# Patient Record
Sex: FEMALE | Race: WHITE | NOT HISPANIC OR LATINO | Employment: OTHER | URBAN - METROPOLITAN AREA
[De-identification: names, ages, dates, MRNs, and addresses within clinical notes are randomized per-mention and may not be internally consistent; named-entity substitution may affect disease eponyms.]

---

## 2017-04-18 ENCOUNTER — HISTORICAL (OUTPATIENT)
Dept: RESPIRATORY THERAPY | Facility: HOSPITAL | Age: 62
End: 2017-04-18

## 2018-07-02 ENCOUNTER — HISTORICAL (OUTPATIENT)
Dept: LAB | Facility: HOSPITAL | Age: 63
End: 2018-07-02

## 2018-07-02 LAB
INR PPP: 2.15 (ref 2–3)
PROTHROMBIN TIME: 21.2 SECOND(S) (ref 9.3–11.4)

## 2018-07-09 ENCOUNTER — HISTORICAL (OUTPATIENT)
Dept: LAB | Facility: HOSPITAL | Age: 63
End: 2018-07-09

## 2018-07-09 LAB
INR PPP: 3.95 (ref 2–3)
PROTHROMBIN TIME: 37.1 SECOND(S) (ref 9.3–11.4)

## 2018-07-18 ENCOUNTER — HISTORICAL (OUTPATIENT)
Dept: LAB | Facility: HOSPITAL | Age: 63
End: 2018-07-18

## 2018-07-18 LAB
INR PPP: 2.71 (ref 2–3)
PROTHROMBIN TIME: 26.2 SECOND(S) (ref 9.3–11.4)

## 2018-09-07 ENCOUNTER — HISTORICAL (OUTPATIENT)
Dept: LAB | Facility: HOSPITAL | Age: 63
End: 2018-09-07

## 2018-09-07 LAB
INR PPP: 2.79 (ref 2–3)
PROTHROMBIN TIME: 26.9 SECOND(S) (ref 9.3–11.4)

## 2018-12-21 ENCOUNTER — HISTORICAL (OUTPATIENT)
Dept: LAB | Facility: HOSPITAL | Age: 63
End: 2018-12-21

## 2018-12-21 LAB
INR PPP: 3.24 (ref 2–3)
PROTHROMBIN TIME: 30.9 SECOND(S) (ref 9.3–11.4)

## 2019-01-25 ENCOUNTER — HISTORICAL (OUTPATIENT)
Dept: LAB | Facility: HOSPITAL | Age: 64
End: 2019-01-25

## 2019-01-25 LAB
INR PPP: 3.61 (ref 2–3)
PROTHROMBIN TIME: 34.1 SECOND(S) (ref 9.3–11.4)

## 2019-02-01 ENCOUNTER — HISTORICAL (OUTPATIENT)
Dept: LAB | Facility: HOSPITAL | Age: 64
End: 2019-02-01

## 2019-02-01 LAB
INR PPP: 2.95 (ref 2–3)
PROTHROMBIN TIME: 28.4 SECOND(S) (ref 9.3–11.4)

## 2019-02-15 ENCOUNTER — HISTORICAL (OUTPATIENT)
Dept: LAB | Facility: HOSPITAL | Age: 64
End: 2019-02-15

## 2019-02-15 LAB
INR PPP: 2.85 (ref 2–3)
PROTHROMBIN TIME: 27.4 SECOND(S) (ref 9.3–11.4)

## 2019-03-26 ENCOUNTER — HISTORICAL (OUTPATIENT)
Dept: LAB | Facility: HOSPITAL | Age: 64
End: 2019-03-26

## 2019-03-26 LAB
INR PPP: 2.11 (ref 2–3)
PROTHROMBIN TIME: 20.8 SECOND(S) (ref 9.3–11.4)

## 2019-05-24 ENCOUNTER — HISTORICAL (OUTPATIENT)
Dept: LAB | Facility: HOSPITAL | Age: 64
End: 2019-05-24

## 2019-05-24 LAB
INR PPP: 2.99 (ref 2–3)
PROTHROMBIN TIME: 28.7 SECOND(S) (ref 9.3–11.4)

## 2019-07-15 ENCOUNTER — HISTORICAL (OUTPATIENT)
Dept: LAB | Facility: HOSPITAL | Age: 64
End: 2019-07-15

## 2019-07-15 LAB
ALBUMIN SERPL-MCNC: 3.9 GM/DL (ref 3.4–5)
ALBUMIN/GLOB SERPL: 1.1 RATIO (ref 1.1–2)
ALP SERPL-CCNC: 125 UNIT/L (ref 46–116)
ALT SERPL-CCNC: 24 UNIT/L (ref 12–78)
AST SERPL-CCNC: 18 UNIT/L (ref 15–37)
BILIRUB SERPL-MCNC: 0.6 MG/DL (ref 0.2–1)
BILIRUBIN DIRECT+TOT PNL SERPL-MCNC: 0.13 MG/DL (ref 0–0.2)
BILIRUBIN DIRECT+TOT PNL SERPL-MCNC: 0.47 MG/DL (ref 0–0.8)
BUN SERPL-MCNC: 15.2 MG/DL (ref 7–18)
CALCIUM SERPL-MCNC: 8.9 MG/DL (ref 8.5–10.1)
CHLORIDE SERPL-SCNC: 105 MMOL/L (ref 98–107)
CHOLEST SERPL-MCNC: 127 MG/DL (ref 0–200)
CHOLEST/HDLC SERPL: 2.6 {RATIO} (ref 0–4)
CO2 SERPL-SCNC: 28.5 MMOL/L (ref 21–32)
CREAT SERPL-MCNC: 0.78 MG/DL (ref 0.6–1.3)
GLOBULIN SER-MCNC: 3.4 GM/DL (ref 2.4–3.5)
GLUCOSE SERPL-MCNC: 138 MG/DL (ref 74–106)
HDLC SERPL-MCNC: 49 MG/DL (ref 40–60)
INR PPP: 3.04 (ref 2–3)
LDLC SERPL CALC-MCNC: 64 MG/DL (ref 0–129)
POTASSIUM SERPL-SCNC: 4.6 MMOL/L (ref 3.5–5.1)
PROT SERPL-MCNC: 7.3 GM/DL (ref 6.4–8.2)
PROTHROMBIN TIME: 29.1 SECOND(S) (ref 9.3–11.4)
SODIUM SERPL-SCNC: 144 MMOL/L (ref 136–145)
TRIGL SERPL-MCNC: 68 MG/DL
VLDLC SERPL CALC-MCNC: 14 MG/DL

## 2019-09-26 ENCOUNTER — HISTORICAL (OUTPATIENT)
Dept: LAB | Facility: HOSPITAL | Age: 64
End: 2019-09-26

## 2019-09-26 LAB
INR PPP: 2.76 (ref 2–3)
PROTHROMBIN TIME: 26.7 SECOND(S) (ref 9.3–11.4)

## 2019-11-21 ENCOUNTER — HISTORICAL (OUTPATIENT)
Dept: LAB | Facility: HOSPITAL | Age: 64
End: 2019-11-21

## 2019-11-21 LAB
INR PPP: 2.89 (ref 2–3)
PROTHROMBIN TIME: 27.8 SECOND(S) (ref 9.3–11.4)

## 2020-01-07 ENCOUNTER — HISTORICAL (OUTPATIENT)
Dept: LAB | Facility: HOSPITAL | Age: 65
End: 2020-01-07

## 2020-01-07 LAB
INR PPP: 2.11 (ref 2–3)
PROTHROMBIN TIME: 20.8 SECOND(S) (ref 9.3–11.4)

## 2020-02-10 ENCOUNTER — HISTORICAL (OUTPATIENT)
Dept: LAB | Facility: HOSPITAL | Age: 65
End: 2020-02-10

## 2020-02-10 LAB
INR PPP: 4.62 (ref 2–3)
PROTHROMBIN TIME: 42.7 SECOND(S) (ref 9.3–11.4)

## 2020-02-12 ENCOUNTER — HISTORICAL (OUTPATIENT)
Dept: LAB | Facility: HOSPITAL | Age: 65
End: 2020-02-12

## 2020-02-12 LAB
INR PPP: 1.66 (ref 2–3)
PROTHROMBIN TIME: 16.7 SECOND(S) (ref 9.3–11.4)

## 2020-02-14 ENCOUNTER — HISTORICAL (OUTPATIENT)
Dept: LAB | Facility: HOSPITAL | Age: 65
End: 2020-02-14

## 2020-02-14 LAB
INR PPP: 1.84 (ref 2–3)
PROTHROMBIN TIME: 18.4 SECOND(S) (ref 9.3–11.4)

## 2020-02-28 ENCOUNTER — HISTORICAL (OUTPATIENT)
Dept: LAB | Facility: HOSPITAL | Age: 65
End: 2020-02-28

## 2020-02-28 LAB
INR PPP: 3.63 (ref 2–3)
PROTHROMBIN TIME: 34.2 SECOND(S) (ref 9.3–11.4)

## 2020-03-10 ENCOUNTER — HISTORICAL (OUTPATIENT)
Dept: LAB | Facility: HOSPITAL | Age: 65
End: 2020-03-10

## 2020-03-10 LAB
INR PPP: 4.41 (ref 2–3)
PROTHROMBIN TIME: 40.9 SECOND(S) (ref 9.3–11.4)

## 2020-03-16 ENCOUNTER — HISTORICAL (OUTPATIENT)
Dept: LAB | Facility: HOSPITAL | Age: 65
End: 2020-03-16

## 2020-03-16 LAB
INR PPP: 2.9 (ref 0–1.3)
PROTHROMBIN TIME: 29.4 SECOND(S) (ref 11.1–13.7)

## 2020-04-02 ENCOUNTER — HISTORICAL (OUTPATIENT)
Dept: LAB | Facility: HOSPITAL | Age: 65
End: 2020-04-02

## 2020-04-02 LAB
INR PPP: 3.6 (ref 0–1.3)
PROTHROMBIN TIME: 35.3 SECOND(S) (ref 11.1–13.7)

## 2020-04-16 ENCOUNTER — HISTORICAL (OUTPATIENT)
Dept: LAB | Facility: HOSPITAL | Age: 65
End: 2020-04-16

## 2020-04-16 LAB
BUN SERPL-MCNC: 19.8 MG/DL (ref 7–18)
CALCIUM SERPL-MCNC: 9 MG/DL (ref 8.5–10.1)
CHLORIDE SERPL-SCNC: 103 MMOL/L (ref 98–107)
CO2 SERPL-SCNC: 29.2 MMOL/L (ref 21–32)
CREAT SERPL-MCNC: 0.7 MG/DL (ref 0.6–1.3)
CREAT/UREA NIT SERPL: 28
GLUCOSE SERPL-MCNC: 107 MG/DL (ref 74–106)
INR PPP: 3.3 (ref 0–1.3)
POTASSIUM SERPL-SCNC: 5 MMOL/L (ref 3.5–5.1)
PROTHROMBIN TIME: 32.9 SECOND(S) (ref 11.1–13.7)
SODIUM SERPL-SCNC: 139 MMOL/L (ref 136–145)

## 2020-05-15 ENCOUNTER — HISTORICAL (OUTPATIENT)
Dept: LAB | Facility: HOSPITAL | Age: 65
End: 2020-05-15

## 2020-05-15 LAB
INR PPP: 2.7 (ref 0–1.3)
PROTHROMBIN TIME: 27.7 SECOND(S) (ref 11.1–13.7)

## 2020-06-18 ENCOUNTER — HISTORICAL (OUTPATIENT)
Dept: LAB | Facility: HOSPITAL | Age: 65
End: 2020-06-18

## 2020-06-18 LAB
INR PPP: 3.3 (ref 0–1.3)
PROTHROMBIN TIME: 33 SECOND(S) (ref 11.1–13.7)

## 2020-07-07 ENCOUNTER — HISTORICAL (OUTPATIENT)
Dept: LAB | Facility: HOSPITAL | Age: 65
End: 2020-07-07

## 2020-07-07 LAB
INR PPP: 2.5 (ref 0–1.3)
PROTHROMBIN TIME: 25.9 SECOND(S) (ref 11.1–13.7)

## 2020-07-30 ENCOUNTER — HISTORICAL (OUTPATIENT)
Dept: LAB | Facility: HOSPITAL | Age: 65
End: 2020-07-30

## 2020-07-30 LAB
INR PPP: 2.3 (ref 0–1.3)
PROTHROMBIN TIME: 24.9 SECOND(S) (ref 11.1–13.7)

## 2020-09-14 ENCOUNTER — HISTORICAL (OUTPATIENT)
Dept: LAB | Facility: HOSPITAL | Age: 65
End: 2020-09-14

## 2020-09-14 LAB
INR PPP: 2.4 (ref 0–1.3)
PROTHROMBIN TIME: 25.7 SECOND(S) (ref 11.1–13.7)

## 2020-10-26 ENCOUNTER — HISTORICAL (OUTPATIENT)
Dept: LAB | Facility: HOSPITAL | Age: 65
End: 2020-10-26

## 2020-10-26 LAB
INR PPP: 2.5 (ref 0–1.3)
PROTHROMBIN TIME: 26.8 SECOND(S) (ref 11.1–13.7)

## 2020-12-03 ENCOUNTER — HISTORICAL (OUTPATIENT)
Dept: LAB | Facility: HOSPITAL | Age: 65
End: 2020-12-03

## 2020-12-03 LAB
INR PPP: 1.5 (ref 0–1.3)
PROTHROMBIN TIME: 17.4 SECOND(S) (ref 11.1–13.7)

## 2020-12-08 ENCOUNTER — HISTORICAL (OUTPATIENT)
Dept: LAB | Facility: HOSPITAL | Age: 65
End: 2020-12-08

## 2020-12-08 LAB
INR PPP: 1.8 (ref 0–1.3)
PROTHROMBIN TIME: 20.6 SECOND(S) (ref 11.1–13.7)

## 2020-12-15 ENCOUNTER — HISTORICAL (OUTPATIENT)
Dept: LAB | Facility: HOSPITAL | Age: 65
End: 2020-12-15

## 2020-12-15 LAB
INR PPP: 2.7 (ref 0–1.27)
PROTHROMBIN TIME: 28.1 SECOND(S) (ref 11.1–13.7)

## 2020-12-22 ENCOUNTER — HISTORICAL (OUTPATIENT)
Dept: LAB | Facility: HOSPITAL | Age: 65
End: 2020-12-22

## 2020-12-22 LAB
INR PPP: 2.7 (ref 0–1.3)
PROTHROMBIN TIME: 28.5 SECOND(S) (ref 11.1–13.7)

## 2021-01-15 ENCOUNTER — HISTORICAL (OUTPATIENT)
Dept: LAB | Facility: HOSPITAL | Age: 66
End: 2021-01-15

## 2021-01-15 LAB
INR PPP: 1.6 (ref 0–1.3)
PROTHROMBIN TIME: 18.7 SECOND(S) (ref 11.1–13.7)

## 2021-01-20 ENCOUNTER — HISTORICAL (OUTPATIENT)
Dept: LAB | Facility: HOSPITAL | Age: 66
End: 2021-01-20

## 2021-01-20 LAB
INR PPP: 1.2 (ref 0–1.3)
PROTHROMBIN TIME: 14.6 SECOND(S) (ref 11.1–13.7)

## 2021-01-22 ENCOUNTER — HISTORICAL (OUTPATIENT)
Dept: LAB | Facility: HOSPITAL | Age: 66
End: 2021-01-22

## 2021-01-22 LAB
INR PPP: 1.9 (ref 0–1.3)
PROTHROMBIN TIME: 21.6 SECOND(S) (ref 11.1–13.7)

## 2021-02-03 ENCOUNTER — HISTORICAL (OUTPATIENT)
Dept: LAB | Facility: HOSPITAL | Age: 66
End: 2021-02-03

## 2021-02-03 LAB
INR PPP: 1.6 (ref 0–1.3)
PROTHROMBIN TIME: 19.2 SECOND(S) (ref 11.1–13.7)

## 2021-02-09 ENCOUNTER — HISTORICAL (OUTPATIENT)
Dept: LAB | Facility: HOSPITAL | Age: 66
End: 2021-02-09

## 2021-02-09 LAB
INR PPP: 1.6 (ref 0–1.3)
PROTHROMBIN TIME: 18.6 SECOND(S) (ref 11.1–13.7)

## 2021-02-18 ENCOUNTER — HISTORICAL (OUTPATIENT)
Dept: LAB | Facility: HOSPITAL | Age: 66
End: 2021-02-18

## 2021-02-18 LAB
INR PPP: 2.1 (ref 0–1.3)
PROTHROMBIN TIME: 23.1 SECOND(S) (ref 11.1–13.7)

## 2021-03-05 ENCOUNTER — HISTORICAL (OUTPATIENT)
Dept: LAB | Facility: HOSPITAL | Age: 66
End: 2021-03-05

## 2021-03-05 LAB
INR PPP: 2.4 (ref 0–1.3)
PROTHROMBIN TIME: 26 SECOND(S) (ref 11.1–13.7)

## 2021-03-15 ENCOUNTER — HISTORICAL (OUTPATIENT)
Dept: LAB | Facility: HOSPITAL | Age: 66
End: 2021-03-15

## 2021-03-15 LAB
INR PPP: 3.9 (ref 0–1.3)
PROTHROMBIN TIME: 37.7 SECOND(S) (ref 11.1–13.7)

## 2021-03-19 ENCOUNTER — HISTORICAL (OUTPATIENT)
Dept: LAB | Facility: HOSPITAL | Age: 66
End: 2021-03-19

## 2021-03-19 LAB
INR PPP: 4.19 (ref 2–3)
PROTHROMBIN TIME: 39.1 SECOND(S) (ref 9.3–11.4)

## 2021-03-23 ENCOUNTER — HISTORICAL (OUTPATIENT)
Dept: LAB | Facility: HOSPITAL | Age: 66
End: 2021-03-23

## 2021-03-23 LAB
INR PPP: 3.8 (ref 2–3)
PROTHROMBIN TIME: 35.7 SECOND(S) (ref 9.3–11.4)

## 2021-03-31 ENCOUNTER — HISTORICAL (OUTPATIENT)
Dept: LAB | Facility: HOSPITAL | Age: 66
End: 2021-03-31

## 2021-03-31 LAB
INR PPP: 1.3 (ref 2–3)
PROTHROMBIN TIME: 13.3 SECOND(S) (ref 9.3–11.4)

## 2021-04-02 ENCOUNTER — HISTORICAL (OUTPATIENT)
Dept: LAB | Facility: HOSPITAL | Age: 66
End: 2021-04-02

## 2021-04-02 LAB
INR PPP: 2.01 (ref 2–3)
PROTHROMBIN TIME: 20 SECOND(S) (ref 9.3–11.4)

## 2021-04-12 ENCOUNTER — HISTORICAL (OUTPATIENT)
Dept: LAB | Facility: HOSPITAL | Age: 66
End: 2021-04-12

## 2021-04-12 LAB
INR PPP: 2.09 (ref 2–3)
PROTHROMBIN TIME: 20.7 SECOND(S) (ref 9.3–11.4)

## 2021-05-06 ENCOUNTER — HISTORICAL (OUTPATIENT)
Dept: ADMINISTRATIVE | Facility: HOSPITAL | Age: 66
End: 2021-05-06

## 2021-05-25 ENCOUNTER — HISTORICAL (OUTPATIENT)
Dept: ADMINISTRATIVE | Facility: HOSPITAL | Age: 66
End: 2021-05-25

## 2021-06-08 ENCOUNTER — HISTORICAL (OUTPATIENT)
Dept: ADMINISTRATIVE | Facility: HOSPITAL | Age: 66
End: 2021-06-08

## 2021-06-24 ENCOUNTER — HISTORICAL (OUTPATIENT)
Dept: ADMINISTRATIVE | Facility: HOSPITAL | Age: 66
End: 2021-06-24

## 2021-07-15 ENCOUNTER — HISTORICAL (OUTPATIENT)
Dept: ADMINISTRATIVE | Facility: HOSPITAL | Age: 66
End: 2021-07-15

## 2021-08-05 ENCOUNTER — HISTORICAL (OUTPATIENT)
Dept: ADMINISTRATIVE | Facility: HOSPITAL | Age: 66
End: 2021-08-05

## 2021-09-07 ENCOUNTER — HISTORICAL (OUTPATIENT)
Dept: ADMINISTRATIVE | Facility: HOSPITAL | Age: 66
End: 2021-09-07

## 2021-10-12 ENCOUNTER — HISTORICAL (OUTPATIENT)
Dept: ADMINISTRATIVE | Facility: HOSPITAL | Age: 66
End: 2021-10-12

## 2021-11-09 ENCOUNTER — HISTORICAL (OUTPATIENT)
Dept: ADMINISTRATIVE | Facility: HOSPITAL | Age: 66
End: 2021-11-09

## 2021-11-30 ENCOUNTER — HISTORICAL (OUTPATIENT)
Dept: ADMINISTRATIVE | Facility: HOSPITAL | Age: 66
End: 2021-11-30

## 2021-12-28 ENCOUNTER — HISTORICAL (OUTPATIENT)
Dept: ADMINISTRATIVE | Facility: HOSPITAL | Age: 66
End: 2021-12-28

## 2022-01-20 ENCOUNTER — HISTORICAL (OUTPATIENT)
Dept: ADMINISTRATIVE | Facility: HOSPITAL | Age: 67
End: 2022-01-20

## 2022-02-24 ENCOUNTER — HISTORICAL (OUTPATIENT)
Dept: ADMINISTRATIVE | Facility: HOSPITAL | Age: 67
End: 2022-02-24

## 2022-04-05 ENCOUNTER — HISTORICAL (OUTPATIENT)
Dept: ADMINISTRATIVE | Facility: HOSPITAL | Age: 67
End: 2022-04-05

## 2022-05-02 DIAGNOSIS — Z95.2 S/P AVR (AORTIC VALVE REPLACEMENT): Primary | ICD-10-CM

## 2022-05-09 DIAGNOSIS — Z95.2 S/P AVR (AORTIC VALVE REPLACEMENT): Primary | ICD-10-CM

## 2022-05-10 ENCOUNTER — ANTI-COAG VISIT (OUTPATIENT)
Dept: CARDIOLOGY | Facility: HOSPITAL | Age: 67
End: 2022-05-10
Payer: MEDICARE

## 2022-05-10 DIAGNOSIS — Z95.2 S/P AVR (AORTIC VALVE REPLACEMENT): ICD-10-CM

## 2022-05-10 LAB — INR PPP: 4 (ref 2–3)

## 2022-05-10 PROCEDURE — 99211 OFF/OP EST MAY X REQ PHY/QHP: CPT

## 2022-05-10 PROCEDURE — 85610 PROTHROMBIN TIME: CPT

## 2022-05-31 ENCOUNTER — ANTI-COAG VISIT (OUTPATIENT)
Dept: CARDIOLOGY | Facility: HOSPITAL | Age: 67
End: 2022-05-31
Payer: MEDICARE

## 2022-05-31 LAB — INR PPP: 3.6 (ref 2–3)

## 2022-05-31 PROCEDURE — 85610 PROTHROMBIN TIME: CPT

## 2022-06-21 ENCOUNTER — ANTI-COAG VISIT (OUTPATIENT)
Dept: CARDIOLOGY | Facility: HOSPITAL | Age: 67
End: 2022-06-21
Payer: MEDICARE

## 2022-06-21 LAB — INR PPP: 2.2 (ref 2–3)

## 2022-06-21 PROCEDURE — 99211 OFF/OP EST MAY X REQ PHY/QHP: CPT

## 2022-06-21 PROCEDURE — 85610 PROTHROMBIN TIME: CPT

## 2022-06-27 DIAGNOSIS — I48.0 PAROXYSMAL ATRIAL FIBRILLATION: Primary | ICD-10-CM

## 2022-07-12 ENCOUNTER — ANTI-COAG VISIT (OUTPATIENT)
Dept: CARDIOLOGY | Facility: HOSPITAL | Age: 67
End: 2022-07-12
Payer: MEDICARE

## 2022-07-12 DIAGNOSIS — I48.0 PAROXYSMAL ATRIAL FIBRILLATION: ICD-10-CM

## 2022-07-12 LAB — INR PPP: 3.3 (ref 2–3)

## 2022-07-12 PROCEDURE — 99211 OFF/OP EST MAY X REQ PHY/QHP: CPT

## 2022-07-12 PROCEDURE — 85610 PROTHROMBIN TIME: CPT

## 2022-08-09 ENCOUNTER — ANTI-COAG VISIT (OUTPATIENT)
Dept: CARDIOLOGY | Facility: HOSPITAL | Age: 67
End: 2022-08-09
Payer: MEDICARE

## 2022-08-09 DIAGNOSIS — I48.0 PAROXYSMAL ATRIAL FIBRILLATION: ICD-10-CM

## 2022-08-09 LAB — INR PPP: 4.2 (ref 2–3)

## 2022-08-09 PROCEDURE — 85610 PROTHROMBIN TIME: CPT

## 2022-08-09 PROCEDURE — 99211 OFF/OP EST MAY X REQ PHY/QHP: CPT

## 2022-09-06 ENCOUNTER — ANTI-COAG VISIT (OUTPATIENT)
Dept: CARDIOLOGY | Facility: HOSPITAL | Age: 67
End: 2022-09-06
Payer: MEDICARE

## 2022-09-06 DIAGNOSIS — I48.0 PAROXYSMAL ATRIAL FIBRILLATION: ICD-10-CM

## 2022-09-06 LAB — INR PPP: 2.3 (ref 2–3)

## 2022-09-06 PROCEDURE — 99211 OFF/OP EST MAY X REQ PHY/QHP: CPT

## 2022-09-06 PROCEDURE — 85610 PROTHROMBIN TIME: CPT

## 2022-10-25 ENCOUNTER — ANTI-COAG VISIT (OUTPATIENT)
Dept: CARDIOLOGY | Facility: HOSPITAL | Age: 67
End: 2022-10-25
Payer: MEDICARE

## 2022-10-25 DIAGNOSIS — I48.0 PAROXYSMAL ATRIAL FIBRILLATION: ICD-10-CM

## 2022-10-25 LAB — INR PPP: 1.6 (ref 2–3)

## 2022-10-25 PROCEDURE — 99211 OFF/OP EST MAY X REQ PHY/QHP: CPT

## 2022-10-25 PROCEDURE — 85610 PROTHROMBIN TIME: CPT

## 2022-11-03 ENCOUNTER — ANTI-COAG VISIT (OUTPATIENT)
Dept: CARDIOLOGY | Facility: HOSPITAL | Age: 67
End: 2022-11-03
Payer: MEDICARE

## 2022-11-03 DIAGNOSIS — I48.0 PAROXYSMAL ATRIAL FIBRILLATION: ICD-10-CM

## 2022-11-03 LAB — INR PPP: 2.7 (ref 2–3)

## 2022-11-03 PROCEDURE — 85610 PROTHROMBIN TIME: CPT

## 2022-11-03 PROCEDURE — 99211 OFF/OP EST MAY X REQ PHY/QHP: CPT

## 2022-12-06 ENCOUNTER — ANTI-COAG VISIT (OUTPATIENT)
Dept: CARDIOLOGY | Facility: HOSPITAL | Age: 67
End: 2022-12-06
Payer: MEDICARE

## 2022-12-06 DIAGNOSIS — I48.0 PAROXYSMAL ATRIAL FIBRILLATION: ICD-10-CM

## 2022-12-06 LAB — INR PPP: 3.1 (ref 2–3)

## 2022-12-06 PROCEDURE — 99211 OFF/OP EST MAY X REQ PHY/QHP: CPT

## 2022-12-06 PROCEDURE — 85610 PROTHROMBIN TIME: CPT

## 2022-12-29 DIAGNOSIS — Z95.2 S/P AVR (AORTIC VALVE REPLACEMENT): ICD-10-CM

## 2022-12-29 DIAGNOSIS — I48.91 A-FIB: Primary | ICD-10-CM

## 2023-01-10 ENCOUNTER — ANTI-COAG VISIT (OUTPATIENT)
Dept: CARDIOLOGY | Facility: HOSPITAL | Age: 68
End: 2023-01-10
Attending: INTERNAL MEDICINE
Payer: MEDICARE

## 2023-01-10 DIAGNOSIS — Z95.2 S/P AVR (AORTIC VALVE REPLACEMENT): ICD-10-CM

## 2023-01-10 DIAGNOSIS — I48.91 A-FIB: ICD-10-CM

## 2023-01-10 LAB — INR PPP: 2.6 (ref 2–3)

## 2023-01-10 PROCEDURE — 99211 OFF/OP EST MAY X REQ PHY/QHP: CPT

## 2023-01-10 PROCEDURE — 85610 PROTHROMBIN TIME: CPT

## 2023-02-17 ENCOUNTER — HOSPITAL ENCOUNTER (OUTPATIENT)
Facility: HOSPITAL | Age: 68
Discharge: HOME | End: 2023-02-20
Attending: EMERGENCY MEDICINE | Admitting: STUDENT IN AN ORGANIZED HEALTH CARE EDUCATION/TRAINING PROGRAM
Payer: MEDICARE

## 2023-02-17 DIAGNOSIS — R06.02 SHORTNESS OF BREATH: ICD-10-CM

## 2023-02-17 DIAGNOSIS — R07.9 CHEST PAIN: ICD-10-CM

## 2023-02-17 DIAGNOSIS — I50.21 ACUTE SYSTOLIC CONGESTIVE HEART FAILURE: ICD-10-CM

## 2023-02-17 DIAGNOSIS — J18.9 COMMUNITY ACQUIRED PNEUMONIA, UNSPECIFIED LATERALITY: ICD-10-CM

## 2023-02-17 DIAGNOSIS — R09.02 HYPOXIA: Primary | ICD-10-CM

## 2023-02-17 DIAGNOSIS — R79.89 TROPONIN LEVEL ELEVATED: ICD-10-CM

## 2023-02-17 LAB
ALBUMIN SERPL-MCNC: 3.8 G/DL (ref 3.4–4.8)
ALBUMIN/GLOB SERPL: 1 RATIO (ref 1.1–2)
ALP SERPL-CCNC: 91 UNIT/L (ref 40–150)
ALT SERPL-CCNC: 29 UNIT/L (ref 0–55)
AST SERPL-CCNC: 33 UNIT/L (ref 5–34)
BASOPHILS # BLD AUTO: 0.03 X10(3)/MCL (ref 0–0.2)
BASOPHILS NFR BLD AUTO: 0.5 %
BILIRUBIN DIRECT+TOT PNL SERPL-MCNC: 0.4 MG/DL
BNP BLD-MCNC: 116.3 PG/ML
BUN SERPL-MCNC: 21.5 MG/DL (ref 9.8–20.1)
CALCIUM SERPL-MCNC: 8.9 MG/DL (ref 8.4–10.2)
CHLORIDE SERPL-SCNC: 97 MMOL/L (ref 98–107)
CO2 SERPL-SCNC: 30 MMOL/L (ref 23–31)
CREAT SERPL-MCNC: 0.85 MG/DL (ref 0.55–1.02)
EOSINOPHIL # BLD AUTO: 0.08 X10(3)/MCL (ref 0–0.9)
EOSINOPHIL NFR BLD AUTO: 1.4 %
ERYTHROCYTE [DISTWIDTH] IN BLOOD BY AUTOMATED COUNT: 13.5 % (ref 11.5–17)
GFR SERPLBLD CREATININE-BSD FMLA CKD-EPI: >60 MLS/MIN/1.73/M2
GLOBULIN SER-MCNC: 3.7 GM/DL (ref 2.4–3.5)
GLUCOSE SERPL-MCNC: 173 MG/DL (ref 82–115)
HCT VFR BLD AUTO: 54.9 % (ref 37–47)
HGB BLD-MCNC: 17.6 G/DL (ref 12–16)
IMM GRANULOCYTES # BLD AUTO: 0.02 X10(3)/MCL (ref 0–0.04)
IMM GRANULOCYTES NFR BLD AUTO: 0.4 %
LYMPHOCYTES # BLD AUTO: 1.2 X10(3)/MCL (ref 0.6–4.6)
LYMPHOCYTES NFR BLD AUTO: 21.5 %
MCH RBC QN AUTO: 30.8 PG
MCHC RBC AUTO-ENTMCNC: 32.1 G/DL (ref 33–36)
MCV RBC AUTO: 96 FL (ref 80–94)
MONOCYTES # BLD AUTO: 0.59 X10(3)/MCL (ref 0.1–1.3)
MONOCYTES NFR BLD AUTO: 10.6 %
NEUTROPHILS # BLD AUTO: 3.67 X10(3)/MCL (ref 2.1–9.2)
NEUTROPHILS NFR BLD AUTO: 65.6 %
NRBC BLD AUTO-RTO: 0 %
PLATELET # BLD AUTO: 182 X10(3)/MCL (ref 130–400)
PMV BLD AUTO: 10 FL (ref 7.4–10.4)
POTASSIUM SERPL-SCNC: 4.1 MMOL/L (ref 3.5–5.1)
PROT SERPL-MCNC: 7.5 GM/DL (ref 5.8–7.6)
RBC # BLD AUTO: 5.72 X10(6)/MCL (ref 4.2–5.4)
SODIUM SERPL-SCNC: 138 MMOL/L (ref 136–145)
TROPONIN I SERPL-MCNC: 0.05 NG/ML (ref 0–0.04)
WBC # SPEC AUTO: 5.6 X10(3)/MCL (ref 4.5–11.5)

## 2023-02-17 PROCEDURE — 25000003 PHARM REV CODE 250: Performed by: EMERGENCY MEDICINE

## 2023-02-17 PROCEDURE — 93005 ELECTROCARDIOGRAM TRACING: CPT

## 2023-02-17 PROCEDURE — 94761 N-INVAS EAR/PLS OXIMETRY MLT: CPT

## 2023-02-17 PROCEDURE — 82962 GLUCOSE BLOOD TEST: CPT

## 2023-02-17 PROCEDURE — 83880 ASSAY OF NATRIURETIC PEPTIDE: CPT | Performed by: EMERGENCY MEDICINE

## 2023-02-17 PROCEDURE — 80053 COMPREHEN METABOLIC PANEL: CPT | Performed by: EMERGENCY MEDICINE

## 2023-02-17 PROCEDURE — 85379 FIBRIN DEGRADATION QUANT: CPT | Performed by: STUDENT IN AN ORGANIZED HEALTH CARE EDUCATION/TRAINING PROGRAM

## 2023-02-17 PROCEDURE — 85025 COMPLETE CBC W/AUTO DIFF WBC: CPT | Performed by: EMERGENCY MEDICINE

## 2023-02-17 PROCEDURE — 85610 PROTHROMBIN TIME: CPT | Performed by: EMERGENCY MEDICINE

## 2023-02-17 PROCEDURE — 84484 ASSAY OF TROPONIN QUANT: CPT | Performed by: EMERGENCY MEDICINE

## 2023-02-17 PROCEDURE — 63600175 PHARM REV CODE 636 W HCPCS: Performed by: EMERGENCY MEDICINE

## 2023-02-17 PROCEDURE — 99285 EMERGENCY DEPT VISIT HI MDM: CPT | Mod: 25

## 2023-02-17 PROCEDURE — 96375 TX/PRO/DX INJ NEW DRUG ADDON: CPT

## 2023-02-17 RX ORDER — FUROSEMIDE 10 MG/ML
40 INJECTION INTRAMUSCULAR; INTRAVENOUS
Status: COMPLETED | OUTPATIENT
Start: 2023-02-17 | End: 2023-02-17

## 2023-02-17 RX ADMIN — NITROGLYCERIN 1 INCH: 20 OINTMENT TOPICAL at 11:02

## 2023-02-17 RX ADMIN — FUROSEMIDE 40 MG: 10 INJECTION, SOLUTION INTRAMUSCULAR; INTRAVENOUS at 11:02

## 2023-02-18 PROBLEM — J18.9 CAP (COMMUNITY ACQUIRED PNEUMONIA): Status: ACTIVE | Noted: 2023-02-18

## 2023-02-18 LAB
ALBUMIN SERPL-MCNC: 3.9 G/DL (ref 3.4–4.8)
ALBUMIN/GLOB SERPL: 1 RATIO (ref 1.1–2)
ALP SERPL-CCNC: 88 UNIT/L (ref 40–150)
ALT SERPL-CCNC: 28 UNIT/L (ref 0–55)
AST SERPL-CCNC: 37 UNIT/L (ref 5–34)
B PERT.PT PRMT NPH QL NAA+NON-PROBE: NOT DETECTED
BASOPHILS # BLD AUTO: 0.02 X10(3)/MCL (ref 0–0.2)
BASOPHILS NFR BLD AUTO: 0.4 %
BILIRUBIN DIRECT+TOT PNL SERPL-MCNC: 0.4 MG/DL
BUN SERPL-MCNC: 22.3 MG/DL (ref 9.8–20.1)
C PNEUM DNA NPH QL NAA+NON-PROBE: NOT DETECTED
CALCIUM SERPL-MCNC: 9.3 MG/DL (ref 8.4–10.2)
CHLORIDE SERPL-SCNC: 97 MMOL/L (ref 98–107)
CO2 SERPL-SCNC: 29 MMOL/L (ref 23–31)
CREAT SERPL-MCNC: 0.86 MG/DL (ref 0.55–1.02)
D DIMER PPP IA.FEU-MCNC: 0.36 UG/ML FEU (ref 0–0.5)
EOSINOPHIL # BLD AUTO: 0.03 X10(3)/MCL (ref 0–0.9)
EOSINOPHIL NFR BLD AUTO: 0.6 %
ERYTHROCYTE [DISTWIDTH] IN BLOOD BY AUTOMATED COUNT: 13.6 % (ref 11.5–17)
FLUAV AG UPPER RESP QL IA.RAPID: NOT DETECTED
FLUBV AG UPPER RESP QL IA.RAPID: NOT DETECTED
GFR SERPLBLD CREATININE-BSD FMLA CKD-EPI: >60 MLS/MIN/1.73/M2
GLOBULIN SER-MCNC: 3.9 GM/DL (ref 2.4–3.5)
GLUCOSE SERPL-MCNC: 158 MG/DL (ref 82–115)
HADV DNA NPH QL NAA+NON-PROBE: NOT DETECTED
HCOV 229E RNA NPH QL NAA+NON-PROBE: NOT DETECTED
HCOV HKU1 RNA NPH QL NAA+NON-PROBE: NOT DETECTED
HCOV NL63 RNA NPH QL NAA+NON-PROBE: NOT DETECTED
HCOV OC43 RNA NPH QL NAA+NON-PROBE: NOT DETECTED
HCT VFR BLD AUTO: 54.4 % (ref 37–47)
HGB BLD-MCNC: 17.7 G/DL (ref 12–16)
HMPV RNA NPH QL NAA+NON-PROBE: DETECTED
HPIV1 RNA NPH QL NAA+NON-PROBE: NOT DETECTED
HPIV2 RNA NPH QL NAA+NON-PROBE: NOT DETECTED
HPIV3 RNA NPH QL NAA+NON-PROBE: NOT DETECTED
HPIV4 RNA NPH QL NAA+NON-PROBE: NOT DETECTED
IMM GRANULOCYTES # BLD AUTO: 0.01 X10(3)/MCL (ref 0–0.04)
IMM GRANULOCYTES NFR BLD AUTO: 0.2 %
LYMPHOCYTES # BLD AUTO: 0.96 X10(3)/MCL (ref 0.6–4.6)
LYMPHOCYTES NFR BLD AUTO: 18.5 %
M PNEUMO DNA NPH QL NAA+NON-PROBE: NOT DETECTED
MAGNESIUM SERPL-MCNC: 2.3 MG/DL (ref 1.6–2.6)
MCH RBC QN AUTO: 31.1 PG
MCHC RBC AUTO-ENTMCNC: 32.5 G/DL (ref 33–36)
MCV RBC AUTO: 95.6 FL (ref 80–94)
MONOCYTES # BLD AUTO: 0.45 X10(3)/MCL (ref 0.1–1.3)
MONOCYTES NFR BLD AUTO: 8.7 %
NEUTROPHILS # BLD AUTO: 3.72 X10(3)/MCL (ref 2.1–9.2)
NEUTROPHILS NFR BLD AUTO: 71.6 %
NRBC BLD AUTO-RTO: 0 %
PHOSPHATE SERPL-MCNC: 3.8 MG/DL (ref 2.3–4.7)
PLATELET # BLD AUTO: 190 X10(3)/MCL (ref 130–400)
PMV BLD AUTO: 10.3 FL (ref 7.4–10.4)
POCT GLUCOSE: 137 MG/DL (ref 70–110)
POCT GLUCOSE: 151 MG/DL (ref 70–110)
POCT GLUCOSE: 159 MG/DL (ref 70–110)
POCT GLUCOSE: 188 MG/DL (ref 70–110)
POCT GLUCOSE: 238 MG/DL (ref 70–110)
POTASSIUM SERPL-SCNC: 4.3 MMOL/L (ref 3.5–5.1)
PROT SERPL-MCNC: 7.8 GM/DL (ref 5.8–7.6)
RBC # BLD AUTO: 5.69 X10(6)/MCL (ref 4.2–5.4)
RSV RNA NPH QL NAA+NON-PROBE: NOT DETECTED
RV+EV RNA NPH QL NAA+NON-PROBE: NOT DETECTED
SARS-COV-2 RDRP RESP QL NAA+PROBE: NEGATIVE
SODIUM SERPL-SCNC: 137 MMOL/L (ref 136–145)
TROPONIN I SERPL-MCNC: 0.67 NG/ML (ref 0–0.04)
TROPONIN I SERPL-MCNC: 0.74 NG/ML (ref 0–0.04)
TROPONIN I SERPL-MCNC: 0.94 NG/ML (ref 0–0.04)
TROPONIN I SERPL-MCNC: 1.07 NG/ML (ref 0–0.04)
WBC # SPEC AUTO: 5.2 X10(3)/MCL (ref 4.5–11.5)

## 2023-02-18 PROCEDURE — 96365 THER/PROPH/DIAG IV INF INIT: CPT

## 2023-02-18 PROCEDURE — 94640 AIRWAY INHALATION TREATMENT: CPT | Mod: XB

## 2023-02-18 PROCEDURE — 93005 ELECTROCARDIOGRAM TRACING: CPT

## 2023-02-18 PROCEDURE — 85610 PROTHROMBIN TIME: CPT | Performed by: STUDENT IN AN ORGANIZED HEALTH CARE EDUCATION/TRAINING PROGRAM

## 2023-02-18 PROCEDURE — 84484 ASSAY OF TROPONIN QUANT: CPT | Mod: 91

## 2023-02-18 PROCEDURE — 96367 TX/PROPH/DG ADDL SEQ IV INF: CPT

## 2023-02-18 PROCEDURE — 84484 ASSAY OF TROPONIN QUANT: CPT | Performed by: STUDENT IN AN ORGANIZED HEALTH CARE EDUCATION/TRAINING PROGRAM

## 2023-02-18 PROCEDURE — 27000221 HC OXYGEN, UP TO 24 HOURS

## 2023-02-18 PROCEDURE — 96376 TX/PRO/DX INJ SAME DRUG ADON: CPT

## 2023-02-18 PROCEDURE — 84100 ASSAY OF PHOSPHORUS: CPT

## 2023-02-18 PROCEDURE — G0378 HOSPITAL OBSERVATION PER HR: HCPCS

## 2023-02-18 PROCEDURE — 87635 SARS-COV-2 COVID-19 AMP PRB: CPT | Performed by: EMERGENCY MEDICINE

## 2023-02-18 PROCEDURE — 81270 JAK2 GENE: CPT | Performed by: STUDENT IN AN ORGANIZED HEALTH CARE EDUCATION/TRAINING PROGRAM

## 2023-02-18 PROCEDURE — 85025 COMPLETE CBC W/AUTO DIFF WBC: CPT | Performed by: STUDENT IN AN ORGANIZED HEALTH CARE EDUCATION/TRAINING PROGRAM

## 2023-02-18 PROCEDURE — 87502 INFLUENZA DNA AMP PROBE: CPT | Mod: 59 | Performed by: STUDENT IN AN ORGANIZED HEALTH CARE EDUCATION/TRAINING PROGRAM

## 2023-02-18 PROCEDURE — 96368 THER/DIAG CONCURRENT INF: CPT

## 2023-02-18 PROCEDURE — 25000242 PHARM REV CODE 250 ALT 637 W/ HCPCS

## 2023-02-18 PROCEDURE — 94761 N-INVAS EAR/PLS OXIMETRY MLT: CPT

## 2023-02-18 PROCEDURE — 87798 DETECT AGENT NOS DNA AMP: CPT | Mod: 59 | Performed by: STUDENT IN AN ORGANIZED HEALTH CARE EDUCATION/TRAINING PROGRAM

## 2023-02-18 PROCEDURE — 96375 TX/PRO/DX INJ NEW DRUG ADDON: CPT

## 2023-02-18 PROCEDURE — 96372 THER/PROPH/DIAG INJ SC/IM: CPT | Mod: 59 | Performed by: STUDENT IN AN ORGANIZED HEALTH CARE EDUCATION/TRAINING PROGRAM

## 2023-02-18 PROCEDURE — 25000003 PHARM REV CODE 250: Performed by: STUDENT IN AN ORGANIZED HEALTH CARE EDUCATION/TRAINING PROGRAM

## 2023-02-18 PROCEDURE — 80053 COMPREHEN METABOLIC PANEL: CPT | Performed by: STUDENT IN AN ORGANIZED HEALTH CARE EDUCATION/TRAINING PROGRAM

## 2023-02-18 PROCEDURE — 87633 RESP VIRUS 12-25 TARGETS: CPT | Performed by: STUDENT IN AN ORGANIZED HEALTH CARE EDUCATION/TRAINING PROGRAM

## 2023-02-18 PROCEDURE — 87040 BLOOD CULTURE FOR BACTERIA: CPT | Mod: 59 | Performed by: STUDENT IN AN ORGANIZED HEALTH CARE EDUCATION/TRAINING PROGRAM

## 2023-02-18 PROCEDURE — 25000242 PHARM REV CODE 250 ALT 637 W/ HCPCS: Performed by: STUDENT IN AN ORGANIZED HEALTH CARE EDUCATION/TRAINING PROGRAM

## 2023-02-18 PROCEDURE — 96361 HYDRATE IV INFUSION ADD-ON: CPT

## 2023-02-18 PROCEDURE — 82668 ASSAY OF ERYTHROPOIETIN: CPT

## 2023-02-18 PROCEDURE — 82103 ALPHA-1-ANTITRYPSIN TOTAL: CPT | Performed by: STUDENT IN AN ORGANIZED HEALTH CARE EDUCATION/TRAINING PROGRAM

## 2023-02-18 PROCEDURE — 63600175 PHARM REV CODE 636 W HCPCS: Performed by: STUDENT IN AN ORGANIZED HEALTH CARE EDUCATION/TRAINING PROGRAM

## 2023-02-18 PROCEDURE — 83735 ASSAY OF MAGNESIUM: CPT

## 2023-02-18 RX ORDER — INSULIN ASPART 100 [IU]/ML
0-5 INJECTION, SOLUTION INTRAVENOUS; SUBCUTANEOUS
Status: DISCONTINUED | OUTPATIENT
Start: 2023-02-18 | End: 2023-02-20 | Stop reason: HOSPADM

## 2023-02-18 RX ORDER — IPRATROPIUM BROMIDE AND ALBUTEROL SULFATE 2.5; .5 MG/3ML; MG/3ML
SOLUTION RESPIRATORY (INHALATION)
Status: COMPLETED
Start: 2023-02-18 | End: 2023-02-18

## 2023-02-18 RX ORDER — SODIUM CHLORIDE 0.9 % (FLUSH) 0.9 %
10 SYRINGE (ML) INJECTION EVERY 12 HOURS PRN
Status: DISCONTINUED | OUTPATIENT
Start: 2023-02-18 | End: 2023-02-20 | Stop reason: HOSPADM

## 2023-02-18 RX ORDER — ATORVASTATIN CALCIUM 40 MG/1
40 TABLET, FILM COATED ORAL NIGHTLY
Status: DISCONTINUED | OUTPATIENT
Start: 2023-02-18 | End: 2023-02-20 | Stop reason: HOSPADM

## 2023-02-18 RX ORDER — FLUOXETINE HYDROCHLORIDE 20 MG/1
40 CAPSULE ORAL DAILY
Status: DISCONTINUED | OUTPATIENT
Start: 2023-02-18 | End: 2023-02-20 | Stop reason: HOSPADM

## 2023-02-18 RX ORDER — WARFARIN SODIUM 5 MG/1
5 TABLET ORAL ONCE
Status: COMPLETED | OUTPATIENT
Start: 2023-02-18 | End: 2023-02-18

## 2023-02-18 RX ORDER — GLUCAGON 1 MG
1 KIT INJECTION
Status: DISCONTINUED | OUTPATIENT
Start: 2023-02-18 | End: 2023-02-20 | Stop reason: HOSPADM

## 2023-02-18 RX ORDER — HYDROCHLOROTHIAZIDE 12.5 MG/1
12.5 TABLET ORAL DAILY
Status: DISCONTINUED | OUTPATIENT
Start: 2023-02-18 | End: 2023-02-20 | Stop reason: HOSPADM

## 2023-02-18 RX ORDER — IPRATROPIUM BROMIDE AND ALBUTEROL SULFATE 2.5; .5 MG/3ML; MG/3ML
3 SOLUTION RESPIRATORY (INHALATION) EVERY 6 HOURS
Status: DISCONTINUED | OUTPATIENT
Start: 2023-02-18 | End: 2023-02-20 | Stop reason: HOSPADM

## 2023-02-18 RX ORDER — ISOSORBIDE MONONITRATE 30 MG/1
60 TABLET, EXTENDED RELEASE ORAL DAILY
Status: DISCONTINUED | OUTPATIENT
Start: 2023-02-18 | End: 2023-02-20 | Stop reason: HOSPADM

## 2023-02-18 RX ORDER — DEXTROSE 40 %
30 GEL (GRAM) ORAL
Status: DISCONTINUED | OUTPATIENT
Start: 2023-02-18 | End: 2023-02-20 | Stop reason: HOSPADM

## 2023-02-18 RX ORDER — AMLODIPINE BESYLATE 5 MG/1
5 TABLET ORAL DAILY
Status: DISCONTINUED | OUTPATIENT
Start: 2023-02-18 | End: 2023-02-20 | Stop reason: HOSPADM

## 2023-02-18 RX ORDER — DEXTROSE 40 %
15 GEL (GRAM) ORAL
Status: DISCONTINUED | OUTPATIENT
Start: 2023-02-18 | End: 2023-02-20 | Stop reason: HOSPADM

## 2023-02-18 RX ORDER — NAPROXEN SODIUM 220 MG/1
81 TABLET, FILM COATED ORAL DAILY
Status: DISCONTINUED | OUTPATIENT
Start: 2023-02-18 | End: 2023-02-20 | Stop reason: HOSPADM

## 2023-02-18 RX ORDER — SOTALOL HYDROCHLORIDE 80 MG/1
80 TABLET ORAL 2 TIMES DAILY
Status: DISCONTINUED | OUTPATIENT
Start: 2023-02-18 | End: 2023-02-20 | Stop reason: HOSPADM

## 2023-02-18 RX ORDER — SODIUM CHLORIDE, SODIUM LACTATE, POTASSIUM CHLORIDE, CALCIUM CHLORIDE 600; 310; 30; 20 MG/100ML; MG/100ML; MG/100ML; MG/100ML
INJECTION, SOLUTION INTRAVENOUS CONTINUOUS
Status: DISCONTINUED | OUTPATIENT
Start: 2023-02-18 | End: 2023-02-18

## 2023-02-18 RX ADMIN — HYDROCHLOROTHIAZIDE 12.5 MG: 12.5 TABLET ORAL at 08:02

## 2023-02-18 RX ADMIN — ASPIRIN 81 MG: 81 TABLET, CHEWABLE ORAL at 08:02

## 2023-02-18 RX ADMIN — SODIUM CHLORIDE, POTASSIUM CHLORIDE, SODIUM LACTATE AND CALCIUM CHLORIDE: 600; 310; 30; 20 INJECTION, SOLUTION INTRAVENOUS at 04:02

## 2023-02-18 RX ADMIN — OLANZAPINE 15 MG: 5 TABLET, FILM COATED ORAL at 11:02

## 2023-02-18 RX ADMIN — SOTALOL HYDROCHLORIDE 80 MG: 80 TABLET ORAL at 10:02

## 2023-02-18 RX ADMIN — SOTALOL HYDROCHLORIDE 80 MG: 80 TABLET ORAL at 08:02

## 2023-02-18 RX ADMIN — INSULIN ASPART 1 UNITS: 100 INJECTION, SOLUTION INTRAVENOUS; SUBCUTANEOUS at 10:02

## 2023-02-18 RX ADMIN — ATORVASTATIN CALCIUM 40 MG: 40 TABLET, FILM COATED ORAL at 10:02

## 2023-02-18 RX ADMIN — AMLODIPINE BESYLATE 5 MG: 5 TABLET ORAL at 08:02

## 2023-02-18 RX ADMIN — IPRATROPIUM BROMIDE AND ALBUTEROL SULFATE 3 ML: 2.5; .5 SOLUTION RESPIRATORY (INHALATION) at 02:02

## 2023-02-18 RX ADMIN — ISOSORBIDE MONONITRATE 60 MG: 30 TABLET, EXTENDED RELEASE ORAL at 08:02

## 2023-02-18 RX ADMIN — METHYLPREDNISOLONE SODIUM SUCCINATE 60 MG: 40 INJECTION, POWDER, FOR SOLUTION INTRAMUSCULAR; INTRAVENOUS at 09:02

## 2023-02-18 RX ADMIN — FLUOXETINE 40 MG: 20 CAPSULE ORAL at 08:02

## 2023-02-18 RX ADMIN — IPRATROPIUM BROMIDE AND ALBUTEROL SULFATE 3 ML: 2.5; .5 SOLUTION RESPIRATORY (INHALATION) at 07:02

## 2023-02-18 RX ADMIN — METHYLPREDNISOLONE SODIUM SUCCINATE 60 MG: 40 INJECTION, POWDER, FOR SOLUTION INTRAMUSCULAR; INTRAVENOUS at 08:02

## 2023-02-18 RX ADMIN — IPRATROPIUM BROMIDE AND ALBUTEROL SULFATE 3 ML: 2.5; .5 SOLUTION RESPIRATORY (INHALATION) at 04:02

## 2023-02-18 RX ADMIN — METHYLPREDNISOLONE SODIUM SUCCINATE 60 MG: 40 INJECTION, POWDER, FOR SOLUTION INTRAMUSCULAR; INTRAVENOUS at 02:02

## 2023-02-18 RX ADMIN — AZITHROMYCIN MONOHYDRATE 500 MG: 500 INJECTION, POWDER, LYOPHILIZED, FOR SOLUTION INTRAVENOUS at 04:02

## 2023-02-18 RX ADMIN — CEFTRIAXONE SODIUM 1 G: 1 INJECTION, POWDER, FOR SOLUTION INTRAMUSCULAR; INTRAVENOUS at 04:02

## 2023-02-18 RX ADMIN — WARFARIN SODIUM 5 MG: 5 TABLET ORAL at 09:02

## 2023-02-18 RX ADMIN — IPRATROPIUM BROMIDE AND ALBUTEROL SULFATE 3 ML: 2.5; .5 SOLUTION RESPIRATORY (INHALATION) at 01:02

## 2023-02-18 NOTE — PLAN OF CARE
Problem: Adult Inpatient Plan of Care  Goal: Plan of Care Review  Outcome: Ongoing, Progressing  Goal: Patient-Specific Goal (Individualized)  Outcome: Ongoing, Progressing  Goal: Absence of Hospital-Acquired Illness or Injury  Outcome: Ongoing, Progressing  Goal: Optimal Comfort and Wellbeing  Outcome: Ongoing, Progressing  Goal: Readiness for Transition of Care  Outcome: Ongoing, Progressing     Problem: Fluid Imbalance (Pneumonia)  Goal: Fluid Balance  Outcome: Ongoing, Progressing     Problem: Infection (Pneumonia)  Goal: Resolution of Infection Signs and Symptoms  Outcome: Ongoing, Progressing     Problem: Respiratory Compromise (Pneumonia)  Goal: Effective Oxygenation and Ventilation  Outcome: Ongoing, Progressing     Problem: Fall Injury Risk  Goal: Absence of Fall and Fall-Related Injury  Outcome: Ongoing, Progressing     Problem: Gas Exchange Impaired  Goal: Optimal Gas Exchange  Outcome: Ongoing, Progressing

## 2023-02-18 NOTE — ED NOTES
"Called to patient room via call bell. Upon entering room, patient sitting at edge of bed states she fell. Removed herself from oxygen, monitor, and external cath suctioning. Unwitnessed. States she was "looking for the controls". Palpated head-no swelling or bruising. No facial injury. Knees without abrasions or bruising. Superficial skin abrasion to right elbow.  "

## 2023-02-18 NOTE — PLAN OF CARE
HO3 Addendum:   I agree with Dr. Coy HPI, ROS, PE and Assessment and plan    PE:  General: appears well, in no acute distress, laying in bed with 4L NC saturating at 92%.  Able to speak in full sentences without getting winded.   Eye: no scleral icterus   HENT: MMM, oropharynx without erythema/exudate   Neck: supple  Respiratory: clear to auscultation in lower lobes,  wheezing noted in bialteral upper lobes, nonlabored respirations   Cardiovascular: regular rate and rhythm without murmurs or gallops, minimal trace edema in bilateral lower extremities   Gastrointestinal: soft, non-tender, non-distended, bowel sounds present   Genitourinary: no suprapubic tenderness   Musculoskeletal: no gross deformities observed   Integumentary: no acute rashes or skin lesions observed    Neuro: No focal lesions observed         CAP   Chronic smoker   Elevated troponin   Afib  Mechanical atrial valve on warfarin  Depression     -curb 65 score is 2  -requiring 4L O2 to maintain 92%  -few days of increasing dyspnea and worsening productive cough with sob and feeling congested in her chest.   -less likely concern for PE at this time as patient is supratheraputic and this was a gradual onset.  Also known sick contact.    -smoked 1 ppd for 40 year, likely undiagnosed COPD  -start Rocephin and Azithromycin  -duonebs, for wheezing in upper lobes   -CT chest without ordered  -will recheck INR in am then reassess in am.  Supratheraputic at this time.  Warfarin goal 2-3.   -will trend troponin and EKG, repeat at 4 am.  Not concerned for cardiac cause at this time. Initial EKG without st elevation or depression, no t-wave changes.  No chest pain, diaphoresis, n/v.  Maybe demand ischemia.  BUN creatine ratio >20 so likely a little dry, will start on maintenance IVF LR at 125 cc/h.    -continue home fluoxetine and olanzapine       Indy Murillo M.D.  [facility]

## 2023-02-18 NOTE — HOSPITAL COURSE
Patient admitted for atypical PNA vs COPD, NSTEMI. She remained without chest pain throughout stay, EKG unremarkable, and troponin peaked at 1 but then downtrended. She follows with [facility] with upcoming appointment in April 2023. She was treated with ASA and nitro. Concern for atypical PNA on CXR, however appeared to be consistent with undiagnosed COPD on chest CT. There was a 6mm nodule noted on chest CT. She required supplemental oxygen throughout the duration of her admission, unable to wean off and she was discharged with home O2. Referral placed to [facility]. Symptoms improved throughout stay on 2L O2. Vitals remained stable. Polycythemia noted likely due to smoking hx with EPO and JAK2 pending at time of discharge. Of note, her INR became supratheraputic and her warfarin was held for 2 days. No active signs of bleeding and she voiced importance of follow up with INR clinic.

## 2023-02-18 NOTE — ED NOTES
"Called lab concerning blood cultures for antibiotic administration. Unable to reach phlebotomy. Reported to floor nurse "Shanel" meds not yet started due to need to have blood cultures collected.  "

## 2023-02-18 NOTE — PROGRESS NOTES
[facility]  Progress Note      Patient Name: Kayla Ruiz  : 1955  MRN: [id number]  Patient Class: OP- Observation   Admission Date: 2023   Length of Stay: 0  Admitting Service: Hospital Medicine  Attending Physician: Jack Patel MD  PCP: Tony Leon MD    CHIEF COMPLAINT   Hospital follow up    HOSPITAL COURSE     Brief HPI:  66y/o F with PMH of afib, aortic valve replacement, DM2, HLD presents to ED with worsening SOB over last 2 days, now requiring oxygen via NC in ED. She reports known sick contact, chills, productive cough, weakness. Denies N/V, dizziness, vision changes, CP, leg edema, abdominal pain. Not on home O2. 50 pack year tobacco history. States baseline ins SOB with exertion when performing house hold activities.     Interval History:  Reported fall overnight. CT head negative. VSS. Needed additional duoneb treatment. Reports improvement from yesterday. Bump on troponin #2. Continues to deny CP, N/V, arm pain.     OBJECTIVE/PHYSICAL EXAM     VITAL SIGNS (MOST RECENT):  Temp: 99 °F (37.2 °C) (23 0444)  Pulse: 73 (23 0444)  Resp: 20 (23 0444)  BP: (!) 142/80 (23 0444)  SpO2: 95 % (23 0444)   VITAL SIGNS (24 HOUR RANGE):  Temp:  [97.9 °F (36.6 °C)-99 °F (37.2 °C)]   Pulse:  [66-88]   Resp:  [15-38]   BP: (111-165)/(69-81)   SpO2:  [77 %-95 %]      Physical Exam  Constitutional:       General: She is not in acute distress.     Comments: Improved from yesterday. On 4L O2 NC   Eyes:      Extraocular Movements: Extraocular movements intact.   Cardiovascular:      Rate and Rhythm: Normal rate and regular rhythm.   Pulmonary:      Effort: Pulmonary effort is normal.      Breath sounds: Wheezing (mild scattered expiratory) present.   Abdominal:      General: Abdomen is flat. There is no distension.      Tenderness: There is no abdominal tenderness.   Skin:     General: Skin is warm and dry.      Capillary Refill: Capillary  refill takes less than 2 seconds.   Neurological:      Mental Status: She is alert and oriented to person, place, and time.   Psychiatric:         Behavior: Behavior normal.       LABS/MICRO/MEDS/DIAGNOSTICS     LABS  CBC  Recent Labs     02/17/23 2217 02/18/23 0358   WBC 5.6 5.2   RBC 5.72* 5.69*   HGB 17.6* 17.7*   HCT 54.9* 54.4*   MCV 96.0* 95.6*   MCH 30.8 31.1   MCHC 32.1* 32.5*   RDW 13.5 13.6    190       Coags  Recent Labs     02/17/23 2217 02/18/23  0406   INR 3.81* 3.96*   D-DIMER 0.36  --      Cardiac  Recent Labs     02/17/23 2217 02/17/23 2218 02/18/23 0358   BNP  --  116.3*  --    TROPONINI 0.054*  --  0.938*        BMP  Recent Labs     02/17/23 2217 02/18/23 0358    137   K 4.1 4.3   CHLORIDE 97* 97*   CO2 30 29   BUN 21.5* 22.3*   CREATININE 0.85 0.86   GLUCOSE 173* 158*   CALCIUM 8.9 9.3     LFTs  Recent Labs     02/17/23 2217 02/18/23 0358   ALBUMIN 3.8 3.9   GLOBULIN 3.7* 3.9*   ALKPHOS 91 88   ALT 29 28   AST 33 37*   BILITOT 0.4 0.4     Diabetes  Recent Labs     02/17/23 2217 02/18/23 0358   GLUCOSE 173* 158*        INTAKE/OUTPUT    Intake/Output Summary (Last 24 hours) at 2/18/2023 0624  Last data filed at 2/18/2023 0611  Gross per 24 hour   Intake 794.17 ml   Output 1150 ml   Net -355.83 ml        MICROBIOLOGY  Microbiology Results (last 7 days)       Procedure Component Value Units Date/Time    Blood Culture [[id number]] Collected: 02/18/23 0358    Order Status: Sent Specimen: Blood from Arm, Right Updated: 02/18/23 0423    Blood Culture [[id number]] Collected: 02/18/23 0358    Order Status: Sent Specimen: Blood from Arm, Left Updated: 02/18/23 0423             MEDICATIONS   albuterol-ipratropium  3 mL Nebulization Q6H    amLODIPine  5 mg Oral Daily    aspirin  81 mg Oral Daily    atorvastatin  40 mg Oral QHS    azithromycin  500 mg Intravenous Q24H    cefTRIAXone (ROCEPHIN) IVPB  1 g Intravenous Q24H    FLUoxetine  40 mg Oral Daily    hydroCHLOROthiazide  12.5 mg Oral  Daily    isosorbide mononitrate  60 mg Oral Daily    OLANZapine  15 mg Oral QHS    sotaloL  80 mg Oral BID         INFUSIONS   lactated ringers 125 mL/hr at 02/18/23 0409        DIAGNOSTIC TESTS  CT Head Without Contrast         CT Chest Without Contrast         X-Ray Chest AP Portable    (Results Pending)        No results found for: EF       ASSESSMENT/PLAN      CAP/Atypical PNA vs COPD         Chronic tobacco user   New requirement for o2, on 4L in ED  Ground glass appearance on CXR, negative covid/flu, repsi culture pending  CURB-65 score 2,   Possible undiagnosed COPD, keep o2 between 88-92%  Supratheraputic INR, unlikely PE, d-dimer negative  Chest CT significant for scarring; head CT negative  Rocephin and azithromycin initiated on 02/18  Duonebs q6, with q4 PRN  Solumedrol 60mg TID  Blood culture pending      Afib        Aortic valve replacement        HTN        NSTEMI likely demand ischemia         HLD   Troponin #2 bump. EKG unremarkable. Will continue to trend troponin and monitor s/s  Symptoms more consistent with CAP than MI or CHF. Received ASA and nitro in ED  Follows up with cardiology with [facility]  Continue home medication    3.    Polycythemia  Likely secondary to tobacco   Ordered JAK2, EPO    4.    DM2  Hold home medication  Initiate SSI     5.    Depression  Continue home medication      Access: pIV  Antibiotics: ceftriaxone and azithromycin  Diet: diabetic heart healthy  DVT Prophylaxis: warfarin  GI Prophylaxis: none  Fluids: LR@125cc/hr     Anticipated discharge and disposition: observation, pending improving in SOB, downtitrating o2 requirement and troponin   __________________________________________________________________________      MD PAMELA SegoviaU [facility], [facility]

## 2023-02-18 NOTE — ED PROVIDER NOTES
Encounter Date: 2/17/2023       History     Chief Complaint   Patient presents with    Shortness of Breath     Pt arrives via [facility] with c/o SOB and was given 324 of aspirin and 2 sprays of nitro enroute; pt states that she gets short of breath with exertion daily but it was worse today; denies any chest pain and currently denies any SOB; O2 sat 77% on room air      67-year-old female with history of AFib, CHF, aortic valve replacement on Coumadin, presents via EMS with shortness of breath and O2 sats 77% on room air documented upon arrival.  Patient reports chronic dyspnea on exertion that has been worse over the past 2 days, currently moderate in intensity.  Denies chest pain.  She does report some cough and congestion recently.  Currently on 4 L nasal cannula, comfortable with O2 sat 95%, no increased work of breathing.    Review of patient's allergies indicates:   Allergen Reactions    Ace inhibitors     Naproxen sodium     Nifedipine     Venlafaxine      History reviewed. No pertinent past medical history.  History reviewed. No pertinent surgical history.  History reviewed. No pertinent family history.  Social History     Tobacco Use    Smoking status: Every Day     Packs/day: 1.00     Types: Cigarettes    Smokeless tobacco: Never     Review of Systems   Constitutional:  Negative for chills and fever.   HENT:  Negative for congestion, rhinorrhea and sore throat.    Respiratory:  Positive for shortness of breath. Negative for chest tightness and wheezing.    Cardiovascular:  Negative for chest pain, palpitations and leg swelling.   Gastrointestinal:  Negative for abdominal pain, blood in stool, diarrhea and vomiting.   Endocrine: Negative for polyuria.   Genitourinary:  Negative for dysuria and flank pain.   Musculoskeletal:  Negative for myalgias.   Skin:  Negative for rash.   Neurological:  Negative for headaches.   All other systems reviewed and are negative.    Physical Exam     Initial Vitals [02/17/23 3975]    BP Pulse Resp Temp SpO2   (!) 165/81 75 (!) 22 97.9 °F (36.6 °C) (!) 77 %      MAP       --         Physical Exam    Nursing note and vitals reviewed.  Constitutional: She appears well-developed and well-nourished.   HENT:   Head: Normocephalic and atraumatic.   Eyes: Conjunctivae are normal. Pupils are equal, round, and reactive to light.   Neck: Neck supple.   Normal range of motion.  Cardiovascular:  Normal rate, regular rhythm, normal heart sounds and intact distal pulses.           Pulmonary/Chest: Breath sounds normal.   Abdominal: Abdomen is soft. Bowel sounds are normal. There is no abdominal tenderness.   Musculoskeletal:         General: No tenderness or edema. Normal range of motion.      Cervical back: Normal range of motion and neck supple.      Comments: Bilateral calves are symmetric and appearance with no significant skin abnormality; normal by palpation with no tenderness or palpable abnormality.     Neurological: She is alert and oriented to person, place, and time.   Skin: Skin is warm and dry.   Psychiatric: She has a normal mood and affect.       ED Course   Procedures  Labs Reviewed   COMPREHENSIVE METABOLIC PANEL - Abnormal; Notable for the following components:       Result Value    Chloride 97 (*)     Glucose Level 173 (*)     Blood Urea Nitrogen 21.5 (*)     Globulin 3.7 (*)     Albumin/Globulin Ratio 1.0 (*)     All other components within normal limits   TROPONIN I - Abnormal; Notable for the following components:    Troponin-I 0.054 (*)     All other components within normal limits   B-TYPE NATRIURETIC PEPTIDE - Abnormal; Notable for the following components:    Natriuretic Peptide 116.3 (*)     All other components within normal limits   PROTIME-INR - Abnormal; Notable for the following components:    INR 3.81 (*)     All other components within normal limits   CBC WITH DIFFERENTIAL - Abnormal; Notable for the following components:    RBC 5.72 (*)     Hgb 17.6 (*)     Hct 54.9 (*)      MCV 96.0 (*)     MCHC 32.1 (*)     All other components within normal limits   SARS-COV-2 RNA AMPLIFICATION, QUAL - Normal    Narrative:     The IDNOW COVID-19 assay is a rapid molecular in vitro diagnostic test utilizing an isothermal nucleic acid amplification technology intended for the qualitative detection of nucleic acid from the SARS-CoV-2 viral RNA in direct nasal, nasopharyngeal or throat swabs from individuals who are suspected of COVID-19 by their healthcare provider.   CBC W/ AUTO DIFFERENTIAL    Narrative:     The following orders were created for panel order CBC auto differential.  Procedure                               Abnormality         Status                     ---------                               -----------         ------                     CBC with Differential[[id number]]        Abnormal            Final result                 Please view results for these tests on the individual orders.          Imaging Results              X-Ray Chest AP Portable (In process)                      Medications   furosemide injection 40 mg (40 mg Intravenous Given 2/17/23 2359)   nitroGLYCERIN 2% TD oint ointment 1 inch (1 inch Topical (Top) Given 2/17/23 2358)     Medical Decision Making:   Initial Assessment:   Differential diagnosis includes but is not limited to bronchitis, pneumonia, pneumothorax, pleural effusion, congestive heart failure, pulmonary embolus      I discussed patient's history, presentation, workup, results, impression, and suggested plan of care with hospitalist Dr Birch. They will see the patient in the ED to determine eligibility for admission to the hospital.      Critical Care:     Aggregate critical care time was 45 minutes which includes only time during which I was engaged in work directly related to patients care, as described above, whether at bedside or elsewhere in the ED. It did not include time spent performing other reported procedures or the services of physician  assistants, residents, students, or nurses.    The high probability of sudden and clinically significant deterioration of patient condition required highest level of my preparedness to intervene urgently.  The services provided to this patient were to treat and/or prevent clinically significant deterioration that could result in permanent disability or death due to hypoxia, interstitial edema, CHF exacerbation .    Services included the following: chart data review, reviewing nurses notes, gathering information form relatives, gathering information for EMS personal, reviewing old charts, documentation time, consultant collaboration regarding findings and treatment options, medication orders/management, direct patient care, re-evaluation of patient response to treatment, vital signs assessments and calculations, interpreting and reviewing diagnostic studies/labs.         Labs are reviewed by me.  No leukocytosis or anemia.  INR mildly supratherapeutic at 3.81.  Mildly hypovolemic suggested by BUN to creatinine ratio of 21.5 to 0.85.  BNP mildly elevated at 116, troponin mildly elevated at 0.054.  COVID test negative.  Chest x-ray shows ground-glass pattern no suggestive of interstitial edema.         Patient is given Lasix 40 mg IV push shortly after arrival and has had approximately 0.5 L diuresis.  She is comfortable on oxygen, 4 L nasal cannula, O2 sat 95%, no increased work of breathing, normal conversation.            Clinical Impression:   Final diagnoses:  [R06.02] Shortness of breath  [R07.9] Chest pain  [R09.02] Hypoxia (Primary)  [I50.21] Acute systolic congestive heart failure  [R77.8] Troponin level elevated               Dennis Landry MD  02/18/23 0114

## 2023-02-18 NOTE — H&P
[facility] Medicine  History & Physical Note      Patient Name: Kayla Ruiz  : 1955  MRN: [id number]  Patient Class: OP- Observation   Admission Date: 2023   Length of Stay: 0  Admitting Service: Hospital Medicine  Attending Physician: Jack Patel MD  PCP: Tony Leon MD  Source of history: Patient and EMR.   Code status: FULL    Chief Complaint   Shortness of Breath (Pt arrives via [facility] with c/o SOB and was given 324 of aspirin and 2 sprays of nitro enroute; pt states that she gets short of breath with exertion daily but it was worse today; denies any chest pain and currently denies any SOB; O2 sat 77% on room air )    History of Present Illness   Patietn with PMH of afib, aortic valve replacement, DM2, HLD presents to ED with worsening SOB over last 2 days, now requiring oxygen via NC in ED. She reports known sick contact, chills, productive cough, weakness. Denies N/V, dizziness, vision changes, CP, leg edema, abdominal pain. Not on home O2. 50 pack year tobacco history. States baseline ins SOB with exertion when performing house hold activities.     ROS   Pertinent positive and negative as mentioned in HPI     Past Medical History     Past Medical History:   Diagnosis Date    A-fib     Anticoagulant long-term use     Depression     Diabetes mellitus      Past Surgical History     Past Surgical History:   Procedure Laterality Date    AORTIC VALVE REPLACEMENT       Social History     Social History     Tobacco Use    Smoking status: Every Day     Packs/day: 1.00     Years: 50.00     Pack years: 50.00     Types: Cigarettes    Smokeless tobacco: Never   Substance Use Topics    Alcohol use: Not on file      Family History   Reviewed and noncontributory    Allergies   Ace inhibitors, Naproxen sodium, Nifedipine, and Venlafaxine    Home Medications     Prior to Admission medications    Not on File      Inpatient Medications   Scheduled Meds   albuterol-ipratropium  3 mL  Nebulization Q6H    azithromycin  500 mg Intravenous Q24H    cefTRIAXone (ROCEPHIN) IVPB  1 g Intravenous Q24H      Continuous Infusions   lactated ringers        PRN Meds  dextrose 10%, dextrose 10%, dextrose, dextrose, glucagon (human recombinant), insulin aspart U-100, sodium chloride 0.9%     Physical Exam   Vital Signs  Temp:  [97.9 °F (36.6 °C)]   Pulse:  [66-80]   Resp:  [17-26]   BP: (111-165)/(69-81)   SpO2:  [77 %-95 %]       General: Appears comfortable on 4L O2 NC. Appropriate and pleasant.  HEENT: NC/AT  Neck:  No JVD  CVS: Regular rhythm. Normal S1/S2. Trace ankle pitting edema b/l  Pulm: No respiratory distress. Scattered mild expiratory wheezing in all lung fields, worse in left upper field  Abdomen: nondistended, normoactive BS, soft and non-tender.  MSK: No obvious deformity or joint swelling  Skin: Warm and dry  Neuro: AAOx3, no focal neurological deficit  Psych: Cooperative    Labs     CBC  Recent Labs     02/17/23 2217   WBC 5.6   RBC 5.72*   HGB 17.6*   HCT 54.9*   MCV 96.0*   MCH 30.8   MCHC 32.1*   RDW 13.5        Coags  Recent Labs     02/17/23 2217   INR 3.81*     Cardiac  Recent Labs     02/17/23 2217 02/17/23 2218   BNP  --  116.3*   TROPONINI 0.054*  --         BMP  Recent Labs     02/17/23 2217      K 4.1   CHLORIDE 97*   CO2 30   BUN 21.5*   CREATININE 0.85   GLUCOSE 173*   CALCIUM 8.9     LFTs  Recent Labs     02/17/23 2217   ALBUMIN 3.8   GLOBULIN 3.7*   ALKPHOS 91   ALT 29   AST 33   BILITOT 0.4     Diabetes  Recent Labs     02/17/23 2217   GLUCOSE 173*        Microbiology Results (last 7 days)       Procedure Component Value Units Date/Time    Blood Culture [[id number]]     Order Status: Sent Specimen: Blood     Blood Culture [[id number]]     Order Status: Sent Specimen: Blood            Imaging     X-Ray Chest AP Portable    (Results Pending)   CT Chest Without Contrast    (Results Pending)   CT Head Without Contrast    (Results Pending)     Assessment & Plan       CAP         Chronic tobacco user   New requirement for o2, on 4L in ED  Ground glass appearance on CXR, negative covid  CURB-65 score 2,   Possible undiagnosed COPD, keep o2 between 88-92%  Supratheraputic INR, unlikely PE, but  will order d-dimer  Will order chest and head CT due to smoking history and fall/weakness   Rocephin and azithromycin initiated on 02/18  Duonebs q6  Blood culture pending      Afib        Aortic valve replacement        Elevated troponin        HLD   Mildly elevated troponin, will repeat  Symptoms more consistent with CAP than MI or CHF. Received ASA and nitro in ED  Continue home medication      DM2  Hold home medication  Initiate SSI     Depression  Continue home medication      Access: pIV  Antibiotics: ceftriaxone and azithromycin  Diet: diabetic heart healthy  DVT Prophylaxis: warfarin  GI Prophylaxis: none  Fluids: LR@125cc/hr    Liliya English MD  [employer] [facility]

## 2023-02-18 NOTE — CARE UPDATE
I was called and informed an increase in troponin to 1.066 from 0.745.      I repeated an EKG which did not show any evidence of an ischemic process.  Ordered phosphorus and magnesium.    I evaluated the patient at bedside and noted that she was not having any chest pain.  She states that her shortness of breath was not worsened from previous.  We will trend troponins for 3 more occurrences or until drop from current plateau.     Zac Mcdowell MD  [facility] PGY-1

## 2023-02-19 LAB
ALBUMIN SERPL-MCNC: 3.5 G/DL (ref 3.4–4.8)
ALBUMIN/GLOB SERPL: 0.9 RATIO (ref 1.1–2)
ALP SERPL-CCNC: 71 UNIT/L (ref 40–150)
ALT SERPL-CCNC: 24 UNIT/L (ref 0–55)
AST SERPL-CCNC: 28 UNIT/L (ref 5–34)
BASOPHILS # BLD AUTO: 0.01 X10(3)/MCL (ref 0–0.2)
BASOPHILS NFR BLD AUTO: 0.2 %
BILIRUBIN DIRECT+TOT PNL SERPL-MCNC: 0.3 MG/DL
BUN SERPL-MCNC: 31.4 MG/DL (ref 9.8–20.1)
CALCIUM SERPL-MCNC: 9.3 MG/DL (ref 8.4–10.2)
CHLORIDE SERPL-SCNC: 100 MMOL/L (ref 98–107)
CO2 SERPL-SCNC: 32 MMOL/L (ref 23–31)
CREAT SERPL-MCNC: 0.82 MG/DL (ref 0.55–1.02)
EOSINOPHIL # BLD AUTO: 0 X10(3)/MCL (ref 0–0.9)
EOSINOPHIL NFR BLD AUTO: 0 %
ERYTHROCYTE [DISTWIDTH] IN BLOOD BY AUTOMATED COUNT: 13.6 % (ref 11.5–17)
GFR SERPLBLD CREATININE-BSD FMLA CKD-EPI: >60 MLS/MIN/1.73/M2
GLOBULIN SER-MCNC: 3.8 GM/DL (ref 2.4–3.5)
GLUCOSE SERPL-MCNC: 180 MG/DL (ref 82–115)
HCT VFR BLD AUTO: 53.2 % (ref 37–47)
HGB BLD-MCNC: 16.9 G/DL (ref 12–16)
IMM GRANULOCYTES # BLD AUTO: 0.01 X10(3)/MCL (ref 0–0.04)
IMM GRANULOCYTES NFR BLD AUTO: 0.2 %
LYMPHOCYTES # BLD AUTO: 0.85 X10(3)/MCL (ref 0.6–4.6)
LYMPHOCYTES NFR BLD AUTO: 20.1 %
MCH RBC QN AUTO: 30 PG
MCHC RBC AUTO-ENTMCNC: 31.8 G/DL (ref 33–36)
MCV RBC AUTO: 94.3 FL (ref 80–94)
MONOCYTES # BLD AUTO: 0.16 X10(3)/MCL (ref 0.1–1.3)
MONOCYTES NFR BLD AUTO: 3.8 %
NEUTROPHILS # BLD AUTO: 3.2 X10(3)/MCL (ref 2.1–9.2)
NEUTROPHILS NFR BLD AUTO: 75.7 %
NRBC BLD AUTO-RTO: 0 %
PLATELET # BLD AUTO: 202 X10(3)/MCL (ref 130–400)
PMV BLD AUTO: 10.3 FL (ref 7.4–10.4)
POCT GLUCOSE: 157 MG/DL (ref 70–110)
POCT GLUCOSE: 179 MG/DL (ref 70–110)
POCT GLUCOSE: 187 MG/DL (ref 70–110)
POCT GLUCOSE: 202 MG/DL (ref 70–110)
POTASSIUM SERPL-SCNC: 4 MMOL/L (ref 3.5–5.1)
PROT SERPL-MCNC: 7.3 GM/DL (ref 5.8–7.6)
RBC # BLD AUTO: 5.64 X10(6)/MCL (ref 4.2–5.4)
SODIUM SERPL-SCNC: 142 MMOL/L (ref 136–145)
WBC # SPEC AUTO: 4.2 X10(3)/MCL (ref 4.5–11.5)

## 2023-02-19 PROCEDURE — 96372 THER/PROPH/DIAG INJ SC/IM: CPT | Performed by: STUDENT IN AN ORGANIZED HEALTH CARE EDUCATION/TRAINING PROGRAM

## 2023-02-19 PROCEDURE — 97162 PT EVAL MOD COMPLEX 30 MIN: CPT

## 2023-02-19 PROCEDURE — 63600175 PHARM REV CODE 636 W HCPCS: Performed by: STUDENT IN AN ORGANIZED HEALTH CARE EDUCATION/TRAINING PROGRAM

## 2023-02-19 PROCEDURE — 96376 TX/PRO/DX INJ SAME DRUG ADON: CPT

## 2023-02-19 PROCEDURE — 25000003 PHARM REV CODE 250: Performed by: STUDENT IN AN ORGANIZED HEALTH CARE EDUCATION/TRAINING PROGRAM

## 2023-02-19 PROCEDURE — 96366 THER/PROPH/DIAG IV INF ADDON: CPT

## 2023-02-19 PROCEDURE — G0378 HOSPITAL OBSERVATION PER HR: HCPCS

## 2023-02-19 PROCEDURE — 94761 N-INVAS EAR/PLS OXIMETRY MLT: CPT

## 2023-02-19 PROCEDURE — 85025 COMPLETE CBC W/AUTO DIFF WBC: CPT | Performed by: STUDENT IN AN ORGANIZED HEALTH CARE EDUCATION/TRAINING PROGRAM

## 2023-02-19 PROCEDURE — 80053 COMPREHEN METABOLIC PANEL: CPT | Performed by: STUDENT IN AN ORGANIZED HEALTH CARE EDUCATION/TRAINING PROGRAM

## 2023-02-19 PROCEDURE — 85610 PROTHROMBIN TIME: CPT | Performed by: STUDENT IN AN ORGANIZED HEALTH CARE EDUCATION/TRAINING PROGRAM

## 2023-02-19 PROCEDURE — 94640 AIRWAY INHALATION TREATMENT: CPT | Mod: XB

## 2023-02-19 PROCEDURE — 25000242 PHARM REV CODE 250 ALT 637 W/ HCPCS: Performed by: STUDENT IN AN ORGANIZED HEALTH CARE EDUCATION/TRAINING PROGRAM

## 2023-02-19 PROCEDURE — 27000221 HC OXYGEN, UP TO 24 HOURS

## 2023-02-19 RX ADMIN — METHYLPREDNISOLONE SODIUM SUCCINATE 60 MG: 40 INJECTION, POWDER, FOR SOLUTION INTRAMUSCULAR; INTRAVENOUS at 06:02

## 2023-02-19 RX ADMIN — METHYLPREDNISOLONE SODIUM SUCCINATE 60 MG: 40 INJECTION, POWDER, FOR SOLUTION INTRAMUSCULAR; INTRAVENOUS at 11:02

## 2023-02-19 RX ADMIN — AZITHROMYCIN MONOHYDRATE 500 MG: 500 INJECTION, POWDER, LYOPHILIZED, FOR SOLUTION INTRAVENOUS at 03:02

## 2023-02-19 RX ADMIN — IPRATROPIUM BROMIDE AND ALBUTEROL SULFATE 3 ML: 2.5; .5 SOLUTION RESPIRATORY (INHALATION) at 08:02

## 2023-02-19 RX ADMIN — ATORVASTATIN CALCIUM 40 MG: 40 TABLET, FILM COATED ORAL at 09:02

## 2023-02-19 RX ADMIN — FLUOXETINE 40 MG: 20 CAPSULE ORAL at 08:02

## 2023-02-19 RX ADMIN — OLANZAPINE 15 MG: 5 TABLET, FILM COATED ORAL at 09:02

## 2023-02-19 RX ADMIN — INSULIN ASPART 2 UNITS: 100 INJECTION, SOLUTION INTRAVENOUS; SUBCUTANEOUS at 05:02

## 2023-02-19 RX ADMIN — CEFTRIAXONE SODIUM 1 G: 1 INJECTION, POWDER, FOR SOLUTION INTRAMUSCULAR; INTRAVENOUS at 03:02

## 2023-02-19 RX ADMIN — METHYLPREDNISOLONE SODIUM SUCCINATE 60 MG: 40 INJECTION, POWDER, FOR SOLUTION INTRAMUSCULAR; INTRAVENOUS at 03:02

## 2023-02-19 RX ADMIN — IPRATROPIUM BROMIDE AND ALBUTEROL SULFATE 3 ML: 2.5; .5 SOLUTION RESPIRATORY (INHALATION) at 01:02

## 2023-02-19 RX ADMIN — SOTALOL HYDROCHLORIDE 80 MG: 80 TABLET ORAL at 09:02

## 2023-02-19 RX ADMIN — ISOSORBIDE MONONITRATE 60 MG: 30 TABLET, EXTENDED RELEASE ORAL at 08:02

## 2023-02-19 RX ADMIN — SOTALOL HYDROCHLORIDE 80 MG: 80 TABLET ORAL at 08:02

## 2023-02-19 RX ADMIN — ASPIRIN 81 MG: 81 TABLET, CHEWABLE ORAL at 08:02

## 2023-02-19 RX ADMIN — AMLODIPINE BESYLATE 5 MG: 5 TABLET ORAL at 08:02

## 2023-02-19 RX ADMIN — HYDROCHLOROTHIAZIDE 12.5 MG: 12.5 TABLET ORAL at 08:02

## 2023-02-19 NOTE — PT/OT/SLP EVAL
Physical Therapy Evaluation    Patient Name:  Kayla Ruiz   MRN:  [id number]    Recommendations:     Discharge Recommendations:  (home w/ responsible caregiver w/ HH)   Discharge Equipment Recommendations: shower chair (pt has access to shower chair, rolling walker, straight cane, wheelchair)   Recommended Equipment needed to be obtained: none  Barriers to discharge:  severity of impairment, 02 requirements    Assessment:     Kayla Ruiz is a 67 y.o. female admitted with a medical diagnosis of CAP (community acquired pneumonia).      CAP/Atypical PNA vs COPD    Chronic tobacco user    Afib   Aortic valve replacement   HTN   NSTEMI likely 2/2 to demand ischemia    HLD    Polycythemia   DM2   Depression  Patient Active Problem List   Diagnosis    CAP (community acquired pneumonia)     She presents with the following impairments/functional limitations: impaired endurance, impaired functional mobility, gait instability, impaired balance, impaired self care skills, impaired cardiopulmonary response to activity.    Rehab Prognosis: Good; patient would benefit from acute skilled PT services to address these deficits and reach maximum level of function.    -continued supervised/assisted out-of-bed and ambulation w/ 02 NC w/o device w/ progression of gait distance/duration/frequency/speed    Recent Surgery: * No surgery found *      Plan:     During this hospitalization, patient to be seen  (3-5 times/week) to address the identified rehab impairments via therapeutic activities, therapeutic exercises, gait training and progress toward the following goals:    Plan of Care Expires:  03/19/23    Subjective     Chief Complaint: SOB w/o 02 NC  Patient/Family Comments/goals: home  Pain/Comfort:  Pain Rating 1: 0/10  Pain Addressed 1: Nurse notified  Pain Rating Post-Intervention 1: 0/10    Patients cultural, spiritual, [employer] conflicts given the current situation: no    Living Environment:  -lives w/  in split level  home w/o outside steps (except threshold) and pt does not go upstairs inside of the home, tub/shower (pt utilized both but intermittent difficulty rising from bottom of tub after bathing.  Prior to admission, patients level of function was independent ADL's and IADL's. Pt drives, goes shopping, performs household chores and some yard work.  Equipment used at home: none (pt has access to shower chair, rolling walker, straight cane, wheelchair).  DME owned (not currently used): rolling walker, single point cane, shower chair, and wheelchair.  Upon discharge, patient will have assistance from .    Pt is Rt hand dominant  Pt denied: pain complaints; lightheadedness/dizziness; new vision impairment; extremity tingling/numbness; swallowing difficulty    Objective:     Per pt's nurse - OK to perform SAT 02 check (ambulatory)    Communicated with pt's nurse Julio prior to session.  Patient found  supine in bed w/ HOB elevated w/ HOB and Rt FOB rails up w/ pt's  in room  with peripheral IV, telemetry, oxygen  upon PT entry to room.    General Precautions: Standard, fall (h/o fall in ED)  Orthopedic Precautions:N/A   Braces: N/A  Respiratory Status: Nasal cannula, flow 2 1/2 L/min    Exams:  Cognitive Exam:  Patient is oriented to Person, Place, and Situation  Fine Motor Coordination:    -       Intact  Left hand thumb/finger opposition skills and Right hand thumb/finger opposition skills  Gross Motor Coordination:  BILAT TOE TAPS - WFL's  Sensation:    -       Intact  light/touch bilat/distal LE's and fingers  RUE ROM: WFL  RUE Strength: WFL  LUE ROM: WFL  LUE Strength: WFL  RLE ROM: WFL  RLE Strength: WFL  LLE ROM: WFL  LLE Strength: WFL    Functional Mobility:  Bed Mobility:     Rolling Left:  modified independence  Rolling Right: modified independence  Scooting: modified independence  Supine to Sit: supervision  Sit to Supine: supervision  Transfers:     Sit to Stand:  supervision with no AD  Gait: contact  guard assistance-supervision w/o assistive device w/ 02 NC  -130ft x1  -HHA  -some inconsistency in step length/base of support and foot placement  -no loss of balance or mis-steps  -decreased bilat UE arm swing  -bilat heel 'initial contact' and 'toe off'    Balance Training  Static Sitting:  Patient performed static sitting on level surface with [place] and  no  verbal cues.     Dynamic Sitting:  Patient performed dynamic sitting on level surface with [place] and  no  verbal cues during maximal excursions.    Static Standing:  Patient performed static standing on level surface without assistive device with Modified [place] and  no  verbal cues.     SAT 02 Check  Room air:  -sitting - 85%  02 NC @ 2 1/2 l/m donned  -sitting - 92% within 2 minutes w/ focused breathing  -sitting after 1st gait session - 88% w/ recovery to 92% within 1 minute w/ focused breathing    Vitals   Vitals at Rest  BP  119/63   HR  55   O2 Sat  93%      Vitals With Activity  BP  122/72   HR  57   O2 Sat  91%     Patient left  cardiac chair positioning in bed (pt and pt's  instructed in cardiac chair button on bed and how to adjust out of that mode), bed rails up x HOB and Rt FOB, telemetry monitoring, UE IV, 02 NC @ 2 1/2 l/m  with all lines intact, call button in reach, pt's nurse Julio (in room at tx end) notified, pt's  present, and tray table at bedside .    GOALS:   Multidisciplinary Problems       Physical Therapy Goals          Problem: Physical Therapy    Goal Priority Disciplines Outcome Goal Variances Interventions   Physical Therapy Goal     PT, PT/OT      Description: Goals to be met by: DISCHARGE     Patient will increase functional independence with mobility by performing:    -. Supine to sit with [place]  -. Sit to supine with [place]  -. Rolling to Left and Right with [place]  -. Sit to stand transfer with Modified [place]  -. Gait  x 130ft x2 w/ sitting rest x3 minutes b/w  sessions with Supervision using No Assistive Device                     History:     Past Medical History:   Diagnosis Date    A-fib     Anticoagulant long-term use     Depression     Diabetes mellitus        Past Surgical History:   Procedure Laterality Date    AORTIC VALVE REPLACEMENT       Time Tracking:     PT Received On: 02/19/23  PT Start Time: 1404     PT Stop Time: 1436  PT Total Time (min): 32 min     Billable Minutes: Evaluation 32      02/19/2023

## 2023-02-19 NOTE — PROGRESS NOTES
[facility] Medicine  Progress Note      Patient Name: Kayla Ruiz  : 1955  MRN: [id number]  Patient Class: OP- Observation   Admission Date: 2023   Length of Stay: 0  Admitting Service: Hospital Medicine  Attending Physician: Jack Patel MD  PCP: Tony Leon MD    CHIEF COMPLAINT   Hospital follow up    HOSPITAL COURSE     Brief HPI:  66y/o F with PMH of afib, aortic valve replacement, DM2, HLD presents to ED with worsening SOB over last 2 days, now requiring oxygen via NC in ED. She reports known sick contact, chills, productive cough, weakness. Denies N/V, dizziness, vision changes, CP, leg edema, abdominal pain. Not on home O2. 50 pack year tobacco history. States baseline ins SOB with exertion when performing house hold activities.     Interval History:  VSS. O2 at goal between 88-92%. Reports improvement from yesterday. Has not ambulated much. Bump in troponin overnight. Unremarkable EKG. Continues to deny CP, N/V, arm pain.     OBJECTIVE/PHYSICAL EXAM     VITAL SIGNS (MOST RECENT):  Temp: 97.8 °F (36.6 °C) (23 0405)  Pulse: 64 (23 0405)  Resp: 18 (23 0129)  BP: 115/70 (23 0405)  SpO2: 96 % (23 0405)   VITAL SIGNS (24 HOUR RANGE):  Temp:  [97.6 °F (36.4 °C)-99.1 °F (37.3 °C)]   Pulse:  [56-64]   Resp:  [18]   BP: (113-137)/(69-78)   SpO2:  [89 %-96 %]      Physical Exam  Constitutional:       General: She is not in acute distress.     Comments: Improved from yesterday. On 3L O2 NC   Eyes:      Extraocular Movements: Extraocular movements intact.   Cardiovascular:      Rate and Rhythm: Normal rate and regular rhythm.   Pulmonary:      Effort: Pulmonary effort is normal.      Breath sounds: Wheezing (mild scattered expiratory) present.   Abdominal:      General: Abdomen is flat. There is no distension.      Tenderness: There is no abdominal tenderness.   Musculoskeletal:      Right lower leg: No edema.      Left lower leg: No edema.    Skin:     General: Skin is warm and dry.      Capillary Refill: Capillary refill takes less than 2 seconds.   Neurological:      Mental Status: She is alert and oriented to person, place, and time.   Psychiatric:         Behavior: Behavior normal.       LABS/MICRO/MEDS/DIAGNOSTICS     LABS  CBC  Recent Labs     02/18/23 0358 02/19/23 0417   WBC 5.2 4.2*   RBC 5.69* 5.64*   HGB 17.7* 16.9*   HCT 54.4* 53.2*   MCV 95.6* 94.3*   MCH 31.1 30.0   MCHC 32.5* 31.8*   RDW 13.6 13.6    202       Coags  Recent Labs     02/17/23 2217 02/18/23 0406 02/19/23 0417   INR 3.81*   < > 5.27*   D-DIMER 0.36  --   --     < > = values in this interval not displayed.     Cardiac  Recent Labs     02/17/23 2218 02/18/23 0358 02/18/23  0605 02/18/23 1155 02/18/23 1807   .3*  --   --   --   --    TROPONINI  --    < > 0.745* 1.066* 0.667*    < > = values in this interval not displayed.        BMP  Recent Labs     02/18/23 0358 02/18/23 1155 02/19/23 0417     --  142   K 4.3  --  4.0   CHLORIDE 97*  --  100   CO2 29  --  32*   BUN 22.3*  --  31.4*   CREATININE 0.86  --  0.82   GLUCOSE 158*  --  180*   CALCIUM 9.3  --  9.3   MG  --  2.30  --    PHOS  --  3.8  --      LFTs  Recent Labs     02/18/23 0358 02/19/23 0417   ALBUMIN 3.9 3.5   GLOBULIN 3.9* 3.8*   ALKPHOS 88 71   ALT 28 24   AST 37* 28   BILITOT 0.4 0.3     Diabetes  Recent Labs     02/17/23 2217 02/18/23 0358 02/19/23 0417   GLUCOSE 173* 158* 180*        INTAKE/OUTPUT    Intake/Output Summary (Last 24 hours) at 2/19/2023 0645  Last data filed at 2/19/2023 0641  Gross per 24 hour   Intake 345 ml   Output 700 ml   Net -355 ml        MICROBIOLOGY  Microbiology Results (last 7 days)       Procedure Component Value Units Date/Time    Blood Culture [[id number]] Collected: 02/18/23 0358    Order Status: Resulted Specimen: Blood from Arm, Right Updated: 02/18/23 0423    Blood Culture [[id number]] Collected: 02/18/23 0358    Order Status: Resulted Specimen:  Blood from Arm, Left Updated: 02/18/23 0423             MEDICATIONS   albuterol-ipratropium  3 mL Nebulization Q6H    amLODIPine  5 mg Oral Daily    aspirin  81 mg Oral Daily    atorvastatin  40 mg Oral QHS    azithromycin  500 mg Intravenous Q24H    cefTRIAXone (ROCEPHIN) IVPB  1 g Intravenous Q24H    FLUoxetine  40 mg Oral Daily    hydroCHLOROthiazide  12.5 mg Oral Daily    isosorbide mononitrate  60 mg Oral Daily    methylPREDNISolone sodium succinate injection  60 mg Intravenous Q8H    OLANZapine  15 mg Oral QHS    sotaloL  80 mg Oral BID         INFUSIONS         DIAGNOSTIC TESTS  CT Head Without Contrast   Final Result   Impression:      1. No acute intracranial process identified. Details and findings as noted above.         Electronically signed by: Philip Amaya   Date:    02/18/2023   Time:    07:57      CT Chest Without Contrast   Final Result   Impression:      1. No acute focal infiltrate or consolidation is seen.      6 mm nodule noted within the left upper lobe.  Recommend CT of the thorax within 3 months to document stability or resolution      2. No acute intrathoracic process identified. Details and findings as discussed above         Electronically signed by: Philip Amaya   Date:    02/18/2023   Time:    07:59      X-Ray Chest AP Portable   Final Result      Bibasilar interstitial infiltrates         Electronically signed by: Blanca Mccabe   Date:    02/18/2023   Time:    09:08           No results found for: EF       ASSESSMENT/PLAN      CAP/Atypical PNA vs COPD         Chronic tobacco user   New requirement for o2, on 4L in ED  Ground glass appearance on CXR, negative covid/flu, repsi culture +metapneumo virus   CURB-65 score 2,   Possible undiagnosed COPD, goal o2 88-92%; alpha antitrypsin pending; outpatient PFTs  Supratheraputic INR, unlikely PE, d-dimer negative, CTM  Chest CT significant for scarring & 6mm nodule. Repeat CT in 3mo  Rocephin and azithromycin initiated on  02/18  Duonebs q6, with q4 PRN  Solumedrol 60mg TID  Blood culture pending      Afib        Aortic valve replacement        HTN        NSTEMI likely 2/2 to demand ischemia         HLD   Troponin peak @1.07. EKG unremarkable. Will continue to trend troponin and monitor s/s  Symptoms more consistent with pulmonary than cardiac etiology. Received ASA and nitro in ED  Follows up with cardiology at [facility], reports having echo and angiogram performed. Consider cards consult Monday (2/19)  INR 5.3. Hold warfarin for today, give low dose vit k  Continue home medication    3.    Polycythemia  Likely secondary to tobacco   JAK2, EPO pending    4.    DM2  Hold home medication  Initiate SSI     5.    Depression  Continue home medication      Access: pIV  Antibiotics: ceftriaxone and azithromycin  Diet: diabetic heart healthy  DVT Prophylaxis: hold 2/19 warfarin  GI Prophylaxis: none  Fluids: none     Anticipated discharge and disposition: observation, pending improving in SOB, downtitrating o2 requirement and troponin   __________________________________________________________________________      Liliya English MD  [employer] [facility], [facility]

## 2023-02-19 NOTE — NURSING
PT ON ROOM AIR AT REST O2 SATURATION 85%    AMBULATED ON ROOM AIR DECREASE TO 78%    O2 APPLIED AT 3LPM VIA NASAL CANNULA AND SAT INCREASE TO 88%

## 2023-02-19 NOTE — PLAN OF CARE
02/19/23 1014   Discharge Assessment   Assessment Type Discharge Planning Assessment   Confirmed/corrected address, phone number and insurance Yes   Confirmed Demographics Correct on Facesheet   Source of Information patient   When was your last doctors appointment?   (PCP: Dr. Nolan in [place], Dr. Tony Leon, cardiologist)   Does patient/caregiver understand observation status Yes   Communicated PETR with patient/caregiver Date not available/Unable to determine   Reason For Admission Chest pain, hypoxia   People in Home spouse   Facility Arrived From: Home   Do you expect to return to your current living situation? Yes   Do you have help at home or someone to help you manage your care at home? Yes   Who are your caregiver(s) and their phone number(s)? Christopher Ruiz, spouse, P: [phone]   Prior to hospitilization cognitive status: Alert/Oriented;No Deficits   Current cognitive status: Alert/Oriented;No Deficits   Home Accessibility wheelchair accessible   Home Layout Able to live on 1st floor   Equipment Currently Used at Home   (BP monitor)   Readmission within 30 days? No   Patient currently being followed by outpatient case management? No   Do you currently have service(s) that help you manage your care at home? No   Do you take prescription medications? Yes  ([facility] [place] [facility] [address] [facility], [employer] [facility])   Do you have prescription coverage? Yes   Coverage [employer]   Do you have any problems affording any of your prescribed medications? No   Is the patient taking medications as prescribed? yes   Who is going to help you get home at discharge? Christopher Ruiz, , P: [phone]   How do you get to doctors appointments? car, drives self;family or friend will provide   Are you on dialysis? No   Do you take coumadin? No   Discharge Plan A Home with family   DME Needed Upon Discharge  none   Discharge Plan discussed with: Patient   Discharge Barriers Identified None   Financial  Resource Strain   How hard is it for you to pay for the very basics like food, housing, medical care, and heating? Not very   Housing Stability   In the last 12 months, was there a time when you did not have a steady place to sleep or slept in a shelter (including now)? N   Transportation Needs   In the past 12 months, has lack of transportation kept you from medical appointments or from getting medications? no   In the past 12 months, has lack of transportation kept you from meetings, work, or from getting things needed for daily living? No   Food Insecurity   Within the past 12 months, you worried that your food would run out before you got the money to buy more. Never true   Within the past 12 months, the food you bought just didn't last and you didn't have money to get more. Never true   Alcohol Use   Q1: How often do you have a drink containing alcohol? Never   Q2: How many drinks containing alcohol do you have on a typical day when you are drinking? None   Q3: How often do you have six or more drinks on one occasion? Never   OTHER   Name(s) of People in Home Christophermaricarmen Ruiz, , P: [phone]

## 2023-02-20 ENCOUNTER — DOCUMENTATION ONLY (OUTPATIENT)
Dept: PHARMACY | Facility: HOSPITAL | Age: 68
End: 2023-02-20
Payer: MEDICARE

## 2023-02-20 VITALS
HEART RATE: 54 BPM | HEIGHT: 66 IN | SYSTOLIC BLOOD PRESSURE: 124 MMHG | DIASTOLIC BLOOD PRESSURE: 68 MMHG | OXYGEN SATURATION: 92 % | WEIGHT: 204 LBS | TEMPERATURE: 98 F | BODY MASS INDEX: 32.78 KG/M2 | RESPIRATION RATE: 18 BRPM

## 2023-02-20 PROBLEM — R79.89 TROPONIN LEVEL ELEVATED: Status: ACTIVE | Noted: 2023-02-20

## 2023-02-20 PROBLEM — I48.91 ATRIAL FIBRILLATION: Status: ACTIVE | Noted: 2023-02-20

## 2023-02-20 PROBLEM — R09.02 HYPOXIA: Status: ACTIVE | Noted: 2023-02-20

## 2023-02-20 PROBLEM — I50.21 ACUTE SYSTOLIC CONGESTIVE HEART FAILURE: Status: ACTIVE | Noted: 2023-02-20

## 2023-02-20 LAB
ALBUMIN SERPL-MCNC: 3.2 G/DL (ref 3.4–4.8)
ALBUMIN/GLOB SERPL: 1 RATIO (ref 1.1–2)
ALP SERPL-CCNC: 61 UNIT/L (ref 40–150)
ALT SERPL-CCNC: 23 UNIT/L (ref 0–55)
AST SERPL-CCNC: 23 UNIT/L (ref 5–34)
BASOPHILS # BLD AUTO: 0.01 X10(3)/MCL (ref 0–0.2)
BASOPHILS NFR BLD AUTO: 0.1 %
BILIRUBIN DIRECT+TOT PNL SERPL-MCNC: 0.4 MG/DL
BUN SERPL-MCNC: 45.8 MG/DL (ref 9.8–20.1)
CALCIUM SERPL-MCNC: 8.8 MG/DL (ref 8.4–10.2)
CHLORIDE SERPL-SCNC: 100 MMOL/L (ref 98–107)
CO2 SERPL-SCNC: 33 MMOL/L (ref 23–31)
CREAT SERPL-MCNC: 0.84 MG/DL (ref 0.55–1.02)
EOSINOPHIL # BLD AUTO: 0 X10(3)/MCL (ref 0–0.9)
EOSINOPHIL NFR BLD AUTO: 0 %
ERYTHROCYTE [DISTWIDTH] IN BLOOD BY AUTOMATED COUNT: 13.8 % (ref 11.5–17)
GFR SERPLBLD CREATININE-BSD FMLA CKD-EPI: >60 MLS/MIN/1.73/M2
GLOBULIN SER-MCNC: 3.1 GM/DL (ref 2.4–3.5)
GLUCOSE SERPL-MCNC: 198 MG/DL (ref 82–115)
HCT VFR BLD AUTO: 48 % (ref 37–47)
HGB BLD-MCNC: 15.1 G/DL (ref 12–16)
IMM GRANULOCYTES # BLD AUTO: 0.02 X10(3)/MCL (ref 0–0.04)
IMM GRANULOCYTES NFR BLD AUTO: 0.2 %
LYMPHOCYTES # BLD AUTO: 1.49 X10(3)/MCL (ref 0.6–4.6)
LYMPHOCYTES NFR BLD AUTO: 14 %
MCH RBC QN AUTO: 30.1 PG
MCHC RBC AUTO-ENTMCNC: 31.5 G/DL (ref 33–36)
MCV RBC AUTO: 95.6 FL (ref 80–94)
MONOCYTES # BLD AUTO: 0.28 X10(3)/MCL (ref 0.1–1.3)
MONOCYTES NFR BLD AUTO: 2.6 %
NEUTROPHILS # BLD AUTO: 8.83 X10(3)/MCL (ref 2.1–9.2)
NEUTROPHILS NFR BLD AUTO: 83.1 %
NRBC BLD AUTO-RTO: 0 %
PLATELET # BLD AUTO: 202 X10(3)/MCL (ref 130–400)
PMV BLD AUTO: 10.6 FL (ref 7.4–10.4)
POCT GLUCOSE: 181 MG/DL (ref 70–110)
POCT GLUCOSE: 232 MG/DL (ref 70–110)
POTASSIUM SERPL-SCNC: 4 MMOL/L (ref 3.5–5.1)
PROT SERPL-MCNC: 6.3 GM/DL (ref 5.8–7.6)
RBC # BLD AUTO: 5.02 X10(6)/MCL (ref 4.2–5.4)
SODIUM SERPL-SCNC: 142 MMOL/L (ref 136–145)
WBC # SPEC AUTO: 10.6 X10(3)/MCL (ref 4.5–11.5)

## 2023-02-20 PROCEDURE — 80053 COMPREHEN METABOLIC PANEL: CPT | Performed by: STUDENT IN AN ORGANIZED HEALTH CARE EDUCATION/TRAINING PROGRAM

## 2023-02-20 PROCEDURE — 27000221 HC OXYGEN, UP TO 24 HOURS

## 2023-02-20 PROCEDURE — 96372 THER/PROPH/DIAG INJ SC/IM: CPT | Performed by: STUDENT IN AN ORGANIZED HEALTH CARE EDUCATION/TRAINING PROGRAM

## 2023-02-20 PROCEDURE — G0378 HOSPITAL OBSERVATION PER HR: HCPCS

## 2023-02-20 PROCEDURE — 94640 AIRWAY INHALATION TREATMENT: CPT

## 2023-02-20 PROCEDURE — 25000242 PHARM REV CODE 250 ALT 637 W/ HCPCS: Performed by: STUDENT IN AN ORGANIZED HEALTH CARE EDUCATION/TRAINING PROGRAM

## 2023-02-20 PROCEDURE — 63600175 PHARM REV CODE 636 W HCPCS: Performed by: STUDENT IN AN ORGANIZED HEALTH CARE EDUCATION/TRAINING PROGRAM

## 2023-02-20 PROCEDURE — 96376 TX/PRO/DX INJ SAME DRUG ADON: CPT

## 2023-02-20 PROCEDURE — 85025 COMPLETE CBC W/AUTO DIFF WBC: CPT | Performed by: STUDENT IN AN ORGANIZED HEALTH CARE EDUCATION/TRAINING PROGRAM

## 2023-02-20 PROCEDURE — 25000003 PHARM REV CODE 250: Performed by: STUDENT IN AN ORGANIZED HEALTH CARE EDUCATION/TRAINING PROGRAM

## 2023-02-20 PROCEDURE — 85610 PROTHROMBIN TIME: CPT | Performed by: STUDENT IN AN ORGANIZED HEALTH CARE EDUCATION/TRAINING PROGRAM

## 2023-02-20 RX ORDER — FLUOXETINE HYDROCHLORIDE 40 MG/1
40 CAPSULE ORAL DAILY
COMMUNITY

## 2023-02-20 RX ORDER — ISOSORBIDE MONONITRATE 60 MG/1
60 TABLET, EXTENDED RELEASE ORAL DAILY
COMMUNITY

## 2023-02-20 RX ORDER — SOTALOL HYDROCHLORIDE 120 MG/1
80 TABLET ORAL 2 TIMES DAILY
COMMUNITY

## 2023-02-20 RX ORDER — AMLODIPINE BESYLATE 5 MG/1
5 TABLET ORAL 2 TIMES DAILY
COMMUNITY

## 2023-02-20 RX ORDER — OLANZAPINE 15 MG/1
15 TABLET ORAL NIGHTLY
COMMUNITY

## 2023-02-20 RX ORDER — ASPIRIN 81 MG/1
81 TABLET ORAL DAILY
COMMUNITY

## 2023-02-20 RX ORDER — ATORVASTATIN CALCIUM 40 MG/1
40 TABLET, FILM COATED ORAL DAILY
COMMUNITY

## 2023-02-20 RX ORDER — METHYLPREDNISOLONE 4 MG/1
TABLET ORAL
Qty: 21 EACH | Refills: 0 | Status: SHIPPED | OUTPATIENT
Start: 2023-02-20 | End: 2023-03-13

## 2023-02-20 RX ORDER — HYDROCHLOROTHIAZIDE 12.5 MG/1
12.5 CAPSULE ORAL DAILY
Status: ON HOLD | COMMUNITY
End: 2023-05-18

## 2023-02-20 RX ADMIN — IPRATROPIUM BROMIDE AND ALBUTEROL SULFATE 3 ML: 2.5; .5 SOLUTION RESPIRATORY (INHALATION) at 01:02

## 2023-02-20 RX ADMIN — ASPIRIN 81 MG: 81 TABLET, CHEWABLE ORAL at 08:02

## 2023-02-20 RX ADMIN — HYDROCHLOROTHIAZIDE 12.5 MG: 12.5 TABLET ORAL at 08:02

## 2023-02-20 RX ADMIN — FLUOXETINE 40 MG: 20 CAPSULE ORAL at 08:02

## 2023-02-20 RX ADMIN — ISOSORBIDE MONONITRATE 60 MG: 30 TABLET, EXTENDED RELEASE ORAL at 08:02

## 2023-02-20 RX ADMIN — INSULIN ASPART 2 UNITS: 100 INJECTION, SOLUTION INTRAVENOUS; SUBCUTANEOUS at 12:02

## 2023-02-20 RX ADMIN — IPRATROPIUM BROMIDE AND ALBUTEROL SULFATE 3 ML: 2.5; .5 SOLUTION RESPIRATORY (INHALATION) at 07:02

## 2023-02-20 RX ADMIN — METHYLPREDNISOLONE SODIUM SUCCINATE 60 MG: 40 INJECTION, POWDER, FOR SOLUTION INTRAMUSCULAR; INTRAVENOUS at 06:02

## 2023-02-20 RX ADMIN — AMLODIPINE BESYLATE 5 MG: 5 TABLET ORAL at 08:02

## 2023-02-20 RX ADMIN — SOTALOL HYDROCHLORIDE 80 MG: 80 TABLET ORAL at 08:02

## 2023-02-20 RX ADMIN — METHYLPREDNISOLONE SODIUM SUCCINATE 60 MG: 40 INJECTION, POWDER, FOR SOLUTION INTRAMUSCULAR; INTRAVENOUS at 02:02

## 2023-02-20 NOTE — PROGRESS NOTES
[facility] Medicine  Progress Note      Patient Name: Kayla Ruiz  : 1955  MRN: [id number]  Patient Class: OP- Observation   Admission Date: 2023   Length of Stay: 0  Admitting Service: Hospital Medicine  Attending Physician: Jack Patel MD  PCP: Tony Leon MD    CHIEF COMPLAINT   Hospital follow up    HOSPITAL COURSE     Brief HPI:  68y/o F with PMH of afib, aortic valve replacement, DM2, HLD presents to ED with worsening SOB over last 2 days, now requiring oxygen via NC in ED. She reports known sick contact, chills, productive cough, weakness. Denies N/V, dizziness, vision changes, CP, leg edema, abdominal pain. Not on home O2. 50 pack year tobacco history. States baseline ins SOB with exertion when performing house hold activities.     Interval History:  VSS. Ambulatory sats 78% without O2.    OBJECTIVE/PHYSICAL EXAM     VITAL SIGNS (MOST RECENT):  Temp: 97.7 °F (36.5 °C) (23 0353)  Pulse: (!) 51 (23 0353)  Resp: 17 (23 0353)  BP: 132/73 (23 0353)  SpO2: 97 % (23 0353)   VITAL SIGNS (24 HOUR RANGE):  Temp:  [97.4 °F (36.3 °C)-98.2 °F (36.8 °C)]   Pulse:  [51-64]   Resp:  [16-18]   BP: (110-132)/(62-73)   SpO2:  [91 %-97 %]      Physical Exam  Constitutional:       General: She is not in acute distress.     Comments: Improved from yesterday. On 3L O2 NC   Eyes:      Extraocular Movements: Extraocular movements intact.   Cardiovascular:      Rate and Rhythm: Normal rate and regular rhythm.   Pulmonary:      Effort: Pulmonary effort is normal.      Breath sounds: Wheezing (mild scattered expiratory) present.   Abdominal:      General: Abdomen is flat. There is no distension.      Tenderness: There is no abdominal tenderness.   Musculoskeletal:      Right lower leg: No edema.      Left lower leg: No edema.   Skin:     General: Skin is warm and dry.      Capillary Refill: Capillary refill takes less than 2 seconds.   Neurological:       Mental Status: She is alert and oriented to person, place, and time.   Psychiatric:         Behavior: Behavior normal.       LABS/MICRO/MEDS/DIAGNOSTICS     LABS  CBC  Recent Labs     02/18/23 0358 02/19/23 0417   WBC 5.2 4.2*   RBC 5.69* 5.64*   HGB 17.7* 16.9*   HCT 54.4* 53.2*   MCV 95.6* 94.3*   MCH 31.1 30.0   MCHC 32.5* 31.8*   RDW 13.6 13.6    202       Coags  Recent Labs     02/17/23 2217 02/18/23  0406 02/20/23  0339   INR 3.81*   < > 5.98*   D-DIMER 0.36  --   --     < > = values in this interval not displayed.     Cardiac  Recent Labs     02/17/23 2218 02/18/23 0358 02/18/23  0605 02/18/23  1155 02/18/23  1807   .3*  --   --   --   --    TROPONINI  --    < > 0.745* 1.066* 0.667*    < > = values in this interval not displayed.        BMP  Recent Labs     02/18/23  1155 02/19/23 0417 02/20/23 0339   NA  --  142 142   K  --  4.0 4.0   CHLORIDE  --  100 100   CO2  --  32* 33*   BUN  --  31.4* 45.8*   CREATININE  --  0.82 0.84   GLUCOSE  --  180* 198*   CALCIUM  --  9.3 8.8   MG 2.30  --   --    PHOS 3.8  --   --      LFTs  Recent Labs     02/19/23 0417 02/20/23 0339   ALBUMIN 3.5 3.2*   GLOBULIN 3.8* 3.1   ALKPHOS 71 61   ALT 24 23   AST 28 23   BILITOT 0.3 0.4     Diabetes  Recent Labs     02/18/23 0358 02/19/23 0417 02/20/23 0339   GLUCOSE 158* 180* 198*        INTAKE/OUTPUT    Intake/Output Summary (Last 24 hours) at 2/20/2023 0551  Last data filed at 2/19/2023 0641  Gross per 24 hour   Intake 345 ml   Output --   Net 345 ml        MICROBIOLOGY  Microbiology Results (last 7 days)       Procedure Component Value Units Date/Time    Blood Culture [[id number]]  (Normal) Collected: 02/18/23 0358    Order Status: Completed Specimen: Blood from Arm, Right Updated: 02/19/23 1000     CULTURE, BLOOD ([facility]) No Growth At 24 Hours    Blood Culture [[id number]]  (Normal) Collected: 02/18/23 0358    Order Status: Completed Specimen: Blood from Arm, Left Updated: 02/19/23 1000     CULTURE, BLOOD  ([facility]) No Growth At 24 Hours             MEDICATIONS   albuterol-ipratropium  3 mL Nebulization Q6H    amLODIPine  5 mg Oral Daily    aspirin  81 mg Oral Daily    atorvastatin  40 mg Oral QHS    FLUoxetine  40 mg Oral Daily    hydroCHLOROthiazide  12.5 mg Oral Daily    isosorbide mononitrate  60 mg Oral Daily    methylPREDNISolone sodium succinate injection  60 mg Intravenous Q8H    OLANZapine  15 mg Oral QHS    sotaloL  80 mg Oral BID         INFUSIONS         DIAGNOSTIC TESTS  CT Head Without Contrast   Final Result   Impression:      1. No acute intracranial process identified. Details and findings as noted above.         Electronically signed by: Philip Amaya   Date:    02/18/2023   Time:    07:57      CT Chest Without Contrast   Final Result   Impression:      1. No acute focal infiltrate or consolidation is seen.      6 mm nodule noted within the left upper lobe.  Recommend CT of the thorax within 3 months to document stability or resolution      2. No acute intrathoracic process identified. Details and findings as discussed above         Electronically signed by: Philip Amaya   Date:    02/18/2023   Time:    07:59      X-Ray Chest AP Portable   Final Result      Bibasilar interstitial infiltrates         Electronically signed by: Blanca Mccabe   Date:    02/18/2023   Time:    09:08           No results found for: EF       ASSESSMENT/PLAN      CAP/Atypical PNA vs COPD         Chronic tobacco user   New requirement for o2, on 4L in ED  Ground glass appearance on CXR, negative covid/flu, repsi culture +metapneumo virus   CURB-65 score 2,   Possible undiagnosed COPD, goal o2 88-92%; alpha antitrypsin pending; outpatient PFTs  Supratheraputic INR, unlikely PE, d-dimer negative, CTM  Chest CT significant for scarring & 6mm nodule. Repeat CT in 3mo  Rocephin and azithromycin initiated on 02/18  Duonebs q6, with q4 PRN  Solumedrol 60mg TID, monitor glucose and WBC  Blood culture NGTD@24hrs     Afib         Aortic valve replacement        HTN        NSTEMI likely 2/2 to demand ischemia         HLD   Troponin peak @1.07 then downtrended. EKG unremarkable.  Symptoms more consistent with pulmonary than cardiac etiology. Received ASA and nitro in ED  Follows up with cardiology at [facility], reports having echo and angiogram performed. Cards consult today (2/20)  INR 5.3. yesterday, increased to 5.98. Warfarin held yesterday  Continue home medication    3.    Polycythemia  Likely secondary to tobacco   JAK2, EPO pending    4.    DM2  Hold home medication  Initiate SSI     5.    Depression  Continue home medication      Access: pIV  Antibiotics: ceftriaxone and azithromycin  Diet: diabetic heart healthy  DVT Prophylaxis: hold warfarin  GI Prophylaxis: none  Fluids: none     Anticipated discharge and disposition: observation, pending improving in SOB, downtitrating o2 requirement and troponin   __________________________________________________________________________      Liliya English MD  [employer] [facility], [facility]

## 2023-02-20 NOTE — PLAN OF CARE
New consult for home O2, pt  has no preference, referral submitted to [employer] via [facility], will follow.  [facility] will deliver oxygen supplies to pt's room.

## 2023-02-20 NOTE — DISCHARGE INSTRUCTIONS
Do NOT take warfarin (coumadin) today (02/20)    DO take 5mg warfarin (coumadin) tomorrow (02/21)    Go to scheduled appointment at INR clinic in [place]

## 2023-02-20 NOTE — NURSING
Discharge instructions given to the patient and her . Demonstrated to them how to operate the portable oxygen tank. Return demonstration successful. Solumedrol IV given at this time as ordered then IV removed. Pt remains on o2 at 3L/NC. No SOB noted. Discharged from tele monitor and leads removed.

## 2023-02-20 NOTE — NURSING
Walked pt around nurses station x3 minutes with o2 at 3L/nc. O2 level dropped to 90% and then leveled out to 92% during the duration of the walk. No exertion noted.

## 2023-02-20 NOTE — PT/OT/SLP PROGRESS
Physical Therapy  Missed Treatment Note    Patient Name:  Kayla Ruiz   MRN:    Patient not seen today secondary to Other (Comment) (Pt. is in the process of being discharged.). Talked to nurse Aurelia.

## 2023-02-20 NOTE — PROGRESS NOTES
[facility] filled medrol dose pack and delivered to bedside.

## 2023-02-20 NOTE — PLAN OF CARE
Problem: Infection (Pneumonia)  Goal: Resolution of Infection Signs and Symptoms  Outcome: Adequate for Care Transition     Problem: Respiratory Compromise (Pneumonia)  Goal: Effective Oxygenation and Ventilation  Outcome: Adequate for Care Transition     Problem: Gas Exchange Impaired  Goal: Optimal Gas Exchange  Outcome: Adequate for Care Transition

## 2023-02-20 NOTE — DISCHARGE SUMMARY
[facility] Discharge Summary    Admitting Physician: Jack Patel MD  Attending Physician: Jack Patel MD  Date of Admit: 2/17/2023  Date of Discharge: 2/20/2023    Condition: Stable  Outcome: Condition has improved and patient is now ready for discharge.  DISPOSITION: Home or Self Care    Discharge Diagnoses     Principal Problem:  CAP (community acquired pneumonia)  Active Hospital Problems    Diagnosis  POA    *CAP (community acquired pneumonia) [J18.9]  Yes    Hypoxia [R09.02]  Yes    Acute systolic congestive heart failure [I50.21]  Yes    Troponin level elevated [R77.8]  Yes      Resolved Hospital Problems   No resolved problems to display.     Patient Active Problem List   Diagnosis    CAP (community acquired pneumonia)    Hypoxia    Acute systolic congestive heart failure    Troponin level elevated    Atrial fibrillation     Consultants and Procedures     Consultants:  IP CONSULT TO INTERNAL MEDICINE  IP CONSULT TO SOCIAL WORK/CASE MANAGEMENT    Procedures:   * No surgery found *     Brief Admission History      68y/o F with PMH of afib, aortic valve replacement, DM2, HLD presents to ED with worsening SOB over last 2 days, now requiring oxygen via NC in ED. She reports known sick contact, chills, productive cough, weakness. Denies N/V, dizziness, vision changes, CP, leg edema, abdominal pain. Not on home O2. 50 pack year tobacco history. States baseline ins SOB with exertion when performing house hold activities.    Last echo in 2020 with EF 60%. Normal Lexiscan in 2020. Last appointment with [facility] in Oct 2020, return in 6 months. Next appointment is 04/05/2023.    Hospital Course with Pertinent Findings     Patient admitted for atypical PNA vs COPD, NSTEMI. She remained without chest pain throughout stay, EKG unremarkable, and troponin peaked at 1 but then downtrended. She follows with [facility] with upcoming appointment in April 2023. She was treated with ASA and nitro. Concern for atypical  "PNA on CXR, however appeared to be consistent with undiagnosed COPD on chest CT. There was a 6mm nodule noted on chest CT. She required supplemental oxygen throughout the duration of her admission, unable to wean off and she was discharged with home O2. Referral placed to [facility]. Symptoms improved throughout stay on 2L O2. Vitals remained stable. Polycythemia noted likely due to smoking hx with EPO and JAK2 pending at time of discharge. Of note, her INR became supratheraputic and her warfarin was held for 2 days. No active signs of bleeding and she voiced importance of follow up with INR clinic.      Discharge physical exam:  Vitals  BP: 124/68  Temp: 97.8 °F (36.6 °C)  Temp src: Oral  Pulse: (!) 50  Resp: 18  SpO2: (!) 92 %  Height: 5' 6" (167.6 cm)  Weight: 92.5 kg (204 lb)    Ambulatory sats: lowest 90% on 3L NC    Physical Exam  Constitutional:       General: She is not in acute distress.     Comments: Improved from yesterday. On 3L O2 NC   Eyes:      Extraocular Movements: Extraocular movements intact.   Cardiovascular:      Rate and Rhythm: Normal rate and regular rhythm.   Pulmonary:      Effort: Pulmonary effort is normal.      Breath sounds: Wheezing (mild scattered expiratory) present.   Abdominal:      General: Abdomen is flat. There is no distension.      Tenderness: There is no abdominal tenderness.   Musculoskeletal:      Right lower leg: No edema.      Left lower leg: No edema.   Skin:     General: Skin is warm and dry.      Capillary Refill: Capillary refill takes less than 2 seconds.   Neurological:      Mental Status: She is alert and oriented to person, place, and time.   Psychiatric:         Behavior: Behavior normal.       TIME SPENT ON DISCHARGE: 60 minutes    Discharge Medications        Medication List        START taking these medications      methylPREDNISolone 4 mg tablet  Commonly known as: MEDROL DOSEPACK  use as directed            CONTINUE taking these medications  "     amLODIPine 5 MG tablet  Commonly known as: NORVASC     aspirin 81 MG EC tablet  Commonly known as: ECOTRIN     atorvastatin 40 MG tablet  Commonly known as: LIPITOR     FLUoxetine 40 MG capsule     hydroCHLOROthiazide 12.5 mg capsule  Commonly known as: MICROZIDE     isosorbide mononitrate 60 MG 24 hr tablet  Commonly known as: IMDUR     OLANZapine 15 MG Tab  Commonly known as: ZyPREXA     sotaloL 120 MG Tab  Commonly known as: BETAPACE               Where to Get Your Medications        These medications were sent to [place], [place] - [address] [facility].  [address] [facility] [address]      Phone: [phone]   methylPREDNISolone 4 mg tablet         Discharge Information:     Complete all medications as prescribed.   - Hold warfarin today (02/20)  - Restart 5mg warfarin tomorrow (02/21)  - Go INR clinic on 02/22    ED precautions discussed including but not limited to worsening SOB, chest pain, bleeding.     Maintain the following appointments:   Follow-up Information       Jennifer Le PA-C. Schedule an appointment as soon as possible for a visit in 1 week(s).    Specialty: Physician Assistant  Why: Call office to schedule a hospital follow up visit.  Contact information:  [address]  [address]  [facility] [address] [phone]               Tony Leon MD. Go on 4/5/2023.    Specialty: Cardiology  Why: - Please call Cardiologist for a hosptial follow up visit.  - Echo  - Routine appointment  Contact information:  [address] [facility] [address] [phone]               [facility]. Schedule an appointment as soon as possible for a visit.    Specialty: Pulmonology  Why: They will call you to establish an appointment for:  - Estbalishing care  - PFT  Contact information:  [address] [facility] [address] [phone]  Additional information:  Entrance 1             [facility]. Go in 2 day(s).    Specialty: Cardiology  Why: Coumadin INR check  Contact information:  [address] [place] [address] [phone]                           Liliya English MD  U , HO-I

## 2023-02-20 NOTE — PROGRESS NOTES
"Inpatient Nutrition Evaluation    Admit Date: 2023   Total duration of encounter: 3 days    Nutrition Recommendation/Prescription     Continue Diabetic diet -- consider adding Cardiac restriction  Monitor po intake, wt, labs    Nutrition Assessment     Chart Review    Reason Seen: continuous nutrition monitoring    Malnutrition Screening Tool Results   Have you recently lost weight without trying?: No  Have you been eating poorly because of a decreased appetite?: No   MST Score: 0     Diagnosis: CAP/Atypical PNA vs COPD  Chronic tobacco user   Afib  Aortic valve replacement  HTN  NSTEMI likely 2/2 to demand ischemia   HLD  Polycythemia  DM2  Depression    Relevant Medical History: afib, aortic valve replacement, DM2, HLD     Nutrition-Related Medications: ASA, statin, HCTZ, methylprednisolone, SSI    Nutrition-Related Labs: -CO2 33, Gluc 198, BUN 45.8, Alb 3.2, GFR >60      Diet Order: Diet diabetic  Oral Supplement Order: none  Appetite/Oral Intake: fair/50-75% of meals  Factors Affecting Nutritional Intake: decreased appetite  Food/Congregational/Cultural Preferences: none reported  Food Allergies: no known food allergies       Wound(s):   None noted    Comments  : Pt reports fair appetite; denies any GI complaints. Pt reports decreased appetite for a few days pta; no wt loss reported. Pt states UBW ~200#. Pt reports plans for d/c today.      Anthropometrics    Height: 5' 6" (167.6 cm)    Last Weight: 92.5 kg (204 lb) (23) Weight Method: Standard Scale  BMI (Calculated): 32.9  BMI Classification: obese grade I (BMI 30-34.9)     Ideal Body Weight (IBW), Female: 130 lb     % Ideal Body Weight, Female (lb): 156.92 %                    Usual Body Weight (UBW), k.91 kg (UBW ~200#)  % Usual Body Weight: 102     Usual Weight Provided By: patient and patient denies unintentional weight loss    Wt Readings from Last 3 Encounters:   23 92.5 kg (204 lb)   23 90.7 kg (200 lb)    "   Weight Change(s) Since Admission:  Admit Weight: 90.7 kg (200 lb) (02/17/23 3139)  2/20: UBW ~200#; no recent wt loss reported    Patient Education    Not applicable.    Monitoring & Evaluation     Dietitian will monitor food and beverage intake, weight, electrolyte/renal panel, and glucose/endocrine profile.  Nutrition Risk/Follow-Up: low (follow-up in 5-7 days)  Patients assigned 'low nutrition risk' status do not qualify for a full nutritional assessment but will be monitored and re-evaluated in a 5-7 day time period. Please consult if re-evaluation needed sooner.

## 2023-02-21 LAB — EPO SERPL-ACNC: 4.4 MIU/ML (ref 2.6–18.5)

## 2023-02-22 ENCOUNTER — LAB VISIT (OUTPATIENT)
Dept: LAB | Facility: HOSPITAL | Age: 68
End: 2023-02-22
Attending: INTERNAL MEDICINE
Payer: MEDICARE

## 2023-02-22 DIAGNOSIS — Z95.2 H/O MECHANICAL AORTIC VALVE REPLACEMENT: Primary | ICD-10-CM

## 2023-02-22 LAB
INR BLD: 4.48 (ref 0–1.3)
JAK2 P.V617F BLD/T QL: NORMAL
JAK2 P.V617F MUT/NOR BLD/T: NORMAL %
PROTHROMBIN TIME: 42.1 SECONDS (ref 12.5–14.5)

## 2023-02-22 PROCEDURE — 36415 COLL VENOUS BLD VENIPUNCTURE: CPT

## 2023-02-22 PROCEDURE — 85610 PROTHROMBIN TIME: CPT

## 2023-02-23 LAB
BACTERIA BLD CULT: NORMAL
BACTERIA BLD CULT: NORMAL

## 2023-02-27 LAB
A1AT PHENOTYP SERPL-IMP: NORMAL
A1AT SERPL NEPH-MCNC: 174 MG/DL (ref 100–190)

## 2023-02-28 ENCOUNTER — ANTI-COAG VISIT (OUTPATIENT)
Dept: CARDIOLOGY | Facility: HOSPITAL | Age: 68
End: 2023-02-28
Attending: INTERNAL MEDICINE
Payer: MEDICARE

## 2023-02-28 DIAGNOSIS — Z95.2 S/P AVR (AORTIC VALVE REPLACEMENT): ICD-10-CM

## 2023-02-28 DIAGNOSIS — I48.91 A-FIB: ICD-10-CM

## 2023-02-28 LAB — INR PPP: 8 (ref 2–3)

## 2023-02-28 PROCEDURE — 99211 OFF/OP EST MAY X REQ PHY/QHP: CPT

## 2023-02-28 PROCEDURE — 85610 PROTHROMBIN TIME: CPT

## 2023-03-02 ENCOUNTER — ANTI-COAG VISIT (OUTPATIENT)
Dept: CARDIOLOGY | Facility: HOSPITAL | Age: 68
End: 2023-03-02
Attending: INTERNAL MEDICINE
Payer: MEDICARE

## 2023-03-02 DIAGNOSIS — Z95.2 S/P AVR (AORTIC VALVE REPLACEMENT): ICD-10-CM

## 2023-03-02 DIAGNOSIS — I48.91 A-FIB: ICD-10-CM

## 2023-03-02 LAB — INR PPP: 2 (ref 2–3)

## 2023-03-02 PROCEDURE — 99211 OFF/OP EST MAY X REQ PHY/QHP: CPT

## 2023-03-02 PROCEDURE — 85610 PROTHROMBIN TIME: CPT

## 2023-03-13 ENCOUNTER — HOSPITAL ENCOUNTER (OUTPATIENT)
Dept: PULMONOLOGY | Facility: HOSPITAL | Age: 68
Discharge: HOME | End: 2023-03-13
Attending: STUDENT IN AN ORGANIZED HEALTH CARE EDUCATION/TRAINING PROGRAM
Payer: MEDICARE

## 2023-03-13 DIAGNOSIS — J18.9 COMMUNITY ACQUIRED PNEUMONIA, UNSPECIFIED LATERALITY: ICD-10-CM

## 2023-03-13 DIAGNOSIS — R09.02 HYPOXIA: ICD-10-CM

## 2023-03-13 DIAGNOSIS — R06.02 SHORTNESS OF BREATH: ICD-10-CM

## 2023-03-13 PROCEDURE — 94726 PLETHYSMOGRAPHY LUNG VOLUMES: CPT

## 2023-03-13 PROCEDURE — 94060 EVALUATION OF WHEEZING: CPT

## 2023-03-14 LAB
ERV LLN: -16449.31
ERV PRE REF: 49.4 %
ERV REF: 0.69
FEF 25 75 CHG: 3.9 %
FEF 25 75 LLN: 0.91
FEF 25 75 POST REF: 45 %
FEF 25 75 PRE REF: 43.3 %
FEF 25 75 REF: 1.96
FET100 CHG: -4.2 %
FEV1 CHG: 4.5 %
FEV1 FVC CHG: 0.1 %
FEV1 FVC LLN: 65
FEV1 FVC POST REF: 95.3 %
FEV1 FVC PRE REF: 95.3 %
FEV1 FVC REF: 78
FEV1 LLN: 1.72
FEV1 POST REF: 50.7 %
FEV1 PRE REF: 48.6 %
FEV1 REF: 2.36
FRCPLETH LLN: 2
FRCPLETH PREREF: 103.7 %
FRCPLETH REF: 2.82
FVC CHG: 4.4 %
FVC LLN: 2.23
FVC POST REF: 52.8 %
FVC PRE REF: 50.5 %
FVC REF: 3.06
MVV LLN: 78
MVV PRE REF: 37.4 %
MVV REF: 92
PEF CHG: 13.2 %
PEF LLN: 4.21
PEF POST REF: 54.2 %
PEF PRE REF: 47.9 %
PEF REF: 6.03
POST FEF 25 75: 0.88 L/S (ref 0.91–3.45)
POST FET 100: 6.31 SEC
POST FEV1 FVC: 74.24 % (ref 64.65–89.25)
POST FEV1: 1.2 L (ref 1.72–2.97)
POST FVC: 1.61 L (ref 2.23–3.92)
POST PEF: 3.27 L/S (ref 4.21–7.85)
PRE ERV: 0.34 L (ref -16449.31–16450.69)
PRE FEF 25 75: 0.85 L/S (ref 0.91–3.45)
PRE FET 100: 6.59 SEC
PRE FEV1 FVC: 74.21 % (ref 64.65–89.25)
PRE FEV1: 1.15 L (ref 1.72–2.97)
PRE FRC PL: 2.93 L (ref 2–3.65)
PRE FVC: 1.54 L (ref 2.23–3.92)
PRE MVV: 34.37 L/MIN (ref 78.09–105.65)
PRE PEF: 2.89 L/S (ref 4.21–7.85)
PRE RV: 2.59 L (ref 1.56–2.71)
PRE TLC: 4.33 L (ref 4.29–6.26)
RAW LLN: 3.06
RAW PRE REF: 202.4 %
RAW PRE: 6.19 CMH2O*S/L (ref 3.06–3.06)
RAW REF: 3.06
RV LLN: 1.56
RV PRE REF: 121.1 %
RV REF: 2.14
RVTLC LLN: 33
RVTLC PRE REF: 141.1 %
RVTLC PRE: 59.86 % (ref 32.83–52.01)
RVTLC REF: 42
TLC LLN: 4.29
TLC PRE REF: 82 %
TLC REF: 5.27
VC LLN: 2.23
VC PRE REF: 56.8 %
VC PRE: 1.74 L (ref 2.23–3.92)
VC REF: 3.06

## 2023-03-16 ENCOUNTER — ANTI-COAG VISIT (OUTPATIENT)
Dept: CARDIOLOGY | Facility: HOSPITAL | Age: 68
End: 2023-03-16
Attending: INTERNAL MEDICINE
Payer: MEDICARE

## 2023-03-16 DIAGNOSIS — Z95.2 S/P AVR (AORTIC VALVE REPLACEMENT): ICD-10-CM

## 2023-03-16 DIAGNOSIS — I48.91 A-FIB: ICD-10-CM

## 2023-03-16 LAB — INR PPP: 5.8 (ref 2–3)

## 2023-03-16 PROCEDURE — 85610 PROTHROMBIN TIME: CPT

## 2023-03-27 DIAGNOSIS — R06.02 SHORTNESS OF BREATH: Primary | ICD-10-CM

## 2023-03-27 DIAGNOSIS — R09.02 HYPOXIA: ICD-10-CM

## 2023-03-30 ENCOUNTER — ANTI-COAG VISIT (OUTPATIENT)
Dept: CARDIOLOGY | Facility: HOSPITAL | Age: 68
End: 2023-03-30
Attending: INTERNAL MEDICINE
Payer: MEDICARE

## 2023-03-30 DIAGNOSIS — Z95.2 S/P AVR (AORTIC VALVE REPLACEMENT): ICD-10-CM

## 2023-03-30 DIAGNOSIS — I48.91 A-FIB: ICD-10-CM

## 2023-03-30 LAB — INR PPP: 6.9 (ref 2–3)

## 2023-03-30 PROCEDURE — 85610 PROTHROMBIN TIME: CPT

## 2023-04-11 ENCOUNTER — ANTI-COAG VISIT (OUTPATIENT)
Dept: CARDIOLOGY | Facility: HOSPITAL | Age: 68
End: 2023-04-11
Attending: INTERNAL MEDICINE
Payer: MEDICARE

## 2023-04-11 DIAGNOSIS — Z95.2 S/P AVR (AORTIC VALVE REPLACEMENT): ICD-10-CM

## 2023-04-11 DIAGNOSIS — I48.91 A-FIB: ICD-10-CM

## 2023-04-11 LAB — INR PPP: 1.9 (ref 2–3)

## 2023-04-27 ENCOUNTER — ANTI-COAG VISIT (OUTPATIENT)
Dept: CARDIOLOGY | Facility: HOSPITAL | Age: 68
End: 2023-04-27
Attending: INTERNAL MEDICINE
Payer: MEDICARE

## 2023-04-27 DIAGNOSIS — Z95.2 S/P AVR (AORTIC VALVE REPLACEMENT): ICD-10-CM

## 2023-04-27 DIAGNOSIS — I48.91 A-FIB: ICD-10-CM

## 2023-04-27 LAB — INR PPP: 3.3 (ref 2–3)

## 2023-04-27 PROCEDURE — 85610 PROTHROMBIN TIME: CPT

## 2023-05-18 ENCOUNTER — HOSPITAL ENCOUNTER (OUTPATIENT)
Facility: HOSPITAL | Age: 68
Discharge: HOME | End: 2023-05-18
Attending: INTERNAL MEDICINE | Admitting: INTERNAL MEDICINE
Payer: MEDICARE

## 2023-05-18 VITALS
OXYGEN SATURATION: 92 % | TEMPERATURE: 98 F | DIASTOLIC BLOOD PRESSURE: 50 MMHG | BODY MASS INDEX: 33.1 KG/M2 | SYSTOLIC BLOOD PRESSURE: 97 MMHG | WEIGHT: 205.94 LBS | RESPIRATION RATE: 15 BRPM | HEIGHT: 66 IN | HEART RATE: 55 BPM

## 2023-05-18 DIAGNOSIS — R94.8 ABNORMAL PET SCAN, MEDIASTINUM: ICD-10-CM

## 2023-05-18 LAB
INR BLD: 1.06 (ref 0–1.3)
POCT GLUCOSE: 137 MG/DL (ref 70–110)
PROTHROMBIN TIME: 13.7 SECONDS (ref 12.5–14.5)

## 2023-05-18 PROCEDURE — 27201423 OPTIME MED/SURG SUP & DEVICES STERILE SUPPLY: Performed by: INTERNAL MEDICINE

## 2023-05-18 PROCEDURE — 25500020 PHARM REV CODE 255: Performed by: INTERNAL MEDICINE

## 2023-05-18 PROCEDURE — C1894 INTRO/SHEATH, NON-LASER: HCPCS | Performed by: INTERNAL MEDICINE

## 2023-05-18 PROCEDURE — 25000003 PHARM REV CODE 250: Performed by: INTERNAL MEDICINE

## 2023-05-18 PROCEDURE — C1887 CATHETER, GUIDING: HCPCS | Performed by: INTERNAL MEDICINE

## 2023-05-18 PROCEDURE — C1769 GUIDE WIRE: HCPCS | Performed by: INTERNAL MEDICINE

## 2023-05-18 PROCEDURE — 63600175 PHARM REV CODE 636 W HCPCS: Performed by: INTERNAL MEDICINE

## 2023-05-18 PROCEDURE — 85610 PROTHROMBIN TIME: CPT | Performed by: INTERNAL MEDICINE

## 2023-05-18 PROCEDURE — 93454 CORONARY ARTERY ANGIO S&I: CPT | Performed by: INTERNAL MEDICINE

## 2023-05-18 RX ORDER — METFORMIN HYDROCHLORIDE 500 MG/1
500 TABLET ORAL 2 TIMES DAILY WITH MEALS
COMMUNITY

## 2023-05-18 RX ORDER — ACETAMINOPHEN 325 MG/1
650 TABLET ORAL EVERY 4 HOURS PRN
Status: DISCONTINUED | OUTPATIENT
Start: 2023-05-18 | End: 2023-05-18 | Stop reason: HOSPADM

## 2023-05-18 RX ORDER — SODIUM CHLORIDE 9 MG/ML
INJECTION, SOLUTION INTRAVENOUS ONCE
Status: ACTIVE | OUTPATIENT
Start: 2023-05-18

## 2023-05-18 RX ORDER — DIPHENHYDRAMINE HCL 25 MG
50 CAPSULE ORAL
Status: DISPENSED | OUTPATIENT
Start: 2023-05-18

## 2023-05-18 RX ORDER — VERAPAMIL HYDROCHLORIDE 2.5 MG/ML
INJECTION, SOLUTION INTRAVENOUS
Status: DISCONTINUED | OUTPATIENT
Start: 2023-05-18 | End: 2023-05-18 | Stop reason: HOSPADM

## 2023-05-18 RX ORDER — NITROGLYCERIN 20 MG/100ML
INJECTION INTRAVENOUS
Status: DISCONTINUED | OUTPATIENT
Start: 2023-05-18 | End: 2023-05-18 | Stop reason: HOSPADM

## 2023-05-18 RX ORDER — LIDOCAINE HYDROCHLORIDE 10 MG/ML
INJECTION INFILTRATION; PERINEURAL
Status: DISCONTINUED | OUTPATIENT
Start: 2023-05-18 | End: 2023-05-18 | Stop reason: HOSPADM

## 2023-05-18 RX ORDER — FENTANYL CITRATE 50 UG/ML
INJECTION, SOLUTION INTRAMUSCULAR; INTRAVENOUS
Status: DISCONTINUED | OUTPATIENT
Start: 2023-05-18 | End: 2023-05-18 | Stop reason: HOSPADM

## 2023-05-18 RX ORDER — MIDAZOLAM HYDROCHLORIDE 1 MG/ML
INJECTION INTRAMUSCULAR; INTRAVENOUS
Status: DISCONTINUED | OUTPATIENT
Start: 2023-05-18 | End: 2023-05-18 | Stop reason: HOSPADM

## 2023-05-18 RX ORDER — DIAZEPAM 5 MG/1
10 TABLET ORAL
Status: DISPENSED | OUTPATIENT
Start: 2023-05-18

## 2023-05-18 RX ORDER — ONDANSETRON 4 MG/1
8 TABLET, ORALLY DISINTEGRATING ORAL EVERY 8 HOURS PRN
Status: DISCONTINUED | OUTPATIENT
Start: 2023-05-18 | End: 2023-05-18 | Stop reason: HOSPADM

## 2023-05-18 RX ORDER — HEPARIN SODIUM 1000 [USP'U]/ML
INJECTION, SOLUTION INTRAVENOUS; SUBCUTANEOUS
Status: DISCONTINUED | OUTPATIENT
Start: 2023-05-18 | End: 2023-05-18 | Stop reason: HOSPADM

## 2023-05-18 RX ORDER — WARFARIN SODIUM 5 MG/1
5 TABLET ORAL DAILY
COMMUNITY

## 2023-05-18 RX ORDER — OLMESARTAN MEDOXOMIL AND HYDROCHLOROTHIAZIDE 40/12.5 40; 12.5 MG/1; MG/1
1 TABLET ORAL DAILY
COMMUNITY

## 2023-05-18 RX ADMIN — DIPHENHYDRAMINE HYDROCHLORIDE 50 MG: 25 CAPSULE ORAL at 09:05

## 2023-05-18 RX ADMIN — DIAZEPAM 10 MG: 5 TABLET ORAL at 09:05

## 2023-05-18 NOTE — Clinical Note
The catheter was reinserted into the ostium   right coronary artery. Hemodynamics were performed.  An angiography was performed of the right coronary arteries. Multiple views were taken. The angiography was performed via power injection.

## 2023-05-18 NOTE — DISCHARGE SUMMARY
[facility]  Discharge Note  Short Stay    Procedure(s) (LRB):  Left heart cath (Left)      OUTCOME: Patient tolerated treatment/procedure well without complication and is now ready for discharge.    DISPOSITION: Home or Self Care    FINAL DIAGNOSIS:  <principal problem not specified>    FOLLOWUP: In clinic    DISCHARGE INSTRUCTIONS:  No discharge procedures on file.     TIME SPENT ON DISCHARGE: 31 minutes

## 2023-05-18 NOTE — Clinical Note
The closure device was inserted into the right radial artery. 12 cc's of air were inserted into the closure device.

## 2023-05-18 NOTE — DISCHARGE INSTRUCTIONS
Remove dressing and armboard in 24hrs.  - Can shower in 24hrs, use soap and water only.   -No driving for two Days  -Do not lift anything heavier than a gallon of milk for 5 days.  -Do not submerge site under water for 5 days.   -No lotions, powders, creams around site for 5 days.  - Return to the nearest emergency room if you start running a fever; have any kind of discharge coming from the site, the site looks red or swollen.  - If site starts to bleed, lay flat on the ground, apply pressure to the site and call 911.     Resume Coumadin today. Notify Coumadin Clinic that you are resuming your Coumadin.   Resume Metformin and Jardiance in 48 hrs.

## 2023-05-18 NOTE — Clinical Note
The catheter was inserted into the left ventricle ostium   left main. An angiography was performed of the left coronary arteries. Multiple views were taken.

## 2023-05-18 NOTE — Clinical Note
dry, intact, no bleeding and no hematoma. Tazorac Pregnancy And Lactation Text: This medication is not safe during pregnancy. It is unknown if this medication is excreted in breast milk.

## 2023-05-22 PROBLEM — J18.9 CAP (COMMUNITY ACQUIRED PNEUMONIA): Status: RESOLVED | Noted: 2023-02-18 | Resolved: 2023-05-22

## 2023-05-23 ENCOUNTER — ANTI-COAG VISIT (OUTPATIENT)
Dept: CARDIOLOGY | Facility: HOSPITAL | Age: 68
End: 2023-05-23
Attending: INTERNAL MEDICINE
Payer: MEDICARE

## 2023-05-23 DIAGNOSIS — I48.91 A-FIB: ICD-10-CM

## 2023-05-23 DIAGNOSIS — Z95.2 S/P AVR (AORTIC VALVE REPLACEMENT): ICD-10-CM

## 2023-05-23 LAB — INR PPP: 1.4 (ref 2–3)

## 2023-05-23 PROCEDURE — 99211 OFF/OP EST MAY X REQ PHY/QHP: CPT

## 2023-05-23 PROCEDURE — 85610 PROTHROMBIN TIME: CPT

## 2023-05-30 ENCOUNTER — ANTI-COAG VISIT (OUTPATIENT)
Dept: CARDIOLOGY | Facility: HOSPITAL | Age: 68
End: 2023-05-30
Attending: INTERNAL MEDICINE
Payer: MEDICARE

## 2023-05-30 DIAGNOSIS — Z95.2 S/P AVR (AORTIC VALVE REPLACEMENT): ICD-10-CM

## 2023-05-30 DIAGNOSIS — I48.91 A-FIB: ICD-10-CM

## 2023-05-30 LAB — INR PPP: 2.8 (ref 2–3)

## 2023-05-30 PROCEDURE — 85610 PROTHROMBIN TIME: CPT

## 2023-06-13 ENCOUNTER — ANTI-COAG VISIT (OUTPATIENT)
Dept: CARDIOLOGY | Facility: HOSPITAL | Age: 68
End: 2023-06-13
Payer: MEDICARE

## 2023-06-13 LAB — INR PPP: 2.4 (ref 2–3)

## 2023-06-13 PROCEDURE — 85610 PROTHROMBIN TIME: CPT

## 2023-06-13 PROCEDURE — 99211 OFF/OP EST MAY X REQ PHY/QHP: CPT

## 2023-07-06 ENCOUNTER — ANTI-COAG VISIT (OUTPATIENT)
Dept: CARDIOLOGY | Facility: HOSPITAL | Age: 68
End: 2023-07-06
Payer: MEDICARE

## 2023-07-06 LAB — INR PPP: 3.5 (ref 2–3)

## 2023-07-06 PROCEDURE — 85610 PROTHROMBIN TIME: CPT

## 2023-07-06 PROCEDURE — 99211 OFF/OP EST MAY X REQ PHY/QHP: CPT

## 2023-08-08 ENCOUNTER — ANTI-COAG VISIT (OUTPATIENT)
Dept: CARDIOLOGY | Facility: HOSPITAL | Age: 68
End: 2023-08-08
Attending: INTERNAL MEDICINE
Payer: MEDICARE

## 2023-08-08 ENCOUNTER — OFFICE VISIT (OUTPATIENT)
Dept: PULMONOLOGY | Facility: CLINIC | Age: 68
End: 2023-08-08
Payer: MEDICARE

## 2023-08-08 VITALS
HEIGHT: 66 IN | BODY MASS INDEX: 33.97 KG/M2 | RESPIRATION RATE: 18 BRPM | SYSTOLIC BLOOD PRESSURE: 124 MMHG | DIASTOLIC BLOOD PRESSURE: 75 MMHG | TEMPERATURE: 98 F | WEIGHT: 211.38 LBS | OXYGEN SATURATION: 92 % | HEART RATE: 70 BPM

## 2023-08-08 DIAGNOSIS — R09.02 HYPOXIA: ICD-10-CM

## 2023-08-08 DIAGNOSIS — R06.02 SHORTNESS OF BREATH: ICD-10-CM

## 2023-08-08 LAB — INR PPP: 3.5 (ref 2–3)

## 2023-08-08 PROCEDURE — 99205 PR OFFICE/OUTPT VISIT, NEW, LEVL V, 60-74 MIN: ICD-10-PCS | Mod: S$PBB,,, | Performed by: HOSPITALIST

## 2023-08-08 PROCEDURE — 99205 OFFICE O/P NEW HI 60 MIN: CPT | Mod: S$PBB,,, | Performed by: HOSPITALIST

## 2023-08-08 PROCEDURE — 3008F BODY MASS INDEX DOCD: CPT | Mod: CPTII,,, | Performed by: HOSPITALIST

## 2023-08-08 PROCEDURE — 1159F MED LIST DOCD IN RCRD: CPT | Mod: CPTII,,, | Performed by: HOSPITALIST

## 2023-08-08 PROCEDURE — 3288F FALL RISK ASSESSMENT DOCD: CPT | Mod: CPTII,,, | Performed by: HOSPITALIST

## 2023-08-08 PROCEDURE — 1101F PT FALLS ASSESS-DOCD LE1/YR: CPT | Mod: CPTII,,, | Performed by: HOSPITALIST

## 2023-08-08 PROCEDURE — 3008F PR BODY MASS INDEX (BMI) DOCUMENTED: ICD-10-PCS | Mod: CPTII,,, | Performed by: HOSPITALIST

## 2023-08-08 PROCEDURE — 1160F RVW MEDS BY RX/DR IN RCRD: CPT | Mod: CPTII,,, | Performed by: HOSPITALIST

## 2023-08-08 PROCEDURE — 1101F PR PT FALLS ASSESS DOC 0-1 FALLS W/OUT INJ PAST YR: ICD-10-PCS | Mod: CPTII,,, | Performed by: HOSPITALIST

## 2023-08-08 PROCEDURE — 1160F PR REVIEW ALL MEDS BY PRESCRIBER/CLIN PHARMACIST DOCUMENTED: ICD-10-PCS | Mod: CPTII,,, | Performed by: HOSPITALIST

## 2023-08-08 PROCEDURE — 1126F PR PAIN SEVERITY QUANTIFIED, NO PAIN PRESENT: ICD-10-PCS | Mod: CPTII,,, | Performed by: HOSPITALIST

## 2023-08-08 PROCEDURE — 99215 OFFICE O/P EST HI 40 MIN: CPT | Mod: PBBFAC,27 | Performed by: HOSPITALIST

## 2023-08-08 PROCEDURE — 99211 OFF/OP EST MAY X REQ PHY/QHP: CPT

## 2023-08-08 PROCEDURE — 1126F AMNT PAIN NOTED NONE PRSNT: CPT | Mod: CPTII,,, | Performed by: HOSPITALIST

## 2023-08-08 PROCEDURE — 3288F PR FALLS RISK ASSESSMENT DOCUMENTED: ICD-10-PCS | Mod: CPTII,,, | Performed by: HOSPITALIST

## 2023-08-08 PROCEDURE — 3074F PR MOST RECENT SYSTOLIC BLOOD PRESSURE < 130 MM HG: ICD-10-PCS | Mod: CPTII,,, | Performed by: HOSPITALIST

## 2023-08-08 PROCEDURE — 3078F PR MOST RECENT DIASTOLIC BLOOD PRESSURE < 80 MM HG: ICD-10-PCS | Mod: CPTII,,, | Performed by: HOSPITALIST

## 2023-08-08 PROCEDURE — 1159F PR MEDICATION LIST DOCUMENTED IN MEDICAL RECORD: ICD-10-PCS | Mod: CPTII,,, | Performed by: HOSPITALIST

## 2023-08-08 PROCEDURE — 3074F SYST BP LT 130 MM HG: CPT | Mod: CPTII,,, | Performed by: HOSPITALIST

## 2023-08-08 PROCEDURE — 85610 PROTHROMBIN TIME: CPT

## 2023-08-08 PROCEDURE — 3078F DIAST BP <80 MM HG: CPT | Mod: CPTII,,, | Performed by: HOSPITALIST

## 2023-08-08 RX ORDER — ARIPIPRAZOLE 5 MG/1
5 TABLET ORAL
COMMUNITY
Start: 2023-07-11

## 2023-08-08 RX ORDER — ALPRAZOLAM 0.5 MG/1
TABLET ORAL
COMMUNITY
Start: 2023-08-05

## 2023-08-08 RX ORDER — AMOXICILLIN 500 MG/1
500 CAPSULE ORAL 3 TIMES DAILY
COMMUNITY
Start: 2023-08-03

## 2023-08-08 RX ORDER — TIRZEPATIDE 5 MG/.5ML
INJECTION, SOLUTION SUBCUTANEOUS
COMMUNITY
Start: 2023-08-01

## 2023-08-08 RX ORDER — PIOGLITAZONEHYDROCHLORIDE 15 MG/1
15 TABLET ORAL
COMMUNITY
Start: 2023-07-11

## 2023-08-08 NOTE — PROGRESS NOTES
Subjective:     Chief Complaint: New pt; SOB, CT done 02/18/2023, CXR 02/17/2023, PFT 03/13/      HPI: Kayla Ruiz is a 68 y.o. female with PMH of Aortic valve replacement, Afib, DM2, LESLI referred to us for evaluation of SOB. Pt states that SOB has been going on for months, eventually leading her to go to the ED. SOB is triggered by exertion, relieved by rest. She denies any cough, LOC, F/C, environmental or occupational exposures. She smokes 1 ppd for 50 years. Is currently breathing back at baseline.     Diagnostics:  PFTs 3/14/23 : FEV1/FVC 74, FEV1 1.15, FVC 1.54, TLC wnl; DLCO cannot be performed; nonspecific pattern with isolated reduction in FVC and FEV1. Can be seen in mixed obstructive and restrictive disease    CT Chest WO 2/18/23: No acute focal infiltrate or consolidation is seen. 6 mm nodule noted within the left upper lobe.  Recommend CT of the thorax within 3 months to document stability or resolution. No acute intrathoracic process identified. Details and findings as discussed above    CXR AP 2/17/23: Bibasilar interstitial infiltrates    Today's visit:  Patient comes to the office today     Past Medical History:   Diagnosis Date    A-fib     Anticoagulant long-term use     Depression     Diabetes mellitus     Hyperlipidemia     Sleep apnea          Review of Systems   Constitutional:  Negative for chills and fever.   HENT: Negative.     Eyes: Negative.    Respiratory:  Positive for shortness of breath. Negative for cough, sputum production and wheezing.    Cardiovascular:  Negative for chest pain, palpitations and orthopnea.   Gastrointestinal: Negative.    Genitourinary: Negative.    Musculoskeletal: Negative.    Skin: Negative.    Neurological: Negative.    Endo/Heme/Allergies: Negative.    Psychiatric/Behavioral: Negative.             Social History     Socioeconomic History    Marital status:    Tobacco Use    Smoking status: Every Day     Current packs/day: 1.00     Average  packs/day: 1 pack/day for 50.0 years (50.0 ttl pk-yrs)     Types: Cigarettes    Smokeless tobacco: Never   Substance and Sexual Activity    Alcohol use: Never    Drug use: Never     Social Determinants of Health     Financial Resource Strain: Low Risk  (2/19/2023)    Overall Financial Resource Strain (CARDIA)     Difficulty of Paying Living Expenses: Not very hard   Food Insecurity: No Food Insecurity (2/19/2023)    Hunger Vital Sign     Worried About Running Out of Food in the Last Year: Never true     Ran Out of Food in the Last Year: Never true   Transportation Needs: No Transportation Needs (2/19/2023)    PRAPARE - Transportation     Lack of Transportation (Medical): No     Lack of Transportation (Non-Medical): No   Physical Activity: Insufficiently Active (2/19/2023)    Exercise Vital Sign     Days of Exercise per Week: 3 days     Minutes of Exercise per Session: 20 min   Stress: Stress Concern Present (2/19/2023)    [employer] [place] [employer] - Occupational Stress Questionnaire     Feeling of Stress : To some extent   Social Connections: Moderately Integrated (2/19/2023)    Social Connection and Isolation Panel [NHANES]     Frequency of Communication with Friends and Family: More than three times a week     Frequency of Social Gatherings with Friends and Family: More than three times a week     Attends [employer] Services: More than 4 times per year     Active Member of Clubs or Organizations: No     Attends Club or Organization Meetings: Never     Marital Status:    Housing Stability: Low Risk  (2/19/2023)    Housing Stability Vital Sign     Unable to Pay for Housing in the Last Year: No     Number of Places Lived in the Last Year: 1     Unstable Housing in the Last Year: No         Current Outpatient Medications   Medication Instructions    ALPRAZolam (XANAX) 0.5 MG tablet TAKE 1/2 TO 1 TABLET BY MOUTH DAILY ONLY AS NEEDED FOR ANXIETY    amLODIPine (NORVASC) 5 mg, Oral, 2 times daily     "amoxicillin (AMOXIL) 500 mg, Oral, 3 times daily    ARIPiprazole (ABILIFY) 5 mg, Oral    aspirin (ECOTRIN) 81 mg, Oral, Daily    atorvastatin (LIPITOR) 40 mg, Oral, Daily    FLUoxetine 40 mg, Oral, Daily    isosorbide mononitrate (IMDUR) 60 mg, Oral, Daily    metFORMIN (GLUCOPHAGE) 500 mg, Oral, 2 times daily with meals    MOUNJARO 5 mg/0.5 mL PnIj INJECT 1 SYRINGE BELOW THE SKIN ONCE WEEKLY    OLANZapine (ZYPREXA) 15 mg, Oral, Nightly    olmesartan-hydrochlorothiazide (BENICAR HCT) 40-12.5 mg Tab 1 tablet, Oral, Daily    pioglitazone (ACTOS) 15 mg, Oral    sotaloL (BETAPACE) 80 mg, Oral, 2 times daily, 1/2 TAB BID    warfarin (COUMADIN) 5 mg, Oral, Daily, 1 TAB DAILY EXCEPT TUE 1 1/2 TAB             Objective:   /75 (BP Location: Left arm)   Pulse 70   Temp 98.2 °F (36.8 °C) (Oral)   Resp 18   Ht 5' 6" (1.676 m)   Wt 95.9 kg (211 lb 6.4 oz)   SpO2 (!) 92% Comment: room air  BMI 34.12 kg/m²     Physical Exam  Vitals and nursing note reviewed.   Constitutional:       General: She is not in acute distress.     Appearance: She is obese.   HENT:      Head: Normocephalic and atraumatic.      Right Ear: External ear normal.      Left Ear: External ear normal.      Nose: Nose normal.      Mouth/Throat:      Mouth: Mucous membranes are moist.      Pharynx: Oropharynx is clear.   Eyes:      Extraocular Movements: Extraocular movements intact.      Conjunctiva/sclera: Conjunctivae normal.      Pupils: Pupils are equal, round, and reactive to light.   Cardiovascular:      Rate and Rhythm: Normal rate and regular rhythm.      Pulses: Normal pulses.      Heart sounds: Normal heart sounds.   Pulmonary:      Effort: Pulmonary effort is normal.      Comments: Bibasilar inspiratory crackles   Abdominal:      General: Bowel sounds are normal. There is no distension.      Palpations: Abdomen is soft.      Tenderness: There is no abdominal tenderness.   Musculoskeletal:         General: Normal range of motion.      " Cervical back: Normal range of motion and neck supple.      Left lower leg: Edema present.      Comments: Grossly wnl    Skin:     General: Skin is warm and dry.      Capillary Refill: Capillary refill takes less than 2 seconds.   Neurological:      General: No focal deficit present.      Mental Status: She is alert and oriented to person, place, and time.   Psychiatric:         Mood and Affect: Mood normal.         Behavior: Behavior normal.           Imaging:  I have personally reviewed the pertinent recent imaging.    Assessment:     Mixed Obstructive and Restrictive Disease     Plan:     Likely 2/2 deconditioning, smoking history, and body habitus  Encouraged smoking cessation  Recommend screening CT Chest per PCP    Conrad Mckinley, DO    Patient seen.  Agree with the above.  She has chronic shortness of breath but back to her baseline status.  Recommend continued exercise as tolerated.  Weight loss would be beneficial.  With the previous tobacco use follow-up low-dose screening CT of the chest yearly as indicated.  We will see back in pulmonary clinic as needed.

## 2023-08-16 LAB
INR PPP: 3.8
INR PPP: 4
INR PPP: 5.3
INR PPP: 6
PROTHROMBIN TIME: 36.4 SECONDS (ref 11.4–14)
PROTHROMBIN TIME: 37.4 SECONDS (ref 11.4–14)
PROTHROMBIN TIME: 46.7 SECONDS (ref 11.4–14)
PROTHROMBIN TIME: 51.4 SECONDS (ref 11.4–14)

## 2023-08-31 ENCOUNTER — ANTI-COAG VISIT (OUTPATIENT)
Dept: CARDIOLOGY | Facility: HOSPITAL | Age: 68
End: 2023-08-31
Attending: INTERNAL MEDICINE
Payer: MEDICARE

## 2023-08-31 LAB — INR PPP: 1.9 (ref 2–3)

## 2023-08-31 PROCEDURE — 85610 PROTHROMBIN TIME: CPT

## 2023-09-14 ENCOUNTER — ANTI-COAG VISIT (OUTPATIENT)
Dept: CARDIOLOGY | Facility: HOSPITAL | Age: 68
End: 2023-09-14
Attending: INTERNAL MEDICINE
Payer: MEDICARE

## 2023-09-14 LAB — INR PPP: 4.3 (ref 2–3)

## 2023-09-14 PROCEDURE — 99211 OFF/OP EST MAY X REQ PHY/QHP: CPT

## 2023-09-14 PROCEDURE — 85610 PROTHROMBIN TIME: CPT

## 2023-09-28 ENCOUNTER — ANTI-COAG VISIT (OUTPATIENT)
Dept: CARDIOLOGY | Facility: HOSPITAL | Age: 68
End: 2023-09-28
Attending: INTERNAL MEDICINE
Payer: MEDICARE

## 2023-09-28 LAB — INR PPP: 6.9 (ref 2–3)

## 2023-09-28 PROCEDURE — 85610 PROTHROMBIN TIME: CPT

## 2023-10-19 ENCOUNTER — ANTI-COAG VISIT (OUTPATIENT)
Dept: CARDIOLOGY | Facility: HOSPITAL | Age: 68
End: 2023-10-19
Attending: INTERNAL MEDICINE
Payer: MEDICARE

## 2023-10-19 LAB — INR PPP: 8 (ref 2–3)

## 2023-10-19 PROCEDURE — 99211 OFF/OP EST MAY X REQ PHY/QHP: CPT

## 2023-10-19 PROCEDURE — 85610 PROTHROMBIN TIME: CPT

## 2023-10-24 ENCOUNTER — ANTI-COAG VISIT (OUTPATIENT)
Dept: CARDIOLOGY | Facility: HOSPITAL | Age: 68
End: 2023-10-24
Attending: INTERNAL MEDICINE
Payer: MEDICARE

## 2023-10-24 LAB — INR PPP: 2.8 (ref 2–3)

## 2023-10-24 PROCEDURE — 85610 PROTHROMBIN TIME: CPT

## 2023-10-31 ENCOUNTER — ANTI-COAG VISIT (OUTPATIENT)
Dept: CARDIOLOGY | Facility: HOSPITAL | Age: 68
End: 2023-10-31
Attending: INTERNAL MEDICINE
Payer: MEDICARE

## 2023-10-31 LAB — INR PPP: 2.1 (ref 2–3)

## 2023-10-31 PROCEDURE — 85610 PROTHROMBIN TIME: CPT

## 2023-11-09 ENCOUNTER — ANTI-COAG VISIT (OUTPATIENT)
Dept: CARDIOLOGY | Facility: HOSPITAL | Age: 68
End: 2023-11-09
Attending: INTERNAL MEDICINE
Payer: MEDICARE

## 2023-11-09 LAB — INR PPP: 2.1 (ref 2–3)

## 2023-11-14 ENCOUNTER — ANTI-COAG VISIT (OUTPATIENT)
Dept: CARDIOLOGY | Facility: HOSPITAL | Age: 68
End: 2023-11-14
Attending: INTERNAL MEDICINE
Payer: MEDICARE

## 2023-11-14 LAB — INR PPP: 4.6 (ref 2–3)

## 2023-11-14 PROCEDURE — 85610 PROTHROMBIN TIME: CPT

## 2023-11-30 ENCOUNTER — ANTI-COAG VISIT (OUTPATIENT)
Dept: CARDIOLOGY | Facility: HOSPITAL | Age: 68
End: 2023-11-30
Attending: INTERNAL MEDICINE
Payer: MEDICARE

## 2023-11-30 LAB — INR PPP: 4 (ref 2–3)

## 2023-12-12 ENCOUNTER — ANTI-COAG VISIT (OUTPATIENT)
Dept: CARDIOLOGY | Facility: HOSPITAL | Age: 68
End: 2023-12-12
Attending: INTERNAL MEDICINE
Payer: MEDICARE

## 2023-12-12 LAB — INR PPP: 1.7 (ref 2–3)

## 2023-12-12 PROCEDURE — 99211 OFF/OP EST MAY X REQ PHY/QHP: CPT

## 2023-12-12 PROCEDURE — 85610 PROTHROMBIN TIME: CPT

## 2024-01-23 ENCOUNTER — ANTI-COAG VISIT (OUTPATIENT)
Dept: CARDIOLOGY | Facility: HOSPITAL | Age: 69
End: 2024-01-23
Attending: INTERNAL MEDICINE
Payer: MEDICARE

## 2024-01-23 LAB — INR PPP: 2.8 (ref 2–3)

## 2024-01-23 PROCEDURE — 85610 PROTHROMBIN TIME: CPT

## 2024-01-23 PROCEDURE — 99211 OFF/OP EST MAY X REQ PHY/QHP: CPT

## 2024-02-22 ENCOUNTER — ANTI-COAG VISIT (OUTPATIENT)
Dept: CARDIOLOGY | Facility: HOSPITAL | Age: 69
End: 2024-02-22
Attending: INTERNAL MEDICINE
Payer: MEDICARE

## 2024-02-22 LAB — INR PPP: 2 (ref 2–3)

## 2024-02-22 PROCEDURE — 85610 PROTHROMBIN TIME: CPT

## 2024-02-22 PROCEDURE — 99211 OFF/OP EST MAY X REQ PHY/QHP: CPT

## 2024-03-12 ENCOUNTER — ANTI-COAG VISIT (OUTPATIENT)
Dept: CARDIOLOGY | Facility: HOSPITAL | Age: 69
End: 2024-03-12
Attending: INTERNAL MEDICINE
Payer: MEDICARE

## 2024-03-12 LAB — INR PPP: 2.7 (ref 2–3)

## 2024-03-12 PROCEDURE — 99211 OFF/OP EST MAY X REQ PHY/QHP: CPT

## 2024-03-12 PROCEDURE — 85610 PROTHROMBIN TIME: CPT

## 2024-05-07 ENCOUNTER — ANTI-COAG VISIT (OUTPATIENT)
Dept: CARDIOLOGY | Facility: HOSPITAL | Age: 69
End: 2024-05-07
Attending: INTERNAL MEDICINE
Payer: MEDICARE

## 2024-05-07 LAB — INR PPP: 4.6 (ref 2–3)

## 2024-05-07 PROCEDURE — 99211 OFF/OP EST MAY X REQ PHY/QHP: CPT

## 2024-05-07 PROCEDURE — 85610 PROTHROMBIN TIME: CPT

## 2024-05-16 ENCOUNTER — ANTI-COAG VISIT (OUTPATIENT)
Dept: CARDIOLOGY | Facility: HOSPITAL | Age: 69
End: 2024-05-16
Attending: INTERNAL MEDICINE
Payer: MEDICARE

## 2024-05-16 LAB — INR PPP: 2.2 (ref 2.5–3.5)

## 2024-05-16 PROCEDURE — 85610 PROTHROMBIN TIME: CPT

## 2024-05-21 ENCOUNTER — ANTI-COAG VISIT (OUTPATIENT)
Dept: CARDIOLOGY | Facility: HOSPITAL | Age: 69
End: 2024-05-21
Attending: INTERNAL MEDICINE
Payer: MEDICARE

## 2024-05-21 LAB — INR PPP: 2.1 (ref 2.5–3.5)

## 2024-05-21 PROCEDURE — 85610 PROTHROMBIN TIME: CPT

## 2024-05-28 ENCOUNTER — ANTI-COAG VISIT (OUTPATIENT)
Dept: CARDIOLOGY | Facility: HOSPITAL | Age: 69
End: 2024-05-28
Attending: INTERNAL MEDICINE
Payer: MEDICARE

## 2024-05-28 LAB — INR PPP: 2.6 (ref 2.5–3.5)

## 2024-05-28 PROCEDURE — 85610 PROTHROMBIN TIME: CPT

## 2024-06-11 ENCOUNTER — ANTI-COAG VISIT (OUTPATIENT)
Dept: CARDIOLOGY | Facility: HOSPITAL | Age: 69
End: 2024-06-11
Attending: INTERNAL MEDICINE
Payer: MEDICARE

## 2024-06-11 LAB — INR PPP: 1.4 (ref 2–3)

## 2024-06-11 PROCEDURE — 85610 PROTHROMBIN TIME: CPT

## 2024-06-11 PROCEDURE — 99211 OFF/OP EST MAY X REQ PHY/QHP: CPT

## 2024-06-18 ENCOUNTER — ANTI-COAG VISIT (OUTPATIENT)
Dept: CARDIOLOGY | Facility: HOSPITAL | Age: 69
End: 2024-06-18
Attending: INTERNAL MEDICINE
Payer: MEDICARE

## 2024-06-18 LAB — INR PPP: 1.7 (ref 2–3)

## 2024-06-18 PROCEDURE — 85610 PROTHROMBIN TIME: CPT

## 2024-06-25 ENCOUNTER — ANTI-COAG VISIT (OUTPATIENT)
Dept: CARDIOLOGY | Facility: HOSPITAL | Age: 69
End: 2024-06-25
Attending: INTERNAL MEDICINE
Payer: MEDICARE

## 2024-06-25 LAB — INR PPP: 1.1 (ref 2–3)

## 2024-06-25 PROCEDURE — 85610 PROTHROMBIN TIME: CPT

## 2024-06-27 ENCOUNTER — ANTI-COAG VISIT (OUTPATIENT)
Dept: CARDIOLOGY | Facility: HOSPITAL | Age: 69
End: 2024-06-27
Attending: INTERNAL MEDICINE
Payer: MEDICARE

## 2024-06-27 LAB — INR PPP: 1.7 (ref 2.5–3.5)

## 2024-06-27 PROCEDURE — 85610 PROTHROMBIN TIME: CPT

## 2024-07-01 ENCOUNTER — ANTI-COAG VISIT (OUTPATIENT)
Dept: CARDIOLOGY | Facility: HOSPITAL | Age: 69
End: 2024-07-01
Attending: INTERNAL MEDICINE
Payer: MEDICARE

## 2024-07-01 LAB — INR PPP: 1.7 (ref 2–3)

## 2024-07-01 PROCEDURE — 85610 PROTHROMBIN TIME: CPT

## 2024-07-01 PROCEDURE — 99211 OFF/OP EST MAY X REQ PHY/QHP: CPT

## 2024-07-02 ENCOUNTER — ANTI-COAG VISIT (OUTPATIENT)
Dept: CARDIOLOGY | Facility: HOSPITAL | Age: 69
End: 2024-07-02
Attending: INTERNAL MEDICINE
Payer: MEDICARE

## 2024-07-02 LAB — INR PPP: 2.1 (ref 2–3)

## 2024-07-02 PROCEDURE — 85610 PROTHROMBIN TIME: CPT

## 2024-07-11 ENCOUNTER — ANTI-COAG VISIT (OUTPATIENT)
Dept: CARDIOLOGY | Facility: HOSPITAL | Age: 69
End: 2024-07-11
Attending: INTERNAL MEDICINE
Payer: MEDICARE

## 2024-07-11 LAB — INR PPP: 1.8 (ref 2–3)

## 2024-07-11 PROCEDURE — 85610 PROTHROMBIN TIME: CPT

## 2024-07-25 ENCOUNTER — ANTI-COAG VISIT (OUTPATIENT)
Dept: CARDIOLOGY | Facility: HOSPITAL | Age: 69
End: 2024-07-25
Attending: INTERNAL MEDICINE
Payer: MEDICARE

## 2024-07-25 LAB — INR PPP: 2.4 (ref 2–3)

## 2024-07-25 PROCEDURE — 85610 PROTHROMBIN TIME: CPT

## 2024-08-20 ENCOUNTER — ANTI-COAG VISIT (OUTPATIENT)
Dept: CARDIOLOGY | Facility: HOSPITAL | Age: 69
End: 2024-08-20
Attending: INTERNAL MEDICINE
Payer: MEDICARE

## 2024-08-20 LAB — INR PPP: 4.8 (ref 2–3)

## 2024-08-20 PROCEDURE — 99211 OFF/OP EST MAY X REQ PHY/QHP: CPT

## 2024-08-20 PROCEDURE — 85610 PROTHROMBIN TIME: CPT

## 2024-09-26 ENCOUNTER — ANTI-COAG VISIT (OUTPATIENT)
Dept: CARDIOLOGY | Facility: HOSPITAL | Age: 69
End: 2024-09-26
Attending: INTERNAL MEDICINE
Payer: MEDICARE

## 2024-09-26 LAB — INR PPP: 1.3 (ref 2–3)

## 2024-09-26 PROCEDURE — 99211 OFF/OP EST MAY X REQ PHY/QHP: CPT

## 2024-09-26 PROCEDURE — 85610 PROTHROMBIN TIME: CPT

## 2024-09-30 ENCOUNTER — ANTI-COAG VISIT (OUTPATIENT)
Dept: CARDIOLOGY | Facility: HOSPITAL | Age: 69
End: 2024-09-30
Attending: INTERNAL MEDICINE
Payer: MEDICARE

## 2024-09-30 PROCEDURE — 85610 PROTHROMBIN TIME: CPT

## 2024-10-01 LAB — INR PPP: 3.4 (ref 2–3)

## 2024-10-03 ENCOUNTER — ANTI-COAG VISIT (OUTPATIENT)
Dept: CARDIOLOGY | Facility: HOSPITAL | Age: 69
End: 2024-10-03
Attending: INTERNAL MEDICINE
Payer: MEDICARE

## 2024-10-03 LAB — INR PPP: 2.4 (ref 2–3)

## 2024-10-03 PROCEDURE — 85610 PROTHROMBIN TIME: CPT

## 2024-10-03 PROCEDURE — 99211 OFF/OP EST MAY X REQ PHY/QHP: CPT

## 2024-10-23 ENCOUNTER — HOSPITAL ENCOUNTER (INPATIENT)
Facility: HOSPITAL | Age: 69
LOS: 5 days | Discharge: HOME HEALTH | DRG: 291 | End: 2024-10-28
Attending: STUDENT IN AN ORGANIZED HEALTH CARE EDUCATION/TRAINING PROGRAM
Payer: MEDICARE

## 2024-10-23 DIAGNOSIS — R07.9 CHEST PAIN: ICD-10-CM

## 2024-10-23 DIAGNOSIS — J96.01 ACUTE RESPIRATORY FAILURE WITH HYPOXIA: ICD-10-CM

## 2024-10-23 DIAGNOSIS — I50.9 CHF (CONGESTIVE HEART FAILURE): Primary | ICD-10-CM

## 2024-10-23 DIAGNOSIS — E87.70 VOLUME OVERLOAD: ICD-10-CM

## 2024-10-23 DIAGNOSIS — R06.02 SHORTNESS OF BREATH: ICD-10-CM

## 2024-10-23 LAB
ALBUMIN SERPL-MCNC: 3.5 G/DL (ref 3.4–4.8)
ALBUMIN/GLOB SERPL: 1 RATIO (ref 1.1–2)
ALP SERPL-CCNC: 125 UNIT/L (ref 40–150)
ALT SERPL-CCNC: 24 UNIT/L (ref 0–55)
ANION GAP SERPL CALC-SCNC: 5 MEQ/L
APTT PPP: 37.8 SECONDS (ref 23.2–33.7)
AST SERPL-CCNC: 20 UNIT/L (ref 5–34)
BACTERIA #/AREA URNS AUTO: ABNORMAL /HPF
BASOPHILS # BLD AUTO: 0.04 X10(3)/MCL
BASOPHILS NFR BLD AUTO: 0.5 %
BILIRUB SERPL-MCNC: 0.5 MG/DL
BILIRUB UR QL STRIP.AUTO: NEGATIVE
BNP BLD-MCNC: 591.4 PG/ML
BUN SERPL-MCNC: 35.8 MG/DL (ref 9.8–20.1)
CALCIUM SERPL-MCNC: 8.8 MG/DL (ref 8.4–10.2)
CHLORIDE SERPL-SCNC: 99 MMOL/L (ref 98–107)
CLARITY UR: CLEAR
CO2 SERPL-SCNC: 37 MMOL/L (ref 23–31)
COLOR UR AUTO: COLORLESS
CREAT SERPL-MCNC: 1.19 MG/DL (ref 0.55–1.02)
CREAT/UREA NIT SERPL: 30
EOSINOPHIL # BLD AUTO: 0.11 X10(3)/MCL (ref 0–0.9)
EOSINOPHIL NFR BLD AUTO: 1.4 %
ERYTHROCYTE [DISTWIDTH] IN BLOOD BY AUTOMATED COUNT: 14.4 % (ref 11.5–17)
FLUAV AG UPPER RESP QL IA.RAPID: NOT DETECTED
FLUBV AG UPPER RESP QL IA.RAPID: NOT DETECTED
GFR SERPLBLD CREATININE-BSD FMLA CKD-EPI: 49 ML/MIN/1.73/M2
GLOBULIN SER-MCNC: 3.4 GM/DL (ref 2.4–3.5)
GLUCOSE SERPL-MCNC: 182 MG/DL (ref 82–115)
GLUCOSE UR QL STRIP: NORMAL
HCT VFR BLD AUTO: 38.8 % (ref 37–47)
HGB BLD-MCNC: 12.1 G/DL (ref 12–16)
HGB UR QL STRIP: NEGATIVE
IMM GRANULOCYTES # BLD AUTO: 0.01 X10(3)/MCL (ref 0–0.04)
IMM GRANULOCYTES NFR BLD AUTO: 0.1 %
INR PPP: 3.1
KETONES UR QL STRIP: NEGATIVE
LEUKOCYTE ESTERASE UR QL STRIP: 25
LYMPHOCYTES # BLD AUTO: 1.79 X10(3)/MCL (ref 0.6–4.6)
LYMPHOCYTES NFR BLD AUTO: 23 %
MCH RBC QN AUTO: 28.9 PG (ref 27–31)
MCHC RBC AUTO-ENTMCNC: 31.2 G/DL (ref 33–36)
MCV RBC AUTO: 92.8 FL (ref 80–94)
MONOCYTES # BLD AUTO: 0.73 X10(3)/MCL (ref 0.1–1.3)
MONOCYTES NFR BLD AUTO: 9.4 %
MUCOUS THREADS URNS QL MICRO: ABNORMAL /LPF
NEUTROPHILS # BLD AUTO: 5.11 X10(3)/MCL (ref 2.1–9.2)
NEUTROPHILS NFR BLD AUTO: 65.6 %
NITRITE UR QL STRIP: NEGATIVE
NRBC BLD AUTO-RTO: 0 %
OHS QRS DURATION: 86 MS
OHS QTC CALCULATION: 440 MS
PH UR STRIP: 5 [PH]
PLATELET # BLD AUTO: 339 X10(3)/MCL (ref 130–400)
PMV BLD AUTO: 10 FL (ref 7.4–10.4)
POTASSIUM SERPL-SCNC: 4.5 MMOL/L (ref 3.5–5.1)
PROT SERPL-MCNC: 6.9 GM/DL (ref 5.8–7.6)
PROT UR QL STRIP: NEGATIVE
PROTHROMBIN TIME: 32.1 SECONDS (ref 12.5–14.5)
RBC # BLD AUTO: 4.18 X10(6)/MCL (ref 4.2–5.4)
RBC #/AREA URNS AUTO: ABNORMAL /HPF
SARS-COV-2 RNA RESP QL NAA+PROBE: NOT DETECTED
SODIUM SERPL-SCNC: 141 MMOL/L (ref 136–145)
SP GR UR STRIP.AUTO: 1.01 (ref 1–1.03)
SQUAMOUS #/AREA URNS LPF: ABNORMAL /HPF
TROPONIN I SERPL-MCNC: <0.01 NG/ML (ref 0–0.04)
UROBILINOGEN UR STRIP-ACNC: NORMAL
WBC # BLD AUTO: 7.79 X10(3)/MCL (ref 4.5–11.5)
WBC #/AREA URNS AUTO: ABNORMAL /HPF

## 2024-10-23 PROCEDURE — 93010 ELECTROCARDIOGRAM REPORT: CPT | Mod: ,,, | Performed by: INTERNAL MEDICINE

## 2024-10-23 PROCEDURE — 63600175 PHARM REV CODE 636 W HCPCS: Performed by: EMERGENCY MEDICINE

## 2024-10-23 PROCEDURE — 94760 N-INVAS EAR/PLS OXIMETRY 1: CPT

## 2024-10-23 PROCEDURE — 25000003 PHARM REV CODE 250: Performed by: STUDENT IN AN ORGANIZED HEALTH CARE EDUCATION/TRAINING PROGRAM

## 2024-10-23 PROCEDURE — 99900031 HC PATIENT EDUCATION (STAT)

## 2024-10-23 PROCEDURE — 96375 TX/PRO/DX INJ NEW DRUG ADDON: CPT

## 2024-10-23 PROCEDURE — 85610 PROTHROMBIN TIME: CPT | Performed by: EMERGENCY MEDICINE

## 2024-10-23 PROCEDURE — 63600175 PHARM REV CODE 636 W HCPCS: Performed by: STUDENT IN AN ORGANIZED HEALTH CARE EDUCATION/TRAINING PROGRAM

## 2024-10-23 PROCEDURE — 85025 COMPLETE CBC W/AUTO DIFF WBC: CPT | Performed by: EMERGENCY MEDICINE

## 2024-10-23 PROCEDURE — 99291 CRITICAL CARE FIRST HOUR: CPT

## 2024-10-23 PROCEDURE — 99900035 HC TECH TIME PER 15 MIN (STAT)

## 2024-10-23 PROCEDURE — 85730 THROMBOPLASTIN TIME PARTIAL: CPT | Performed by: EMERGENCY MEDICINE

## 2024-10-23 PROCEDURE — 11000001 HC ACUTE MED/SURG PRIVATE ROOM

## 2024-10-23 PROCEDURE — 0240U COVID/FLU A&B PCR: CPT | Performed by: EMERGENCY MEDICINE

## 2024-10-23 PROCEDURE — 27000221 HC OXYGEN, UP TO 24 HOURS

## 2024-10-23 PROCEDURE — 87040 BLOOD CULTURE FOR BACTERIA: CPT | Performed by: STUDENT IN AN ORGANIZED HEALTH CARE EDUCATION/TRAINING PROGRAM

## 2024-10-23 PROCEDURE — 80053 COMPREHEN METABOLIC PANEL: CPT | Performed by: EMERGENCY MEDICINE

## 2024-10-23 PROCEDURE — 93005 ELECTROCARDIOGRAM TRACING: CPT

## 2024-10-23 PROCEDURE — 83880 ASSAY OF NATRIURETIC PEPTIDE: CPT | Performed by: EMERGENCY MEDICINE

## 2024-10-23 PROCEDURE — 84484 ASSAY OF TROPONIN QUANT: CPT | Performed by: EMERGENCY MEDICINE

## 2024-10-23 PROCEDURE — 94640 AIRWAY INHALATION TREATMENT: CPT

## 2024-10-23 PROCEDURE — 25000242 PHARM REV CODE 250 ALT 637 W/ HCPCS: Performed by: EMERGENCY MEDICINE

## 2024-10-23 PROCEDURE — 96365 THER/PROPH/DIAG IV INF INIT: CPT

## 2024-10-23 PROCEDURE — 81001 URINALYSIS AUTO W/SCOPE: CPT | Performed by: EMERGENCY MEDICINE

## 2024-10-23 RX ORDER — CEFTRIAXONE 1 G/1
1 INJECTION, POWDER, FOR SOLUTION INTRAMUSCULAR; INTRAVENOUS
Status: COMPLETED | OUTPATIENT
Start: 2024-10-23 | End: 2024-10-23

## 2024-10-23 RX ORDER — CHOLECALCIFEROL (VITAMIN D3) 1250 MCG
50000 CAPSULE ORAL
Status: ON HOLD | COMMUNITY
End: 2024-10-28 | Stop reason: HOSPADM

## 2024-10-23 RX ORDER — TRAZODONE HYDROCHLORIDE 50 MG/1
25-50 TABLET ORAL NIGHTLY PRN
COMMUNITY

## 2024-10-23 RX ORDER — FUROSEMIDE 10 MG/ML
40 INJECTION INTRAMUSCULAR; INTRAVENOUS
Status: COMPLETED | OUTPATIENT
Start: 2024-10-23 | End: 2024-10-23

## 2024-10-23 RX ORDER — IPRATROPIUM BROMIDE AND ALBUTEROL SULFATE 2.5; .5 MG/3ML; MG/3ML
3 SOLUTION RESPIRATORY (INHALATION)
Status: COMPLETED | OUTPATIENT
Start: 2024-10-23 | End: 2024-10-23

## 2024-10-23 RX ADMIN — IPRATROPIUM BROMIDE AND ALBUTEROL SULFATE 3 ML: .5; 3 SOLUTION RESPIRATORY (INHALATION) at 04:10

## 2024-10-23 RX ADMIN — AZITHROMYCIN MONOHYDRATE 500 MG: 500 INJECTION, POWDER, LYOPHILIZED, FOR SOLUTION INTRAVENOUS at 04:10

## 2024-10-23 RX ADMIN — FUROSEMIDE 40 MG: 10 INJECTION, SOLUTION INTRAMUSCULAR; INTRAVENOUS at 03:10

## 2024-10-23 RX ADMIN — CEFTRIAXONE SODIUM 1 G: 1 INJECTION, POWDER, FOR SOLUTION INTRAMUSCULAR; INTRAVENOUS at 04:10

## 2024-10-24 PROBLEM — I50.9 CHF WITH UNKNOWN LVEF: Status: ACTIVE | Noted: 2024-10-24

## 2024-10-24 PROBLEM — F17.200 TOBACCO DEPENDENCY: Status: ACTIVE | Noted: 2024-10-24

## 2024-10-24 PROBLEM — I50.23 ACUTE ON CHRONIC HFREF (HEART FAILURE WITH REDUCED EJECTION FRACTION): Status: ACTIVE | Noted: 2023-02-20

## 2024-10-24 PROBLEM — J44.1 COPD EXACERBATION: Status: ACTIVE | Noted: 2024-10-24

## 2024-10-24 PROBLEM — J18.9 CAP (COMMUNITY ACQUIRED PNEUMONIA): Status: ACTIVE | Noted: 2024-10-24

## 2024-10-24 LAB
ALBUMIN SERPL-MCNC: 3.3 G/DL (ref 3.4–4.8)
ALBUMIN/GLOB SERPL: 0.8 RATIO (ref 1.1–2)
ALP SERPL-CCNC: 118 UNIT/L (ref 40–150)
ALT SERPL-CCNC: 21 UNIT/L (ref 0–55)
ANION GAP SERPL CALC-SCNC: 9 MEQ/L
AST SERPL-CCNC: 19 UNIT/L (ref 5–34)
B PERT.PT PRMT NPH QL NAA+NON-PROBE: NOT DETECTED
BASOPHILS # BLD AUTO: 0.05 X10(3)/MCL
BASOPHILS NFR BLD AUTO: 0.6 %
BILIRUB SERPL-MCNC: 0.4 MG/DL
BUN SERPL-MCNC: 32.8 MG/DL (ref 9.8–20.1)
C PNEUM DNA NPH QL NAA+NON-PROBE: NOT DETECTED
CALCIUM SERPL-MCNC: 8.9 MG/DL (ref 8.4–10.2)
CHLORIDE SERPL-SCNC: 98 MMOL/L (ref 98–107)
CO2 SERPL-SCNC: 35 MMOL/L (ref 23–31)
CREAT SERPL-MCNC: 1.08 MG/DL (ref 0.55–1.02)
CREAT/UREA NIT SERPL: 30
EOSINOPHIL # BLD AUTO: 0.12 X10(3)/MCL (ref 0–0.9)
EOSINOPHIL NFR BLD AUTO: 1.4 %
ERYTHROCYTE [DISTWIDTH] IN BLOOD BY AUTOMATED COUNT: 14.6 % (ref 11.5–17)
GFR SERPLBLD CREATININE-BSD FMLA CKD-EPI: 55 ML/MIN/1.73/M2
GLOBULIN SER-MCNC: 3.9 GM/DL (ref 2.4–3.5)
GLUCOSE SERPL-MCNC: 149 MG/DL (ref 82–115)
HADV DNA NPH QL NAA+NON-PROBE: NOT DETECTED
HCOV 229E RNA NPH QL NAA+NON-PROBE: NOT DETECTED
HCOV HKU1 RNA NPH QL NAA+NON-PROBE: NOT DETECTED
HCOV NL63 RNA NPH QL NAA+NON-PROBE: NOT DETECTED
HCOV OC43 RNA NPH QL NAA+NON-PROBE: NOT DETECTED
HCT VFR BLD AUTO: 38.4 % (ref 37–47)
HGB BLD-MCNC: 11.5 G/DL (ref 12–16)
HMPV RNA NPH QL NAA+NON-PROBE: NOT DETECTED
HPIV1 RNA NPH QL NAA+NON-PROBE: NOT DETECTED
HPIV2 RNA NPH QL NAA+NON-PROBE: NOT DETECTED
HPIV3 RNA NPH QL NAA+NON-PROBE: NOT DETECTED
HPIV4 RNA NPH QL NAA+NON-PROBE: NOT DETECTED
IMM GRANULOCYTES # BLD AUTO: 0.01 X10(3)/MCL (ref 0–0.04)
IMM GRANULOCYTES NFR BLD AUTO: 0.1 %
INR PPP: 2.5
LYMPHOCYTES # BLD AUTO: 1.44 X10(3)/MCL (ref 0.6–4.6)
LYMPHOCYTES NFR BLD AUTO: 17.3 %
M PNEUMO DNA NPH QL NAA+NON-PROBE: NOT DETECTED
MAGNESIUM SERPL-MCNC: 2.6 MG/DL (ref 1.6–2.6)
MCH RBC QN AUTO: 28.6 PG (ref 27–31)
MCHC RBC AUTO-ENTMCNC: 29.9 G/DL (ref 33–36)
MCV RBC AUTO: 95.5 FL (ref 80–94)
MONOCYTES # BLD AUTO: 0.69 X10(3)/MCL (ref 0.1–1.3)
MONOCYTES NFR BLD AUTO: 8.3 %
MRSA PCR SCRN (OHS): NOT DETECTED
NEUTROPHILS # BLD AUTO: 5.99 X10(3)/MCL (ref 2.1–9.2)
NEUTROPHILS NFR BLD AUTO: 72.3 %
NRBC BLD AUTO-RTO: 0 %
PHOSPHATE SERPL-MCNC: 5.1 MG/DL (ref 2.3–4.7)
PLATELET # BLD AUTO: 330 X10(3)/MCL (ref 130–400)
PMV BLD AUTO: 10 FL (ref 7.4–10.4)
POCT GLUCOSE: 139 MG/DL (ref 70–110)
POTASSIUM SERPL-SCNC: 4.2 MMOL/L (ref 3.5–5.1)
PROT SERPL-MCNC: 7.2 GM/DL (ref 5.8–7.6)
PROTHROMBIN TIME: 27.3 SECONDS (ref 12.5–14.5)
RBC # BLD AUTO: 4.02 X10(6)/MCL (ref 4.2–5.4)
RSV RNA NPH QL NAA+NON-PROBE: NOT DETECTED
RV+EV RNA NPH QL NAA+NON-PROBE: NOT DETECTED
SODIUM SERPL-SCNC: 142 MMOL/L (ref 136–145)
WBC # BLD AUTO: 8.3 X10(3)/MCL (ref 4.5–11.5)

## 2024-10-24 PROCEDURE — 25000242 PHARM REV CODE 250 ALT 637 W/ HCPCS: Performed by: INTERNAL MEDICINE

## 2024-10-24 PROCEDURE — 83735 ASSAY OF MAGNESIUM: CPT | Performed by: INTERNAL MEDICINE

## 2024-10-24 PROCEDURE — 27000221 HC OXYGEN, UP TO 24 HOURS

## 2024-10-24 PROCEDURE — 63600175 PHARM REV CODE 636 W HCPCS: Performed by: INTERNAL MEDICINE

## 2024-10-24 PROCEDURE — 21400001 HC TELEMETRY ROOM

## 2024-10-24 PROCEDURE — 80053 COMPREHEN METABOLIC PANEL: CPT | Performed by: INTERNAL MEDICINE

## 2024-10-24 PROCEDURE — 87581 M.PNEUMON DNA AMP PROBE: CPT | Performed by: INTERNAL MEDICINE

## 2024-10-24 PROCEDURE — 94640 AIRWAY INHALATION TREATMENT: CPT

## 2024-10-24 PROCEDURE — 85610 PROTHROMBIN TIME: CPT | Performed by: INTERNAL MEDICINE

## 2024-10-24 PROCEDURE — 87641 MR-STAPH DNA AMP PROBE: CPT | Performed by: INTERNAL MEDICINE

## 2024-10-24 PROCEDURE — 87798 DETECT AGENT NOS DNA AMP: CPT | Performed by: INTERNAL MEDICINE

## 2024-10-24 PROCEDURE — S4991 NICOTINE PATCH NONLEGEND: HCPCS | Performed by: INTERNAL MEDICINE

## 2024-10-24 PROCEDURE — 84100 ASSAY OF PHOSPHORUS: CPT | Performed by: INTERNAL MEDICINE

## 2024-10-24 PROCEDURE — 87632 RESP VIRUS 6-11 TARGETS: CPT | Performed by: INTERNAL MEDICINE

## 2024-10-24 PROCEDURE — 25500020 PHARM REV CODE 255

## 2024-10-24 PROCEDURE — 85025 COMPLETE CBC W/AUTO DIFF WBC: CPT | Performed by: INTERNAL MEDICINE

## 2024-10-24 PROCEDURE — 99900031 HC PATIENT EDUCATION (STAT)

## 2024-10-24 PROCEDURE — 25000003 PHARM REV CODE 250: Performed by: INTERNAL MEDICINE

## 2024-10-24 PROCEDURE — 94760 N-INVAS EAR/PLS OXIMETRY 1: CPT

## 2024-10-24 PROCEDURE — 99900035 HC TECH TIME PER 15 MIN (STAT)

## 2024-10-24 RX ORDER — IPRATROPIUM BROMIDE AND ALBUTEROL SULFATE 2.5; .5 MG/3ML; MG/3ML
3 SOLUTION RESPIRATORY (INHALATION) EVERY 4 HOURS
Status: DISCONTINUED | OUTPATIENT
Start: 2024-10-24 | End: 2024-10-24

## 2024-10-24 RX ORDER — TALC
6 POWDER (GRAM) TOPICAL NIGHTLY PRN
Status: DISCONTINUED | OUTPATIENT
Start: 2024-10-24 | End: 2024-10-28 | Stop reason: HOSPADM

## 2024-10-24 RX ORDER — BENZONATATE 100 MG/1
200 CAPSULE ORAL 3 TIMES DAILY PRN
Status: DISCONTINUED | OUTPATIENT
Start: 2024-10-24 | End: 2024-10-28 | Stop reason: HOSPADM

## 2024-10-24 RX ORDER — ALUMINUM HYDROXIDE, MAGNESIUM HYDROXIDE, AND SIMETHICONE 1200; 120; 1200 MG/30ML; MG/30ML; MG/30ML
30 SUSPENSION ORAL 4 TIMES DAILY PRN
Status: DISCONTINUED | OUTPATIENT
Start: 2024-10-24 | End: 2024-10-28 | Stop reason: HOSPADM

## 2024-10-24 RX ORDER — CEFTRIAXONE 1 G/1
1 INJECTION, POWDER, FOR SOLUTION INTRAMUSCULAR; INTRAVENOUS
Status: DISCONTINUED | OUTPATIENT
Start: 2024-10-24 | End: 2024-10-25

## 2024-10-24 RX ORDER — INSULIN ASPART 100 [IU]/ML
1-10 INJECTION, SOLUTION INTRAVENOUS; SUBCUTANEOUS
Status: DISCONTINUED | OUTPATIENT
Start: 2024-10-24 | End: 2024-10-28 | Stop reason: HOSPADM

## 2024-10-24 RX ORDER — FLUOXETINE HYDROCHLORIDE 20 MG/1
40 CAPSULE ORAL DAILY
Status: DISCONTINUED | OUTPATIENT
Start: 2024-10-24 | End: 2024-10-28 | Stop reason: HOSPADM

## 2024-10-24 RX ORDER — POLYETHYLENE GLYCOL 3350 17 G/17G
17 POWDER, FOR SOLUTION ORAL 2 TIMES DAILY PRN
Status: DISCONTINUED | OUTPATIENT
Start: 2024-10-24 | End: 2024-10-28 | Stop reason: HOSPADM

## 2024-10-24 RX ORDER — IBUPROFEN 200 MG
16 TABLET ORAL
Status: DISCONTINUED | OUTPATIENT
Start: 2024-10-24 | End: 2024-10-28 | Stop reason: HOSPADM

## 2024-10-24 RX ORDER — PROCHLORPERAZINE EDISYLATE 5 MG/ML
5 INJECTION INTRAMUSCULAR; INTRAVENOUS EVERY 6 HOURS PRN
Status: DISCONTINUED | OUTPATIENT
Start: 2024-10-24 | End: 2024-10-28 | Stop reason: HOSPADM

## 2024-10-24 RX ORDER — SODIUM CHLORIDE 0.9 % (FLUSH) 0.9 %
10 SYRINGE (ML) INJECTION
Status: DISCONTINUED | OUTPATIENT
Start: 2024-10-24 | End: 2024-10-28 | Stop reason: HOSPADM

## 2024-10-24 RX ORDER — ACETAMINOPHEN 325 MG/1
650 TABLET ORAL EVERY 4 HOURS PRN
Status: DISCONTINUED | OUTPATIENT
Start: 2024-10-24 | End: 2024-10-28 | Stop reason: HOSPADM

## 2024-10-24 RX ORDER — IBUPROFEN 200 MG
1 TABLET ORAL DAILY
Status: DISCONTINUED | OUTPATIENT
Start: 2024-10-24 | End: 2024-10-28 | Stop reason: HOSPADM

## 2024-10-24 RX ORDER — AMOXICILLIN 250 MG
2 CAPSULE ORAL 2 TIMES DAILY PRN
Status: DISCONTINUED | OUTPATIENT
Start: 2024-10-24 | End: 2024-10-28 | Stop reason: HOSPADM

## 2024-10-24 RX ORDER — LEVALBUTEROL INHALATION SOLUTION 1.25 MG/3ML
1.25 SOLUTION RESPIRATORY (INHALATION) EVERY 8 HOURS
Status: DISCONTINUED | OUTPATIENT
Start: 2024-10-24 | End: 2024-10-28 | Stop reason: HOSPADM

## 2024-10-24 RX ORDER — TIRZEPATIDE 15 MG/.5ML
15 INJECTION, SOLUTION SUBCUTANEOUS
COMMUNITY
Start: 2024-10-08

## 2024-10-24 RX ORDER — SOTALOL HYDROCHLORIDE 80 MG/1
40 TABLET ORAL 2 TIMES DAILY
Status: DISCONTINUED | OUTPATIENT
Start: 2024-10-24 | End: 2024-10-25

## 2024-10-24 RX ORDER — IBUPROFEN 200 MG
24 TABLET ORAL
Status: DISCONTINUED | OUTPATIENT
Start: 2024-10-24 | End: 2024-10-28 | Stop reason: HOSPADM

## 2024-10-24 RX ORDER — ASPIRIN 325 MG
50000 TABLET, DELAYED RELEASE (ENTERIC COATED) ORAL
Status: DISCONTINUED | OUTPATIENT
Start: 2024-10-24 | End: 2024-10-24

## 2024-10-24 RX ORDER — OLANZAPINE 5 MG/1
5 TABLET ORAL NIGHTLY
COMMUNITY
Start: 2024-10-02

## 2024-10-24 RX ORDER — FUROSEMIDE 10 MG/ML
40 INJECTION INTRAMUSCULAR; INTRAVENOUS
Status: DISCONTINUED | OUTPATIENT
Start: 2024-10-24 | End: 2024-10-26

## 2024-10-24 RX ORDER — ASPIRIN 81 MG/1
81 TABLET ORAL DAILY
Status: DISCONTINUED | OUTPATIENT
Start: 2024-10-24 | End: 2024-10-28 | Stop reason: HOSPADM

## 2024-10-24 RX ORDER — GUAIFENESIN 600 MG/1
600 TABLET, EXTENDED RELEASE ORAL 2 TIMES DAILY
Status: DISCONTINUED | OUTPATIENT
Start: 2024-10-24 | End: 2024-10-28 | Stop reason: HOSPADM

## 2024-10-24 RX ORDER — CLONAZEPAM 1 MG/1
0.5 TABLET ORAL 3 TIMES DAILY PRN
COMMUNITY
Start: 2024-10-01

## 2024-10-24 RX ORDER — GABAPENTIN 100 MG/1
100 CAPSULE ORAL 3 TIMES DAILY
COMMUNITY
Start: 2024-09-26

## 2024-10-24 RX ORDER — CLONAZEPAM 0.5 MG/1
0.5 TABLET ORAL 3 TIMES DAILY PRN
Status: DISCONTINUED | OUTPATIENT
Start: 2024-10-24 | End: 2024-10-28 | Stop reason: HOSPADM

## 2024-10-24 RX ORDER — OLANZAPINE 5 MG/1
5 TABLET ORAL NIGHTLY
Status: DISCONTINUED | OUTPATIENT
Start: 2024-10-24 | End: 2024-10-28 | Stop reason: HOSPADM

## 2024-10-24 RX ORDER — GUAIFENESIN AND DEXTROMETHORPHAN HYDROBROMIDE 10; 100 MG/5ML; MG/5ML
5 SYRUP ORAL EVERY 4 HOURS PRN
Status: DISCONTINUED | OUTPATIENT
Start: 2024-10-24 | End: 2024-10-28 | Stop reason: HOSPADM

## 2024-10-24 RX ORDER — ISOSORBIDE MONONITRATE 60 MG/1
60 TABLET, EXTENDED RELEASE ORAL DAILY
Status: DISCONTINUED | OUTPATIENT
Start: 2024-10-24 | End: 2024-10-28 | Stop reason: HOSPADM

## 2024-10-24 RX ORDER — PIOGLITAZONEHYDROCHLORIDE 45 MG/1
45 TABLET ORAL DAILY
COMMUNITY
Start: 2024-10-16

## 2024-10-24 RX ORDER — OLMESARTAN MEDOXOMIL 40 MG/1
40 TABLET ORAL DAILY
Status: ON HOLD | COMMUNITY
Start: 2024-08-25 | End: 2024-10-28 | Stop reason: HOSPADM

## 2024-10-24 RX ORDER — IPRATROPIUM BROMIDE AND ALBUTEROL SULFATE 2.5; .5 MG/3ML; MG/3ML
3 SOLUTION RESPIRATORY (INHALATION) EVERY 4 HOURS PRN
COMMUNITY
Start: 2024-10-16

## 2024-10-24 RX ORDER — GLUCAGON 1 MG
1 KIT INJECTION
Status: DISCONTINUED | OUTPATIENT
Start: 2024-10-24 | End: 2024-10-28 | Stop reason: HOSPADM

## 2024-10-24 RX ORDER — GABAPENTIN 100 MG/1
100 CAPSULE ORAL 3 TIMES DAILY
Status: DISCONTINUED | OUTPATIENT
Start: 2024-10-24 | End: 2024-10-28 | Stop reason: HOSPADM

## 2024-10-24 RX ORDER — MAGNESIUM SULFATE HEPTAHYDRATE 40 MG/ML
2 INJECTION, SOLUTION INTRAVENOUS ONCE
Status: COMPLETED | OUTPATIENT
Start: 2024-10-24 | End: 2024-10-24

## 2024-10-24 RX ORDER — WARFARIN SODIUM 5 MG/1
5 TABLET ORAL
Status: DISCONTINUED | OUTPATIENT
Start: 2024-10-24 | End: 2024-10-28 | Stop reason: HOSPADM

## 2024-10-24 RX ORDER — ONDANSETRON HYDROCHLORIDE 2 MG/ML
4 INJECTION, SOLUTION INTRAVENOUS EVERY 4 HOURS PRN
Status: DISCONTINUED | OUTPATIENT
Start: 2024-10-24 | End: 2024-10-28 | Stop reason: HOSPADM

## 2024-10-24 RX ORDER — SOTALOL HYDROCHLORIDE 80 MG/1
40 TABLET ORAL 2 TIMES DAILY
Status: ON HOLD | COMMUNITY
Start: 2024-10-03 | End: 2024-10-28 | Stop reason: HOSPADM

## 2024-10-24 RX ORDER — ERGOCALCIFEROL 1.25 MG/1
50000 CAPSULE ORAL
Status: DISCONTINUED | OUTPATIENT
Start: 2024-10-24 | End: 2024-10-28 | Stop reason: HOSPADM

## 2024-10-24 RX ORDER — SOTALOL HYDROCHLORIDE 80 MG/1
40 TABLET ORAL 2 TIMES DAILY
Status: DISCONTINUED | OUTPATIENT
Start: 2024-10-24 | End: 2024-10-24

## 2024-10-24 RX ADMIN — GUAIFENESIN 600 MG: 600 TABLET, EXTENDED RELEASE ORAL at 08:10

## 2024-10-24 RX ADMIN — CEFTRIAXONE SODIUM 1 G: 1 INJECTION, POWDER, FOR SOLUTION INTRAMUSCULAR; INTRAVENOUS at 08:10

## 2024-10-24 RX ADMIN — MAGNESIUM SULFATE HEPTAHYDRATE 2 G: 40 INJECTION, SOLUTION INTRAVENOUS at 05:10

## 2024-10-24 RX ADMIN — GABAPENTIN 100 MG: 100 CAPSULE ORAL at 08:10

## 2024-10-24 RX ADMIN — FUROSEMIDE 40 MG: 10 INJECTION, SOLUTION INTRAMUSCULAR; INTRAVENOUS at 03:10

## 2024-10-24 RX ADMIN — OLANZAPINE 5 MG: 5 TABLET, FILM COATED ORAL at 08:10

## 2024-10-24 RX ADMIN — FLUOXETINE HYDROCHLORIDE 40 MG: 20 CAPSULE ORAL at 08:10

## 2024-10-24 RX ADMIN — LEVALBUTEROL HYDROCHLORIDE 1.25 MG: 1.25 SOLUTION RESPIRATORY (INHALATION) at 03:10

## 2024-10-24 RX ADMIN — IOHEXOL 100 ML: 350 INJECTION, SOLUTION INTRAVENOUS at 12:10

## 2024-10-24 RX ADMIN — ERGOCALCIFEROL 50000 UNITS: 1.25 CAPSULE ORAL at 08:10

## 2024-10-24 RX ADMIN — DOXYCYCLINE 100 MG: 100 INJECTION, POWDER, LYOPHILIZED, FOR SOLUTION INTRAVENOUS at 04:10

## 2024-10-24 RX ADMIN — FUROSEMIDE 40 MG: 10 INJECTION, SOLUTION INTRAMUSCULAR; INTRAVENOUS at 04:10

## 2024-10-24 RX ADMIN — TRAZODONE HYDROCHLORIDE 25 MG: 50 TABLET ORAL at 08:10

## 2024-10-24 RX ADMIN — DOXYCYCLINE 100 MG: 100 INJECTION, POWDER, LYOPHILIZED, FOR SOLUTION INTRAVENOUS at 03:10

## 2024-10-24 RX ADMIN — NICOTINE 1 PATCH: 14 PATCH TRANSDERMAL at 08:10

## 2024-10-24 RX ADMIN — IPRATROPIUM BROMIDE AND ALBUTEROL SULFATE 3 ML: .5; 3 SOLUTION RESPIRATORY (INHALATION) at 08:10

## 2024-10-24 RX ADMIN — WARFARIN SODIUM 5 MG: 5 TABLET ORAL at 04:10

## 2024-10-24 RX ADMIN — ASPIRIN 81 MG: 81 TABLET, COATED ORAL at 08:10

## 2024-10-24 RX ADMIN — GABAPENTIN 100 MG: 100 CAPSULE ORAL at 04:10

## 2024-10-24 RX ADMIN — IPRATROPIUM BROMIDE AND ALBUTEROL SULFATE 3 ML: .5; 3 SOLUTION RESPIRATORY (INHALATION) at 03:10

## 2024-10-24 RX ADMIN — SOTALOL HYDROCHLORIDE 40 MG: 80 TABLET ORAL at 08:10

## 2024-10-25 LAB
ANION GAP SERPL CALC-SCNC: 8 MEQ/L
APICAL FOUR CHAMBER EJECTION FRACTION: 52 %
APICAL TWO CHAMBER EJECTION FRACTION: 60 %
AV INDEX (PROSTH): 0.63
AV MEAN GRADIENT: 14 MMHG
AV PEAK GRADIENT: 27 MMHG
AV VALVE AREA BY VELOCITY RATIO: 2.6 CM²
AV VALVE AREA: 2.8 CM²
AV VELOCITY RATIO: 0.58
BASOPHILS # BLD AUTO: 0.04 X10(3)/MCL
BASOPHILS NFR BLD AUTO: 0.5 %
BSA FOR ECHO PROCEDURE: 2.07 M2
BUN SERPL-MCNC: 29.5 MG/DL (ref 9.8–20.1)
CALCIUM SERPL-MCNC: 8.3 MG/DL (ref 8.4–10.2)
CHLORIDE SERPL-SCNC: 99 MMOL/L (ref 98–107)
CO2 SERPL-SCNC: 36 MMOL/L (ref 23–31)
CREAT SERPL-MCNC: 0.98 MG/DL (ref 0.55–1.02)
CREAT/UREA NIT SERPL: 30
CV ECHO LV RWT: 0.86 CM
DOP CALC AO PEAK VEL: 2.6 M/S
DOP CALC AO VTI: 56.1 CM
DOP CALC LVOT AREA: 4.5 CM2
DOP CALC LVOT DIAMETER: 2.4 CM
DOP CALC LVOT PEAK VEL: 1.5 M/S
DOP CALC LVOT STROKE VOLUME: 159.2 CM3
DOP CALC MV VTI: 51.9 CM
DOP CALCLVOT PEAK VEL VTI: 35.2 CM
E WAVE DECELERATION TIME: 211 MSEC
E/A RATIO: 0.98
E/E' RATIO: 15.75 M/S
ECHO LV POSTERIOR WALL: 1.6 CM (ref 0.6–1.1)
EOSINOPHIL # BLD AUTO: 0.18 X10(3)/MCL (ref 0–0.9)
EOSINOPHIL NFR BLD AUTO: 2.3 %
ERYTHROCYTE [DISTWIDTH] IN BLOOD BY AUTOMATED COUNT: 14.6 % (ref 11.5–17)
FRACTIONAL SHORTENING: 29.7 % (ref 28–44)
GFR SERPLBLD CREATININE-BSD FMLA CKD-EPI: >60 ML/MIN/1.73/M2
GLUCOSE SERPL-MCNC: 130 MG/DL (ref 82–115)
HCT VFR BLD AUTO: 36.1 % (ref 37–47)
HGB BLD-MCNC: 11.2 G/DL (ref 12–16)
IMM GRANULOCYTES # BLD AUTO: 0.03 X10(3)/MCL (ref 0–0.04)
IMM GRANULOCYTES NFR BLD AUTO: 0.4 %
INTERVENTRICULAR SEPTUM: 1.5 CM (ref 0.6–1.1)
LEFT ATRIUM AREA SYSTOLIC (APICAL 2 CHAMBER): 37.1 CM2
LEFT ATRIUM AREA SYSTOLIC (APICAL 4 CHAMBER): 26.8 CM2
LEFT ATRIUM SIZE: 4.6 CM
LEFT ATRIUM VOLUME INDEX MOD: 63.8 ML/M2
LEFT ATRIUM VOLUME MOD: 125 ML
LEFT INTERNAL DIMENSION IN SYSTOLE: 2.6 CM (ref 2.1–4)
LEFT VENTRICLE DIASTOLIC VOLUME INDEX: 29.64 ML/M2
LEFT VENTRICLE DIASTOLIC VOLUME: 58.1 ML
LEFT VENTRICLE END DIASTOLIC VOLUME APICAL 2 CHAMBER: 102 ML
LEFT VENTRICLE END DIASTOLIC VOLUME APICAL 4 CHAMBER: 70.3 ML
LEFT VENTRICLE END SYSTOLIC VOLUME APICAL 2 CHAMBER: 156 ML
LEFT VENTRICLE END SYSTOLIC VOLUME APICAL 4 CHAMBER: 89.4 ML
LEFT VENTRICLE MASS INDEX: 112.3 G/M2
LEFT VENTRICLE SYSTOLIC VOLUME INDEX: 12.6 ML/M2
LEFT VENTRICLE SYSTOLIC VOLUME: 24.6 ML
LEFT VENTRICULAR INTERNAL DIMENSION IN DIASTOLE: 3.7 CM (ref 3.5–6)
LEFT VENTRICULAR MASS: 220.1 G
LV LATERAL E/E' RATIO: 12.6 M/S
LV SEPTAL E/E' RATIO: 21 M/S
LVED V (TEICH): 58.1 ML
LVES V (TEICH): 24.6 ML
LVOT MG: 5 MMHG
LVOT MV: 1 CM/S
LYMPHOCYTES # BLD AUTO: 1.5 X10(3)/MCL (ref 0.6–4.6)
LYMPHOCYTES NFR BLD AUTO: 19 %
MCH RBC QN AUTO: 29.2 PG (ref 27–31)
MCHC RBC AUTO-ENTMCNC: 31 G/DL (ref 33–36)
MCV RBC AUTO: 94 FL (ref 80–94)
MONOCYTES # BLD AUTO: 0.62 X10(3)/MCL (ref 0.1–1.3)
MONOCYTES NFR BLD AUTO: 7.9 %
MV MEAN GRADIENT: 4 MMHG
MV PEAK A VEL: 1.28 M/S
MV PEAK E VEL: 1.26 M/S
MV PEAK GRADIENT: 9 MMHG
MV STENOSIS PRESSURE HALF TIME: 94 MS
MV VALVE AREA BY CONTINUITY EQUATION: 3.07 CM2
MV VALVE AREA P 1/2 METHOD: 2.34 CM2
NEUTROPHILS # BLD AUTO: 5.52 X10(3)/MCL (ref 2.1–9.2)
NEUTROPHILS NFR BLD AUTO: 69.9 %
NRBC BLD AUTO-RTO: 0 %
OHS LV EJECTION FRACTION SIMPSONS BIPLANE MOD: 55 %
PISA TR MAX VEL: 2.54 M/S
PLATELET # BLD AUTO: 314 X10(3)/MCL (ref 130–400)
PMV BLD AUTO: 10.2 FL (ref 7.4–10.4)
POTASSIUM SERPL-SCNC: 4.2 MMOL/L (ref 3.5–5.1)
PV PEAK GRADIENT: 3 MMHG
PV PEAK VELOCITY: 0.8 M/S
RA PRESSURE ESTIMATED: 8 MMHG
RBC # BLD AUTO: 3.84 X10(6)/MCL (ref 4.2–5.4)
RIGHT VENTRICULAR END-DIASTOLIC DIMENSION: 2.9 CM
RV TB RVSP: 11 MMHG
SODIUM SERPL-SCNC: 143 MMOL/L (ref 136–145)
TDI LATERAL: 0.1 M/S
TDI SEPTAL: 0.06 M/S
TDI: 0.08 M/S
TR MAX PG: 26 MMHG
TRICUSPID ANNULAR PLANE SYSTOLIC EXCURSION: 2.7 CM
TV REST PULMONARY ARTERY PRESSURE: 34 MMHG
WBC # BLD AUTO: 7.89 X10(3)/MCL (ref 4.5–11.5)
Z-SCORE OF LEFT VENTRICULAR DIMENSION IN END DIASTOLE: -4.16
Z-SCORE OF LEFT VENTRICULAR DIMENSION IN END SYSTOLE: -2.23

## 2024-10-25 PROCEDURE — 94799 UNLISTED PULMONARY SVC/PX: CPT

## 2024-10-25 PROCEDURE — 21400001 HC TELEMETRY ROOM

## 2024-10-25 PROCEDURE — 25000003 PHARM REV CODE 250: Performed by: INTERNAL MEDICINE

## 2024-10-25 PROCEDURE — 94640 AIRWAY INHALATION TREATMENT: CPT

## 2024-10-25 PROCEDURE — 80048 BASIC METABOLIC PNL TOTAL CA: CPT | Performed by: INTERNAL MEDICINE

## 2024-10-25 PROCEDURE — 99900035 HC TECH TIME PER 15 MIN (STAT)

## 2024-10-25 PROCEDURE — 94760 N-INVAS EAR/PLS OXIMETRY 1: CPT

## 2024-10-25 PROCEDURE — 36415 COLL VENOUS BLD VENIPUNCTURE: CPT | Performed by: INTERNAL MEDICINE

## 2024-10-25 PROCEDURE — 99900031 HC PATIENT EDUCATION (STAT)

## 2024-10-25 PROCEDURE — 63600175 PHARM REV CODE 636 W HCPCS: Performed by: INTERNAL MEDICINE

## 2024-10-25 PROCEDURE — 27000221 HC OXYGEN, UP TO 24 HOURS

## 2024-10-25 PROCEDURE — 25000242 PHARM REV CODE 250 ALT 637 W/ HCPCS: Performed by: INTERNAL MEDICINE

## 2024-10-25 PROCEDURE — 85025 COMPLETE CBC W/AUTO DIFF WBC: CPT | Performed by: INTERNAL MEDICINE

## 2024-10-25 RX ADMIN — LEVALBUTEROL HYDROCHLORIDE 1.25 MG: 1.25 SOLUTION RESPIRATORY (INHALATION) at 04:10

## 2024-10-25 RX ADMIN — OLANZAPINE 5 MG: 5 TABLET, FILM COATED ORAL at 09:10

## 2024-10-25 RX ADMIN — LEVALBUTEROL HYDROCHLORIDE 1.25 MG: 1.25 SOLUTION RESPIRATORY (INHALATION) at 12:10

## 2024-10-25 RX ADMIN — LEVALBUTEROL HYDROCHLORIDE 1.25 MG: 1.25 SOLUTION RESPIRATORY (INHALATION) at 08:10

## 2024-10-25 RX ADMIN — GABAPENTIN 100 MG: 100 CAPSULE ORAL at 09:10

## 2024-10-25 RX ADMIN — DOXYCYCLINE 100 MG: 100 INJECTION, POWDER, LYOPHILIZED, FOR SOLUTION INTRAVENOUS at 03:10

## 2024-10-25 RX ADMIN — CEFTRIAXONE SODIUM 1 G: 1 INJECTION, POWDER, FOR SOLUTION INTRAMUSCULAR; INTRAVENOUS at 09:10

## 2024-10-25 RX ADMIN — FLUOXETINE HYDROCHLORIDE 40 MG: 20 CAPSULE ORAL at 09:10

## 2024-10-25 RX ADMIN — GUAIFENESIN 600 MG: 600 TABLET, EXTENDED RELEASE ORAL at 09:10

## 2024-10-25 RX ADMIN — WARFARIN SODIUM 5 MG: 5 TABLET ORAL at 05:10

## 2024-10-25 RX ADMIN — ISOSORBIDE MONONITRATE 60 MG: 60 TABLET, EXTENDED RELEASE ORAL at 09:10

## 2024-10-25 RX ADMIN — SOTALOL HYDROCHLORIDE 40 MG: 80 TABLET ORAL at 09:10

## 2024-10-25 RX ADMIN — TRAZODONE HYDROCHLORIDE 25 MG: 50 TABLET ORAL at 09:10

## 2024-10-25 RX ADMIN — GABAPENTIN 100 MG: 100 CAPSULE ORAL at 03:10

## 2024-10-25 RX ADMIN — FUROSEMIDE 40 MG: 10 INJECTION, SOLUTION INTRAMUSCULAR; INTRAVENOUS at 03:10

## 2024-10-25 RX ADMIN — ASPIRIN 81 MG: 81 TABLET, COATED ORAL at 09:10

## 2024-10-26 LAB
ANION GAP SERPL CALC-SCNC: 5 MEQ/L
BUN SERPL-MCNC: 25.2 MG/DL (ref 9.8–20.1)
CALCIUM SERPL-MCNC: 9.1 MG/DL (ref 8.4–10.2)
CHLORIDE SERPL-SCNC: 95 MMOL/L (ref 98–107)
CO2 SERPL-SCNC: 43 MMOL/L (ref 23–31)
CREAT SERPL-MCNC: 0.79 MG/DL (ref 0.55–1.02)
CREAT/UREA NIT SERPL: 32
ERYTHROCYTE [DISTWIDTH] IN BLOOD BY AUTOMATED COUNT: 14.1 % (ref 11.5–17)
GFR SERPLBLD CREATININE-BSD FMLA CKD-EPI: >60 ML/MIN/1.73/M2
GLUCOSE SERPL-MCNC: 151 MG/DL (ref 82–115)
HCT VFR BLD AUTO: 39.1 % (ref 37–47)
HGB BLD-MCNC: 11.9 G/DL (ref 12–16)
INR PPP: 2.2
MAGNESIUM SERPL-MCNC: 1.9 MG/DL (ref 1.6–2.6)
MCH RBC QN AUTO: 28.9 PG (ref 27–31)
MCHC RBC AUTO-ENTMCNC: 30.4 G/DL (ref 33–36)
MCV RBC AUTO: 94.9 FL (ref 80–94)
NRBC BLD AUTO-RTO: 0 %
PLATELET # BLD AUTO: 356 X10(3)/MCL (ref 130–400)
PMV BLD AUTO: 9.7 FL (ref 7.4–10.4)
POCT GLUCOSE: 110 MG/DL (ref 70–110)
POCT GLUCOSE: 192 MG/DL (ref 70–110)
POCT GLUCOSE: 227 MG/DL (ref 70–110)
POTASSIUM SERPL-SCNC: 3.6 MMOL/L (ref 3.5–5.1)
PROTHROMBIN TIME: 24.1 SECONDS (ref 12.5–14.5)
RBC # BLD AUTO: 4.12 X10(6)/MCL (ref 4.2–5.4)
SODIUM SERPL-SCNC: 143 MMOL/L (ref 136–145)
WBC # BLD AUTO: 9.53 X10(3)/MCL (ref 4.5–11.5)

## 2024-10-26 PROCEDURE — 94761 N-INVAS EAR/PLS OXIMETRY MLT: CPT

## 2024-10-26 PROCEDURE — 80048 BASIC METABOLIC PNL TOTAL CA: CPT | Performed by: INTERNAL MEDICINE

## 2024-10-26 PROCEDURE — 27000221 HC OXYGEN, UP TO 24 HOURS

## 2024-10-26 PROCEDURE — 94760 N-INVAS EAR/PLS OXIMETRY 1: CPT

## 2024-10-26 PROCEDURE — 36415 COLL VENOUS BLD VENIPUNCTURE: CPT | Performed by: INTERNAL MEDICINE

## 2024-10-26 PROCEDURE — 25000003 PHARM REV CODE 250

## 2024-10-26 PROCEDURE — 36415 COLL VENOUS BLD VENIPUNCTURE: CPT

## 2024-10-26 PROCEDURE — 25000242 PHARM REV CODE 250 ALT 637 W/ HCPCS: Performed by: INTERNAL MEDICINE

## 2024-10-26 PROCEDURE — 94640 AIRWAY INHALATION TREATMENT: CPT

## 2024-10-26 PROCEDURE — 63600175 PHARM REV CODE 636 W HCPCS: Performed by: INTERNAL MEDICINE

## 2024-10-26 PROCEDURE — 97162 PT EVAL MOD COMPLEX 30 MIN: CPT

## 2024-10-26 PROCEDURE — 21400001 HC TELEMETRY ROOM

## 2024-10-26 PROCEDURE — 85610 PROTHROMBIN TIME: CPT | Performed by: INTERNAL MEDICINE

## 2024-10-26 PROCEDURE — 85027 COMPLETE CBC AUTOMATED: CPT

## 2024-10-26 PROCEDURE — 94799 UNLISTED PULMONARY SVC/PX: CPT

## 2024-10-26 PROCEDURE — S4991 NICOTINE PATCH NONLEGEND: HCPCS | Performed by: INTERNAL MEDICINE

## 2024-10-26 PROCEDURE — 99900031 HC PATIENT EDUCATION (STAT)

## 2024-10-26 PROCEDURE — 25000003 PHARM REV CODE 250: Performed by: INTERNAL MEDICINE

## 2024-10-26 PROCEDURE — 83735 ASSAY OF MAGNESIUM: CPT | Performed by: INTERNAL MEDICINE

## 2024-10-26 PROCEDURE — 99900035 HC TECH TIME PER 15 MIN (STAT)

## 2024-10-26 RX ADMIN — GABAPENTIN 100 MG: 100 CAPSULE ORAL at 03:10

## 2024-10-26 RX ADMIN — NICOTINE 1 PATCH: 14 PATCH TRANSDERMAL at 08:10

## 2024-10-26 RX ADMIN — WARFARIN SODIUM 5 MG: 5 TABLET ORAL at 04:10

## 2024-10-26 RX ADMIN — GUAIFENESIN 600 MG: 600 TABLET, EXTENDED RELEASE ORAL at 08:10

## 2024-10-26 RX ADMIN — OLANZAPINE 5 MG: 5 TABLET, FILM COATED ORAL at 08:10

## 2024-10-26 RX ADMIN — FUROSEMIDE 40 MG: 10 INJECTION, SOLUTION INTRAMUSCULAR; INTRAVENOUS at 03:10

## 2024-10-26 RX ADMIN — LEVALBUTEROL HYDROCHLORIDE 1.25 MG: 1.25 SOLUTION RESPIRATORY (INHALATION) at 04:10

## 2024-10-26 RX ADMIN — LEVALBUTEROL HYDROCHLORIDE 1.25 MG: 1.25 SOLUTION RESPIRATORY (INHALATION) at 12:10

## 2024-10-26 RX ADMIN — TRAZODONE HYDROCHLORIDE 25 MG: 50 TABLET ORAL at 08:10

## 2024-10-26 RX ADMIN — METOPROLOL SUCCINATE 12.5 MG: 25 TABLET, EXTENDED RELEASE ORAL at 04:10

## 2024-10-26 RX ADMIN — GABAPENTIN 100 MG: 100 CAPSULE ORAL at 08:10

## 2024-10-26 RX ADMIN — FLUOXETINE HYDROCHLORIDE 40 MG: 20 CAPSULE ORAL at 08:10

## 2024-10-26 RX ADMIN — ASPIRIN 81 MG: 81 TABLET, COATED ORAL at 08:10

## 2024-10-26 RX ADMIN — LEVALBUTEROL HYDROCHLORIDE 1.25 MG: 1.25 SOLUTION RESPIRATORY (INHALATION) at 08:10

## 2024-10-26 RX ADMIN — ISOSORBIDE MONONITRATE 60 MG: 60 TABLET, EXTENDED RELEASE ORAL at 08:10

## 2024-10-27 LAB
ANION GAP SERPL CALC-SCNC: 4 MEQ/L
BUN SERPL-MCNC: 31.3 MG/DL (ref 9.8–20.1)
CALCIUM SERPL-MCNC: 8.5 MG/DL (ref 8.4–10.2)
CHLORIDE SERPL-SCNC: 94 MMOL/L (ref 98–107)
CO2 SERPL-SCNC: 45 MMOL/L (ref 23–31)
CREAT SERPL-MCNC: 0.85 MG/DL (ref 0.55–1.02)
CREAT/UREA NIT SERPL: 37
GFR SERPLBLD CREATININE-BSD FMLA CKD-EPI: >60 ML/MIN/1.73/M2
GLUCOSE SERPL-MCNC: 136 MG/DL (ref 82–115)
INR PPP: 2.1
MAGNESIUM SERPL-MCNC: 2 MG/DL (ref 1.6–2.6)
POCT GLUCOSE: 129 MG/DL (ref 70–110)
POCT GLUCOSE: 249 MG/DL (ref 70–110)
POCT GLUCOSE: 329 MG/DL (ref 70–110)
POTASSIUM SERPL-SCNC: 4.4 MMOL/L (ref 3.5–5.1)
PROTHROMBIN TIME: 23.6 SECONDS (ref 12.5–14.5)
SODIUM SERPL-SCNC: 143 MMOL/L (ref 136–145)

## 2024-10-27 PROCEDURE — 25000003 PHARM REV CODE 250

## 2024-10-27 PROCEDURE — 94761 N-INVAS EAR/PLS OXIMETRY MLT: CPT

## 2024-10-27 PROCEDURE — 36415 COLL VENOUS BLD VENIPUNCTURE: CPT | Performed by: STUDENT IN AN ORGANIZED HEALTH CARE EDUCATION/TRAINING PROGRAM

## 2024-10-27 PROCEDURE — 85610 PROTHROMBIN TIME: CPT | Performed by: STUDENT IN AN ORGANIZED HEALTH CARE EDUCATION/TRAINING PROGRAM

## 2024-10-27 PROCEDURE — 25000242 PHARM REV CODE 250 ALT 637 W/ HCPCS: Performed by: INTERNAL MEDICINE

## 2024-10-27 PROCEDURE — 80048 BASIC METABOLIC PNL TOTAL CA: CPT | Performed by: STUDENT IN AN ORGANIZED HEALTH CARE EDUCATION/TRAINING PROGRAM

## 2024-10-27 PROCEDURE — 99900031 HC PATIENT EDUCATION (STAT)

## 2024-10-27 PROCEDURE — 21400001 HC TELEMETRY ROOM

## 2024-10-27 PROCEDURE — 27000221 HC OXYGEN, UP TO 24 HOURS

## 2024-10-27 PROCEDURE — 94760 N-INVAS EAR/PLS OXIMETRY 1: CPT

## 2024-10-27 PROCEDURE — 83735 ASSAY OF MAGNESIUM: CPT | Performed by: STUDENT IN AN ORGANIZED HEALTH CARE EDUCATION/TRAINING PROGRAM

## 2024-10-27 PROCEDURE — 99900035 HC TECH TIME PER 15 MIN (STAT)

## 2024-10-27 PROCEDURE — 94640 AIRWAY INHALATION TREATMENT: CPT

## 2024-10-27 PROCEDURE — 25000003 PHARM REV CODE 250: Performed by: INTERNAL MEDICINE

## 2024-10-27 PROCEDURE — 63600175 PHARM REV CODE 636 W HCPCS: Performed by: STUDENT IN AN ORGANIZED HEALTH CARE EDUCATION/TRAINING PROGRAM

## 2024-10-27 RX ORDER — ACETAZOLAMIDE 500 MG/5ML
500 INJECTION, POWDER, LYOPHILIZED, FOR SOLUTION INTRAVENOUS ONCE
Status: COMPLETED | OUTPATIENT
Start: 2024-10-27 | End: 2024-10-27

## 2024-10-27 RX ADMIN — ISOSORBIDE MONONITRATE 60 MG: 60 TABLET, EXTENDED RELEASE ORAL at 09:10

## 2024-10-27 RX ADMIN — LEVALBUTEROL HYDROCHLORIDE 1.25 MG: 1.25 SOLUTION RESPIRATORY (INHALATION) at 03:10

## 2024-10-27 RX ADMIN — METOPROLOL SUCCINATE 12.5 MG: 25 TABLET, EXTENDED RELEASE ORAL at 09:10

## 2024-10-27 RX ADMIN — GUAIFENESIN 600 MG: 600 TABLET, EXTENDED RELEASE ORAL at 09:10

## 2024-10-27 RX ADMIN — LEVALBUTEROL HYDROCHLORIDE 1.25 MG: 1.25 SOLUTION RESPIRATORY (INHALATION) at 07:10

## 2024-10-27 RX ADMIN — SENNOSIDES AND DOCUSATE SODIUM 2 TABLET: 50; 8.6 TABLET ORAL at 09:10

## 2024-10-27 RX ADMIN — FLUOXETINE HYDROCHLORIDE 40 MG: 20 CAPSULE ORAL at 09:10

## 2024-10-27 RX ADMIN — Medication 6 MG: at 09:10

## 2024-10-27 RX ADMIN — TRAZODONE HYDROCHLORIDE 25 MG: 50 TABLET ORAL at 09:10

## 2024-10-27 RX ADMIN — OLANZAPINE 5 MG: 5 TABLET, FILM COATED ORAL at 09:10

## 2024-10-27 RX ADMIN — GABAPENTIN 100 MG: 100 CAPSULE ORAL at 04:10

## 2024-10-27 RX ADMIN — LEVALBUTEROL HYDROCHLORIDE 1.25 MG: 1.25 SOLUTION RESPIRATORY (INHALATION) at 01:10

## 2024-10-27 RX ADMIN — GABAPENTIN 100 MG: 100 CAPSULE ORAL at 09:10

## 2024-10-27 RX ADMIN — ACETAZOLAMIDE 500 MG: 500 INJECTION, POWDER, LYOPHILIZED, FOR SOLUTION INTRAVENOUS at 09:10

## 2024-10-27 RX ADMIN — WARFARIN SODIUM 5 MG: 5 TABLET ORAL at 04:10

## 2024-10-27 RX ADMIN — ASPIRIN 81 MG: 81 TABLET, COATED ORAL at 09:10

## 2024-10-28 VITALS
TEMPERATURE: 98 F | SYSTOLIC BLOOD PRESSURE: 100 MMHG | HEART RATE: 56 BPM | WEIGHT: 214 LBS | RESPIRATION RATE: 18 BRPM | DIASTOLIC BLOOD PRESSURE: 56 MMHG | HEIGHT: 60 IN | BODY MASS INDEX: 42.01 KG/M2 | OXYGEN SATURATION: 95 %

## 2024-10-28 LAB
ANION GAP SERPL CALC-SCNC: 8 MEQ/L
BACTERIA BLD CULT: NORMAL
BACTERIA BLD CULT: NORMAL
BUN SERPL-MCNC: 31.6 MG/DL (ref 9.8–20.1)
CALCIUM SERPL-MCNC: 8.5 MG/DL (ref 8.4–10.2)
CHLORIDE SERPL-SCNC: 97 MMOL/L (ref 98–107)
CO2 SERPL-SCNC: 37 MMOL/L (ref 23–31)
CREAT SERPL-MCNC: 0.93 MG/DL (ref 0.55–1.02)
CREAT/UREA NIT SERPL: 34
GFR SERPLBLD CREATININE-BSD FMLA CKD-EPI: >60 ML/MIN/1.73/M2
GLUCOSE SERPL-MCNC: 131 MG/DL (ref 82–115)
MAGNESIUM SERPL-MCNC: 2.4 MG/DL (ref 1.6–2.6)
POTASSIUM SERPL-SCNC: 3.7 MMOL/L (ref 3.5–5.1)
SODIUM SERPL-SCNC: 142 MMOL/L (ref 136–145)

## 2024-10-28 PROCEDURE — 83735 ASSAY OF MAGNESIUM: CPT | Performed by: NURSE PRACTITIONER

## 2024-10-28 PROCEDURE — 94640 AIRWAY INHALATION TREATMENT: CPT

## 2024-10-28 PROCEDURE — 80048 BASIC METABOLIC PNL TOTAL CA: CPT | Performed by: STUDENT IN AN ORGANIZED HEALTH CARE EDUCATION/TRAINING PROGRAM

## 2024-10-28 PROCEDURE — 94761 N-INVAS EAR/PLS OXIMETRY MLT: CPT

## 2024-10-28 PROCEDURE — 99900031 HC PATIENT EDUCATION (STAT)

## 2024-10-28 PROCEDURE — 25000242 PHARM REV CODE 250 ALT 637 W/ HCPCS: Performed by: INTERNAL MEDICINE

## 2024-10-28 PROCEDURE — 25000003 PHARM REV CODE 250: Performed by: INTERNAL MEDICINE

## 2024-10-28 PROCEDURE — 94799 UNLISTED PULMONARY SVC/PX: CPT

## 2024-10-28 PROCEDURE — 27000221 HC OXYGEN, UP TO 24 HOURS

## 2024-10-28 PROCEDURE — 99900035 HC TECH TIME PER 15 MIN (STAT)

## 2024-10-28 PROCEDURE — 94644 CONT INHLJ TX 1ST HOUR: CPT

## 2024-10-28 PROCEDURE — 36415 COLL VENOUS BLD VENIPUNCTURE: CPT | Performed by: STUDENT IN AN ORGANIZED HEALTH CARE EDUCATION/TRAINING PROGRAM

## 2024-10-28 RX ORDER — IBUPROFEN 200 MG
1 TABLET ORAL DAILY
Qty: 90 PATCH | Refills: 0 | Status: SHIPPED | OUTPATIENT
Start: 2024-10-28 | End: 2025-01-26

## 2024-10-28 RX ORDER — ERGOCALCIFEROL 1.25 MG/1
50000 CAPSULE ORAL
Qty: 12 CAPSULE | Refills: 0 | Status: SHIPPED | OUTPATIENT
Start: 2024-10-31 | End: 2025-01-29

## 2024-10-28 RX ORDER — METOPROLOL SUCCINATE 25 MG/1
12.5 TABLET, EXTENDED RELEASE ORAL 2 TIMES DAILY
Qty: 90 TABLET | Refills: 3 | Status: SHIPPED | OUTPATIENT
Start: 2024-10-28 | End: 2025-10-28

## 2024-10-28 RX ORDER — ISOSORBIDE MONONITRATE 30 MG/1
30 TABLET, EXTENDED RELEASE ORAL DAILY
Qty: 60 TABLET | Refills: 0 | Status: SHIPPED | OUTPATIENT
Start: 2024-10-28 | End: 2024-12-27

## 2024-10-28 RX ORDER — GUAIFENESIN 600 MG/1
600 TABLET, EXTENDED RELEASE ORAL 2 TIMES DAILY
Qty: 120 TABLET | Refills: 0 | Status: SHIPPED | OUTPATIENT
Start: 2024-10-28 | End: 2024-12-27

## 2024-10-28 RX ORDER — DAPAGLIFLOZIN 10 MG/1
10 TABLET, FILM COATED ORAL DAILY
Qty: 30 TABLET | Refills: 5 | Status: SHIPPED | OUTPATIENT
Start: 2024-10-28

## 2024-10-28 RX ORDER — FUROSEMIDE 20 MG/1
20 TABLET ORAL DAILY
Qty: 30 TABLET | Refills: 0 | Status: SHIPPED | OUTPATIENT
Start: 2024-10-28 | End: 2024-11-27

## 2024-10-28 RX ADMIN — FLUOXETINE HYDROCHLORIDE 40 MG: 20 CAPSULE ORAL at 09:10

## 2024-10-28 RX ADMIN — LEVALBUTEROL HYDROCHLORIDE 1.25 MG: 1.25 SOLUTION RESPIRATORY (INHALATION) at 12:10

## 2024-10-28 RX ADMIN — GABAPENTIN 100 MG: 100 CAPSULE ORAL at 09:10

## 2024-10-28 RX ADMIN — GUAIFENESIN 600 MG: 600 TABLET, EXTENDED RELEASE ORAL at 09:10

## 2024-10-28 RX ADMIN — ASPIRIN 81 MG: 81 TABLET, COATED ORAL at 09:10

## 2024-10-28 RX ADMIN — LEVALBUTEROL HYDROCHLORIDE 1.25 MG: 1.25 SOLUTION RESPIRATORY (INHALATION) at 08:10

## 2024-10-28 RX ADMIN — ISOSORBIDE MONONITRATE 60 MG: 60 TABLET, EXTENDED RELEASE ORAL at 09:10

## 2024-10-30 ENCOUNTER — PATIENT OUTREACH (OUTPATIENT)
Dept: ADMINISTRATIVE | Facility: CLINIC | Age: 69
End: 2024-10-30
Payer: MEDICARE

## 2024-11-05 ENCOUNTER — LAB REQUISITION (OUTPATIENT)
Dept: LAB | Facility: HOSPITAL | Age: 69
End: 2024-11-05
Payer: MEDICARE

## 2024-11-05 DIAGNOSIS — I10 ESSENTIAL (PRIMARY) HYPERTENSION: ICD-10-CM

## 2024-11-05 DIAGNOSIS — E11.65 TYPE 2 DIABETES MELLITUS WITH HYPERGLYCEMIA: ICD-10-CM

## 2024-11-05 DIAGNOSIS — I50.31 ACUTE DIASTOLIC (CONGESTIVE) HEART FAILURE: ICD-10-CM

## 2024-11-05 LAB
ANION GAP SERPL CALC-SCNC: 13 MEQ/L
BUN SERPL-MCNC: 23.1 MG/DL (ref 9.8–20.1)
CALCIUM SERPL-MCNC: 9 MG/DL (ref 8.4–10.2)
CHLORIDE SERPL-SCNC: 102 MMOL/L (ref 98–107)
CO2 SERPL-SCNC: 29 MMOL/L (ref 23–31)
CREAT SERPL-MCNC: 0.91 MG/DL (ref 0.55–1.02)
CREAT/UREA NIT SERPL: 25
GFR SERPLBLD CREATININE-BSD FMLA CKD-EPI: >60 ML/MIN/1.73/M2
GLUCOSE SERPL-MCNC: 169 MG/DL (ref 82–115)
POTASSIUM SERPL-SCNC: 4.3 MMOL/L (ref 3.5–5.1)
SODIUM SERPL-SCNC: 144 MMOL/L (ref 136–145)

## 2024-11-05 PROCEDURE — 80048 BASIC METABOLIC PNL TOTAL CA: CPT | Performed by: PHYSICIAN ASSISTANT

## 2024-11-27 ENCOUNTER — TELEPHONE (OUTPATIENT)
Dept: NEUROLOGY | Facility: CLINIC | Age: 69
End: 2024-11-27
Payer: MEDICARE

## 2024-12-09 DIAGNOSIS — J90 PLEURAL EFFUSION, RIGHT: Primary | ICD-10-CM

## 2024-12-09 DIAGNOSIS — J44.9 CHRONIC OBSTRUCTIVE PULMONARY DISEASE, UNSPECIFIED COPD TYPE: ICD-10-CM

## 2024-12-10 DIAGNOSIS — R06.09 DYSPNEA ON EXERTION: Primary | ICD-10-CM

## 2024-12-16 NOTE — PROGRESS NOTES
"Subjective     Patient ID: Kayla Ruiz is a 69 y.o. female.    Chief Complaint: dyspnea on exertion (New pt - sleep , dyspnea on exertion - referred by Dr. Arsen Dow/)    HPI  Had sleep study many years ago (in-lab)  Got to where she couldn't tolerate the machine after a couple months & she stopped wearing it  Sounds like she may have been on apap (does not recall a second night in the lab)    Snores  EDS  Witnessed apneas  DE LUNA  RLS  Nocturia x4    Was hospitalized with PNA & since then has been unable to wean off of O2 since then  Currently on 2LPM during the day when she's active & wears at night    CHF, COPD, A.Fib  Had mechanical AVR 17 years ago; on coumadin      Review of Systems  A 14pt ROS was reviewed & is negative unless otherwise documented in the HPI       Objective     Physical Exam  GENERAL: NAD, calm, cooperative, appropriate  Awake/alert  Well groomed  Mall 4  Neck size: 16.5"  RESP: CTAB  HEART: RRR  No LE edema  MENTAL STATUS: oriented, follow commands reliably  SPEECH/LANGUAGE: clear, fluent  CN:  Perrla, eomi, vff, gaze conjugate  No tactile or motor facial asymmetry  Tongue protrudes midline  Motor: no focal weakness  No bradykinesis  No rigidity  Cerebellar: chin tremor (not bothersome to her)  Mild LUE intention tremor  Sensory: normal to tactile stim/vibration  DTRs: normal +2, symmetric  Gait: steady       Assessment and Plan     1. LESLI (obstructive sleep apnea)  -     Polysomnogram (CPAP will be added if patient meets diagnostic criteria.); Future    2. Dyspnea on exertion  -     Polysomnogram (CPAP will be added if patient meets diagnostic criteria.); Future    3. CHF with unknown LVEF    4. Chronic obstructive pulmonary disease, unspecified COPD type      Get PSG - add PAP if indicated  Do diagnostic portion w/o supplemental O2  F/u p testing    Alice Rascon, RANDY-BC           Follow up if symptoms worsen or fail to improve.    "

## 2024-12-17 ENCOUNTER — OFFICE VISIT (OUTPATIENT)
Dept: NEUROLOGY | Facility: CLINIC | Age: 69
End: 2024-12-17
Payer: MEDICARE

## 2024-12-17 VITALS
HEIGHT: 61 IN | SYSTOLIC BLOOD PRESSURE: 110 MMHG | BODY MASS INDEX: 42.48 KG/M2 | WEIGHT: 225 LBS | DIASTOLIC BLOOD PRESSURE: 72 MMHG

## 2024-12-17 DIAGNOSIS — G47.33 OSA (OBSTRUCTIVE SLEEP APNEA): Primary | ICD-10-CM

## 2024-12-17 DIAGNOSIS — I50.9 CHF WITH UNKNOWN LVEF: ICD-10-CM

## 2024-12-17 DIAGNOSIS — J44.9 CHRONIC OBSTRUCTIVE PULMONARY DISEASE, UNSPECIFIED COPD TYPE: ICD-10-CM

## 2024-12-17 DIAGNOSIS — R06.09 DYSPNEA ON EXERTION: ICD-10-CM

## 2024-12-17 PROCEDURE — 3288F FALL RISK ASSESSMENT DOCD: CPT | Mod: CPTII,S$GLB,, | Performed by: NURSE PRACTITIONER

## 2024-12-17 PROCEDURE — 3078F DIAST BP <80 MM HG: CPT | Mod: CPTII,S$GLB,, | Performed by: NURSE PRACTITIONER

## 2024-12-17 PROCEDURE — 1160F RVW MEDS BY RX/DR IN RCRD: CPT | Mod: CPTII,S$GLB,, | Performed by: NURSE PRACTITIONER

## 2024-12-17 PROCEDURE — 3074F SYST BP LT 130 MM HG: CPT | Mod: CPTII,S$GLB,, | Performed by: NURSE PRACTITIONER

## 2024-12-17 PROCEDURE — 4010F ACE/ARB THERAPY RXD/TAKEN: CPT | Mod: CPTII,S$GLB,, | Performed by: NURSE PRACTITIONER

## 2024-12-17 PROCEDURE — 99203 OFFICE O/P NEW LOW 30 MIN: CPT | Mod: S$GLB,,, | Performed by: NURSE PRACTITIONER

## 2024-12-17 PROCEDURE — 99999 PR PBB SHADOW E&M-EST. PATIENT-LVL IV: CPT | Mod: PBBFAC,,, | Performed by: NURSE PRACTITIONER

## 2024-12-17 PROCEDURE — 3008F BODY MASS INDEX DOCD: CPT | Mod: CPTII,S$GLB,, | Performed by: NURSE PRACTITIONER

## 2024-12-17 PROCEDURE — 1126F AMNT PAIN NOTED NONE PRSNT: CPT | Mod: CPTII,S$GLB,, | Performed by: NURSE PRACTITIONER

## 2024-12-17 PROCEDURE — 1159F MED LIST DOCD IN RCRD: CPT | Mod: CPTII,S$GLB,, | Performed by: NURSE PRACTITIONER

## 2024-12-17 PROCEDURE — 1101F PT FALLS ASSESS-DOCD LE1/YR: CPT | Mod: CPTII,S$GLB,, | Performed by: NURSE PRACTITIONER

## 2024-12-17 NOTE — PATIENT INSTRUCTIONS
"   Sleep Apnea in Adults   The Basics   Written by the doctors and editors at [facility]   What is sleep apnea? -- Sleep apnea is a condition that makes you stop breathing for short periods while you are asleep. There are 2 types of sleep apnea. One is called "obstructive sleep apnea," and the other is called "central sleep apnea."  In obstructive sleep apnea, you stop breathing because your throat narrows or closes (figure 1). In central sleep apnea, you stop breathing because your brain does not send the right signals to your muscles to make you breathe. When people talk about sleep apnea, they are usually referring to obstructive sleep apnea, which is what this article is about.  People with sleep apnea do not know that they stop breathing when they are asleep. But they do sometimes wake up startled or gasping for breath. They also often hear from loved ones that they snore.  What are the symptoms of sleep apnea? -- The main symptoms of sleep apnea are loud snoring, tiredness, and daytime sleepiness. Other symptoms can include:  Restless sleep  Waking up choking or gasping  Morning headaches, dry mouth, or sore throat  Waking up often to urinate  Waking up feeling unrested or groggy  Trouble thinking clearly or remembering things  Some people with sleep apnea don't have symptoms, or they don't know they have them. They might figure that it's normal to be tired or to snore a lot.  Should I see a doctor or nurse? -- Yes. If you think you might have sleep apnea, see your doctor.  Is there a test for sleep apnea? -- Yes. If your doctor or nurse suspects you have sleep apnea, they might send you for a "sleep study." Sleep studies can sometimes be done at home, but they are usually done in a sleep lab. For the study, you spend the night in the lab, and you are hooked up to different machines that monitor your heart rate, breathing, and other body functions. The results of the test will tell your doctor or nurse if you " "have the disorder.  Is there anything I can do on my own to help my sleep apnea? -- Yes. Here are some things that might help:  Stay off your back when sleeping. (This is not always practical, because people cannot control their position while asleep. Plus, it only helps some people.)  Lose weight, if you are overweight  Avoid alcohol, because it can make sleep apnea worse  How is sleep apnea treated? -- As mentioned above, weight loss can help if you are overweight or obese. But losing weight can be challenging, and it takes time to lose enough weight to help with your sleep apnea. Most people need other treatment while they work on losing weight.  The most effective treatment for sleep apnea is a device that keeps your airway open while you sleep. Treatment with this device is called "continuous positive airway pressure," or CPAP. People getting CPAP wear a face mask at night that keeps them breathing (figure 2).  If your doctor or nurse recommends a CPAP machine, try to be patient about using it. The mask might seem uncomfortable to wear at first, and the machine might seem noisy, but using the machine can really pay off. People with sleep apnea who use a CPAP machine feel more rested and generally feel better.  There is also another device that you wear in your mouth called an "oral appliance" or "mandibular advancement device." It also helps keep your airway open while you sleep. But devices do not work as well as CPAP for treating sleep apnea.  In rare cases, when nothing else helps, doctors recommend surgery to keep the airway open. Surgery to do this is not always effective, and even when it is, the problem can come back.  Is sleep apnea dangerous? -- It can be. People with sleep apnea do not get good-quality sleep, so they are often tired and not alert. This puts them at risk for car accidents and other types of accidents. Plus, studies show that people with sleep apnea are more likely than others to have " high blood pressure, heart attacks, and other serious heart problems. In people with severe sleep apnea, getting treated (for example, with a CPAP machine) can help prevent some of these problems.  All topics are updated as new evidence becomes available and our peer review process is complete.  This topic retrieved from [employer] on: Sep 21, 2021.  Topic 64900 Version 12.0  Release: 29.4.2 - C29.263  © 2021 UpToDate, Inc. and/or its affiliates. All rights reserved.  figure 1: Airway in a person with sleep apnea     Normally when a person sleeps, the airway remains open, and air can pass from the nose and mouth to the lungs. In a person with sleep apnea, parts of the throat and mouth drop into the airway and block off the flow of air. This can cause loud snoring and interrupt breathing for short periods.  Graphic 45133 Version 5.0    figure 2: Continuous positive airway pressure (CPAP) for sleep apnea     The CPAP mask gently blows air into your nose while you sleep. It puts just enough pressure on your airway to keep it from closing. The mask in this picture fits over just the nose. Other CPAP devices have masks that fit over the nose and mouth.  Graphic 27653 Version 5.0    Consumer Information Use and Disclaimer   This information is not specific medical advice and does not replace information you receive from your health care provider. This is only a brief summary of general information. It does NOT include all information about conditions, illnesses, injuries, tests, procedures, treatments, therapies, discharge instructions or life-style choices that may apply to you. You must talk with your health care provider for complete information about your health and treatment options. This information should not be used to decide whether or not to accept your health care provider's advice, instructions or recommendations. Only your health care provider has the knowledge and training to provide advice that is right for  you. The use of this information is governed by the [employer] End User License Agreement, available at [email or web address].The use of [employer] content is governed by the [employer] Terms of Use. ©2021 UpToDate, Inc. All rights reserved.  Copyright   © 2021 UpToDate, Inc. and/or its affiliates. All rights reserved.

## 2024-12-20 ENCOUNTER — HOSPITAL ENCOUNTER (OUTPATIENT)
Dept: RADIOLOGY | Facility: HOSPITAL | Age: 69
Discharge: HOME | End: 2024-12-20
Attending: INTERNAL MEDICINE
Payer: MEDICARE

## 2024-12-20 ENCOUNTER — PROCEDURE VISIT (OUTPATIENT)
Dept: RESPIRATORY THERAPY | Facility: HOSPITAL | Age: 69
End: 2024-12-20
Attending: INTERNAL MEDICINE
Payer: MEDICARE

## 2024-12-20 DIAGNOSIS — J90 PLEURAL EFFUSION, RIGHT: ICD-10-CM

## 2024-12-20 DIAGNOSIS — J44.9 CHRONIC OBSTRUCTIVE PULMONARY DISEASE, UNSPECIFIED COPD TYPE: ICD-10-CM

## 2024-12-20 LAB
DLCO SINGLE BREATH LLN: 16.64
DLCO SINGLE BREATH PRE REF: 32.9 %
DLCO SINGLE BREATH REF: 22.38
DLCOC SBVA LLN: 2.89
DLCOC SBVA REF: 4.24
DLCOC SINGLE BREATH LLN: 16.64
DLCOC SINGLE BREATH REF: 22.38
DLCOCSBVAULN: 5.59
DLCOCSINGLEBREATHULN: 28.11
DLCOSINGLEBREATHULN: 28.11
DLCOSINGLEBREATHZSCORE: -4.31
DLCOVA LLN: 2.89
DLCOVA PRE REF: 101.7 %
DLCOVA PRE: 4.31 ML/(MIN*MMHG*L) (ref 2.89–5.59)
DLCOVA REF: 4.24
DLCOVAULN: 5.59
ERVN2 LLN: -16449.34
ERVN2 PRE REF: 21.2 %
ERVN2 PRE: 0.14 L (ref -16449.34–16450.66)
ERVN2 REF: 0.66
ERVN2ULN: ABNORMAL
FEF 25 75 LLN: 1.2
FEF 25 75 PRE REF: 9.3 %
FEF 25 75 REF: 2.6
FET100 CHG: 11.2 %
FEV05 LLN: 0.93
FEV05 REF: 1.79
FEV1 CHG: 3.6 %
FEV1 FVC LLN: 64
FEV1 FVC PRE REF: 80.5 %
FEV1 FVC REF: 78
FEV1 LLN: 1.67
FEV1 PRE REF: 23.6 %
FEV1 REF: 2.3
FEV1 VOL CHG: 0.02
FEV1FVCZSCORE: -1.83
FEV1ZSCORE: -4.28
FRCN2 LLN: 2
FRCN2 PRE REF: 94.9 %
FRCN2 REF: 2.83
FRCN2ULN: 3.65
FVC CHG: 8.6 %
FVC LLN: 2.17
FVC PRE REF: 29.1 %
FVC REF: 2.99
FVC VOL CHG: 0.07
FVCZSCORE: -4.51
HCO3 UR-SCNC: 41.4 MMOL/L (ref 24–28)
ICN2REF: 2.17
IVC PRE: 0.93 L (ref 2.17–3.86)
IVC SINGLE BREATH LLN: 2.17
IVC SINGLE BREATH PRE REF: 30.9 %
IVC SINGLE BREATH REF: 2.99
IVCSINGLEBREATHULN: 3.86
LLN IC N2: -16447.83
PCO2 BLDA: 72.4 MMHG (ref 35–45)
PEF LLN: 4.06
PEF PRE REF: 46.2 %
PEF REF: 5.88
PH SMN: 7.37 [PH] (ref 7.35–7.45)
PO2 BLDA: 84 MMHG (ref 80–100)
POC BE: 16 MMOL/L
POC SATURATED O2: 95 % (ref 95–100)
POC TCO2: 44 MMOL/L (ref 23–27)
POST FEF 25 75: 0.24 L/S (ref 1.2–4)
POST FET 100: 9.3 SEC
POST FEV1 FVC: 59.59 % (ref 64.2–89.26)
POST FEV1: 0.56 L (ref 1.67–2.91)
POST FEV5: 0.44 L (ref 0.93–2.64)
POST FVC: 0.95 L (ref 2.17–3.86)
POST PEF: 2.5 L/S (ref 4.06–7.7)
PRE DLCO: 7.36 ML/(MIN*MMHG) (ref 16.64–28.11)
PRE FEF 25 75: 0.24 L/S (ref 1.2–4)
PRE FET 100: 8.37 SEC
PRE FEV05 REF: 24 %
PRE FEV1 FVC: 62.49 % (ref 64.2–89.26)
PRE FEV1: 0.54 L (ref 1.67–2.91)
PRE FEV5: 0.43 L (ref 0.93–2.64)
PRE FRC N2: 2.68 L (ref 2–3.65)
PRE FVC: 0.87 L (ref 2.17–3.86)
PRE IC N2: 0.84 L (ref -16447.83–16452.17)
PRE PEF: 2.72 L/S (ref 4.06–7.7)
PRE REF IC N2: 38.5 %
RVN2 LLN: 1.59
RVN2 PRE REF: 117.2 %
RVN2 PRE: 2.54 L (ref 1.59–2.75)
RVN2 REF: 2.17
RVN2TLCN2 LLN: 33.51
RVN2TLCN2 PRE REF: 167.7 %
RVN2TLCN2 PRE: 72.3 % (ref 33.51–52.69)
RVN2TLCN2 REF: 43.1
RVN2TLCN2ULN: 52.69
RVN2ULN: 2.75
SAMPLE: ABNORMAL
TLCN2 LLN: 4.29
TLCN2 PRE REF: 66.7 %
TLCN2 PRE: 3.52 L (ref 4.29–6.26)
TLCN2 REF: 5.27
TLCN2ULN: 6.26
TLCN2ZSCORE: -2.92
ULN IC N2: ABNORMAL
VA PRE: 1.71 L (ref 5.12–5.12)
VA SINGLE BREATH LLN: 5.12
VA SINGLE BREATH PRE REF: 33.3 %
VA SINGLE BREATH REF: 5.12
VASINGLEBREATHULN: 5.12
VCMAXN2 LLN: 2.17
VCMAXN2 PRE REF: 32.6 %
VCMAXN2 PRE: 0.97 L (ref 2.17–3.86)
VCMAXN2 REF: 2.99
VCMAXN2ULN: 3.86

## 2024-12-20 PROCEDURE — 71250 CT THORAX DX C-: CPT | Mod: TC

## 2024-12-20 PROCEDURE — 36600 WITHDRAWAL OF ARTERIAL BLOOD: CPT

## 2024-12-20 PROCEDURE — 94729 DIFFUSING CAPACITY: CPT

## 2024-12-20 PROCEDURE — 82803 BLOOD GASES ANY COMBINATION: CPT

## 2024-12-20 PROCEDURE — 94375 RESPIRATORY FLOW VOLUME LOOP: CPT

## 2024-12-20 PROCEDURE — 99900035 HC TECH TIME PER 15 MIN (STAT)

## 2024-12-20 PROCEDURE — 99900031 HC PATIENT EDUCATION (STAT)

## 2024-12-20 PROCEDURE — 94727 GAS DIL/WSHOT DETER LNG VOL: CPT

## 2024-12-24 ENCOUNTER — HOSPITAL ENCOUNTER (INPATIENT)
Facility: HOSPITAL | Age: 69
LOS: 3 days | Discharge: HOME HEALTH | DRG: 291 | End: 2024-12-27
Attending: EMERGENCY MEDICINE | Admitting: INTERNAL MEDICINE
Payer: MEDICARE

## 2024-12-24 DIAGNOSIS — R06.02 SHORTNESS OF BREATH: ICD-10-CM

## 2024-12-24 DIAGNOSIS — I50.9 CONGESTIVE HEART FAILURE, UNSPECIFIED HF CHRONICITY, UNSPECIFIED HEART FAILURE TYPE: Primary | ICD-10-CM

## 2024-12-24 DIAGNOSIS — R07.9 CHEST PAIN: ICD-10-CM

## 2024-12-24 LAB
ALBUMIN SERPL-MCNC: 3.6 G/DL (ref 3.4–4.8)
ALBUMIN/GLOB SERPL: 1 RATIO (ref 1.1–2)
ALP SERPL-CCNC: 108 UNIT/L (ref 40–150)
ALT SERPL-CCNC: 29 UNIT/L (ref 0–55)
ANION GAP SERPL CALC-SCNC: 10 MEQ/L
AST SERPL-CCNC: 29 UNIT/L (ref 5–34)
BACTERIA #/AREA URNS AUTO: ABNORMAL /HPF
BASOPHILS # BLD AUTO: 0.03 X10(3)/MCL
BASOPHILS NFR BLD AUTO: 0.3 %
BILIRUB SERPL-MCNC: 0.9 MG/DL
BILIRUB UR QL STRIP.AUTO: NEGATIVE
BNP BLD-MCNC: 1575.8 PG/ML
BUN SERPL-MCNC: 25.7 MG/DL (ref 9.8–20.1)
CALCIUM SERPL-MCNC: 9.2 MG/DL (ref 8.4–10.2)
CHLORIDE SERPL-SCNC: 98 MMOL/L (ref 98–107)
CLARITY UR: CLEAR
CO2 SERPL-SCNC: 39 MMOL/L (ref 23–31)
COLOR UR AUTO: ABNORMAL
CREAT SERPL-MCNC: 0.84 MG/DL (ref 0.55–1.02)
CREAT/UREA NIT SERPL: 31
EOSINOPHIL # BLD AUTO: 0.06 X10(3)/MCL (ref 0–0.9)
EOSINOPHIL NFR BLD AUTO: 0.7 %
ERYTHROCYTE [DISTWIDTH] IN BLOOD BY AUTOMATED COUNT: 17 % (ref 11.5–17)
FLUAV AG UPPER RESP QL IA.RAPID: NOT DETECTED
FLUBV AG UPPER RESP QL IA.RAPID: NOT DETECTED
GFR SERPLBLD CREATININE-BSD FMLA CKD-EPI: >60 ML/MIN/1.73/M2
GLOBULIN SER-MCNC: 3.7 GM/DL (ref 2.4–3.5)
GLUCOSE SERPL-MCNC: 165 MG/DL (ref 82–115)
GLUCOSE UR QL STRIP: ABNORMAL
HCT VFR BLD AUTO: 35.8 % (ref 37–47)
HGB BLD-MCNC: 10.6 G/DL (ref 12–16)
HGB UR QL STRIP: ABNORMAL
IMM GRANULOCYTES # BLD AUTO: 0.04 X10(3)/MCL (ref 0–0.04)
IMM GRANULOCYTES NFR BLD AUTO: 0.4 %
INR PPP: 2.8
KETONES UR QL STRIP: NEGATIVE
LEUKOCYTE ESTERASE UR QL STRIP: NEGATIVE
LYMPHOCYTES # BLD AUTO: 0.93 X10(3)/MCL (ref 0.6–4.6)
LYMPHOCYTES NFR BLD AUTO: 10.3 %
MCH RBC QN AUTO: 27.7 PG (ref 27–31)
MCHC RBC AUTO-ENTMCNC: 29.6 G/DL (ref 33–36)
MCV RBC AUTO: 93.7 FL (ref 80–94)
MONOCYTES # BLD AUTO: 0.5 X10(3)/MCL (ref 0.1–1.3)
MONOCYTES NFR BLD AUTO: 5.5 %
NEUTROPHILS # BLD AUTO: 7.51 X10(3)/MCL (ref 2.1–9.2)
NEUTROPHILS NFR BLD AUTO: 82.8 %
NITRITE UR QL STRIP: NEGATIVE
NRBC BLD AUTO-RTO: 0 %
OHS QRS DURATION: 88 MS
OHS QTC CALCULATION: 448 MS
PH UR STRIP: 6.5 [PH]
PLATELET # BLD AUTO: 269 X10(3)/MCL (ref 130–400)
PMV BLD AUTO: 10.4 FL (ref 7.4–10.4)
POCT GLUCOSE: 193 MG/DL (ref 70–110)
POTASSIUM SERPL-SCNC: 3.7 MMOL/L (ref 3.5–5.1)
PROT SERPL-MCNC: 7.3 GM/DL (ref 5.8–7.6)
PROT UR QL STRIP: ABNORMAL
PROTHROMBIN TIME: 29.7 SECONDS (ref 12.5–14.5)
RBC # BLD AUTO: 3.82 X10(6)/MCL (ref 4.2–5.4)
RBC #/AREA URNS AUTO: ABNORMAL /HPF
SARS-COV-2 RNA RESP QL NAA+PROBE: NOT DETECTED
SODIUM SERPL-SCNC: 147 MMOL/L (ref 136–145)
SP GR UR STRIP.AUTO: 1.02 (ref 1–1.03)
SQUAMOUS #/AREA URNS LPF: ABNORMAL /HPF
TROPONIN I SERPL-MCNC: 0.04 NG/ML (ref 0–0.04)
UROBILINOGEN UR STRIP-ACNC: NORMAL
WBC # BLD AUTO: 9.07 X10(3)/MCL (ref 4.5–11.5)
WBC #/AREA URNS AUTO: ABNORMAL /HPF

## 2024-12-24 PROCEDURE — 63600175 PHARM REV CODE 636 W HCPCS: Performed by: EMERGENCY MEDICINE

## 2024-12-24 PROCEDURE — 93010 ELECTROCARDIOGRAM REPORT: CPT | Mod: ,,, | Performed by: INTERNAL MEDICINE

## 2024-12-24 PROCEDURE — 84484 ASSAY OF TROPONIN QUANT: CPT

## 2024-12-24 PROCEDURE — 85610 PROTHROMBIN TIME: CPT | Performed by: NURSE PRACTITIONER

## 2024-12-24 PROCEDURE — 96374 THER/PROPH/DIAG INJ IV PUSH: CPT

## 2024-12-24 PROCEDURE — 25000003 PHARM REV CODE 250: Performed by: NURSE PRACTITIONER

## 2024-12-24 PROCEDURE — 80053 COMPREHEN METABOLIC PANEL: CPT

## 2024-12-24 PROCEDURE — 83880 ASSAY OF NATRIURETIC PEPTIDE: CPT

## 2024-12-24 PROCEDURE — 99291 CRITICAL CARE FIRST HOUR: CPT

## 2024-12-24 PROCEDURE — 11000001 HC ACUTE MED/SURG PRIVATE ROOM

## 2024-12-24 PROCEDURE — 85025 COMPLETE CBC W/AUTO DIFF WBC: CPT

## 2024-12-24 PROCEDURE — 25000003 PHARM REV CODE 250: Performed by: INTERNAL MEDICINE

## 2024-12-24 PROCEDURE — 25000242 PHARM REV CODE 250 ALT 637 W/ HCPCS: Performed by: EMERGENCY MEDICINE

## 2024-12-24 PROCEDURE — 21400001 HC TELEMETRY ROOM

## 2024-12-24 PROCEDURE — 25000003 PHARM REV CODE 250: Performed by: EMERGENCY MEDICINE

## 2024-12-24 PROCEDURE — 93005 ELECTROCARDIOGRAM TRACING: CPT

## 2024-12-24 PROCEDURE — 81001 URINALYSIS AUTO W/SCOPE: CPT

## 2024-12-24 PROCEDURE — 0240U COVID/FLU A&B PCR: CPT | Performed by: EMERGENCY MEDICINE

## 2024-12-24 RX ORDER — ACETAZOLAMIDE 250 MG/1
500 TABLET ORAL DAILY
Status: DISCONTINUED | OUTPATIENT
Start: 2024-12-24 | End: 2024-12-27 | Stop reason: HOSPADM

## 2024-12-24 RX ORDER — INSULIN ASPART 100 [IU]/ML
0-5 INJECTION, SOLUTION INTRAVENOUS; SUBCUTANEOUS
Status: DISCONTINUED | OUTPATIENT
Start: 2024-12-24 | End: 2024-12-27 | Stop reason: HOSPADM

## 2024-12-24 RX ORDER — WARFARIN SODIUM 5 MG/1
5 TABLET ORAL DAILY
Status: DISCONTINUED | OUTPATIENT
Start: 2024-12-24 | End: 2024-12-25

## 2024-12-24 RX ORDER — GLUCAGON 1 MG
1 KIT INJECTION
Status: DISCONTINUED | OUTPATIENT
Start: 2024-12-24 | End: 2024-12-27 | Stop reason: HOSPADM

## 2024-12-24 RX ORDER — OLANZAPINE 5 MG/1
5 TABLET ORAL NIGHTLY
Status: DISCONTINUED | OUTPATIENT
Start: 2024-12-24 | End: 2024-12-27 | Stop reason: HOSPADM

## 2024-12-24 RX ORDER — FUROSEMIDE 10 MG/ML
40 INJECTION INTRAMUSCULAR; INTRAVENOUS DAILY
Status: DISCONTINUED | OUTPATIENT
Start: 2024-12-25 | End: 2024-12-25

## 2024-12-24 RX ORDER — IBUPROFEN 200 MG
24 TABLET ORAL
Status: DISCONTINUED | OUTPATIENT
Start: 2024-12-24 | End: 2024-12-27 | Stop reason: HOSPADM

## 2024-12-24 RX ORDER — DAPAGLIFLOZIN 10 MG/1
10 TABLET, FILM COATED ORAL DAILY
Status: DISCONTINUED | OUTPATIENT
Start: 2024-12-24 | End: 2024-12-27 | Stop reason: HOSPADM

## 2024-12-24 RX ORDER — PROCHLORPERAZINE EDISYLATE 5 MG/ML
5 INJECTION INTRAMUSCULAR; INTRAVENOUS EVERY 6 HOURS PRN
Status: DISCONTINUED | OUTPATIENT
Start: 2024-12-24 | End: 2024-12-27 | Stop reason: HOSPADM

## 2024-12-24 RX ORDER — IBUPROFEN 200 MG
16 TABLET ORAL
Status: DISCONTINUED | OUTPATIENT
Start: 2024-12-24 | End: 2024-12-27 | Stop reason: HOSPADM

## 2024-12-24 RX ORDER — SODIUM CHLORIDE 0.9 % (FLUSH) 0.9 %
10 SYRINGE (ML) INJECTION
Status: DISCONTINUED | OUTPATIENT
Start: 2024-12-24 | End: 2024-12-27 | Stop reason: HOSPADM

## 2024-12-24 RX ORDER — IPRATROPIUM BROMIDE AND ALBUTEROL SULFATE 2.5; .5 MG/3ML; MG/3ML
3 SOLUTION RESPIRATORY (INHALATION)
Status: COMPLETED | OUTPATIENT
Start: 2024-12-24 | End: 2024-12-24

## 2024-12-24 RX ORDER — ONDANSETRON HYDROCHLORIDE 2 MG/ML
4 INJECTION, SOLUTION INTRAVENOUS EVERY 4 HOURS PRN
Status: DISCONTINUED | OUTPATIENT
Start: 2024-12-24 | End: 2024-12-27 | Stop reason: HOSPADM

## 2024-12-24 RX ORDER — NITROGLYCERIN 0.4 MG/1
0.4 TABLET SUBLINGUAL
Status: DISPENSED | OUTPATIENT
Start: 2024-12-24 | End: 2024-12-24

## 2024-12-24 RX ORDER — PIOGLITAZONEHYDROCHLORIDE 15 MG/1
45 TABLET ORAL DAILY
Status: DISCONTINUED | OUTPATIENT
Start: 2024-12-24 | End: 2024-12-24

## 2024-12-24 RX ORDER — ATORVASTATIN CALCIUM 40 MG/1
40 TABLET, FILM COATED ORAL DAILY
Status: DISCONTINUED | OUTPATIENT
Start: 2024-12-24 | End: 2024-12-27 | Stop reason: HOSPADM

## 2024-12-24 RX ORDER — NALOXONE HCL 0.4 MG/ML
0.02 VIAL (ML) INJECTION
Status: DISCONTINUED | OUTPATIENT
Start: 2024-12-24 | End: 2024-12-27 | Stop reason: HOSPADM

## 2024-12-24 RX ORDER — ACETAMINOPHEN 325 MG/1
650 TABLET ORAL EVERY 4 HOURS PRN
Status: DISCONTINUED | OUTPATIENT
Start: 2024-12-24 | End: 2024-12-27 | Stop reason: HOSPADM

## 2024-12-24 RX ORDER — TRAZODONE HYDROCHLORIDE 50 MG/1
50 TABLET ORAL NIGHTLY PRN
Status: DISCONTINUED | OUTPATIENT
Start: 2024-12-24 | End: 2024-12-27 | Stop reason: HOSPADM

## 2024-12-24 RX ORDER — ISOSORBIDE MONONITRATE 30 MG/1
30 TABLET, EXTENDED RELEASE ORAL DAILY
Status: DISCONTINUED | OUTPATIENT
Start: 2024-12-24 | End: 2024-12-27 | Stop reason: HOSPADM

## 2024-12-24 RX ORDER — METOLAZONE 2.5 MG/1
5 TABLET ORAL DAILY
Status: DISCONTINUED | OUTPATIENT
Start: 2024-12-24 | End: 2024-12-24

## 2024-12-24 RX ORDER — ACETAMINOPHEN 500 MG
1000 TABLET ORAL EVERY 6 HOURS PRN
Status: DISCONTINUED | OUTPATIENT
Start: 2024-12-24 | End: 2024-12-27 | Stop reason: HOSPADM

## 2024-12-24 RX ORDER — FUROSEMIDE 10 MG/ML
20 INJECTION INTRAMUSCULAR; INTRAVENOUS
Status: COMPLETED | OUTPATIENT
Start: 2024-12-24 | End: 2024-12-24

## 2024-12-24 RX ORDER — ASPIRIN 325 MG
325 TABLET, DELAYED RELEASE (ENTERIC COATED) ORAL
Status: COMPLETED | OUTPATIENT
Start: 2024-12-24 | End: 2024-12-24

## 2024-12-24 RX ADMIN — IPRATROPIUM BROMIDE AND ALBUTEROL SULFATE 3 ML: 2.5; .5 SOLUTION RESPIRATORY (INHALATION) at 12:12

## 2024-12-24 RX ADMIN — ASPIRIN 325 MG: 325 TABLET, COATED ORAL at 12:12

## 2024-12-24 RX ADMIN — FUROSEMIDE 20 MG: 10 INJECTION, SOLUTION INTRAMUSCULAR; INTRAVENOUS at 12:12

## 2024-12-24 RX ADMIN — WARFARIN SODIUM 5 MG: 5 TABLET ORAL at 05:12

## 2024-12-24 RX ADMIN — ISOSORBIDE MONONITRATE 30 MG: 30 TABLET, EXTENDED RELEASE ORAL at 05:12

## 2024-12-24 RX ADMIN — NITROGLYCERIN 0.4 MG: 0.4 TABLET, ORALLY DISINTEGRATING SUBLINGUAL at 12:12

## 2024-12-24 RX ADMIN — OLANZAPINE 5 MG: 5 TABLET, FILM COATED ORAL at 11:12

## 2024-12-24 RX ADMIN — ACETAZOLAMIDE 500 MG: 250 TABLET ORAL at 05:12

## 2024-12-24 RX ADMIN — DAPAGLIFLOZIN 10 MG: 10 TABLET, FILM COATED ORAL at 06:12

## 2024-12-24 RX ADMIN — ATORVASTATIN CALCIUM 40 MG: 40 TABLET, FILM COATED ORAL at 05:12

## 2024-12-24 NOTE — H&P
[facility] Medicine History & Physical Examination       Patient Name: Kayla Ruiz  MRN: [id number]  Patient Class: IP- Inpatient   Admission Date: 12/24/2024   Admitting Physician: LUCÍA Service   Length of Stay: 0  Attending Physician: DR Rissa Pham  Primary Care Provider: Jennifer Chavez NP-C  Face-to-Face encounter date: 12/24/2024  Code Status: Full code  Chief Complaint: Shortness of Breath (Pt reports increase in SOB, is on 2L home O2 for COPD and CHF. Pt is currently on 4L NC in triage at 92%. Denies CP. NAD in triage.)        Screening for Social Drivers for health:  Patient screened for food insecurity, housing instability, transportation needs, utility difficulties, and interpersonal safety (select all that apply as identified as concern)  []Housing or Food  []Transportation Needs  []Utility Difficulties  []Interpersonal safety  [x]None    Patient information was obtained from patient, patient's family, past medical records and/or ER records.     HISTORY OF PRESENT ILLNESS:   Kayla Ruiz is a 69 y.o. female who PMH includes PAF, CAD, DM , HLD; anxiety, depression, LESLI; Presents to the ED at [facility] on 12/24/2024 with a primary complaint of exertional dyspnea with associated shortness a breath lower extremity swelling and generalized weakness.  Patient has been is at the bedside providing some of the history.  Patient reports she has been feeling bad over the past several days which progressed.  Has been reports he did increase her oxygen to 4 L at home secondary to her shortness a breath which provided little relief.  Chronically patient is on oxygen at 2 L per nasal cannula for her COPD.  She reports she has a upcoming sleep study.  No reports of new foods or medications.  No reports of injury, trauma, or falls.  Lab work reviewed demonstrated H&H 10 point 5.8, sodium 147, CO2 39, BUN 25.7, creatinine 0.84, glucose 165, BNP 1575.8, troponin 0.035; other  indices unremarkable.  Influenza, and SARs CoV 2 PCR not detected.  Chest x-ray impression reviewed demonstrated enlarged cardiac silhouette with vascular congestion and small effusions.  Initial vital signs /82 pulse 64 respirations 20 temperature 97.9° F O2 saturation 92% on 4 L per nasal cannula.  Patient did receive lasix in the ED, canister with her external urinary system was full at the time of examination.  Patient also received DuoNeb treatment at 325 mg of aspirin.  Patient is admitted to hospital medicine service management.    PAST MEDICAL HISTORY:     Past Medical History:   Diagnosis Date    A-fib     Anticoagulant long-term use     Depression     Diabetes mellitus     Hyperlipidemia     Sleep apnea        PAST SURGICAL HISTORY:     Past Surgical History:   Procedure Laterality Date    AORTIC VALVE REPLACEMENT      CHOLECYSTECTOMY      COLON RESECTION      fusion      LEFT HEART CATHETERIZATION Left 05/18/2023    Procedure: Left heart cath;  Surgeon: Kahlil Martin MD;  Location: [facility] CATH LAB;  Service: Cardiology;  Laterality: Left;  LHC +/- PCI    TONSILLECTOMY         ALLERGIES:   Ace inhibitors, Naproxen sodium, Nifedipine, and Venlafaxine    FAMILY HISTORY:   Reviewed and negative    SOCIAL HISTORY:     Social History     Tobacco Use    Smoking status: Every Day     Current packs/day: 1.00     Average packs/day: 1 pack/day for 50.0 years (50.0 ttl pk-yrs)     Types: Cigarettes    Smokeless tobacco: Never   Substance Use Topics    Alcohol use: Never        HOME MEDICATIONS:   As documented  Prior to Admission medications    Medication Sig Start Date End Date Taking? Authorizing Provider   albuterol-ipratropium (DUO-NEB) 2.5 mg-0.5 mg/3 mL nebulizer solution Take 3 mLs by nebulization every 4 (four) hours as needed for Wheezing or Shortness of Breath. 10/16/24   Provider, Historical   aspirin (ECOTRIN) 81 MG EC tablet Take 81 mg by mouth once daily.    Provider, Historical   atorvastatin  (LIPITOR) 40 MG tablet Take 40 mg by mouth once daily.    Provider, Historical   clonazePAM (KLONOPIN) 1 MG tablet Take 0.5 mg by mouth 3 (three) times daily as needed for Anxiety. 10/1/24   Provider, Historical   dapagliflozin propanediol (FARXIGA) 10 mg tablet Take 1 tablet (10 mg total) by mouth once daily. 10/28/24   Diana Gudino, DONAVON   ergocalciferol (ERGOCALCIFEROL) 50,000 unit Cap Take 1 capsule (50,000 Units total) by mouth every 7 days. 10/31/24 1/29/25  Hilary Rojas DO   FLUoxetine 40 MG capsule Take 40 mg by mouth once daily.    Provider, Historical   furosemide (LASIX) 20 MG tablet Take 1 tablet (20 mg total) by mouth once daily. 10/28/24 11/27/24  Hilary Rojas DO   gabapentin (NEURONTIN) 100 MG capsule Take 100 mg by mouth 3 (three) times daily. 9/26/24   Provider, Historical   guaiFENesin (MUCINEX) 600 mg 12 hr tablet Take 1 tablet (600 mg total) by mouth 2 (two) times daily. 10/28/24 12/27/24  Hilary Rojas DO   isosorbide mononitrate (IMDUR) 30 MG 24 hr tablet Take 1 tablet (30 mg total) by mouth once daily. 10/28/24 12/27/24  Hilary Rojas DO   metoprolol succinate (TOPROL-XL) 25 MG 24 hr tablet Take 0.5 tablets (12.5 mg total) by mouth 2 (two) times daily. 10/28/24 10/28/25  Hilary Rojas DO   MOUNJARO 15 mg/0.5 mL PnIj Inject 15 mg into the skin every 7 days. 10/8/24   Provider, Historical   nicotine (NICODERM CQ) 14 mg/24 hr Place 1 patch onto the skin once daily. 10/28/24 1/26/25  Hilary Rojas DO   OLANZapine (ZYPREXA) 5 MG tablet Take 5 mg by mouth every evening. 10/2/24   Provider, Historical   pioglitazone (ACTOS) 45 MG tablet Take 45 mg by mouth once daily. 10/16/24   Provider, Historical   traZODone (DESYREL) 50 MG tablet Take 25-50 mg by mouth nightly as needed.    Provider, Historical   warfarin (COUMADIN) 5 MG tablet Take 5 mg by mouth once daily. 1 TAB DAILY EXCEPT TUE 1 1/2 TAB    Provider, Historical       REVIEW OF SYSTEMS:   Except as documented, all other systems reviewed and  negative     PHYSICAL EXAM:     VITAL SIGNS: 24 HRS MIN & MAX LAST   Temp  Min: 97.9 °F (36.6 °C)  Max: 97.9 °F (36.6 °C) 97.9 °F (36.6 °C)   BP  Min: 173/73  Max: 212/71 (!) 173/73   Pulse  Min: 57  Max: 118  (!) 118   Resp  Min: 18  Max: 22 20   SpO2  Min: 92 %  Max: 100 % (!) 94 %       General appearance: Well-developed, well-nourished elderly female  chronically in no apparent distress.  Has been at the bedside  HENT: Atraumatic head. Moist mucous membranes of oral cavity.  Eyes: PERRL  Lungs: diminished bilaterally, coarse breath sounds, mildly labored respirations fine scattered wheezing O2 per nasal cannula in use.   Heart: Regular rate and rhythm. S1 and S2 present cap refill brisk, systolic murmur  Abdomen: Soft, non-distended, non-tender. No rebound tenderness/guarding. Bowel sounds are normal.   Extremities: No cyanosis, clubbing, or edema.  Skin: warm and dry, 3+ pitting edema to BLE  Neuro: Motor and sensory exams grossly intact. Good tone. Muscle strength 5/5 in all 4 extremities  Psych/mental status: Appropriate mood and affect. Responds appropriately to questions.     LABS AND IMAGING:     Recent Labs   Lab 12/20/24  1046 12/24/24  1121   WBC 7.65 9.07   RBC 3.84* 3.82*   HGB 10.6* 10.6*   HCT 36.7* 35.8*   MCV 95.6* 93.7   MCH 27.6 27.7   MCHC 28.9* 29.6*   RDW 16.8 17.0    269   MPV 10.6* 10.4       Recent Labs   Lab 12/20/24  1031 12/24/24  1121   NA  --  147*   K  --  3.7   CL  --  98   CO2  --  39*   BUN  --  25.7*   CREATININE  --  0.84   CALCIUM  --  9.2   PH 7.365  --    ALBUMIN  --  3.6   ALKPHOS  --  108   ALT  --  29   AST  --  29   BILITOT  --  0.9       Microbiology Results (last 7 days)       ** No results found for the last 168 hours. **             X-Ray Chest PA And Lateral  Narrative: EXAMINATION:  XR CHEST PA AND LATERAL    CLINICAL HISTORY:  Shortness of breath    TECHNIQUE:  Two views of the chest    COMPARISON:  10/23/2024    FINDINGS:  LINES AND TUBES:  None    MEDIASTINUM AND LUDY: Cardiac silhouette is enlarged. There is an aortic valve prosthesis.  Aortic atherosclerosis.    LUNGS: Mild vascular congestion without overt alveolar edema.    PLEURA:Trace pleural effusions.No pneumothorax.    BONES: The bones are demineralized.  Post treatment change of vertebral cement augmentation at and ACDF.  Impression: Enlarged cardiac silhouette with vascular congestion and small effusions.    Electronically signed by: Jennifer Braun  Date:    12/24/2024  Time:    11:10        ASSESSMENT & PLAN:   ASSESSMENT:  Acute respiratory failure with hypoxia requiring increase in oxygen therapy-POA   Acute CHF with exacerbation and elevated BNP-POA   Exertional dyspnea with shortness a breath-POA   Hypernatremia-POA   DM type 2 with hyperglycemia-POA   Anemia-chronic disease stable H&H-POA     PLAN:  Continue with diuresing   Accurate I&O   Daily weights   Fall precautions   Accu-Cheks with sliding scale coverage   Patient has began diuresing  Resume home medication as deemed necessary   Repeat lab work in a.m.   Patient reports she has a sleep study scheduled for this weekend through her pulmonologist Dr. Dow.      VTE Prophylaxis:  for DVT prophylaxis and will be advised to be as mobile as possible and sit in a chair as tolerated    Patient condition:  Stable    __________________________________________________________________________  INPATIENT LIST OF MEDICATIONS     Scheduled Meds:   atorvastatin  40 mg Oral Daily    dapagliflozin propanediol  10 mg Oral Daily    isosorbide mononitrate  30 mg Oral Daily    OLANZapine  5 mg Oral QHS    pioglitazone  45 mg Oral Daily    warfarin  5 mg Oral Daily     Continuous Infusions:  PRN Meds:.  Current Facility-Administered Medications:     acetaminophen, 1,000 mg, Oral, Q6H PRN    acetaminophen, 650 mg, Oral, Q4H PRN    dextrose 50%, 12.5 g, Intravenous, PRN    dextrose 50%, 25 g, Intravenous, PRN    glucagon (human recombinant), 1  mg, Intramuscular, PRN    glucose, 16 g, Oral, PRN    glucose, 24 g, Oral, PRN    insulin aspart U-100, 0-5 Units, Subcutaneous, QID (AC + HS) PRN    naloxone, 0.02 mg, Intravenous, PRN    ondansetron, 4 mg, Intravenous, Q4H PRN    prochlorperazine, 5 mg, Intravenous, Q6H PRN    sodium chloride 0.9%, 10 mL, Intravenous, PRN    traZODone, 50 mg, Oral, Nightly PRN      IAdriana FNP have reviewed and discussed the case with   Dr. Jorge Pham.  Please see the following addendum for further assessment and plan from their attending MD.  DONAVON Bernabe   12/24/2024    ________________________________________________________________________________    MD Addendum:  Dr. MIHIA ---assumed care of this patient today at ---am/pm  For the patient encounter, I performed the substantive portion of the visit, I reviewed the NP/PA documentation, treatment plan, and medical decision making.  I had face to face time with this patient     A. History:    B. Physical exam:    C. Medical decision making:    Discharge Planning and Disposition: No mobility needs. Ambulating well. Good social support system.   Anticipated discharge    All diagnosis and differential diagnosis have been reviewed; assessment and plan has been documented; I have personally reviewed the labs and test results that are presently available; I have reviewed the patients medication list; I have reviewed the consulting providers response and recommendations. I have reviewed or attempted to review medical records based upon their availability.    All of the patient and family questions have been addressed and answered. Patient's is agreeable to the above stated plan. I will continue to monitor closely and make adjustments to medical management as needed.

## 2024-12-24 NOTE — FIRST PROVIDER EVALUATION
Medical screening examination initiated.  I have conducted a focused provider triage encounter, findings are as follows:    Brief history of present illness:  69 year old female presents to Er with c/o SOB x 3-4 weeks, worse over the past 3 days. Denies Chest pain . Hx of CHF and COPD. Patient on home oxygen. Reports compliance with lasix at home.     There were no vitals filed for this visit.    Pertinent physical exam:  Awake and alert, NAD    Brief workup plan:  labs, EKG, cxr    Preliminary workup initiated; this workup will be continued and followed by the physician or advanced practice provider that is assigned to the patient when roomed.

## 2024-12-24 NOTE — ED PROVIDER NOTES
Encounter Date: 12/24/2024    SCRIBE #1 NOTE: IRoderick Foster III, MD, am scribing for, and in the presence of,  Manuel Montejo. I have scribed the following portions of the note - the EKG reading. Other sections scribed: HPI, ROS, PE.       History     Chief Complaint   Patient presents with    Shortness of Breath     Pt reports increase in SOB, is on 2L home O2 for COPD and CHF. Pt is currently on 4L NC in triage at 92%. Denies CP. NAD in triage.     Patient is a 69 y.o. female with a PMHx of a-fib, DM, HLD, and depression presenting to the ED with complaints of SOB that worsened over the last 2 days. Patient reports she has been SOB for a few months now, but it worsened significantly over the last couple days with associated leg swelling. She denies any fever, chills, cough, wheezing, chest pain, headache, n/v/d, and abdominal pain. Patient has been on home oxygen for the last 4-5 weeks. Patient did not take her medications this morning due to coming to the ED but is typically compliant. Patient is a former smoker.     The history is provided by the patient. No  was used.     Review of patient's allergies indicates:   Allergen Reactions    Ace inhibitors     Naproxen sodium     Nifedipine     Venlafaxine      Past Medical History:   Diagnosis Date    A-fib     Anticoagulant long-term use     Depression     Diabetes mellitus     Hyperlipidemia     Sleep apnea      Past Surgical History:   Procedure Laterality Date    AORTIC VALVE REPLACEMENT      CHOLECYSTECTOMY      COLON RESECTION      fusion      LEFT HEART CATHETERIZATION Left 05/18/2023    Procedure: Left heart cath;  Surgeon: Kahlil Martin MD;  Location: [facility] CATH LAB;  Service: Cardiology;  Laterality: Left;  LHC +/- PCI    TONSILLECTOMY       Family History   Problem Relation Name Age of Onset    COPD Mother      Hypertension Mother      Heart disease Father       Social History     Tobacco Use    Smoking status: Every Day      Current packs/day: 1.00     Average packs/day: 1 pack/day for 50.0 years (50.0 ttl pk-yrs)     Types: Cigarettes    Smokeless tobacco: Never   Substance Use Topics    Alcohol use: Never    Drug use: Never     Review of Systems   Constitutional:  Negative for fatigue, fever and unexpected weight change.   HENT:  Negative for congestion and rhinorrhea.    Eyes:  Negative for pain.   Respiratory:  Positive for shortness of breath. Negative for chest tightness and wheezing.    Cardiovascular:  Positive for leg swelling. Negative for chest pain.   Gastrointestinal:  Negative for abdominal pain, constipation, diarrhea, nausea and vomiting.   Genitourinary:  Negative for dysuria.   Musculoskeletal:  Negative for back pain and neck pain.   Skin:  Negative for rash.   Allergic/Immunologic: Negative for environmental allergies, food allergies and immunocompromised state.   Neurological:  Negative for dizziness and speech difficulty.   Hematological:  Does not bruise/bleed easily.   Psychiatric/Behavioral:  Negative for sleep disturbance and suicidal ideas.        Physical Exam     Initial Vitals [12/24/24 1047]   BP Pulse Resp Temp SpO2   (!) 185/82 64 20 97.9 °F (36.6 °C) (!) 92 %      MAP       --         Physical Exam    Nursing note and vitals reviewed.  Constitutional: She appears distressed (mild).   HENT:   Head: Normocephalic and atraumatic.   Neck: Trachea normal.   Cardiovascular:  Normal rate and regular rhythm.           No murmur heard.  Pulmonary/Chest: Tachypnea (mild) noted. She is in respiratory distress (mild). She has rales (bases).   Abdominal: Abdomen is soft. Bowel sounds are normal. She exhibits no distension. There is no abdominal tenderness.   Musculoskeletal:         General: Normal range of motion.      Lumbar back: Normal range of motion.      Right lower leg: 3+ Edema present.      Left lower leg: 3+ Edema present.     Neurological: She is alert. She has normal strength. No cranial nerve deficit.    Skin: Skin is warm and dry. No rash noted.   Psychiatric: She has a normal mood and affect. Judgment normal.         ED Course   Critical Care    Date/Time: 12/24/2024 12:52 PM    Performed by: Luis Alberto Vaughn III, MD  Authorized by: Luis Alberto Vaughn III, MD  Direct patient critical care time: 25 minutes  Documentation critical care time: 5 minutes  Consulting other physicians critical care time: 5 minutes  Total critical care time (exclusive of procedural time) : 35 minutes  Critical care was necessary to treat or prevent imminent or life-threatening deterioration of the following conditions: cardiac failure.  Critical care was time spent personally by me on the following activities: discussions with primary provider, discussions with consultants, examination of patient, re-evaluation of patient's condition, review of old charts, ordering and review of laboratory studies and ordering and performing treatments and interventions.        Labs Reviewed   B-TYPE NATRIURETIC PEPTIDE - Abnormal       Result Value    Natriuretic Peptide 1,575.8 (*)    COMPREHENSIVE METABOLIC PANEL - Abnormal    Sodium 147 (*)     Potassium 3.7      Chloride 98      CO2 39 (*)     Glucose 165 (*)     Blood Urea Nitrogen 25.7 (*)     Creatinine 0.84      Calcium 9.2      Protein Total 7.3      Albumin 3.6      Globulin 3.7 (*)     Albumin/Globulin Ratio 1.0 (*)     Bilirubin Total 0.9            ALT 29      AST 29      eGFR >60      Anion Gap 10.0      BUN/Creatinine Ratio 31     CBC WITH DIFFERENTIAL - Abnormal    WBC 9.07      RBC 3.82 (*)     Hgb 10.6 (*)     Hct 35.8 (*)     MCV 93.7      MCH 27.7      MCHC 29.6 (*)     RDW 17.0      Platelet 269      MPV 10.4      Neut % 82.8      Lymph % 10.3      Mono % 5.5      Eos % 0.7      Basophil % 0.3      Lymph # 0.93      Neut # 7.51      Mono # 0.50      Eos # 0.06      Baso # 0.03      IG# 0.04      IG% 0.4      NRBC% 0.0     TROPONIN I - Normal    Troponin-I 0.035     COVID/FLU  A&B PCR - Normal    Influenza A PCR Not Detected      Influenza B PCR Not Detected      SARS-CoV-2 PCR Not Detected      Narrative:     The Xpert Xpress SARS-CoV-2/FLU/RSV plus is a rapid, multiplexed real-time PCR test intended for the simultaneous qualitative detection and differentiation of SARS-CoV-2, Influenza A, Influenza B, and respiratory syncytial virus (RSV) viral RNA in either nasopharyngeal swab or nasal swab specimens.         CBC W/ AUTO DIFFERENTIAL    Narrative:     The following orders were created for panel order CBC auto differential.  Procedure                               Abnormality         Status                     ---------                               -----------         ------                     CBC with Differential[[id number]]       Abnormal            Final result                 Please view results for these tests on the individual orders.   URINALYSIS, REFLEX TO URINE CULTURE     EKG Readings: (Independently Interpreted)   Initial Reading: No STEMI. Rhythm: Normal Sinus Rhythm. Heart Rate: 63. Ectopy: No Ectopy. Conduction: Normal. ST Segments: Normal ST Segments. T Waves: Normal. Axis: Normal. Clinical Impression: Normal Sinus Rhythm   Done at 10:50.       Imaging Results              X-Ray Chest PA And Lateral (Final result)  Result time 12/24/24 11:10:30      Final result by Jennifer Braun MD (12/24/24 11:10:30)                   Impression:      Enlarged cardiac silhouette with vascular congestion and small effusions.      Electronically signed by: Jennifer Braun  Date:    12/24/2024  Time:    11:10               Narrative:    EXAMINATION:  XR CHEST PA AND LATERAL    CLINICAL HISTORY:  Shortness of breath    TECHNIQUE:  Two views of the chest    COMPARISON:  10/23/2024    FINDINGS:  LINES AND TUBES: None    MEDIASTINUM AND LUDY: Cardiac silhouette is enlarged. There is an aortic valve prosthesis.  Aortic atherosclerosis.    LUNGS: Mild vascular congestion without overt  alveolar edema.    PLEURA:Trace pleural effusions.No pneumothorax.    BONES: The bones are demineralized.  Post treatment change of vertebral cement augmentation at and ACDF.                                       Medications   nitroGLYCERIN SL tablet 0.4 mg (0.4 mg Sublingual Given 12/24/24 1210)   albuterol-ipratropium 2.5 mg-0.5 mg/3 mL nebulizer solution 3 mL (3 mLs Nebulization Given 12/24/24 1210)   aspirin EC tablet 325 mg (325 mg Oral Given 12/24/24 1210)   furosemide injection 20 mg (20 mg Intravenous Given 12/24/24 1210)     Medical Decision Making  Differential diagnosis includes but is not limited to anemia, COPD exacerbation, pneumonia, flu, and covid.    Patient CBC without abnormality patient with a elevated BNP consistent with CHF exacerbation prior to lab results clinically patient was given aspirin nitro and Lasix patient with good response will admit to hospital medicine and continue diuresis    Problems Addressed:  Congestive heart failure, unspecified HF chronicity, unspecified heart failure type: complicated acute illness or injury that poses a threat to life or bodily functions  Shortness of breath: acute illness or injury    Amount and/or Complexity of Data Reviewed  Labs: ordered.  Radiology: ordered.  ECG/medicine tests: ordered and independent interpretation performed.     Details: No STEMI. Rhythm: Normal Sinus Rhythm. Heart Rate: 63. Ectopy: No Ectopy. Conduction: Normal. ST Segments: Normal ST Segments. T Waves: Normal. Axis: Normal. Clinical Impression: Normal Sinus Rhythm   Done at 10:50.    Risk  OTC drugs.  Prescription drug management.  Decision regarding hospitalization.            Scribe Attestation:   Scribe #1: I performed the above scribed service and the documentation accurately describes the services I performed. I attest to the accuracy of the note.    Attending Attestation:           Physician Attestation for Scribe:  Physician Attestation Statement for Scribe #1: I,  Luis Alberto Vaughn III, MD, reviewed documentation, as scribed by Manuel Montejo in my presence, and it is both accurate and complete.                                    Clinical Impression:  Final diagnoses:  [R06.02] Shortness of breath  [I50.9] Congestive heart failure, unspecified HF chronicity, unspecified heart failure type (Primary)          ED Disposition Condition    Admit Stable                Luis Alberto Vaughn III, MD  12/24/24 5282

## 2024-12-25 LAB
ALBUMIN SERPL-MCNC: 3 G/DL (ref 3.4–4.8)
ALBUMIN/GLOB SERPL: 0.9 RATIO (ref 1.1–2)
ALP SERPL-CCNC: 100 UNIT/L (ref 40–150)
ALT SERPL-CCNC: 22 UNIT/L (ref 0–55)
ANION GAP SERPL CALC-SCNC: 7 MEQ/L
AST SERPL-CCNC: 24 UNIT/L (ref 5–34)
BASOPHILS # BLD AUTO: 0.03 X10(3)/MCL
BASOPHILS NFR BLD AUTO: 0.4 %
BILIRUB SERPL-MCNC: 0.7 MG/DL
BUN SERPL-MCNC: 21.9 MG/DL (ref 9.8–20.1)
CALCIUM SERPL-MCNC: 8.5 MG/DL (ref 8.4–10.2)
CHLORIDE SERPL-SCNC: 102 MMOL/L (ref 98–107)
CO2 SERPL-SCNC: 34 MMOL/L (ref 23–31)
CREAT SERPL-MCNC: 0.86 MG/DL (ref 0.55–1.02)
CREAT/UREA NIT SERPL: 25
EOSINOPHIL # BLD AUTO: 0.11 X10(3)/MCL (ref 0–0.9)
EOSINOPHIL NFR BLD AUTO: 1.5 %
ERYTHROCYTE [DISTWIDTH] IN BLOOD BY AUTOMATED COUNT: 17.1 % (ref 11.5–17)
GFR SERPLBLD CREATININE-BSD FMLA CKD-EPI: >60 ML/MIN/1.73/M2
GLOBULIN SER-MCNC: 3.2 GM/DL (ref 2.4–3.5)
GLUCOSE SERPL-MCNC: 151 MG/DL (ref 82–115)
HCT VFR BLD AUTO: 34.5 % (ref 37–47)
HGB BLD-MCNC: 10.2 G/DL (ref 12–16)
IMM GRANULOCYTES # BLD AUTO: 0.02 X10(3)/MCL (ref 0–0.04)
IMM GRANULOCYTES NFR BLD AUTO: 0.3 %
INR PPP: 3.4
LYMPHOCYTES # BLD AUTO: 1.33 X10(3)/MCL (ref 0.6–4.6)
LYMPHOCYTES NFR BLD AUTO: 17.8 %
MAGNESIUM SERPL-MCNC: 2.5 MG/DL (ref 1.6–2.6)
MCH RBC QN AUTO: 27.8 PG (ref 27–31)
MCHC RBC AUTO-ENTMCNC: 29.6 G/DL (ref 33–36)
MCV RBC AUTO: 94 FL (ref 80–94)
MONOCYTES # BLD AUTO: 0.55 X10(3)/MCL (ref 0.1–1.3)
MONOCYTES NFR BLD AUTO: 7.4 %
NEUTROPHILS # BLD AUTO: 5.44 X10(3)/MCL (ref 2.1–9.2)
NEUTROPHILS NFR BLD AUTO: 72.6 %
NRBC BLD AUTO-RTO: 0 %
PHOSPHATE SERPL-MCNC: 3.6 MG/DL (ref 2.3–4.7)
PLATELET # BLD AUTO: 253 X10(3)/MCL (ref 130–400)
PMV BLD AUTO: 10.2 FL (ref 7.4–10.4)
POCT GLUCOSE: 143 MG/DL (ref 70–110)
POCT GLUCOSE: 144 MG/DL (ref 70–110)
POCT GLUCOSE: 167 MG/DL (ref 70–110)
POTASSIUM SERPL-SCNC: 3.3 MMOL/L (ref 3.5–5.1)
PROT SERPL-MCNC: 6.2 GM/DL (ref 5.8–7.6)
PROTHROMBIN TIME: 34.8 SECONDS (ref 12.5–14.5)
RBC # BLD AUTO: 3.67 X10(6)/MCL (ref 4.2–5.4)
SODIUM SERPL-SCNC: 143 MMOL/L (ref 136–145)
T4 FREE SERPL-MCNC: 1.07 NG/DL (ref 0.7–1.48)
TSH SERPL-ACNC: 0.55 UIU/ML (ref 0.35–4.94)
WBC # BLD AUTO: 7.48 X10(3)/MCL (ref 4.5–11.5)

## 2024-12-25 PROCEDURE — 27000221 HC OXYGEN, UP TO 24 HOURS

## 2024-12-25 PROCEDURE — 83735 ASSAY OF MAGNESIUM: CPT | Performed by: INTERNAL MEDICINE

## 2024-12-25 PROCEDURE — 99900031 HC PATIENT EDUCATION (STAT)

## 2024-12-25 PROCEDURE — 94760 N-INVAS EAR/PLS OXIMETRY 1: CPT

## 2024-12-25 PROCEDURE — 84439 ASSAY OF FREE THYROXINE: CPT | Performed by: INTERNAL MEDICINE

## 2024-12-25 PROCEDURE — 85025 COMPLETE CBC W/AUTO DIFF WBC: CPT | Performed by: NURSE PRACTITIONER

## 2024-12-25 PROCEDURE — 84100 ASSAY OF PHOSPHORUS: CPT | Performed by: INTERNAL MEDICINE

## 2024-12-25 PROCEDURE — 25000003 PHARM REV CODE 250: Performed by: NURSE PRACTITIONER

## 2024-12-25 PROCEDURE — 84443 ASSAY THYROID STIM HORMONE: CPT | Performed by: INTERNAL MEDICINE

## 2024-12-25 PROCEDURE — 80053 COMPREHEN METABOLIC PANEL: CPT | Performed by: NURSE PRACTITIONER

## 2024-12-25 PROCEDURE — 25000003 PHARM REV CODE 250: Performed by: INTERNAL MEDICINE

## 2024-12-25 PROCEDURE — 63600175 PHARM REV CODE 636 W HCPCS: Performed by: INTERNAL MEDICINE

## 2024-12-25 PROCEDURE — 36415 COLL VENOUS BLD VENIPUNCTURE: CPT | Performed by: INTERNAL MEDICINE

## 2024-12-25 PROCEDURE — 85610 PROTHROMBIN TIME: CPT | Performed by: INTERNAL MEDICINE

## 2024-12-25 PROCEDURE — 21400001 HC TELEMETRY ROOM

## 2024-12-25 PROCEDURE — 99900035 HC TECH TIME PER 15 MIN (STAT)

## 2024-12-25 RX ORDER — FUROSEMIDE 10 MG/ML
40 INJECTION INTRAMUSCULAR; INTRAVENOUS 2 TIMES DAILY
Status: DISCONTINUED | OUTPATIENT
Start: 2024-12-25 | End: 2024-12-27

## 2024-12-25 RX ORDER — WARFARIN SODIUM 5 MG/1
5 TABLET ORAL DAILY
Status: DISCONTINUED | OUTPATIENT
Start: 2024-12-26 | End: 2024-12-27 | Stop reason: HOSPADM

## 2024-12-25 RX ORDER — POTASSIUM CHLORIDE 20 MEQ/1
40 TABLET, EXTENDED RELEASE ORAL
Status: COMPLETED | OUTPATIENT
Start: 2024-12-25 | End: 2024-12-25

## 2024-12-25 RX ORDER — WARFARIN 2 MG/1
4 TABLET ORAL DAILY
Status: DISCONTINUED | OUTPATIENT
Start: 2024-12-26 | End: 2024-12-25

## 2024-12-25 RX ADMIN — POTASSIUM CHLORIDE 40 MEQ: 1500 TABLET, EXTENDED RELEASE ORAL at 04:12

## 2024-12-25 RX ADMIN — ACETAZOLAMIDE 500 MG: 250 TABLET ORAL at 09:12

## 2024-12-25 RX ADMIN — FUROSEMIDE 40 MG: 10 INJECTION, SOLUTION INTRAMUSCULAR; INTRAVENOUS at 09:12

## 2024-12-25 RX ADMIN — OLANZAPINE 5 MG: 5 TABLET, FILM COATED ORAL at 08:12

## 2024-12-25 RX ADMIN — POTASSIUM CHLORIDE 40 MEQ: 1500 TABLET, EXTENDED RELEASE ORAL at 09:12

## 2024-12-25 RX ADMIN — FUROSEMIDE 40 MG: 10 INJECTION, SOLUTION INTRAMUSCULAR; INTRAVENOUS at 04:12

## 2024-12-25 RX ADMIN — ATORVASTATIN CALCIUM 40 MG: 40 TABLET, FILM COATED ORAL at 09:12

## 2024-12-25 RX ADMIN — ISOSORBIDE MONONITRATE 30 MG: 30 TABLET, EXTENDED RELEASE ORAL at 09:12

## 2024-12-25 RX ADMIN — DAPAGLIFLOZIN 10 MG: 10 TABLET, FILM COATED ORAL at 09:12

## 2024-12-25 NOTE — PROGRESS NOTES
[facility] [address] MEDICINE ~ PROGRESS NOTE        CHIEF COMPLAINT   Hospital follow up    HOSPITAL COURSE   Kayla Ruiz is a 69 y.o. female who PMH includes PAF, CAD, DM , HLD; anxiety, depression, LESLI; Presents to the ED at [facility] on 12/24/2024 with a primary complaint of exertional dyspnea with associated shortness a breath lower extremity swelling and generalized weakness.  Patient has been is at the bedside providing some of the history.  Patient reports she has been feeling bad over the past several days which progressed.  Has been reports he did increase her oxygen to 4 L at home secondary to her shortness a breath which provided little relief.  Chronically patient is on oxygen at 2 L per nasal cannula for her COPD.  She reports she has a upcoming sleep study.  No reports of new foods or medications.  No reports of injury, trauma, or falls.  Lab work reviewed demonstrated H&H 10 point 5.8, sodium 147, CO2 39, BUN 25.7, creatinine 0.84, glucose 165, BNP 1575.8, troponin 0.035; other indices unremarkable.  Influenza, and SARs CoV 2 PCR not detected.  Chest x-ray impression reviewed demonstrated enlarged cardiac silhouette with vascular congestion and small effusions.  Initial vital signs /82 pulse 64 respirations 20 temperature 97.9° F O2 saturation 92% on 4 L per nasal cannula.  Patient did receive lasix in the ED, canister with her external urinary system was full at the time of examination.  Patient also received DuoNeb treatment at 325 mg of aspirin.  Patient is admitted to hospital medicine service management.    Today  Seen examined this morning.  Looks better today more comfortable today family member at the bedside.  Still with evidence of fluid overload.        OBJECTIVE/PHYSICAL EXAM     VITAL SIGNS (MOST RECENT):  Temp: 97.5 °F (36.4 °C) (12/25/24 0717)  Pulse: (!) 59 (12/25/24 0717)  Resp: 18 (12/25/24 0717)  BP: 129/83 (12/25/24 0410)  SpO2: (!)  89 % (12/25/24 0717) VITAL SIGNS (24 HOUR RANGE):  Temp:  [97.2 °F (36.2 °C)-97.9 °F (36.6 °C)] 97.5 °F (36.4 °C)  Pulse:  [] 59  Resp:  [16-22] 18  SpO2:  [89 %-100 %] 89 %  BP: (129-212)/(67-92) 129/83   GENERAL: In no acute distress, afebrile  HEENT:  JVD  CHEST:  Crackles bibasilar  HEART: S1, S2, no appreciable murmur  ABDOMEN: Soft, nontender, BS +  MSK: Warm, 1+ lower extremity edema, no clubbing or cyanosis  NEUROLOGIC: Alert and oriented x4, moving all extremities with good strength   INTEGUMENTARY:  Skin wrinkling present to the legs now  PSYCHIATRY:        ASSESSMENT/PLAN   Acute on chronic HFpEF-decompensated   Acute on chronic hypoxemic respiratory failure    History of: As listed above      Continue IV Lasix 40 mg b.i.d. with dose of acetazolamide once daily 500 mg.  Monitor urine output, daily standing weights.  Reviewed her CT scan 4 days ago which showed bilateral pleural effusion and what looks to me as pulmonary edema with interstitial congestion.  Needs aggressive fluid removal and she maybe able to discontinue her oxygen which was recently prescribed to her on last discharge.  Also needs to be taken off of her home pioglitazone as this has a big side effect of fluid retention.  Continue Coumadin, monitor daily INR with goal of 2.5 to 3.5.    DVT prophylaxis:  Coumadin    Critical care diagnosis:  Decompensated heart failure with pulmonary edema  Critical care time spent:  35 minutes      Anticipated discharge and disposition:   __________________________________________________________________________    NUTRITIONAL STATUS     Patient meets ASPEN criteria for   malnutrition of   per RD assessment as evidenced by:                       A minimum of two characteristics is recommended for diagnosis of either severe or non-severe malnutrition.     LABS/MICRO/MEDS/DIAGNOSTICS       LABS  Recent Labs     12/25/24  0648      K 3.3*   CO2 34*   BUN 21.9*   CREATININE 0.86   GLUCOSE 151*    CALCIUM 8.5   ALKPHOS 100   AST 24   ALT 22   ALBUMIN 3.0*     Recent Labs     12/25/24  0648   WBC 7.48   RBC 3.67*   HCT 34.5*   MCV 94.0          MICROBIOLOGY  Microbiology Results (last 7 days)       ** No results found for the last 168 hours. **               MEDICATIONS   acetaZOLAMIDE  500 mg Oral Daily    atorvastatin  40 mg Oral Daily    dapagliflozin propanediol  10 mg Oral Daily    furosemide (LASIX) injection  40 mg Intravenous Daily    isosorbide mononitrate  30 mg Oral Daily    OLANZapine  5 mg Oral QHS    warfarin  5 mg Oral Daily         INFUSIONS         DIAGNOSTIC TESTS  X-Ray Chest PA And Lateral   Final Result      Enlarged cardiac silhouette with vascular congestion and small effusions.         Electronically signed by: Jennifer Braun   Date:    12/24/2024   Time:    11:10           No echocardiogram results found for the past 14 days.         Case related differential diagnoses have been reviewed; assessment and plan has been documented. I have personally reviewed the labs and test results that are currently available; I have reviewed the patients medication list. I have reviewed the consulting providers recommendations. I have reviewed or attempted to review medical records based upon their availability.  All of the patient's and/or family's questions have been addressed and answered to the best of my ability.  I will continue to monitor closely and make adjustments to medical management as needed.  This document was created using M*Modal Fluency Direct.  Transcription errors may have been made.  Please contact me if any questions may rise regarding documentation to clarify transcription.        Jorge Pham MD   Internal Medicine  Department of Hospital Medicine [facility] Tele[facility]

## 2024-12-26 LAB
ANION GAP SERPL CALC-SCNC: 9 MEQ/L
BASOPHILS # BLD AUTO: 0.02 X10(3)/MCL
BASOPHILS NFR BLD AUTO: 0.3 %
BUN SERPL-MCNC: 28.3 MG/DL (ref 9.8–20.1)
CALCIUM SERPL-MCNC: 8.4 MG/DL (ref 8.4–10.2)
CHLORIDE SERPL-SCNC: 103 MMOL/L (ref 98–107)
CO2 SERPL-SCNC: 31 MMOL/L (ref 23–31)
CREAT SERPL-MCNC: 0.95 MG/DL (ref 0.55–1.02)
CREAT/UREA NIT SERPL: 30
EOSINOPHIL # BLD AUTO: 0.17 X10(3)/MCL (ref 0–0.9)
EOSINOPHIL NFR BLD AUTO: 2.5 %
ERYTHROCYTE [DISTWIDTH] IN BLOOD BY AUTOMATED COUNT: 17.2 % (ref 11.5–17)
GFR SERPLBLD CREATININE-BSD FMLA CKD-EPI: >60 ML/MIN/1.73/M2
GLUCOSE SERPL-MCNC: 151 MG/DL (ref 82–115)
HCT VFR BLD AUTO: 35.1 % (ref 37–47)
HGB BLD-MCNC: 10.2 G/DL (ref 12–16)
IMM GRANULOCYTES # BLD AUTO: 0.02 X10(3)/MCL (ref 0–0.04)
IMM GRANULOCYTES NFR BLD AUTO: 0.3 %
INR PPP: 2.5
LYMPHOCYTES # BLD AUTO: 1.47 X10(3)/MCL (ref 0.6–4.6)
LYMPHOCYTES NFR BLD AUTO: 21.2 %
MAGNESIUM SERPL-MCNC: 2.3 MG/DL (ref 1.6–2.6)
MCH RBC QN AUTO: 27.6 PG (ref 27–31)
MCHC RBC AUTO-ENTMCNC: 29.1 G/DL (ref 33–36)
MCV RBC AUTO: 95.1 FL (ref 80–94)
MONOCYTES # BLD AUTO: 0.61 X10(3)/MCL (ref 0.1–1.3)
MONOCYTES NFR BLD AUTO: 8.8 %
NEUTROPHILS # BLD AUTO: 4.64 X10(3)/MCL (ref 2.1–9.2)
NEUTROPHILS NFR BLD AUTO: 66.9 %
NRBC BLD AUTO-RTO: 0.3 %
PHOSPHATE SERPL-MCNC: 3.8 MG/DL (ref 2.3–4.7)
PLATELET # BLD AUTO: 254 X10(3)/MCL (ref 130–400)
PMV BLD AUTO: 10.3 FL (ref 7.4–10.4)
POCT GLUCOSE: 132 MG/DL (ref 70–110)
POCT GLUCOSE: 149 MG/DL (ref 70–110)
POCT GLUCOSE: 224 MG/DL (ref 70–110)
POTASSIUM SERPL-SCNC: 3.9 MMOL/L (ref 3.5–5.1)
PROTHROMBIN TIME: 26.9 SECONDS (ref 12.5–14.5)
RBC # BLD AUTO: 3.69 X10(6)/MCL (ref 4.2–5.4)
SODIUM SERPL-SCNC: 143 MMOL/L (ref 136–145)
WBC # BLD AUTO: 6.93 X10(3)/MCL (ref 4.5–11.5)

## 2024-12-26 PROCEDURE — 25000003 PHARM REV CODE 250: Performed by: INTERNAL MEDICINE

## 2024-12-26 PROCEDURE — 80048 BASIC METABOLIC PNL TOTAL CA: CPT | Performed by: INTERNAL MEDICINE

## 2024-12-26 PROCEDURE — 36415 COLL VENOUS BLD VENIPUNCTURE: CPT | Performed by: INTERNAL MEDICINE

## 2024-12-26 PROCEDURE — 27000221 HC OXYGEN, UP TO 24 HOURS

## 2024-12-26 PROCEDURE — 63600175 PHARM REV CODE 636 W HCPCS: Performed by: INTERNAL MEDICINE

## 2024-12-26 PROCEDURE — 63600175 PHARM REV CODE 636 W HCPCS: Performed by: NURSE PRACTITIONER

## 2024-12-26 PROCEDURE — 25000003 PHARM REV CODE 250: Performed by: NURSE PRACTITIONER

## 2024-12-26 PROCEDURE — 83735 ASSAY OF MAGNESIUM: CPT | Performed by: INTERNAL MEDICINE

## 2024-12-26 PROCEDURE — 84100 ASSAY OF PHOSPHORUS: CPT | Performed by: INTERNAL MEDICINE

## 2024-12-26 PROCEDURE — 21400001 HC TELEMETRY ROOM

## 2024-12-26 PROCEDURE — 85610 PROTHROMBIN TIME: CPT | Performed by: INTERNAL MEDICINE

## 2024-12-26 PROCEDURE — 85025 COMPLETE CBC W/AUTO DIFF WBC: CPT | Performed by: INTERNAL MEDICINE

## 2024-12-26 RX ORDER — SPIRONOLACTONE 25 MG/1
25 TABLET ORAL DAILY
Status: DISCONTINUED | OUTPATIENT
Start: 2024-12-26 | End: 2024-12-27 | Stop reason: HOSPADM

## 2024-12-26 RX ADMIN — ISOSORBIDE MONONITRATE 30 MG: 30 TABLET, EXTENDED RELEASE ORAL at 09:12

## 2024-12-26 RX ADMIN — WARFARIN SODIUM 5 MG: 5 TABLET ORAL at 05:12

## 2024-12-26 RX ADMIN — OLANZAPINE 5 MG: 5 TABLET, FILM COATED ORAL at 08:12

## 2024-12-26 RX ADMIN — ACETAZOLAMIDE 500 MG: 250 TABLET ORAL at 09:12

## 2024-12-26 RX ADMIN — SPIRONOLACTONE 25 MG: 25 TABLET ORAL at 12:12

## 2024-12-26 RX ADMIN — ACETAMINOPHEN 1000 MG: 500 TABLET, FILM COATED ORAL at 12:12

## 2024-12-26 RX ADMIN — INSULIN ASPART 1 UNITS: 100 INJECTION, SOLUTION INTRAVENOUS; SUBCUTANEOUS at 08:12

## 2024-12-26 RX ADMIN — DAPAGLIFLOZIN 10 MG: 10 TABLET, FILM COATED ORAL at 09:12

## 2024-12-26 RX ADMIN — ATORVASTATIN CALCIUM 40 MG: 40 TABLET, FILM COATED ORAL at 09:12

## 2024-12-26 RX ADMIN — ACETAMINOPHEN 1000 MG: 500 TABLET, FILM COATED ORAL at 11:12

## 2024-12-26 RX ADMIN — FUROSEMIDE 40 MG: 10 INJECTION, SOLUTION INTRAMUSCULAR; INTRAVENOUS at 05:12

## 2024-12-26 RX ADMIN — FUROSEMIDE 40 MG: 10 INJECTION, SOLUTION INTRAMUSCULAR; INTRAVENOUS at 09:12

## 2024-12-26 NOTE — PROGRESS NOTES
[facility] Hospital Medicine Progress Note        Chief Complaint: Inpatient Follow-up for HF    HPI:   Kayla Ruiz is a 69 y.o. female who PMH includes PAF, CAD, DM , HLD; anxiety, depression, LESLI; Presents to the ED at [facility] on 12/24/2024 with a primary complaint of exertional dyspnea with associated shortness a breath lower extremity swelling and generalized weakness.  Patient has been is at the bedside providing some of the history.  Patient reports she has been feeling bad over the past several days which progressed.  Has been reports he did increase her oxygen to 4 L at home secondary to her shortness a breath which provided little relief.  Chronically patient is on oxygen at 2 L per nasal cannula for her COPD.  She reports she has a upcoming sleep study.  No reports of new foods or medications.  No reports of injury, trauma, or falls.  Lab work reviewed demonstrated H&H 10 point 5.8, sodium 147, CO2 39, BUN 25.7, creatinine 0.84, glucose 165, BNP 1575.8, troponin 0.035; other indices unremarkable.  Influenza, and SARs CoV 2 PCR not detected.  Chest x-ray impression reviewed demonstrated enlarged cardiac silhouette with vascular congestion and small effusions.  Initial vital signs /82 pulse 64 respirations 20 temperature 97.9° F O2 saturation 92% on 4 L per nasal cannula.  Patient did receive lasix in the ED, canister with her external urinary system was full at the time of examination.  Patient also received DuoNeb treatment at 325 mg of aspirin.  Patient is admitted to hospital medicine service management.    Patient was diagnosed with diastolic HF and started on IV lasix and acetazolamide.     Interval Hx:   Patient today awake and comfortable. Sitting up in the chair. Feels much better since admit. Has a mild cough. No SOB, chest pain, fever or chills.     Family at bedside, explained in detail about the patients condition, diagnosis, vitals, labs and treatment plan.  They understand and agree with the plan. All their questions were answered.      Has Home health.     Case was discussed with patient's nurse and  on the floor.    Objective/physical exam:  General: In no acute distress  Chest: Clear to auscultation bilaterally  Heart: RRR, +S1, S2, no appreciable murmur  Abdomen: Soft, nontender, BS +  MSK: Warm, + lower extremity pitting edema, no clubbing or cyanosis  Neurologic: Alert and oriented x4, Cranial nerve II-XII intact, Strength 5/5 in all 4 extremities    VITAL SIGNS: 24 HRS MIN & MAX LAST   Temp  Min: 97.4 °F (36.3 °C)  Max: 97.6 °F (36.4 °C) 97.4 °F (36.3 °C)   BP  Min: 134/60  Max: 159/67 (!) 159/67   Pulse  Min: 59  Max: 67  63   Resp  Min: 16  Max: 20 16   SpO2  Min: 90 %  Max: 100 % 98 %     I have reviewed the following labs:  Recent Labs   Lab 12/24/24  1121 12/25/24  0648 12/26/24  0505   WBC 9.07 7.48 6.93   RBC 3.82* 3.67* 3.69*   HGB 10.6* 10.2* 10.2*   HCT 35.8* 34.5* 35.1*   MCV 93.7 94.0 95.1*   MCH 27.7 27.8 27.6   MCHC 29.6* 29.6* 29.1*   RDW 17.0 17.1* 17.2*    253 254   MPV 10.4 10.2 10.3     Recent Labs   Lab 12/20/24  1031 12/24/24  1121 12/25/24  0648 12/26/24  0505   NA  --  147* 143 143   K  --  3.7 3.3* 3.9   CL  --  98 102 103   CO2  --  39* 34* 31   BUN  --  25.7* 21.9* 28.3*   CREATININE  --  0.84 0.86 0.95   CALCIUM  --  9.2 8.5 8.4   PH 7.365  --   --   --    MG  --   --  2.50 2.30   ALBUMIN  --  3.6 3.0*  --    ALKPHOS  --  108 100  --    ALT  --  29 22  --    AST  --  29 24  --    BILITOT  --  0.9 0.7  --      Microbiology Results (last 7 days)       ** No results found for the last 168 hours. **             See below for Radiology    Assessment/Plan:  Acute on chronic diastolic HF  Acute pulmonary edema secondary to HF   LESLI not on CPAP   HTN, benign   PAF  CAD  DM2    Plan:  Patient looks comfortable and feels better since admit   Has sam LE edema   Will continue IV lasix and po acetazolamide day 3  Will closely  monitor patients daily weight, urine out put, renal parameters and volume status    HF education given   BUN 28, Crt 0.95  Add aldactone 25 mg daily  Will continue Farxiga     Blood sugars stable     She has LESLI and will be set up with CPAP next week     Continue coumadin, INR 2.5    Encouraged to ambulate     BMP in am     Critical care note:  Critical care diagnosis: HF with pulmonary edema needing iv lasix   Critical care interventions: Hands-on evaluation, review of labs/radiographs/records and discussion with patient and family if present  Critical care time spent: 35 minutes     VTE prophylaxis: Coumadin     Patient condition:  Guarded    Anticipated discharge and Disposition:   Home with        All diagnosis and differential diagnosis have been reviewed; assessment and plan has been documented; I have personally reviewed the labs and test results that are presently available; I have reviewed the patients medication list; I have reviewed the consulting providers response and recommendations. I have reviewed or attempted to review medical records based upon their availability    All of the patient's questions have been  addressed and answered. Patient's is agreeable to the above stated plan. I will continue to monitor closely and make adjustments to medical management as needed.    Portions of this note dictated using EMR integrated voice recognition software, and may be subject to voice recognition errors not corrected at proofreading. Please contact writer for clarification if needed.   _____________________________________________________________________    Malnutrition Status:  Nutrition consulted. Most recent weight and BMI monitored-     Measurements:  Wt Readings from Last 1 Encounters:   12/26/24 99.7 kg (219 lb 14.4 oz)   Body mass index is 35.49 kg/m².    Patient has been screened and assessed by RD.    Malnutrition Type:  Context:    Level:      Malnutrition Characteristic Summary:        Interventions/Recommendations (treatment strategy):        Scheduled Med:   acetaZOLAMIDE  500 mg Oral Daily    atorvastatin  40 mg Oral Daily    dapagliflozin propanediol  10 mg Oral Daily    furosemide (LASIX) injection  40 mg Intravenous BID loop    isosorbide mononitrate  30 mg Oral Daily    OLANZapine  5 mg Oral QHS    warfarin  5 mg Oral Daily      Continuous Infusions:     PRN Meds:    Current Facility-Administered Medications:     acetaminophen, 1,000 mg, Oral, Q6H PRN    acetaminophen, 650 mg, Oral, Q4H PRN    dextrose 50%, 12.5 g, Intravenous, PRN    dextrose 50%, 25 g, Intravenous, PRN    glucagon (human recombinant), 1 mg, Intramuscular, PRN    glucose, 16 g, Oral, PRN    glucose, 24 g, Oral, PRN    insulin aspart U-100, 0-5 Units, Subcutaneous, QID (AC + HS) PRN    naloxone, 0.02 mg, Intravenous, PRN    ondansetron, 4 mg, Intravenous, Q4H PRN    prochlorperazine, 5 mg, Intravenous, Q6H PRN    sodium chloride 0.9%, 10 mL, Intravenous, PRN    traZODone, 50 mg, Oral, Nightly PRN     Radiology:  I have personally reviewed the following imaging and agree with the radiologist.     X-Ray Chest PA And Lateral  Narrative: EXAMINATION:  XR CHEST PA AND LATERAL    CLINICAL HISTORY:  Shortness of breath    TECHNIQUE:  Two views of the chest    COMPARISON:  10/23/2024    FINDINGS:  LINES AND TUBES: None    MEDIASTINUM AND LUDY: Cardiac silhouette is enlarged. There is an aortic valve prosthesis.  Aortic atherosclerosis.    LUNGS: Mild vascular congestion without overt alveolar edema.    PLEURA:Trace pleural effusions.No pneumothorax.    BONES: The bones are demineralized.  Post treatment change of vertebral cement augmentation at and ACDF.  Impression: Enlarged cardiac silhouette with vascular congestion and small effusions.    Electronically signed by: Jennifer Braun  Date:    12/24/2024  Time:    11:10      Timothy Nogueira MD  Department of Hospital Medicine   [facility]    12/26/2024

## 2024-12-27 VITALS
TEMPERATURE: 98 F | OXYGEN SATURATION: 96 % | SYSTOLIC BLOOD PRESSURE: 142 MMHG | HEIGHT: 66 IN | WEIGHT: 215.38 LBS | HEART RATE: 69 BPM | DIASTOLIC BLOOD PRESSURE: 81 MMHG | RESPIRATION RATE: 20 BRPM | BODY MASS INDEX: 34.61 KG/M2

## 2024-12-27 LAB
ANION GAP SERPL CALC-SCNC: 9 MEQ/L
BUN SERPL-MCNC: 32.2 MG/DL (ref 9.8–20.1)
CALCIUM SERPL-MCNC: 8.4 MG/DL (ref 8.4–10.2)
CHLORIDE SERPL-SCNC: 103 MMOL/L (ref 98–107)
CO2 SERPL-SCNC: 33 MMOL/L (ref 23–31)
CREAT SERPL-MCNC: 0.97 MG/DL (ref 0.55–1.02)
CREAT/UREA NIT SERPL: 33
GFR SERPLBLD CREATININE-BSD FMLA CKD-EPI: >60 ML/MIN/1.73/M2
GLUCOSE SERPL-MCNC: 146 MG/DL (ref 82–115)
POCT GLUCOSE: 142 MG/DL (ref 70–110)
POTASSIUM SERPL-SCNC: 3.7 MMOL/L (ref 3.5–5.1)
SODIUM SERPL-SCNC: 145 MMOL/L (ref 136–145)

## 2024-12-27 PROCEDURE — 25000003 PHARM REV CODE 250: Performed by: NURSE PRACTITIONER

## 2024-12-27 PROCEDURE — 25000003 PHARM REV CODE 250: Performed by: INTERNAL MEDICINE

## 2024-12-27 PROCEDURE — 36415 COLL VENOUS BLD VENIPUNCTURE: CPT | Performed by: INTERNAL MEDICINE

## 2024-12-27 PROCEDURE — 80048 BASIC METABOLIC PNL TOTAL CA: CPT | Performed by: INTERNAL MEDICINE

## 2024-12-27 RX ORDER — FUROSEMIDE 40 MG/1
40 TABLET ORAL 2 TIMES DAILY
Status: DISCONTINUED | OUTPATIENT
Start: 2024-12-27 | End: 2024-12-27 | Stop reason: HOSPADM

## 2024-12-27 RX ORDER — SPIRONOLACTONE 25 MG/1
25 TABLET ORAL DAILY
Qty: 30 TABLET | Refills: 11 | Status: SHIPPED | OUTPATIENT
Start: 2024-12-28 | End: 2025-12-28

## 2024-12-27 RX ORDER — LOSARTAN POTASSIUM 25 MG/1
25 TABLET ORAL DAILY
Status: DISCONTINUED | OUTPATIENT
Start: 2024-12-27 | End: 2024-12-27 | Stop reason: HOSPADM

## 2024-12-27 RX ORDER — FUROSEMIDE 40 MG/1
40 TABLET ORAL 2 TIMES DAILY
Qty: 60 TABLET | Refills: 11 | Status: SHIPPED | OUTPATIENT
Start: 2024-12-27 | End: 2025-12-27

## 2024-12-27 RX ORDER — LOSARTAN POTASSIUM 25 MG/1
25 TABLET ORAL DAILY
Qty: 90 TABLET | Refills: 3 | Status: SHIPPED | OUTPATIENT
Start: 2024-12-28 | End: 2025-12-28

## 2024-12-27 RX ADMIN — FUROSEMIDE 40 MG: 40 TABLET ORAL at 08:12

## 2024-12-27 RX ADMIN — METOPROLOL SUCCINATE 12.5 MG: 25 TABLET, EXTENDED RELEASE ORAL at 08:12

## 2024-12-27 RX ADMIN — DAPAGLIFLOZIN 10 MG: 10 TABLET, FILM COATED ORAL at 08:12

## 2024-12-27 RX ADMIN — ACETAMINOPHEN 1000 MG: 500 TABLET, FILM COATED ORAL at 08:12

## 2024-12-27 RX ADMIN — LOSARTAN POTASSIUM 25 MG: 25 TABLET, FILM COATED ORAL at 08:12

## 2024-12-27 RX ADMIN — ATORVASTATIN CALCIUM 40 MG: 40 TABLET, FILM COATED ORAL at 08:12

## 2024-12-27 RX ADMIN — ACETAZOLAMIDE 500 MG: 250 TABLET ORAL at 08:12

## 2024-12-27 RX ADMIN — SPIRONOLACTONE 25 MG: 25 TABLET ORAL at 08:12

## 2024-12-27 RX ADMIN — ISOSORBIDE MONONITRATE 30 MG: 30 TABLET, EXTENDED RELEASE ORAL at 08:12

## 2024-12-27 NOTE — DISCHARGE SUMMARY
[facility] Hospital Medicine Discharge Summary    Admit Date: 12/24/2024  Discharge Date and Time: 12/27/202410:14 AM  Admitting Physician:  Team  Discharging Physician: Timothy Nogueira MD.  Primary Care Physician: Jennifer Chavez, NP-C      Discharge Diagnoses:  Acute on chronic diastolic HF  Acute pulmonary edema secondary to HF   LESLI not on CPAP   HTN, benign   PAF  CAD  DM2    Hospital Course:   Kayla Ruiz is a 69 y.o. female who PMH includes PAF, CAD, DM , HLD; anxiety, depression, LESLI; Presents to the ED at [facility] on 12/24/2024 with a primary complaint of exertional dyspnea with associated shortness a breath lower extremity swelling and generalized weakness.  Patient has been is at the bedside providing some of the history.  Patient reports she has been feeling bad over the past several days which progressed.  Has been reports he did increase her oxygen to 4 L at home secondary to her shortness a breath which provided little relief.  Chronically patient is on oxygen at 2 L per nasal cannula for her COPD.  She reports she has a upcoming sleep study.  No reports of new foods or medications.  No reports of injury, trauma, or falls.  Lab work reviewed demonstrated H&H 10 point 5.8, sodium 147, CO2 39, BUN 25.7, creatinine 0.84, glucose 165, BNP 1575.8, troponin 0.035; other indices unremarkable.  Influenza, and SARs CoV 2 PCR not detected.  Chest x-ray impression reviewed demonstrated enlarged cardiac silhouette with vascular congestion and small effusions.  Initial vital signs /82 pulse 64 respirations 20 temperature 97.9° F O2 saturation 92% on 4 L per nasal cannula.  Patient did receive lasix in the ED, canister with her external urinary system was full at the time of examination.  Patient also received DuoNeb treatment at 325 mg of aspirin.  Patient is admitted to hospital medicine service management.     Patient was diagnosed with diastolic HF and started  on IV lasix and acetazolamide. Patient had good diuresis. Later aldactone was added. She was advised life style modification. She was stable and asymptomatic. She was discharged home with HH on po lasix. Advised close f/u with her PCP.    Pt was seen and examined on the day of discharge  Vitals:  VITAL SIGNS: 24 HRS MIN & MAX LAST   Temp  Min: 97 °F (36.1 °C)  Max: 98 °F (36.7 °C) 97.8 °F (36.6 °C)   BP  Min: 115/57  Max: 164/53 (!) 151/67   Pulse  Min: 61  Max: 77  64   Resp  Min: 16  Max: 20 20   SpO2  Min: 92 %  Max: 98 % (!) 92 %       Physical Exam:  Heart RRR  Lungs clear   Abdomen soft and non tender   Neuro: No FND      Procedures Performed: No admission procedures for hospital encounter.     Significant Diagnostic Studies: See Full reports for all details    Recent Labs   Lab 12/24/24  1121 12/25/24  0648 12/26/24  0505   WBC 9.07 7.48 6.93   RBC 3.82* 3.67* 3.69*   HGB 10.6* 10.2* 10.2*   HCT 35.8* 34.5* 35.1*   MCV 93.7 94.0 95.1*   MCH 27.7 27.8 27.6   MCHC 29.6* 29.6* 29.1*   RDW 17.0 17.1* 17.2*    253 254   MPV 10.4 10.2 10.3       Recent Labs   Lab 12/20/24  1031 12/24/24  1121 12/24/24  1121 12/25/24  0648 12/26/24  0505 12/27/24  0511   NA  --  147*   < > 143 143 145   K  --  3.7   < > 3.3* 3.9 3.7   CL  --  98   < > 102 103 103   CO2  --  39*   < > 34* 31 33*   BUN  --  25.7*   < > 21.9* 28.3* 32.2*   CREATININE  --  0.84   < > 0.86 0.95 0.97   CALCIUM  --  9.2   < > 8.5 8.4 8.4   PH 7.365  --   --   --   --   --    MG  --   --   --  2.50 2.30  --    ALBUMIN  --  3.6  --  3.0*  --   --    ALKPHOS  --  108  --  100  --   --    ALT  --  29  --  22  --   --    AST  --  29  --  24  --   --    BILITOT  --  0.9  --  0.7  --   --     < > = values in this interval not displayed.        Microbiology Results (last 7 days)       ** No results found for the last 168 hours. **             X-Ray Chest PA And Lateral  Narrative: EXAMINATION:  XR CHEST PA AND LATERAL    CLINICAL HISTORY:  Shortness of  breath    TECHNIQUE:  Two views of the chest    COMPARISON:  10/23/2024    FINDINGS:  LINES AND TUBES: None    MEDIASTINUM AND LUDY: Cardiac silhouette is enlarged. There is an aortic valve prosthesis.  Aortic atherosclerosis.    LUNGS: Mild vascular congestion without overt alveolar edema.    PLEURA:Trace pleural effusions.No pneumothorax.    BONES: The bones are demineralized.  Post treatment change of vertebral cement augmentation at and ACDF.  Impression: Enlarged cardiac silhouette with vascular congestion and small effusions.    Electronically signed by: Jennifer Braun  Date:    12/24/2024  Time:    11:10         Medication List        START taking these medications      losartan 25 MG tablet  Commonly known as: COZAAR  Take 1 tablet (25 mg total) by mouth once daily.  Start taking on: December 28, 2024     spironolactone 25 MG tablet  Commonly known as: ALDACTONE  Take 1 tablet (25 mg total) by mouth once daily.  Start taking on: December 28, 2024            CHANGE how you take these medications      furosemide 40 MG tablet  Commonly known as: LASIX  Take 1 tablet (40 mg total) by mouth 2 (two) times daily.  What changed:   medication strength  how much to take  when to take this            CONTINUE taking these medications      albuterol-ipratropium 2.5 mg-0.5 mg/3 mL nebulizer solution  Commonly known as: DUO-NEB     aspirin 81 MG EC tablet  Commonly known as: ECOTRIN     atorvastatin 40 MG tablet  Commonly known as: LIPITOR     clonazePAM 1 MG tablet  Commonly known as: KlonoPIN     dapagliflozin propanediol 10 mg tablet  Commonly known as: FARXIGA  Take 1 tablet (10 mg total) by mouth once daily.     ergocalciferol 50,000 unit Cap  Commonly known as: ERGOCALCIFEROL  Take 1 capsule (50,000 Units total) by mouth every 7 days.     FLUoxetine 40 MG capsule     gabapentin 100 MG capsule  Commonly known as: NEURONTIN     guaiFENesin 600 mg 12 hr tablet  Commonly known as: MUCINEX  Take 1 tablet (600 mg total)  by mouth 2 (two) times daily.     isosorbide mononitrate 30 MG 24 hr tablet  Commonly known as: IMDUR  Take 1 tablet (30 mg total) by mouth once daily.     metoprolol succinate 25 MG 24 hr tablet  Commonly known as: TOPROL-XL  Take 0.5 tablets (12.5 mg total) by mouth 2 (two) times daily.     MOUNJARO 15 mg/0.5 mL Pnij  Generic drug: tirzepatide     nicotine 14 mg/24 hr  Commonly known as: NICODERM CQ  Place 1 patch onto the skin once daily.     OLANZapine 5 MG tablet  Commonly known as: ZyPREXA     traZODone 50 MG tablet  Commonly known as: DESYREL     warfarin 5 MG tablet  Commonly known as: COUMADIN            STOP taking these medications      pioglitazone 45 MG tablet  Commonly known as: ACTOS               Where to Get Your Medications        These medications were sent to [place] [address] [place] [address] [address]      Phone: [phone]   furosemide 40 MG tablet  losartan 25 MG tablet  spironolactone 25 MG tablet          Explained in detail to the patient about the discharge plan, medications, and follow-up visits. Pt understands and agrees with the treatment plan  Discharge Disposition: Home-Health Care [facility]   Discharged Condition: stable  Diet-   Dietary Orders (From admission, onward)       Start     Ordered    12/24/24 1406  Diet Heart Healthy Diabetic; 2000 Calories (up to 75 gm per meal); Standard Tray  (Diet/Nutrition - [facility])  Diet effective now        Question Answer Comment   Diet Modifier: Diabetic    Total calories / carbs: 2000 Calories (up to 75 gm per meal)    Tray type: Standard Tray        12/24/24 1407                   Medications Per DC med rec  Activities as tolerated   Contact information for follow-up providers       Jennifer Chavez NP-C. Go on 1/14/2025.    Specialty: Family Medicine  Why: Tuesday, December 14th @9:15am :)  Contact information:  [address] [place] [address]  [place]  [address]  [phone]               MayTony MD. Go on 1/7/2025.    Specialty: Cardiology  Why: Tuesday, January 7th @3:30pm :)  Contact information:  [address] [facility] [address]  [phone]                       Contact information for after-discharge care       Home Medical Care       NURSING SPECIALTIES .    Service: Home Health Services  Contact information:  Darvin Peck [facility] [address]  [phone]                                 For further questions contact hospitalist office    Discharge time 33 minutes    For worsening symptoms, chest pain, shortness of breath, increased abdominal pain, high grade fever, stroke or stroke like symptoms, immediately go to the nearest Emergency Room or call 911 as soon as possible.      Timothy Adams M.D, on 12/27/2024. at 10:14 AM.

## 2024-12-27 NOTE — PLAN OF CARE
12/27/24 0948   Final Note   Assessment Type Final Discharge Note   Anticipated Discharge Disposition Home-Health  (FOC: The patient is active with HH services through: [facility]. She will be enrolled in the heart failure program.)   Hospital Resources/Appts/Education Provided Appointments scheduled and added to AVS   Post-Acute Status   Post-Acute Authorization Home Health   Home Health Status Set-up Complete/Auth obtained   Coverage Payor: [employer] MANAGED MEDICARE - [employer] MEDICARE O -   Patient choice form signed by patient/caregiver List with quality metrics by geographic area provided   Discharge Delays None known at this time     The patient will be discharged from [facility] to her private residence with HH services through [facility]. She is current with this HH agency and will be enrolled in the heart failure program as per Dr. Velazquez (Hospitalist) orders. Clinical notes/updates and AVS & D/C summary were uploaded and sent via [employer] review. Transportation will be arranged by her daughter's personal vehicle.

## 2024-12-27 NOTE — NURSING
IV removed with no signs of bleeding. Tele removed and returned to the box. Pt has home O2 to transport home with.

## 2024-12-30 ENCOUNTER — PATIENT OUTREACH (OUTPATIENT)
Dept: ADMINISTRATIVE | Facility: CLINIC | Age: 69
End: 2024-12-30
Payer: MEDICARE

## 2024-12-30 NOTE — PROGRESS NOTES
C3 nurse attempted to contact Kayla Ruiz for a TCC post hospital discharge follow up call. No answer. No voicemail available. The patient has a scheduled [facility] appointment with Jennifer Chavez NP-C (Family Medicine) on 1/14/2025;  @9:15am.

## 2024-12-31 NOTE — PROGRESS NOTES
2nd attempt-C3 nurse attempted to contact Kayla Ruiz for a TCC post hospital discharge follow up call. No answer. No voicemail available. The patient has a scheduled [facility] appointment with Jennifer Chavez NP-C (Family Medicine) on 1/14/2025;  @9:15am.

## 2025-01-02 NOTE — PROGRESS NOTES
3rd attempt-C3 nurse attempted to contact Kayla Ruiz for a TCC post hospital discharge follow up call. No answer. No voicemail available. The patient has a scheduled [facility] appointment with Jennifer Chavez NP-C (Family Medicine) on 1/14/2025;  @9:15am.

## 2025-01-09 ENCOUNTER — HOSPITAL ENCOUNTER (INPATIENT)
Facility: HOSPITAL | Age: 70
LOS: 4 days | Discharge: HOME HEALTH | DRG: 563 | End: 2025-01-13
Attending: STUDENT IN AN ORGANIZED HEALTH CARE EDUCATION/TRAINING PROGRAM | Admitting: SURGERY
Payer: MEDICARE

## 2025-01-09 DIAGNOSIS — S32.000A CLOSED WEDGE FRACTURE OF LUMBAR VERTEBRA, UNSPECIFIED LUMBAR VERTEBRAL LEVEL, INITIAL ENCOUNTER: Primary | ICD-10-CM

## 2025-01-09 DIAGNOSIS — F17.200 TOBACCO DEPENDENCY: ICD-10-CM

## 2025-01-09 DIAGNOSIS — S82.034A CLOSED NONDISPLACED TRANSVERSE FRACTURE OF RIGHT PATELLA, INITIAL ENCOUNTER: ICD-10-CM

## 2025-01-09 DIAGNOSIS — S32.010A COMPRESSION FRACTURE OF L1 VERTEBRA, INITIAL ENCOUNTER: ICD-10-CM

## 2025-01-09 DIAGNOSIS — M54.9 BACK PAIN: ICD-10-CM
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
## 2025-01-09 DIAGNOSIS — M25.561 ACUTE PAIN OF RIGHT KNEE: ICD-10-CM

## 2025-01-09 DIAGNOSIS — S82.001A CLOSED NONDISPLACED FRACTURE OF RIGHT PATELLA, UNSPECIFIED FRACTURE MORPHOLOGY, INITIAL ENCOUNTER: ICD-10-CM

## 2025-01-09 PROBLEM — S32.009A CLOSED FRACTURE OF LUMBAR VERTEBRA: Status: ACTIVE | Noted: 2025-01-09

## 2025-01-09 LAB
ALBUMIN SERPL-MCNC: 3.9 G/DL (ref 3.4–4.8)
ALBUMIN/GLOB SERPL: 1 RATIO (ref 1.1–2)
ALP SERPL-CCNC: 133 UNIT/L (ref 40–150)
ALT SERPL-CCNC: 20 UNIT/L (ref 0–55)
ANION GAP SERPL CALC-SCNC: 11 MEQ/L
APTT PPP: 32.3 SECONDS (ref 23.2–33.7)
AST SERPL-CCNC: 44 UNIT/L (ref 5–34)
BASOPHILS # BLD AUTO: 0.05 X10(3)/MCL
BASOPHILS NFR BLD AUTO: 0.4 %
BILIRUB SERPL-MCNC: 0.5 MG/DL
BUN SERPL-MCNC: 29.8 MG/DL (ref 9.8–20.1)
CALCIUM SERPL-MCNC: 9 MG/DL (ref 8.4–10.2)
CHLORIDE SERPL-SCNC: 95 MMOL/L (ref 98–107)
CO2 SERPL-SCNC: 33 MMOL/L (ref 23–31)
CREAT SERPL-MCNC: 1.1 MG/DL (ref 0.55–1.02)
CREAT/UREA NIT SERPL: 27
EOSINOPHIL # BLD AUTO: 0.1 X10(3)/MCL (ref 0–0.9)
EOSINOPHIL NFR BLD AUTO: 0.9 %
ERYTHROCYTE [DISTWIDTH] IN BLOOD BY AUTOMATED COUNT: 16.8 % (ref 11.5–17)
GFR SERPLBLD CREATININE-BSD FMLA CKD-EPI: 54 ML/MIN/1.73/M2
GLOBULIN SER-MCNC: 4 GM/DL (ref 2.4–3.5)
GLUCOSE SERPL-MCNC: 238 MG/DL (ref 82–115)
HCT VFR BLD AUTO: 44.1 % (ref 37–47)
HGB BLD-MCNC: 13.3 G/DL (ref 12–16)
IMM GRANULOCYTES # BLD AUTO: 0.05 X10(3)/MCL (ref 0–0.04)
IMM GRANULOCYTES NFR BLD AUTO: 0.4 %
INR PPP: 2.7
LACTATE SERPL-SCNC: 2 MMOL/L (ref 0.5–2.2)
LYMPHOCYTES # BLD AUTO: 1.29 X10(3)/MCL (ref 0.6–4.6)
LYMPHOCYTES NFR BLD AUTO: 11.4 %
MCH RBC QN AUTO: 27.5 PG (ref 27–31)
MCHC RBC AUTO-ENTMCNC: 30.2 G/DL (ref 33–36)
MCV RBC AUTO: 91.1 FL (ref 80–94)
MONOCYTES # BLD AUTO: 0.56 X10(3)/MCL (ref 0.1–1.3)
MONOCYTES NFR BLD AUTO: 5 %
NEUTROPHILS # BLD AUTO: 9.23 X10(3)/MCL (ref 2.1–9.2)
NEUTROPHILS NFR BLD AUTO: 81.9 %
NRBC BLD AUTO-RTO: 0 %
PLATELET # BLD AUTO: 361 X10(3)/MCL (ref 130–400)
PMV BLD AUTO: 11.1 FL (ref 7.4–10.4)
POCT GLUCOSE: 138 MG/DL (ref 70–110)
POCT GLUCOSE: 167 MG/DL (ref 70–110)
POTASSIUM SERPL-SCNC: 5.2 MMOL/L (ref 3.5–5.1)
PROT SERPL-MCNC: 7.9 GM/DL (ref 5.8–7.6)
PROTHROMBIN TIME: 28.5 SECONDS (ref 12.5–14.5)
RBC # BLD AUTO: 4.84 X10(6)/MCL (ref 4.2–5.4)
SODIUM SERPL-SCNC: 139 MMOL/L (ref 136–145)
WBC # BLD AUTO: 11.28 X10(3)/MCL (ref 4.5–11.5)

## 2025-01-09 PROCEDURE — 99223 1ST HOSP IP/OBS HIGH 75: CPT | Mod: AI,FS,, | Performed by: SURGERY

## 2025-01-09 PROCEDURE — 11000001 HC ACUTE MED/SURG PRIVATE ROOM

## 2025-01-09 PROCEDURE — 27000221 HC OXYGEN, UP TO 24 HOURS

## 2025-01-09 PROCEDURE — 99900031 HC PATIENT EDUCATION (STAT)

## 2025-01-09 PROCEDURE — 25000003 PHARM REV CODE 250: Performed by: NURSE PRACTITIONER

## 2025-01-09 PROCEDURE — 25000003 PHARM REV CODE 250

## 2025-01-09 PROCEDURE — 96361 HYDRATE IV INFUSION ADD-ON: CPT

## 2025-01-09 PROCEDURE — 25000003 PHARM REV CODE 250: Performed by: STUDENT IN AN ORGANIZED HEALTH CARE EDUCATION/TRAINING PROGRAM

## 2025-01-09 PROCEDURE — 85730 THROMBOPLASTIN TIME PARTIAL: CPT | Performed by: STUDENT IN AN ORGANIZED HEALTH CARE EDUCATION/TRAINING PROGRAM

## 2025-01-09 PROCEDURE — 85610 PROTHROMBIN TIME: CPT | Performed by: STUDENT IN AN ORGANIZED HEALTH CARE EDUCATION/TRAINING PROGRAM

## 2025-01-09 PROCEDURE — 99285 EMERGENCY DEPT VISIT HI MDM: CPT | Mod: 25

## 2025-01-09 PROCEDURE — 99223 1ST HOSP IP/OBS HIGH 75: CPT | Mod: FS,,, | Performed by: STUDENT IN AN ORGANIZED HEALTH CARE EDUCATION/TRAINING PROGRAM

## 2025-01-09 PROCEDURE — 99223 1ST HOSP IP/OBS HIGH 75: CPT | Mod: ,,,

## 2025-01-09 PROCEDURE — 63600175 PHARM REV CODE 636 W HCPCS: Performed by: NURSE PRACTITIONER

## 2025-01-09 PROCEDURE — 96375 TX/PRO/DX INJ NEW DRUG ADDON: CPT

## 2025-01-09 PROCEDURE — 25500020 PHARM REV CODE 255: Performed by: STUDENT IN AN ORGANIZED HEALTH CARE EDUCATION/TRAINING PROGRAM

## 2025-01-09 PROCEDURE — 80053 COMPREHEN METABOLIC PANEL: CPT | Performed by: STUDENT IN AN ORGANIZED HEALTH CARE EDUCATION/TRAINING PROGRAM

## 2025-01-09 PROCEDURE — 85025 COMPLETE CBC W/AUTO DIFF WBC: CPT | Performed by: STUDENT IN AN ORGANIZED HEALTH CARE EDUCATION/TRAINING PROGRAM

## 2025-01-09 PROCEDURE — 96374 THER/PROPH/DIAG INJ IV PUSH: CPT

## 2025-01-09 PROCEDURE — 63600175 PHARM REV CODE 636 W HCPCS: Performed by: STUDENT IN AN ORGANIZED HEALTH CARE EDUCATION/TRAINING PROGRAM

## 2025-01-09 PROCEDURE — 83605 ASSAY OF LACTIC ACID: CPT | Performed by: NURSE PRACTITIONER

## 2025-01-09 PROCEDURE — 99900035 HC TECH TIME PER 15 MIN (STAT)

## 2025-01-09 RX ORDER — ADHESIVE BANDAGE
30 BANDAGE TOPICAL DAILY PRN
Status: DISCONTINUED | OUTPATIENT
Start: 2025-01-09 | End: 2025-01-13 | Stop reason: HOSPADM

## 2025-01-09 RX ORDER — IBUPROFEN 200 MG
24 TABLET ORAL
Status: DISCONTINUED | OUTPATIENT
Start: 2025-01-09 | End: 2025-01-13 | Stop reason: HOSPADM

## 2025-01-09 RX ORDER — OXYCODONE HYDROCHLORIDE 5 MG/1
5 TABLET ORAL EVERY 4 HOURS PRN
Status: DISCONTINUED | OUTPATIENT
Start: 2025-01-09 | End: 2025-01-13 | Stop reason: HOSPADM

## 2025-01-09 RX ORDER — ACETAMINOPHEN 325 MG/1
650 TABLET ORAL EVERY 4 HOURS
Status: DISCONTINUED | OUTPATIENT
Start: 2025-01-09 | End: 2025-01-09

## 2025-01-09 RX ORDER — OLANZAPINE 5 MG/1
5 TABLET ORAL NIGHTLY
Status: DISCONTINUED | OUTPATIENT
Start: 2025-01-09 | End: 2025-01-13 | Stop reason: HOSPADM

## 2025-01-09 RX ORDER — CLONAZEPAM 0.5 MG/1
0.5 TABLET ORAL 3 TIMES DAILY PRN
Status: DISCONTINUED | OUTPATIENT
Start: 2025-01-09 | End: 2025-01-13 | Stop reason: HOSPADM

## 2025-01-09 RX ORDER — TALC
6 POWDER (GRAM) TOPICAL NIGHTLY PRN
Status: DISCONTINUED | OUTPATIENT
Start: 2025-01-09 | End: 2025-01-13 | Stop reason: HOSPADM

## 2025-01-09 RX ORDER — IBUPROFEN 200 MG
16 TABLET ORAL
Status: DISCONTINUED | OUTPATIENT
Start: 2025-01-09 | End: 2025-01-13 | Stop reason: HOSPADM

## 2025-01-09 RX ORDER — FLUOXETINE HYDROCHLORIDE 20 MG/1
40 CAPSULE ORAL DAILY
Status: DISCONTINUED | OUTPATIENT
Start: 2025-01-10 | End: 2025-01-13 | Stop reason: HOSPADM

## 2025-01-09 RX ORDER — GABAPENTIN 300 MG/1
300 CAPSULE ORAL 3 TIMES DAILY
Status: DISCONTINUED | OUTPATIENT
Start: 2025-01-09 | End: 2025-01-09

## 2025-01-09 RX ORDER — INSULIN ASPART 100 [IU]/ML
0-5 INJECTION, SOLUTION INTRAVENOUS; SUBCUTANEOUS
Status: DISCONTINUED | OUTPATIENT
Start: 2025-01-09 | End: 2025-01-13 | Stop reason: HOSPADM

## 2025-01-09 RX ORDER — ONDANSETRON HYDROCHLORIDE 2 MG/ML
4 INJECTION, SOLUTION INTRAVENOUS
Status: COMPLETED | OUTPATIENT
Start: 2025-01-09 | End: 2025-01-09

## 2025-01-09 RX ORDER — OXYCODONE HYDROCHLORIDE 10 MG/1
10 TABLET ORAL EVERY 4 HOURS PRN
Status: DISCONTINUED | OUTPATIENT
Start: 2025-01-09 | End: 2025-01-13 | Stop reason: HOSPADM

## 2025-01-09 RX ORDER — HYDROCODONE BITARTRATE AND ACETAMINOPHEN 5; 325 MG/1; MG/1
1 TABLET ORAL EVERY 8 HOURS PRN
Qty: 15 TABLET | Refills: 0 | Status: SHIPPED | OUTPATIENT
Start: 2025-01-09 | End: 2025-01-14

## 2025-01-09 RX ORDER — SODIUM CHLORIDE, SODIUM LACTATE, POTASSIUM CHLORIDE, CALCIUM CHLORIDE 600; 310; 30; 20 MG/100ML; MG/100ML; MG/100ML; MG/100ML
INJECTION, SOLUTION INTRAVENOUS CONTINUOUS
Status: DISCONTINUED | OUTPATIENT
Start: 2025-01-09 | End: 2025-01-10

## 2025-01-09 RX ORDER — IPRATROPIUM BROMIDE AND ALBUTEROL SULFATE 2.5; .5 MG/3ML; MG/3ML
3 SOLUTION RESPIRATORY (INHALATION) EVERY 4 HOURS PRN
Status: DISCONTINUED | OUTPATIENT
Start: 2025-01-09 | End: 2025-01-13 | Stop reason: HOSPADM

## 2025-01-09 RX ORDER — DOCUSATE SODIUM 100 MG/1
100 CAPSULE, LIQUID FILLED ORAL 2 TIMES DAILY
Status: DISCONTINUED | OUTPATIENT
Start: 2025-01-09 | End: 2025-01-13 | Stop reason: HOSPADM

## 2025-01-09 RX ORDER — POLYETHYLENE GLYCOL 3350 17 G/17G
17 POWDER, FOR SOLUTION ORAL 2 TIMES DAILY
Status: DISCONTINUED | OUTPATIENT
Start: 2025-01-09 | End: 2025-01-13 | Stop reason: HOSPADM

## 2025-01-09 RX ORDER — ACETAMINOPHEN 325 MG/1
650 TABLET ORAL EVERY 6 HOURS
Status: DISCONTINUED | OUTPATIENT
Start: 2025-01-10 | End: 2025-01-13 | Stop reason: HOSPADM

## 2025-01-09 RX ORDER — SOTALOL HYDROCHLORIDE 120 MG/1
80 TABLET ORAL EVERY 12 HOURS
COMMUNITY

## 2025-01-09 RX ORDER — GLUCAGON 1 MG
1 KIT INJECTION
Status: DISCONTINUED | OUTPATIENT
Start: 2025-01-09 | End: 2025-01-13 | Stop reason: HOSPADM

## 2025-01-09 RX ORDER — MORPHINE SULFATE 4 MG/ML
4 INJECTION, SOLUTION INTRAMUSCULAR; INTRAVENOUS
Status: COMPLETED | OUTPATIENT
Start: 2025-01-09 | End: 2025-01-09

## 2025-01-09 RX ORDER — ISOSORBIDE MONONITRATE 30 MG/1
30 TABLET, EXTENDED RELEASE ORAL DAILY
Status: DISCONTINUED | OUTPATIENT
Start: 2025-01-10 | End: 2025-01-13 | Stop reason: HOSPADM

## 2025-01-09 RX ORDER — METHOCARBAMOL 500 MG/1
500 TABLET, FILM COATED ORAL EVERY 8 HOURS
Status: DISCONTINUED | OUTPATIENT
Start: 2025-01-09 | End: 2025-01-10

## 2025-01-09 RX ADMIN — METHOCARBAMOL 500 MG: 500 TABLET ORAL at 09:01

## 2025-01-09 RX ADMIN — DOCUSATE SODIUM 100 MG: 100 CAPSULE, LIQUID FILLED ORAL at 09:01

## 2025-01-09 RX ADMIN — SODIUM CHLORIDE, POTASSIUM CHLORIDE, SODIUM LACTATE AND CALCIUM CHLORIDE: 600; 310; 30; 20 INJECTION, SOLUTION INTRAVENOUS at 06:01

## 2025-01-09 RX ADMIN — ONDANSETRON 4 MG: 2 INJECTION INTRAMUSCULAR; INTRAVENOUS at 10:01

## 2025-01-09 RX ADMIN — ACETAMINOPHEN 325MG 650 MG: 325 TABLET ORAL at 11:01

## 2025-01-09 RX ADMIN — OXYCODONE HYDROCHLORIDE 5 MG: 5 TABLET ORAL at 05:01

## 2025-01-09 RX ADMIN — SODIUM CHLORIDE, POTASSIUM CHLORIDE, SODIUM LACTATE AND CALCIUM CHLORIDE 500 ML: 600; 310; 30; 20 INJECTION, SOLUTION INTRAVENOUS at 11:01

## 2025-01-09 RX ADMIN — POLYETHYLENE GLYCOL 3350 17 G: 17 POWDER, FOR SOLUTION ORAL at 09:01

## 2025-01-09 RX ADMIN — MORPHINE SULFATE 4 MG: 4 INJECTION, SOLUTION INTRAMUSCULAR; INTRAVENOUS at 10:01

## 2025-01-09 RX ADMIN — SODIUM ZIRCONIUM CYCLOSILICATE 10 G: 10 POWDER, FOR SUSPENSION ORAL at 11:01

## 2025-01-09 RX ADMIN — IOHEXOL 100 ML: 350 INJECTION, SOLUTION INTRAVENOUS at 01:01

## 2025-01-09 RX ADMIN — OLANZAPINE 5 MG: 5 TABLET, FILM COATED ORAL at 09:01

## 2025-01-09 RX ADMIN — METOPROLOL SUCCINATE 12.5 MG: 25 TABLET, EXTENDED RELEASE ORAL at 09:01

## 2025-01-09 RX ADMIN — ACETAMINOPHEN 650 MG: 325 TABLET ORAL at 05:01

## 2025-01-09 NOTE — ED PROVIDER NOTES
Encounter Date: 1/9/2025    SCRIBE #1 NOTE: I, Trever Goss, am scribing for, and in the presence of,  Lewis Pham MD. I have scribed the entire note.       History     Chief Complaint   Patient presents with    Fall     Via [facility] for fall last night after tripping over blanker. Denies LOC, hitting head. On warfarin. C/o back and R knee pain. GCS 15. Received 100 mcg fent and 4 mg zofran en route.     Patient is a 69 year old female with a hx of A fib, mechanical heart valve (on Coumadin), on chronic 2L oxygen therapy at baseline and DM presents to the ED via EMS following a fall. EMS reports the pt tripped on a blanket and fell to the ground at around 0100 last night. Pt denies hitting her head or losing consciousness. Pt complains of lower back pain and right knee pain. Pt states she takes Warfarin daily. Pt was given 100 mcg Fentanyl and 4 mg Zofran en route.  Patient also reported some nausea EN route.  Denies any chest pain, shortness of breath.    The history is provided by the patient. No  was used.     Review of patient's allergies indicates:   Allergen Reactions    Ace inhibitors     Naproxen sodium     Nifedipine     Venlafaxine      Past Medical History:   Diagnosis Date    A-fib     Anticoagulant long-term use     Depression     Diabetes mellitus     Hyperlipidemia     Sleep apnea      Past Surgical History:   Procedure Laterality Date    AORTIC VALVE REPLACEMENT      CHOLECYSTECTOMY      COLON RESECTION      fusion      LEFT HEART CATHETERIZATION Left 05/18/2023    Procedure: Left heart cath;  Surgeon: Kahlil Martin MD;  Location: [facility] CATH LAB;  Service: Cardiology;  Laterality: Left;  LHC +/- PCI    TONSILLECTOMY       Family History   Problem Relation Name Age of Onset    COPD Mother      Hypertension Mother      Heart disease Father       Social History     Tobacco Use    Smoking status: Every Day     Current packs/day: 1.00     Average packs/day: 1 pack/day for 50.0 years  (50.0 ttl pk-yrs)     Types: Cigarettes    Smokeless tobacco: Never   Substance Use Topics    Alcohol use: Never    Drug use: Never     Review of Systems   Constitutional:  Negative for fever.   HENT:  Negative for sore throat.    Eyes:  Negative for visual disturbance.   Respiratory:  Negative for shortness of breath.    Cardiovascular:  Negative for chest pain.   Gastrointestinal:  Negative for abdominal pain.   Genitourinary:  Negative for dysuria.   Musculoskeletal:  Positive for back pain. Negative for joint swelling.        Right knee pain    Skin:  Negative for rash.   Neurological:  Negative for weakness.   Psychiatric/Behavioral:  Negative for confusion.        Physical Exam     Initial Vitals [01/09/25 0903]   BP Pulse Resp Temp SpO2   (!) 157/87 80 18 97.9 °F (36.6 °C) 95 %      MAP       --         Physical Exam    Nursing note and vitals reviewed.  Constitutional: She appears well-developed and well-nourished. Nasal cannula in place.   HENT:   Head: Normocephalic.   Eyes: EOM are normal. Pupils are equal, round, and reactive to light.   Neck:   Normal range of motion.  Cardiovascular:  Normal rate, regular rhythm and intact distal pulses.           Murmur heard.  Pulmonary/Chest: Breath sounds normal. No respiratory distress. She has no wheezes. She has no rales.   Abdominal: Abdomen is soft. She exhibits no distension. There is no abdominal tenderness. There is no rebound.   Musculoskeletal:         General: Tenderness and edema present. Normal range of motion.      Cervical back: Normal range of motion.      Comments: No C,T or L-spine vertebral point tenderness to palpation, no step-offs, no deformities.  Paraspinal lumbar tenderness to palpation.  Right upper extremity:  Full range of motion of shoulder, elbow, wrist, no deformity or tenderness to palpation.  Left upper extremity: Full range of motion of shoulder, elbow, wrist, no deformity or tenderness to palpation.  Right lower extremity:  Full  range of motion of hip, ankle, no tenderness palpation or deformity noted.  Edema and pain to palpation of the anterior knee.  Left lower extremity:  Full range of motion of hip, knee, ankle, no tenderness palpation or deformity noted.       Neurological: She is alert. She has normal strength. No cranial nerve deficit. GCS score is 15. GCS eye subscore is 4. GCS verbal subscore is 5. GCS motor subscore is 6.   Skin: Skin is warm and dry. Capillary refill takes less than 2 seconds. No rash noted. No erythema.   Psychiatric: She has a normal mood and affect.         ED Course   Critical Care    Date/Time: 1/9/2025 12:15 PM    Performed by: Lewis Pham MD  Authorized by: Lewis Pham MD  Direct patient critical care time: 12 minutes  Additional history critical care time: 6 minutes  Ordering / reviewing critical care time: 5 minutes  Documentation critical care time: 4 minutes  Consulting other physicians critical care time: 5 minutes  Total critical care time (exclusive of procedural time) : 32 minutes  Critical care time was exclusive of separately billable procedures and treating other patients and teaching time.  Critical care was necessary to treat or prevent imminent or life-threatening deterioration of the following conditions: trauma.  Critical care was time spent personally by me on the following activities: development of treatment plan with patient or surrogate, discussions with consultants, interpretation of cardiac output measurements, evaluation of patient's response to treatment, examination of patient, obtaining history from patient or surrogate, ordering and performing treatments and interventions, ordering and review of laboratory studies, ordering and review of radiographic studies, pulse oximetry, re-evaluation of patient's condition and review of old charts.        Labs Reviewed   COMPREHENSIVE METABOLIC PANEL - Abnormal       Result Value    Sodium 139      Potassium 5.2 (*)     Chloride  95 (*)     CO2 33 (*)     Glucose 238 (*)     Blood Urea Nitrogen 29.8 (*)     Creatinine 1.10 (*)     Calcium 9.0      Protein Total 7.9 (*)     Albumin 3.9      Globulin 4.0 (*)     Albumin/Globulin Ratio 1.0 (*)     Bilirubin Total 0.5            ALT 20      AST 44 (*)     eGFR 54      Anion Gap 11.0      BUN/Creatinine Ratio 27     PROTIME-INR - Abnormal    PT 28.5 (*)     INR 2.7 (*)    CBC WITH DIFFERENTIAL - Abnormal    WBC 11.28      RBC 4.84      Hgb 13.3      Hct 44.1      MCV 91.1      MCH 27.5      MCHC 30.2 (*)     RDW 16.8      Platelet 361      MPV 11.1 (*)     Neut % 81.9      Lymph % 11.4      Mono % 5.0      Eos % 0.9      Basophil % 0.4      Imm Grans % 0.4      Neut # 9.23 (*)     Lymph # 1.29      Mono # 0.56      Eos # 0.10      Baso # 0.05      Imm Gran # 0.05 (*)     NRBC% 0.0     APTT - Normal    PTT 32.3     CBC W/ AUTO DIFFERENTIAL    Narrative:     The following orders were created for panel order CBC auto differential.  Procedure                               Abnormality         Status                     ---------                               -----------         ------                     CBC with Differential[[id number]]       Abnormal            Final result                 Please view results for these tests on the individual orders.          Imaging Results              CT Cervical Spine Without Contrast (Final result)  Result time 01/09/25 13:43:15      Final result by Juanito Chopra MD (01/09/25 13:43:15)                   Impression:      No acute fracture or malalignment identified.      Electronically signed by: Juanito Chopra  Date:    01/09/2025  Time:    13:43               Narrative:    EXAMINATION:  CT CERVICAL SPINE WITHOUT CONTRAST    CLINICAL HISTORY:  Trauma.    TECHNIQUE:  Multidetector axial images were performed of the cervical spine without and.  Images were reconstructed.    Automated exposure control was utilized to minimize radiation dose.  DLP  407.    COMPARISON:  None available.    FINDINGS:  There is solid ACDF at C5, C6 and C7.  Cervical vertebrae stature is maintained.  There is grade 1 anterolisthesis of C4 on C5 and C7 on T1 no acute fracture or malalignment identified.  There are degenerative changes which cause mild narrowing of left neural foramen at C4-C5.  There is no prevertebral soft tissue prominence.    This study does not exclude the possibility of intrathecal soft tissue, ligamentous or vascular injury.                                       CT Chest Abdomen Pelvis With IV Contrast (XPD) NO Oral Contrast (Final result)  Result time 01/09/25 13:48:46      Final result by Dennis Chang MD (01/09/25 13:48:46)                   Impression:      1. Mild L1 superior endplate compression deformity is more conspicuous compared to the CT performed earlier today.  2. Otherwise no acute findings chest, abdomen or pelvis.      Electronically signed by: Dennis Chang  Date:    01/09/2025  Time:    13:48               Narrative:    EXAMINATION:  CT CHEST ABDOMEN PELVIS WITH IV CONTRAST (XPD)    CLINICAL HISTORY:  Polytrauma, blunt;    TECHNIQUE:  Helical acquisition from the thoracic inlet through the ischia with  IV contrast. Three plane reconstructions made available for review.  mGycm. Automatic exposure control, adjustment of mA/kV or iterative reconstruction technique was used to reduce radiation.    COMPARISON:  CT earlier today, 20 December 2024, 25 August 2014    FINDINGS:  Chest.    Heart is not enlarged.  No pericardial effusion.  There are coronary artery calcifications.  Dense aortic atherosclerosis.  Mildly dilated ascending thoracic aorta measuring 4 cm.    No mediastinal hematoma.  There is no thoracic adenopathy.    There is no pneumothorax or pleural effusion.  Mild bibasilar atelectasis or scarring.    Abdomen and pelvis.    There is no significant abnormality of the liver, spleen, pancreas or adrenals.  The gallbladder is  surgically absent.  No hydronephrosis or suspicious renal lesion.    No bowel obstruction.  There is colonic diverticulosis.  There is a suture line upper rectum.  Moderate stool in the colon.  No free air    Urinary bladder is unremarkable.  No pelvic free fluid.  Abdominal aorta is normal in caliber.  Dense atherosclerotic disease.    There is a mild L1 superior endplate compression deformity which is more conspicuous compared to the CT performed earlier today.  Otherwise no acute osseous findings.                                       CT Lumbar Spine Without Contrast (Final result)  Result time 01/09/25 10:40:04      Final result by Phoenix Albert MD (01/09/25 10:40:04)                   Impression:      No acute fracture or subluxation.    Mild multilevel lumbar degeneration as detailed above.      Electronically signed by: Phoenix Albert  Date:    01/09/2025  Time:    10:40               Narrative:    EXAMINATION:  CT LUMBAR SPINE WITHOUT CONTRAST    CLINICAL HISTORY:  Low back pain, trauma;Back trauma, no prior imaging (Age >= 16y);    TECHNIQUE:  Low-dose axial, sagittal and coronal reformations are obtained through the lumbar spine. 3D reconstructions were performed. Contrast was not administered. Dose reduction techniques including automatic exposure control (AEC) were utilized.    Dose (DLP): 1060 mGycm    COMPARISON:  CT abdomen and pelvis 08/25/2014.    FINDINGS:  Five lumbar-type vertebral bodies.    Vertebral column alignment is normal.  No fractures demonstrated.  Vertebral body heights are preserved.    Mild multilevel lumbar disc degeneration.  Left foraminal disc protrusion at L3-L4 causing mild left foraminal stenosis encroaching upon the exiting left L3 nerve.  Shallow disc bulges at L1-L2 through L4-5 which indent the ventral thecal sac.  No significant central canal stenosis.    Paravertebral soft tissues are normal.    Cholecystectomy.  Calcific atherosclerosis of the abdominal aorta and its  branches.  Anastomotic sutures are noted in the sigmoid colon.  Diverticulosis of the descending and sigmoid colon.                                       CT Head Without Contrast (Final result)  Result time 01/09/25 10:37:48      Final result by Wood Fortune MD (01/09/25 10:37:48)                   Narrative:    EXAMINATION  CT HEAD WITHOUT CONTRAST    CLINICAL HISTORY  fall, on thinners, nausea/vomiting;    TECHNIQUE  Axial non-contrast CT images of the head were acquired and multiplanar reconstructions accomplished by a CT technologist at a separate workstation, pushed to PACS for physician review.    COMPARISON  18 February 2023    FINDINGS  Images were reviewed in subdural, brain, soft tissue, and bone windows.    Exam quality: Motion/streak artifact limits assessment of the posterior fossa.    Hemorrhage: No evidence of acute hyperattenuating blood products.    Parenchyma: There is diffuse bilateral supratentorial white matter hypoattenuation, typical of chronic microvascular changes. No discrete mass, mass effect, or CT evidence of acute large vascular territory insult. Gray-white differentiation is preserved. No midline shift is evident.    CSF spaces: Proportional appearance of ventricular and sulcal enlargement. No hydrocephalus. No masses or fluid collections.    Other findings: No focally hyperdense artery. Scattered carotid siphon calcifications are present. No abnormal densities within the dural sinuses. No abnormalities of the scalp or subjacent osseous structures. Mastoids are well aerated. No focal abnormality of the sella. The included facial structures are unremarkable.    IMPRESSION  1. No convincing acute intracranial abnormality.  2. Chronic age-related changes again noted.  3. Additional secondary details discussed above, relatively unchanged from the comparison CT appearance.    RADIATION DOSE  Automated tube current modulation, weight-based exposure dosing, and/or iterative reconstruction  technique utilized to reach lowest reasonably achievable exposure rate.    DLP: 1012 mGy*cm      Electronically signed by: Wood Fortune  Date:    01/09/2025  Time:    10:37                                     X-Ray Knee Complete 4 Or More Views Right (Final result)  Result time 01/09/25 10:13:37      Final result by Norma Leblanc MD (01/09/25 10:13:37)                   Impression:      Nondisplaced mid patellar fracture with small joint effusion.      Electronically signed by: Norma Leblanc  Date:    01/09/2025  Time:    10:13               Narrative:    EXAMINATION:  XR KNEE COMP 4 OR MORE VIEWS RIGHT    CLINICAL HISTORY:  Unspecified fall, initial encounter    COMPARISON:  None.    FINDINGS:  There is a nondisplaced fracture through the mid patella.  There is a small knee joint effusion.  Vascular calcifications are noted.                                       X-Ray Pelvis Routine AP (Final result)  Result time 01/09/25 10:12:35      Final result by Norma Leblanc MD (01/09/25 10:12:35)                   Impression:      No acute bony abnormality.      Electronically signed by: Norma Leblanc  Date:    01/09/2025  Time:    10:12               Narrative:    EXAMINATION:  XR PELVIS ROUTINE AP    CLINICAL HISTORY:  pain;    COMPARISON:  None.    FINDINGS:  There is no displaced fracture identified.  Surgical clips project over the left hip.                                       Medications   morphine injection 4 mg (4 mg Intravenous Given 1/9/25 1054)   ondansetron injection 4 mg (4 mg Intravenous Given 1/9/25 1054)   lactated ringers bolus 500 mL (500 mLs Intravenous New Bag 1/9/25 1137)   sodium zirconium cyclosilicate packet 10 g (10 g Oral Given 1/9/25 1137)   iohexoL (OMNIPAQUE 350) injection 100 mL (100 mLs Intravenous Given 1/9/25 1328)     Medical Decision Making  Judging by the patient's chief complaint and pertinent history, the patient has the following possible differential diagnoses,  including but not limited to the following.  Some of these are deemed to be lower likelihood and some more likely based on my physical exam and history combined with possible lab work and/or imaging studies.   Please see the pertinent studies, and refer to the HPI.  Some of these diagnoses will take further evaluation to fully rule out, perhaps as an outpatient and the patient was encouraged to follow up when discharged for more comprehensive evaluation.      abrasion, contusion, fracture, traumatic ICH, TBI, concussion, spinal injury, fracture,  hemorrhage, laceration       Patient is a 69-year-old female presents to emergency department after mechanical trip and fall earlier this morning.  See HPI.  See physical exam.  He is on Coumadin.  INR of 2.7.  Had some nausea and route.  Was given Zofran.  Has a result CT of the head obtained without any acute abnormalities.  Patient complaining of lower back pain.  CT of the lumbar spine without acute abnormalities.  Right knee tenderness to palpation.  Effusion noted.  Imaging obtained which showed patellar fracture.  Discussed with Orthopedic surgery.  Placed in knee immobilizer.  Weightbearing as tolerated while in extension.  Discussed all results with the patient and family.  Discussed need for follow-up with orthopedic surgery.  Discussed return precautions.  Answered all questions at this time.  Hemodynamically stable for continued outpatient management strict return precautions.  Attempted to discharge the patient.  Unfortunately she is not able to take it to a standing position.  Complaining of worsening pain.  Has a result additional scans ordered.  Possible L1 compression fracture.  Discussed with Trauma surgery who will evaluate the patient for admission.    Problems Addressed:  Acute pain of right knee: acute illness or injury that poses a threat to life or bodily functions  Back pain: acute illness or injury that poses a threat to life or bodily  functions  Closed nondisplaced transverse fracture of right patella, initial encounter: acute illness or injury that poses a threat to life or bodily functions  Fall: acute illness or injury that poses a threat to life or bodily functions    Amount and/or Complexity of Data Reviewed  External Data Reviewed: notes.  Labs: ordered.  Radiology: ordered.  Discussion of management or test interpretation with external provider(s): Discussed with Dr. Young, orthopedic surgery.         Risk  Prescription drug management.  Parenteral controlled substances.  Decision regarding hospitalization.             Scribe Attestation:   Scribe #1: I performed the above scribed service and the documentation accurately describes the services I performed. I attest to the accuracy of the note.    Attending Attestation:           Physician Attestation for Scribe:  Physician Attestation Statement for Scribe #1: I, Lewis Pham MD, reviewed documentation, as scribed by Trever Goss in my presence, and it is both accurate and complete.             ED Course as of 01/09/25 1446   Thu Jan 09, 2025   1205 Discussed with Orthopedic surgery, Dr. Young.  Fracture nonoperative.  In knee immobilizer, follow-up outpatient. [RP]   5842 I have attempted to discharge the patient.  Unfortunately she has not been able to stand or move around even with crutches.  She may require placement.  She is also complaining of upper back pain now.  Has a result CTA of the chest abdomen pelvis obtained.  L1 fracture noted.  Will discuss with Trauma surgery. [RP]      ED Course User Index  [RP] Lewis Pham MD                           Clinical Impression:  Final diagnoses:  [M54.9] Back pain  [W19.XXXA] Fall  [S82.034A] Closed nondisplaced transverse fracture of right patella, initial encounter (Primary)  [M25.561] Acute pain of right knee  [S32.010A] Compression fracture of L1 vertebra, initial encounter          ED Disposition Condition    Admit Stable                   Lewis Pham MD  01/09/25 1242       Lewis Pham MD  01/09/25 1243       Lewis Pham MD  01/09/25 1446       Lewis Pham MD  01/22/25 0609

## 2025-01-09 NOTE — HPI
69F trip and fall at home. Has primarily back pain with some R knee pain. On Home O2. On Coumadin for mechanical valve. Initial CT lumbar was negative for acute injury. Then got pan scanned and noted to acute acute spine fracture. Pain controlled while flat but significant pain while trying to ambulate. Family at bedside. +Smoker.   PMH:  Acute on chronic diastolic HF  Pulmonary edema secondary to HF   LESLI not on CPAP   HTN, benign   PAF  CAD  DM2

## 2025-01-09 NOTE — ASSESSMENT & PLAN NOTE
Admit  PT/OT once able per neurosurgery  Per neurosurgery, no MRI, upright Xrs in AM  Brace?  [facility]  Holding lovenox for now

## 2025-01-09 NOTE — H&P
[facility] - Emergency Dept  Trauma  History & Physical     Patient Name: Kayla Ruiz  MRN: [id number]  Code Status: Full Code  Admission Date: 1/9/2025  Hospital Length of Stay: 0 days  Attending Physician: Lewis Pham MD  Primary Care Provider: Jennifer Chavez NP-C    Patient information was obtained from patient, EMS personnel, and ER records.     Consults  Subjective:     Principal Problem: <principal problem not specified>    History of Present Illness: 69F trip and fall at home. Has primarily back pain with some R knee pain. On Home O2. On Coumadin for mechanical valve. Initial CT lumbar was negative for acute injury. Then got pan scanned and noted to acute acute spine fracture. Pain controlled while flat but significant pain while trying to ambulate. Family at bedside. +Smoker.   PMH:  Acute on chronic diastolic HF  Pulmonary edema secondary to HF   LESLI not on CPAP   HTN, benign   PAF  CAD  DM2      Current Facility-Administered Medications on File Prior to Encounter   Medication    0.9%  NaCl infusion    diazePAM tablet 10 mg    diphenhydrAMINE capsule 50 mg     Current Outpatient Medications on File Prior to Encounter   Medication Sig    albuterol-ipratropium (DUO-NEB) 2.5 mg-0.5 mg/3 mL nebulizer solution Take 3 mLs by nebulization every 4 (four) hours as needed for Wheezing or Shortness of Breath.    aspirin (ECOTRIN) 81 MG EC tablet Take 81 mg by mouth once daily.    atorvastatin (LIPITOR) 40 MG tablet Take 40 mg by mouth once daily.    clonazePAM (KLONOPIN) 1 MG tablet Take 0.5 mg by mouth 3 (three) times daily as needed for Anxiety.    dapagliflozin propanediol (FARXIGA) 10 mg tablet Take 1 tablet (10 mg total) by mouth once daily.    ergocalciferol (ERGOCALCIFEROL) 50,000 unit Cap Take 1 capsule (50,000 Units total) by mouth every 7 days.    FLUoxetine 40 MG capsule Take 40 mg by mouth once daily.    furosemide (LASIX) 40 MG tablet Take 1 tablet (40 mg total) by mouth 2  (two) times daily.    gabapentin (NEURONTIN) 100 MG capsule Take 100 mg by mouth 3 (three) times daily.    isosorbide mononitrate (IMDUR) 30 MG 24 hr tablet Take 1 tablet (30 mg total) by mouth once daily.    losartan (COZAAR) 25 MG tablet Take 1 tablet (25 mg total) by mouth once daily.    metoprolol succinate (TOPROL-XL) 25 MG 24 hr tablet Take 0.5 tablets (12.5 mg total) by mouth 2 (two) times daily.    MOUNJARO 15 mg/0.5 mL PnIj Inject 15 mg into the skin every 7 days.    nicotine (NICODERM CQ) 14 mg/24 hr Place 1 patch onto the skin once daily.    OLANZapine (ZYPREXA) 5 MG tablet Take 5 mg by mouth every evening.    spironolactone (ALDACTONE) 25 MG tablet Take 1 tablet (25 mg total) by mouth once daily.    traZODone (DESYREL) 50 MG tablet Take 25-50 mg by mouth nightly as needed.    warfarin (COUMADIN) 5 MG tablet Take 5 mg by mouth Daily.       Review of patient's allergies indicates:   Allergen Reactions    Ace inhibitors     Naproxen sodium     Nifedipine     Venlafaxine        Past Medical History:   Diagnosis Date    A-fib     Anticoagulant long-term use     Depression     Diabetes mellitus     Hyperlipidemia     Sleep apnea      Past Surgical History:   Procedure Laterality Date    AORTIC VALVE REPLACEMENT      CHOLECYSTECTOMY      COLON RESECTION      fusion      LEFT HEART CATHETERIZATION Left 05/18/2023    Procedure: Left heart cath;  Surgeon: Kahlil Martin MD;  Location: [facility] CATH LAB;  Service: Cardiology;  Laterality: Left;  LHC +/- PCI    TONSILLECTOMY       Family History       Problem Relation (Age of Onset)    COPD Mother    Heart disease Father    Hypertension Mother          Tobacco Use    Smoking status: Every Day     Current packs/day: 1.00     Average packs/day: 1 pack/day for 50.0 years (50.0 ttl pk-yrs)     Types: Cigarettes    Smokeless tobacco: Never   Substance and Sexual Activity    Alcohol use: Never    Drug use: Never    Sexual activity: Not on file     Review of Systems    Constitutional:  Negative for chills and fever.   HENT:  Negative for ear pain and trouble swallowing.    Eyes:  Negative for pain and redness.   Respiratory:  Positive for cough and shortness of breath. Negative for chest tightness.         SOB/cough chronic.    Cardiovascular:  Negative for chest pain, palpitations and leg swelling.   Gastrointestinal:  Negative for abdominal distention, abdominal pain, nausea and vomiting.   Genitourinary:  Negative for difficulty urinating.   Musculoskeletal:  Positive for arthralgias, back pain, gait problem and myalgias. Negative for neck pain.   Skin:  Negative for color change, pallor and wound.   Neurological:  Negative for dizziness, syncope, speech difficulty, weakness, light-headedness, numbness and headaches.   Psychiatric/Behavioral:  Negative for agitation and suicidal ideas.    All other systems reviewed and are negative.    Objective:     Vital Signs (Most Recent):  Temp: 97.9 °F (36.6 °C) (01/09/25 0903)  Pulse: (!) 53 (01/09/25 1004)  Resp: 16 (01/09/25 1054)  BP: (!) 140/67 (01/09/25 1004)  SpO2: 99 % (01/09/25 1004) Vital Signs (24h Range):  Temp:  [97.9 °F (36.6 °C)] 97.9 °F (36.6 °C)  Pulse:  [53-80] 53  Resp:  [14-18] 16  SpO2:  [93 %-99 %] 99 %  BP: (140-169)/(67-87) 140/67     Weight: 97.5 kg (215 lb)  Body mass index is 34.7 kg/m².     Physical Exam  Constitutional:       Appearance: Normal appearance.   HENT:      Head: Normocephalic and atraumatic.      Nose: Nose normal.   Eyes:      Pupils: Pupils are equal, round, and reactive to light.   Cardiovascular:      Rate and Rhythm: Normal rate.      Pulses: Normal pulses.      Comments: Normal peripheral pulses  Pulmonary:      Effort: No respiratory distress.   Chest:      Chest wall: No tenderness.   Abdominal:      General: Abdomen is flat. Bowel sounds are normal. There is no distension.      Palpations: Abdomen is soft.      Tenderness: There is no abdominal tenderness.   Musculoskeletal:          General: Tenderness present. No swelling, deformity or signs of injury.      Cervical back: Normal range of motion and neck supple. No tenderness.   Skin:     General: Skin is warm and dry.      Capillary Refill: Capillary refill takes less than 2 seconds.      Findings: No lesion.   Neurological:      General: No focal deficit present.      Mental Status: She is alert and oriented to person, place, and time. Mental status is at baseline.   Psychiatric:         Mood and Affect: Mood normal.         Behavior: Behavior normal.         Thought Content: Thought content normal.         Judgment: Judgment normal.            I have reviewed all pertinent lab results within the past 24 hours.    Significant Diagnostics:  I have reviewed all pertinent imaging results/findings within the past 24 hours.    Assessment/Plan:     Right patella fracture  Per Ortho: non-op, outpatient follow up, WB in full extension, no ROM    Closed fracture of lumbar vertebra  Admit  PT/OT once able per neurosurgery  Per neurosurgery, no MRI, upright Xrs in AM  Brace?  [facility]  Holding lovenox for now        VTE Risk Mitigation (From admission, onward)           Ordered     IP VTE HIGH RISK PATIENT  Once         01/09/25 1604     Place sequential compression device  Until discontinued         01/09/25 1604                    Thank you for your consult.     Dandre Song, DONAVON  General Surgery  [facility] - Emergency Dept

## 2025-01-09 NOTE — DISCHARGE INSTRUCTIONS
Follow-up with your primary care physician.      Keep knee in knee immobilizer.      Use crutches as provided.      You may take Norco as needed for pain.      Return to the emergency department if any new or worsening symptoms, chest pain, shortness of breath, nausea, vomiting, or any other concerns.

## 2025-01-09 NOTE — CONSULTS
[facility] - Emergency Dept  Neurosurgery  Consult Note    Inpatient consult to Neurosurgery  Consult performed by: Varsha Ritter AGACNP-BC  Consult ordered by: Lewis Pham MD        Subjective:     Chief Complaint/Reason for Admission:  Status post fall last night.  Tripped over blanket.    History of Present Illness:  This is a very pleasant 69-year-old  female with a history of atrial fibrillation, mechanical heart valve, on warfarin, on oxygen at home, diabetes had a trip and fall over a blanket last night at home.  She also has history of lumbar fusion with surgeon in [place].  Initial complaints of lower back pain and right knee pain.    CT chest abdomen pelvis with IV contrast 01/09/2025: Mild L1 superior endplate compression deformity.      CT cervical spine unrevealing.      CT lumbar spine:  Mild multilevel lumbar degeneration.  No acute fracture.      CT head unrevealing for acute process.        On physical exam patient is very pleasant.  Fully oriented with a GCS of 15.  States she unfortunately tripped and fell over her blanket.  Unfortunately she has a patellar fracture on the right and has a brace on that leg at this point.  She does have mild to moderate lower back pain.  She tells me she has had previous lumbar surgery with a surgeon from [place].  Otherwise motor and sensory are intact to all extremities.  No paresthesias.    (Not in a hospital admission)      Review of patient's allergies indicates:   Allergen Reactions    Ace inhibitors     Naproxen sodium     Nifedipine     Venlafaxine        Past Medical History:   Diagnosis Date    A-fib     Anticoagulant long-term use     Depression     Diabetes mellitus     Hyperlipidemia     Sleep apnea      Past Surgical History:   Procedure Laterality Date    AORTIC VALVE REPLACEMENT      CHOLECYSTECTOMY      COLON RESECTION      fusion      LEFT HEART CATHETERIZATION Left 05/18/2023    Procedure: Left heart cath;   Surgeon: Kahlil Martin MD;  Location: [facility] CATH LAB;  Service: Cardiology;  Laterality: Left;  LHC +/- PCI    TONSILLECTOMY       Family History       Problem Relation (Age of Onset)    COPD Mother    Heart disease Father    Hypertension Mother          Tobacco Use    Smoking status: Every Day     Current packs/day: 1.00     Average packs/day: 1 pack/day for 50.0 years (50.0 ttl pk-yrs)     Types: Cigarettes    Smokeless tobacco: Never   Substance and Sexual Activity    Alcohol use: Never    Drug use: Never    Sexual activity: Not on file     Review of Systems   Musculoskeletal:  Positive for back pain.     Objective:     Weight: 97.5 kg (215 lb)  Body mass index is 34.7 kg/m².  Vital Signs (Most Recent):  Temp: 97.9 °F (36.6 °C) (01/09/25 0903)  Pulse: (!) 53 (01/09/25 1004)  Resp: 16 (01/09/25 1054)  BP: (!) 140/67 (01/09/25 1004)  SpO2: 99 % (01/09/25 1004) Vital Signs (24h Range):  Temp:  [97.9 °F (36.6 °C)] 97.9 °F (36.6 °C)  Pulse:  [53-80] 53  Resp:  [14-18] 16  SpO2:  [93 %-99 %] 99 %  BP: (140-169)/(67-87) 140/67                              Physical Exam:  Nursing note and vitals reviewed.    Constitutional: She appears well-developed and well-nourished. She is not diaphoretic. No distress.     Eyes: Pupils are equal, round, and reactive to light. Conjunctivae and EOM are normal.     Cardiovascular: Normal rate.     Abdominal: Soft.     Skin: Skin displays no rash on trunk. Skin displays no lesions on trunk.     Psych/Behavior: She is alert. She is oriented to person, place, and time. She has a normal mood and affect.     Musculoskeletal:        Back: There is tenderness. Tenderness is located Lumbar region.        Right Upper Extremities: Muscle strength is 5/5.        Left Upper Extremities: Muscle strength is 5/5.       Right Lower Extremities: Range of motion is limited. Muscle strength is 5/5.        Left Lower Extremities: Muscle strength is 5/5.      Comments: 5/5 to all extremities, sensation  intact  No paresthesias    No Colmenares's  No clonus  Negative Babinski  No saddle anesthesia    Mild-to-moderate lower back pain on initiation of movement     Neurological:        Sensory: There is no sensory deficit in the trunk. There is no sensory deficit in the extremities.        DTRs: DTRs are DTRS NORMAL AND SYMMETRICnormal and symmetric. She displays no Babinski's sign on the right side. She displays no Babinski's sign on the left side.        Cranial nerves: Cranial nerve(s) II, III, IV, V, VI, VII, VIII, IX, X, XI and XII are intact.   GCS 15   Awake and conversant       Significant Labs:  Recent Labs   Lab 01/09/25  0959      K 5.2*   CL 95*   CO2 33*   BUN 29.8*   CREATININE 1.10*   CALCIUM 9.0     Recent Labs   Lab 01/09/25  0959   WBC 11.28   HGB 13.3   HCT 44.1        Recent Labs   Lab 01/09/25  0959   INR 2.7*   APTT 32.3     Microbiology Results (last 7 days)       ** No results found for the last 168 hours. **              Assessment/Plan:    TLSO brace   PTOT with brace   Pain control   Upright x-rays in brace.    Fall precautions   SCDs   No neurosurgical interventions indicated         Active Diagnoses:    Diagnosis Date Noted POA    Closed fracture of lumbar vertebra [S32.009A] 01/09/2025 Yes    Right patella fracture [S82.001A] 01/09/2025 Yes      Problems Resolved During this Admission:       Thank you for your consult. I will follow-up with patient. Please contact us if you have any additional questions.    Varsha Ritter, [facility]-BC  Neurosurgery  [facility] - Emergency Dept

## 2025-01-09 NOTE — CONSULTS
[facility] - Emergency Dept  Orthopedics  Consult Note    Patient Name: Kayla Ruiz  MRN: [id number]  Admission Date: 1/9/2025  Hospital Length of Stay: 0 days  Attending Provider: Lewis Pham MD  Primary Care Provider: Jennifer Chavez NP-C        Inpatient consult to Orthopedic Surgery  Consult performed by: Geovanna Damon PA-C  Consult ordered by: Dandre Song FNP        Subjective:     Principal Problem:<principal problem not specified>    Chief Complaint:   Chief Complaint   Patient presents with    Fall     Via [facility] for fall last night after tripping over blanker. Denies LOC, hitting head. On warfarin. C/o back and R knee pain. GCS 15. Received 100 mcg fent and 4 mg zofran en route.        HPI:  69-year-old female presents to the emergency department for trip and fall at home.  On exam, she complains of right knee and low back pain.  Neurosurgery consulted for low back pain.  Patient reports her right knee is only extremity that hurts.  Gross motor intact all of her extremities.  No other complaints at this time.  Her  is at bedside.    Social history positive for smoking  Per chart review, medical history includes heart failure, sleep apnea, hypertension, CAD, type 2 diabetes    Past Medical History:   Diagnosis Date    A-fib     Anticoagulant long-term use     Depression     Diabetes mellitus     Hyperlipidemia     Sleep apnea        Past Surgical History:   Procedure Laterality Date    AORTIC VALVE REPLACEMENT      CHOLECYSTECTOMY      COLON RESECTION      fusion      LEFT HEART CATHETERIZATION Left 05/18/2023    Procedure: Left heart cath;  Surgeon: Kahlil Martin MD;  Location: [facility] CATH LAB;  Service: Cardiology;  Laterality: Left;  LHC +/- PCI    TONSILLECTOMY         Review of patient's allergies indicates:   Allergen Reactions    Ace inhibitors     Naproxen sodium     Nifedipine     Venlafaxine        Current Facility-Administered Medications   Medication     acetaminophen tablet 650 mg    dextrose 50% injection 12.5 g    dextrose 50% injection 25 g    docusate sodium capsule 100 mg    gabapentin capsule 300 mg    glucagon (human recombinant) injection 1 mg    glucose chewable tablet 16 g    glucose chewable tablet 24 g    insulin aspart U-100 injection 0-5 Units    lactated ringers infusion    magnesium hydroxide 400 mg/5 ml suspension 2,400 mg    melatonin tablet 6 mg    methocarbamoL tablet 500 mg    oxyCODONE immediate release tablet 5 mg    oxyCODONE immediate release tablet Tab 10 mg    polyethylene glycol packet 17 g     Current Outpatient Medications   Medication Sig    albuterol-ipratropium (DUO-NEB) 2.5 mg-0.5 mg/3 mL nebulizer solution Take 3 mLs by nebulization every 4 (four) hours as needed for Wheezing or Shortness of Breath.    aspirin (ECOTRIN) 81 MG EC tablet Take 81 mg by mouth once daily.    atorvastatin (LIPITOR) 40 MG tablet Take 40 mg by mouth once daily.    clonazePAM (KLONOPIN) 1 MG tablet Take 0.5 mg by mouth 3 (three) times daily as needed for Anxiety.    dapagliflozin propanediol (FARXIGA) 10 mg tablet Take 1 tablet (10 mg total) by mouth once daily.    ergocalciferol (ERGOCALCIFEROL) 50,000 unit Cap Take 1 capsule (50,000 Units total) by mouth every 7 days.    FLUoxetine 40 MG capsule Take 40 mg by mouth once daily.    furosemide (LASIX) 40 MG tablet Take 1 tablet (40 mg total) by mouth 2 (two) times daily.    gabapentin (NEURONTIN) 100 MG capsule Take 100 mg by mouth 3 (three) times daily.    HYDROcodone-acetaminophen (NORCO) 5-325 mg per tablet Take 1 tablet by mouth every 8 (eight) hours as needed for Pain.    isosorbide mononitrate (IMDUR) 30 MG 24 hr tablet Take 1 tablet (30 mg total) by mouth once daily.    losartan (COZAAR) 25 MG tablet Take 1 tablet (25 mg total) by mouth once daily.    metoprolol succinate (TOPROL-XL) 25 MG 24 hr tablet Take 0.5 tablets (12.5 mg total) by mouth 2 (two) times daily.    MOUNJARO 15 mg/0.5 mL PnIj  "Inject 15 mg into the skin every 7 days.    nicotine (NICODERM CQ) 14 mg/24 hr Place 1 patch onto the skin once daily.    OLANZapine (ZYPREXA) 5 MG tablet Take 5 mg by mouth every evening.    spironolactone (ALDACTONE) 25 MG tablet Take 1 tablet (25 mg total) by mouth once daily.    traZODone (DESYREL) 50 MG tablet Take 25-50 mg by mouth nightly as needed.    warfarin (COUMADIN) 5 MG tablet Take 5 mg by mouth Daily.     Facility-Administered Medications Ordered in Other Encounters   Medication    0.9%  NaCl infusion    diazePAM tablet 10 mg    diphenhydrAMINE capsule 50 mg     Family History       Problem Relation (Age of Onset)    COPD Mother    Heart disease Father    Hypertension Mother          Tobacco Use    Smoking status: Every Day     Current packs/day: 1.00     Average packs/day: 1 pack/day for 50.0 years (50.0 ttl pk-yrs)     Types: Cigarettes    Smokeless tobacco: Never   Substance and Sexual Activity    Alcohol use: Never    Drug use: Never    Sexual activity: Not on file     ROS  Objective:     Vital Signs (Most Recent):  Temp: 97.9 °F (36.6 °C) (01/09/25 0903)  Pulse: (!) 53 (01/09/25 1004)  Resp: 16 (01/09/25 1054)  BP: (!) 140/67 (01/09/25 1004)  SpO2: 99 % (01/09/25 1004) Vital Signs (24h Range):  Temp:  [97.9 °F (36.6 °C)] 97.9 °F (36.6 °C)  Pulse:  [53-80] 53  Resp:  [14-18] 16  SpO2:  [93 %-99 %] 99 %  BP: (140-169)/(67-87) 140/67     Weight: 97.5 kg (215 lb)  Height: 5' 6" (167.6 cm)  Body mass index is 34.7 kg/m².    No intake or output data in the 24 hours ending 01/09/25 1637    Ortho/SPM Exam  General the patient is alert and oriented x3 no acute distress nontoxic-appearing appropriate affect.    Constitutional: Vital signs are examined and stable.  Resp: No signs of labored breathing                      RLE: -Skin:  Knee immobilizer CDI, No signs of abrasions or lacerations to the knee or foot           -MSK: Gastroc/Tib anterior motor intact           -Neuro:  Sensation intact to light " touch L3-S1 dermatomes           -Lymphatic:  Nonpitting edema to anterior knee over patella           -CV: DP/PT pulses  palpable. Compartments soft and compressible.      Significant Labs: CBC:   Recent Labs   Lab 01/09/25  0959   WBC 11.28   HGB 13.3   HCT 44.1        CMP:   Recent Labs   Lab 01/09/25 0959      K 5.2*   CL 95*   CO2 33*   BUN 29.8*   CREATININE 1.10*   CALCIUM 9.0   ALBUMIN 3.9   BILITOT 0.5   ALKPHOS 133   AST 44*   ALT 20     All pertinent labs within the past 24 hours have been reviewed.    Significant Imaging: I have reviewed all pertinent imaging results/findings.     Assessment/Plan:     Active Diagnoses:    Diagnosis Date Noted POA    Closed fracture of lumbar vertebra [S32.009A] 01/09/2025 Yes    Right patella fracture [S82.001A] 01/09/2025 Yes      Problems Resolved During this Admission:     Patient has sustained a nondisplaced right patella fracture that is closed.  Injury does not require surgical intervention at this time.  Patient currently in knee immobilizer.  Plan to switch to hinged brace tomorrow when DME specialist on site and able to fit patient.      Patient may weightbear as tolerated in extension and in brace for right lower extremity.  No range of motion to the right knee at this time.  Range of motion as tolerated to the hip, ankle, digits of the right lower extremity.      Monitor swelling.  Multimodal pain control suggested.  Patient will need to follow up in clinic in a couple of weeks for re-evaluation and imaging.  Depending how patient is recovering, may progress range of motion at that outpatient appointment.    Pt has acute injury with risk of severe bodily function with their injury.     The above findings, diagnostics, and treatment plan were discussed with Dr. Young who is in agreement with the plan of care except as stated in additional documentation.       Geovanna Damon PA-C  Orthopedic Trauma Surgery  [facility] - Emergency  Dept

## 2025-01-09 NOTE — SUBJECTIVE & OBJECTIVE
Current Facility-Administered Medications on File Prior to Encounter   Medication    0.9%  NaCl infusion    diazePAM tablet 10 mg    diphenhydrAMINE capsule 50 mg     Current Outpatient Medications on File Prior to Encounter   Medication Sig    albuterol-ipratropium (DUO-NEB) 2.5 mg-0.5 mg/3 mL nebulizer solution Take 3 mLs by nebulization every 4 (four) hours as needed for Wheezing or Shortness of Breath.    aspirin (ECOTRIN) 81 MG EC tablet Take 81 mg by mouth once daily.    atorvastatin (LIPITOR) 40 MG tablet Take 40 mg by mouth once daily.    clonazePAM (KLONOPIN) 1 MG tablet Take 0.5 mg by mouth 3 (three) times daily as needed for Anxiety.    dapagliflozin propanediol (FARXIGA) 10 mg tablet Take 1 tablet (10 mg total) by mouth once daily.    ergocalciferol (ERGOCALCIFEROL) 50,000 unit Cap Take 1 capsule (50,000 Units total) by mouth every 7 days.    FLUoxetine 40 MG capsule Take 40 mg by mouth once daily.    furosemide (LASIX) 40 MG tablet Take 1 tablet (40 mg total) by mouth 2 (two) times daily.    gabapentin (NEURONTIN) 100 MG capsule Take 100 mg by mouth 3 (three) times daily.    isosorbide mononitrate (IMDUR) 30 MG 24 hr tablet Take 1 tablet (30 mg total) by mouth once daily.    losartan (COZAAR) 25 MG tablet Take 1 tablet (25 mg total) by mouth once daily.    metoprolol succinate (TOPROL-XL) 25 MG 24 hr tablet Take 0.5 tablets (12.5 mg total) by mouth 2 (two) times daily.    MOUNJARO 15 mg/0.5 mL PnIj Inject 15 mg into the skin every 7 days.    nicotine (NICODERM CQ) 14 mg/24 hr Place 1 patch onto the skin once daily.    OLANZapine (ZYPREXA) 5 MG tablet Take 5 mg by mouth every evening.    spironolactone (ALDACTONE) 25 MG tablet Take 1 tablet (25 mg total) by mouth once daily.    traZODone (DESYREL) 50 MG tablet Take 25-50 mg by mouth nightly as needed.    warfarin (COUMADIN) 5 MG tablet Take 5 mg by mouth Daily.       Review of patient's allergies indicates:   Allergen Reactions    Ace inhibitors      Naproxen sodium     Nifedipine     Venlafaxine        Past Medical History:   Diagnosis Date    A-fib     Anticoagulant long-term use     Depression     Diabetes mellitus     Hyperlipidemia     Sleep apnea      Past Surgical History:   Procedure Laterality Date    AORTIC VALVE REPLACEMENT      CHOLECYSTECTOMY      COLON RESECTION      fusion      LEFT HEART CATHETERIZATION Left 05/18/2023    Procedure: Left heart cath;  Surgeon: Kahlil Martin MD;  Location: [facility] CATH LAB;  Service: Cardiology;  Laterality: Left;  LHC +/- PCI    TONSILLECTOMY       Family History       Problem Relation (Age of Onset)    COPD Mother    Heart disease Father    Hypertension Mother          Tobacco Use    Smoking status: Every Day     Current packs/day: 1.00     Average packs/day: 1 pack/day for 50.0 years (50.0 ttl pk-yrs)     Types: Cigarettes    Smokeless tobacco: Never   Substance and Sexual Activity    Alcohol use: Never    Drug use: Never    Sexual activity: Not on file     Review of Systems   Constitutional:  Negative for chills and fever.   HENT:  Negative for ear pain and trouble swallowing.    Eyes:  Negative for pain and redness.   Respiratory:  Positive for cough and shortness of breath. Negative for chest tightness.         SOB/cough chronic.    Cardiovascular:  Negative for chest pain, palpitations and leg swelling.   Gastrointestinal:  Negative for abdominal distention, abdominal pain, nausea and vomiting.   Genitourinary:  Negative for difficulty urinating.   Musculoskeletal:  Positive for arthralgias, back pain, gait problem and myalgias. Negative for neck pain.   Skin:  Negative for color change, pallor and wound.   Neurological:  Negative for dizziness, syncope, speech difficulty, weakness, light-headedness, numbness and headaches.   Psychiatric/Behavioral:  Negative for agitation and suicidal ideas.    All other systems reviewed and are negative.    Objective:     Vital Signs (Most Recent):  Temp: 97.9 °F (36.6  °C) (01/09/25 0903)  Pulse: (!) 53 (01/09/25 1004)  Resp: 16 (01/09/25 1054)  BP: (!) 140/67 (01/09/25 1004)  SpO2: 99 % (01/09/25 1004) Vital Signs (24h Range):  Temp:  [97.9 °F (36.6 °C)] 97.9 °F (36.6 °C)  Pulse:  [53-80] 53  Resp:  [14-18] 16  SpO2:  [93 %-99 %] 99 %  BP: (140-169)/(67-87) 140/67     Weight: 97.5 kg (215 lb)  Body mass index is 34.7 kg/m².     Physical Exam  Constitutional:       Appearance: Normal appearance.   HENT:      Head: Normocephalic and atraumatic.      Nose: Nose normal.   Eyes:      Pupils: Pupils are equal, round, and reactive to light.   Cardiovascular:      Rate and Rhythm: Normal rate.      Pulses: Normal pulses.      Comments: Normal peripheral pulses  Pulmonary:      Effort: No respiratory distress.   Chest:      Chest wall: No tenderness.   Abdominal:      General: Abdomen is flat. Bowel sounds are normal. There is no distension.      Palpations: Abdomen is soft.      Tenderness: There is no abdominal tenderness.   Musculoskeletal:         General: Tenderness present. No swelling, deformity or signs of injury.      Cervical back: Normal range of motion and neck supple. No tenderness.   Skin:     General: Skin is warm and dry.      Capillary Refill: Capillary refill takes less than 2 seconds.      Findings: No lesion.   Neurological:      General: No focal deficit present.      Mental Status: She is alert and oriented to person, place, and time. Mental status is at baseline.   Psychiatric:         Mood and Affect: Mood normal.         Behavior: Behavior normal.         Thought Content: Thought content normal.         Judgment: Judgment normal.            I have reviewed all pertinent lab results within the past 24 hours.    Significant Diagnostics:  I have reviewed all pertinent imaging results/findings within the past 24 hours.

## 2025-01-10 LAB
ALBUMIN SERPL-MCNC: 3.3 G/DL (ref 3.4–4.8)
ALBUMIN/GLOB SERPL: 1.1 RATIO (ref 1.1–2)
ALP SERPL-CCNC: 116 UNIT/L (ref 40–150)
ALT SERPL-CCNC: 18 UNIT/L (ref 0–55)
ANION GAP SERPL CALC-SCNC: 5 MEQ/L
AST SERPL-CCNC: 28 UNIT/L (ref 5–34)
BASOPHILS # BLD AUTO: 0.04 X10(3)/MCL
BASOPHILS NFR BLD AUTO: 0.4 %
BILIRUB SERPL-MCNC: 0.6 MG/DL
BUN SERPL-MCNC: 26.7 MG/DL (ref 9.8–20.1)
CALCIUM SERPL-MCNC: 8.4 MG/DL (ref 8.4–10.2)
CHLORIDE SERPL-SCNC: 97 MMOL/L (ref 98–107)
CO2 SERPL-SCNC: 37 MMOL/L (ref 23–31)
CREAT SERPL-MCNC: 0.97 MG/DL (ref 0.55–1.02)
CREAT/UREA NIT SERPL: 28
EOSINOPHIL # BLD AUTO: 0.15 X10(3)/MCL (ref 0–0.9)
EOSINOPHIL NFR BLD AUTO: 1.4 %
ERYTHROCYTE [DISTWIDTH] IN BLOOD BY AUTOMATED COUNT: 16.8 % (ref 11.5–17)
GFR SERPLBLD CREATININE-BSD FMLA CKD-EPI: >60 ML/MIN/1.73/M2
GLOBULIN SER-MCNC: 3.1 GM/DL (ref 2.4–3.5)
GLUCOSE SERPL-MCNC: 152 MG/DL (ref 82–115)
HCT VFR BLD AUTO: 38.9 % (ref 37–47)
HGB BLD-MCNC: 11.4 G/DL (ref 12–16)
IMM GRANULOCYTES # BLD AUTO: 0.03 X10(3)/MCL (ref 0–0.04)
IMM GRANULOCYTES NFR BLD AUTO: 0.3 %
INR PPP: 3.6
LYMPHOCYTES # BLD AUTO: 1.47 X10(3)/MCL (ref 0.6–4.6)
LYMPHOCYTES NFR BLD AUTO: 13.9 %
MAGNESIUM SERPL-MCNC: 2.5 MG/DL (ref 1.6–2.6)
MCH RBC QN AUTO: 27.5 PG (ref 27–31)
MCHC RBC AUTO-ENTMCNC: 29.3 G/DL (ref 33–36)
MCV RBC AUTO: 93.7 FL (ref 80–94)
MONOCYTES # BLD AUTO: 0.69 X10(3)/MCL (ref 0.1–1.3)
MONOCYTES NFR BLD AUTO: 6.5 %
NEUTROPHILS # BLD AUTO: 8.18 X10(3)/MCL (ref 2.1–9.2)
NEUTROPHILS NFR BLD AUTO: 77.5 %
NRBC BLD AUTO-RTO: 0 %
PHOSPHATE SERPL-MCNC: 4.3 MG/DL (ref 2.3–4.7)
PLATELET # BLD AUTO: 321 X10(3)/MCL (ref 130–400)
PMV BLD AUTO: 10.2 FL (ref 7.4–10.4)
POCT GLUCOSE: 152 MG/DL (ref 70–110)
POCT GLUCOSE: 159 MG/DL (ref 70–110)
POCT GLUCOSE: 190 MG/DL (ref 70–110)
POTASSIUM SERPL-SCNC: 4.2 MMOL/L (ref 3.5–5.1)
PROT SERPL-MCNC: 6.4 GM/DL (ref 5.8–7.6)
PROTHROMBIN TIME: 36.7 SECONDS (ref 12.5–14.5)
RBC # BLD AUTO: 4.15 X10(6)/MCL (ref 4.2–5.4)
SODIUM SERPL-SCNC: 139 MMOL/L (ref 136–145)
WBC # BLD AUTO: 10.56 X10(3)/MCL (ref 4.5–11.5)

## 2025-01-10 PROCEDURE — 36415 COLL VENOUS BLD VENIPUNCTURE: CPT | Performed by: NURSE PRACTITIONER

## 2025-01-10 PROCEDURE — 85025 COMPLETE CBC W/AUTO DIFF WBC: CPT | Performed by: NURSE PRACTITIONER

## 2025-01-10 PROCEDURE — 99233 SBSQ HOSP IP/OBS HIGH 50: CPT | Mod: ,,, | Performed by: SURGERY

## 2025-01-10 PROCEDURE — 25000003 PHARM REV CODE 250: Performed by: NURSE PRACTITIONER

## 2025-01-10 PROCEDURE — 80053 COMPREHEN METABOLIC PANEL: CPT | Performed by: NURSE PRACTITIONER

## 2025-01-10 PROCEDURE — 97166 OT EVAL MOD COMPLEX 45 MIN: CPT

## 2025-01-10 PROCEDURE — 25000003 PHARM REV CODE 250

## 2025-01-10 PROCEDURE — 84100 ASSAY OF PHOSPHORUS: CPT | Performed by: NURSE PRACTITIONER

## 2025-01-10 PROCEDURE — 11000001 HC ACUTE MED/SURG PRIVATE ROOM

## 2025-01-10 PROCEDURE — 97530 THERAPEUTIC ACTIVITIES: CPT

## 2025-01-10 PROCEDURE — 83735 ASSAY OF MAGNESIUM: CPT | Performed by: NURSE PRACTITIONER

## 2025-01-10 PROCEDURE — 85610 PROTHROMBIN TIME: CPT | Performed by: NURSE PRACTITIONER

## 2025-01-10 PROCEDURE — 97162 PT EVAL MOD COMPLEX 30 MIN: CPT

## 2025-01-10 RX ORDER — TRAZODONE HYDROCHLORIDE 50 MG/1
50 TABLET ORAL NIGHTLY PRN
Status: DISCONTINUED | OUTPATIENT
Start: 2025-01-10 | End: 2025-01-13 | Stop reason: HOSPADM

## 2025-01-10 RX ORDER — FUROSEMIDE 40 MG/1
40 TABLET ORAL 2 TIMES DAILY
Status: DISCONTINUED | OUTPATIENT
Start: 2025-01-10 | End: 2025-01-13 | Stop reason: HOSPADM

## 2025-01-10 RX ORDER — SPIRONOLACTONE 25 MG/1
25 TABLET ORAL DAILY
Status: DISCONTINUED | OUTPATIENT
Start: 2025-01-10 | End: 2025-01-13 | Stop reason: HOSPADM

## 2025-01-10 RX ORDER — ASPIRIN 81 MG/1
81 TABLET ORAL DAILY
Status: DISCONTINUED | OUTPATIENT
Start: 2025-01-10 | End: 2025-01-13 | Stop reason: HOSPADM

## 2025-01-10 RX ORDER — WARFARIN SODIUM 5 MG/1
5 TABLET ORAL DAILY
Status: DISCONTINUED | OUTPATIENT
Start: 2025-01-11 | End: 2025-01-10

## 2025-01-10 RX ORDER — SOTALOL HYDROCHLORIDE 80 MG/1
80 TABLET ORAL EVERY 12 HOURS
Status: DISCONTINUED | OUTPATIENT
Start: 2025-01-10 | End: 2025-01-13 | Stop reason: HOSPADM

## 2025-01-10 RX ORDER — LOSARTAN POTASSIUM 25 MG/1
25 TABLET ORAL DAILY
Status: DISCONTINUED | OUTPATIENT
Start: 2025-01-10 | End: 2025-01-13 | Stop reason: HOSPADM

## 2025-01-10 RX ORDER — WARFARIN SODIUM 5 MG/1
5 TABLET ORAL DAILY
Status: DISCONTINUED | OUTPATIENT
Start: 2025-01-10 | End: 2025-01-10

## 2025-01-10 RX ADMIN — METHOCARBAMOL 500 MG: 500 TABLET ORAL at 02:01

## 2025-01-10 RX ADMIN — ACETAMINOPHEN 325MG 650 MG: 325 TABLET ORAL at 05:01

## 2025-01-10 RX ADMIN — FUROSEMIDE 40 MG: 40 TABLET ORAL at 09:01

## 2025-01-10 RX ADMIN — SPIRONOLACTONE 25 MG: 25 TABLET ORAL at 02:01

## 2025-01-10 RX ADMIN — LOSARTAN POTASSIUM 25 MG: 25 TABLET, FILM COATED ORAL at 09:01

## 2025-01-10 RX ADMIN — DOCUSATE SODIUM 100 MG: 100 CAPSULE, LIQUID FILLED ORAL at 10:01

## 2025-01-10 RX ADMIN — OLANZAPINE 5 MG: 5 TABLET, FILM COATED ORAL at 10:01

## 2025-01-10 RX ADMIN — METOPROLOL SUCCINATE 12.5 MG: 25 TABLET, EXTENDED RELEASE ORAL at 10:01

## 2025-01-10 RX ADMIN — METOPROLOL SUCCINATE 12.5 MG: 25 TABLET, EXTENDED RELEASE ORAL at 09:01

## 2025-01-10 RX ADMIN — OXYCODONE HYDROCHLORIDE 10 MG: 10 TABLET ORAL at 11:01

## 2025-01-10 RX ADMIN — OXYCODONE HYDROCHLORIDE 10 MG: 10 TABLET ORAL at 02:01

## 2025-01-10 RX ADMIN — METHOCARBAMOL 500 MG: 500 TABLET ORAL at 05:01

## 2025-01-10 RX ADMIN — FUROSEMIDE 40 MG: 40 TABLET ORAL at 10:01

## 2025-01-10 RX ADMIN — SOTALOL HYDROCHLORIDE 80 MG: 80 TABLET ORAL at 09:01

## 2025-01-10 RX ADMIN — ASPIRIN 81 MG: 81 TABLET, COATED ORAL at 02:01

## 2025-01-10 RX ADMIN — FLUOXETINE 40 MG: 20 CAPSULE ORAL at 09:01

## 2025-01-10 RX ADMIN — ISOSORBIDE MONONITRATE 30 MG: 30 TABLET, EXTENDED RELEASE ORAL at 09:01

## 2025-01-10 NOTE — PLAN OF CARE
Problem: Adult Inpatient Plan of Care  Goal: Plan of Care Review  Outcome: Progressing     Problem: Adult Inpatient Plan of Care  Goal: Patient-Specific Goal (Individualized)  Reactivated  Goal: Absence of Hospital-Acquired Illness or Injury  Reactivated  Goal: Optimal Comfort and Wellbeing  Reactivated  Goal: Readiness for Transition of Care  Reactivated     Problem: Fall Injury Risk  Goal: Absence of Fall and Fall-Related Injury  Reactivated

## 2025-01-10 NOTE — PT/OT/SLP EVAL
Physical Therapy Evaluation    Patient Name:  Kayla Ruiz   MRN:  [id number]    Recommendations:     Discharge therapy intensity: High Intensity Therapy   Discharge Equipment Recommendations: to be determined by next level of care   Barriers to discharge: Impaired mobility    Assessment:     Kayla Ruiz is a 69 y.o. female admitted with a medical diagnosis of fall at home resulting in R patellar fx (non-op), L1 compression fx. Pt will need to wear TLSO and a HKB in extension, and is clear to WB with HKB.  She presents with the following impairments/functional limitations: weakness, gait instability, impaired balance, impaired endurance, decreased safety awareness, impaired functional mobility. Pt is limited by pain at this time. She is able to mobilize to EOB with log roll and max A. Pt is able to perform sit to stand and one lateral step to R with min A. Progress as able.    Rehab Prognosis: Good; patient would benefit from acute skilled PT services to address these deficits and reach maximum level of function.    Recent Surgery: * No surgery found *      Plan:     During this hospitalization, patient would benefit from acute PT services 6 x/week to address the identified rehab impairments via gait training, therapeutic activities, therapeutic exercises and progress toward the following goals:    Plan of Care Expires:  02/10/25    Subjective     Chief Complaint: none  Patient/Family Comments/goals: return to PLOF  Pain/Comfort:       Patients cultural, spiritual, Yazidism conflicts given the current situation:      Living Environment:  Home with   Prior to admission, patients level of function was independent.  Equipment used at home: walker, rolling (as needed).  DME owned (not currently used): rolling walker.  Upon discharge, patient will have assistance from  at times.    Objective:     Communicated with NSG prior to session.  Patient found supine with telemetry, TLSO, knee immobilizer, oxygen   upon PT entry to room.    General Precautions: Standard, fall  Orthopedic Precautions:RLE weight bearing as tolerated, spinal precautions (ok to WB in HKB, no ROM R knee)   Braces: Hinged knee brace, TLSO  Respiratory Status: Room air  Blood Pressure: NA      Exams:  RLE ROM: NT- HKB  RLE Strength: NT- HKB  LLE ROM: WFL  LLE Strength: WFL  Skin integrity: Visible skin intact      Functional Mobility:  Bed Mobility:     Supine to Sit: maximal assistance  Sit to Supine: maximal assistance  Transfers:     Sit to Stand:  minimum assistance with rolling walker  Gait: pregait: pt able to take one lateral step to R, unable to lift RLE from floor      Patient provided with verbal education education regarding PT role/goals/POC, safety awareness, and discharge/DME recommendations.  Understanding was verbalized.     Patient left supine with all lines intact, call button in reach, and NSG notified.    GOALS:   Multidisciplinary Problems       Physical Therapy Goals          Problem: Physical Therapy    Goal Priority Disciplines Outcome Interventions   Physical Therapy Goal     PT, PT/OT Progressing    Description: Goals to be met by: 2/10/25     Patient will increase functional independence with mobility by performin. Supine to sit with Contact Guard Assistance  2. Sit to supine with Contact Guard Assistance  3. Sit to stand transfer with Contact Guard Assistance  4. Bed to chair transfer with Contact Guard Assistance using Rolling Walker  5. Gait  x 100 feet with Contact Guard Assistance using Rolling Walker.                          History:     Past Medical History:   Diagnosis Date    A-fib     Anticoagulant long-term use     Depression     Diabetes mellitus     Hyperlipidemia     Sleep apnea        Past Surgical History:   Procedure Laterality Date    AORTIC VALVE REPLACEMENT      CHOLECYSTECTOMY      COLON RESECTION      fusion      LEFT HEART CATHETERIZATION Left 2023    Procedure: Left heart cath;   Surgeon: Kahlil Martin MD;  Location: [facility] CATH LAB;  Service: Cardiology;  Laterality: Left;  LHC +/- PCI    TONSILLECTOMY         Time Tracking:     PT Received On: 01/10/25  PT Start Time: 1101     PT Stop Time: 1124  PT Total Time (min): 23 min     Billable Minutes: Evaluation 15 and Therapeutic Activity 8      01/10/2025

## 2025-01-10 NOTE — PLAN OF CARE
01/10/25 1050   Discharge Assessment   Assessment Type Discharge Planning Assessment   Confirmed/corrected address, phone number and insurance Yes   Confirmed Demographics Correct on Facesheet   Source of Information patient;family   When was your last doctors appointment? 10/10/25   Communicated PETR with patient/caregiver Date not available/Unable to determine   Reason For Admission back pain   People in Home spouse;child(guilherme), adult   Do you expect to return to your current living situation? Yes   Do you have help at home or someone to help you manage your care at home? Yes   Who are your caregiver(s) and their phone number(s)? Christopher Ruiz [phone]   Prior to hospitilization cognitive status: Alert/Oriented   Current cognitive status: Alert/Oriented   Walking or Climbing Stairs Difficulty yes   Walking or Climbing Stairs ambulation difficulty, requires equipment   Mobility Management rollator, std walker, wheelchair   Dressing/Bathing Difficulty yes   Dressing/Bathing bathing difficulty, requires equipment   Dressing/Bathing Management grab bars, shower chair, bath bench   Do you have any problems with: Errands/Grocery   Home Accessibility stairs to enter home   Number of Stairs, Main Entrance one   Stair Railings, Main Entrance none   Home Layout Able to live on 1st floor   Equipment Currently Used at Home bath bench;shower chair;rollator;walker, standard;wheelchair;glucometer   Patient currently being followed by outpatient case management? No   Do you currently have service(s) that help you manage your care at home? No   Do you take prescription medications? Yes   Do you have prescription coverage? Yes   Do you have any problems affording any of your prescribed medications? No   Is the patient taking medications as prescribed? yes   Who is going to help you get home at discharge? Christopher Ruiz   How do you get to doctors appointments? family or friend will provide   Are you on dialysis? No   Do you take  coumadin? Yes   Who monitors your labs? [facility]   Discharge Plan A Home Health   Discharge Plan B Home Health   DME Needed Upon Discharge    (TBD)   Discharge Plan discussed with: Patient;Spouse/sig other   Name(s) and Number(s) Christopher Ruiz [phone]   Transition of Care Barriers Mobility   OTHER   Name(s) of People in Home Christopher Ruiz [phone]     Pt lives with her spouse and adult son in a 2 story home. She is able to live on 1st floor. She is on service with [employer] . Clinical updates sent via [employer]. Patient uses [facility] in [employer] off of [address]     12:45pm-therapy is recommending inpt rehab. [place] of choice signed. Referral sent to [facility] via [employer].

## 2025-01-10 NOTE — PLAN OF CARE
Problem: Physical Therapy  Goal: Physical Therapy Goal  Description: Goals to be met by: 2/10/25     Patient will increase functional independence with mobility by performin. Supine to sit with Contact Guard Assistance  2. Sit to supine with Contact Guard Assistance  3. Sit to stand transfer with Contact Guard Assistance  4. Bed to chair transfer with Contact Guard Assistance using Rolling Walker  5. Gait  x 100 feet with Contact Guard Assistance using Rolling Walker.     Outcome: Progressing

## 2025-01-10 NOTE — PT/OT/SLP EVAL
Occupational Therapy  Evaluation    Name: Kayla Ruiz  MRN: [id number]  Admitting Diagnosis: R patella fx and L1 lumbar fx GLF  Recent Surgery: * No surgery found *      Recommendations:     Discharge therapy intensity: High Intensity Therapy   Discharge Equipment Recommendations:  to be determined by next level of care  Barriers to discharge:  Other (Comment) (decreased mobility due to injuries)    Assessment:     Kayla Ruiz is a 69 y.o. female with a medical diagnosis of fall at home resulting in R patella fx (non-op WBAT in full extension), L1 compression fx (non-op in TLSO).  She presents with the following performance deficits affecting function: weakness, impaired self care skills, impaired balance, impaired endurance, impaired functional mobility, decreased upper extremity function, pain, gait instability, decreased lower extremity function, orthopedic precautions.     Pt appeared wary to participate in therapy this date. However, she was able to answer interview questions with accuracy as approved by . After initiating bed mobility, pt began to experience a notable increase in pain as observed with verbalizations and facial expressions. Supine to sit required increase time as a result of this. Max A was required to don gown to cover pt's back. Stand transfer was completed while following RLE px for full knee extension and WBAT and no ROM. Pt was able to take one step to the right w/ min A from OT and PT. Pt would benefit from high intensity therapy post d/c as pt was independent prior to admit and is expected to make significant gains because of her current level of strength, motivation, and support from spouse.     Rehab Prognosis: Good; patient would benefit from acute skilled OT services to address these deficits and reach maximum level of function.       Plan:     Patient to be seen 6 x/week to address the above listed problems via self-care/home management  Plan of Care Expires:  02/10/25  Plan of Care Reviewed with: patient, spouse    Subjective     Chief Complaint: Pt complained of minimal pain at rest.   Patient/Family Comments/goals:  wants to have pt mobilized to increase recovery speed.     Occupational Profile:  Living Environment: 2 level home but able to live on 1 floor / 0 SYLWIA w/ . Tub shower w/ chair in the BR.   Previous level of function: Independent   Roles and Routines: wife and friend   Equipment Used at Home: walker, rolling, rollator, shower chair, grab bar (used PRN)  Assistance upon Discharge:  but not 24/7 as he works during the day      Pain/Comfort:  Pain Rating 1: 2/10  Location - Orientation 1: lower  Location 1: back  Pain Addressed 1: Reposition, Distraction, Cessation of Activity    Patients cultural, spiritual, Presybeterian conflicts given the current situation: no    Objective:     OT communicated with RN prior to session.      Patient was found supine with peripheral IV, PureWick, oxygen, TLSO, Other (comments) (hinged knee brace) upon OT entry to room.    General Precautions: Standard, fall  Orthopedic Precautions: RLE weight bearing as tolerated, spinal precautions (no knee ROM, full extension in hinged knee brace)  Braces: Hinged knee brace, TLSO    Vital Signs: Sp02: WNL  Supplemental 02: 2L nasal cannula     Bed Mobility:    Patient completed Rolling/Turning to Right with max assistance   Patient completed Supine to Sit with moderate assistance x 2     Functional Mobility/Transfers:  Patient completed Sit <> Stand Transfer with minimum assistance with  rolling walker   Functional Mobility: One step completed to the right w/ RW support and min A      Activities of Daily Living:  Upper Body Dressing: maximal assistance for donning on gown due to pain   Lower Body Dressing: maximal assistance expected with observation due to pain      AMPAC 6 Click ADL:  AMPAC Total Score: 14    Functional Cognition:  Intact  Orientation: oriented to  Person, Place, Time, and Situation    Visual Perceptual Skills:  Intact    Upper Extremity Function:  Right Upper Extremity:   Range of Motion: WFL observed in functional mobility. Formal MMT assessment EOB limited due to pain.      Left Upper Extremity:  Range of Motion: WFL observed in functional mobility. Formal MMT assessment EOB limited due to pain.      Balance:   Static sitting balance: WFL   Dynamic sitting balance: Impaired (required BUE to support trunk)   Static standing balance: Impaired w/ support of RW and min A     Therapeutic Positioning  Risk for acquired pressure injuries is decreased due to intact sensation.    OT interventions performed during the course of today's session:   Education was provided on benefits of and recommendations for therapeutic positioning    Skin assessment:  all visible skin intact     OT recommendations for therapeutic positioning throughout hospitalization:   Follow [facility] Pressure Injury Prevention Protocol    Patient Education:  Patient and spouse were provided with verbal education education regarding OT role/goals/POC, post op precautions, fall prevention, safety awareness, and Discharge/DME recommendations.  Understanding was verbalized.     Patient left supine with all lines intact, call button in reach, and spouse present.    GOALS:   Multidisciplinary Problems       Occupational Therapy Goals          Problem: Occupational Therapy    Goal Priority Disciplines Outcome Interventions   Occupational Therapy Goal     OT, PT/OT Progressing    Description: Goals to be met by: 2/10/2025    Patient will increase functional independence with ADLs by performing:    UE Dressing with Modified [place].  LE Dressing with Modified [place].  Grooming while standing at sink with Modified [place].  Toileting from toilet with SBA for hygiene and clothing management.   Toilet transfer to toilet with SBA.                         History:     Past Medical History:    Diagnosis Date    A-fib     Anticoagulant long-term use     Depression     Diabetes mellitus     Hyperlipidemia     Sleep apnea          Past Surgical History:   Procedure Laterality Date    AORTIC VALVE REPLACEMENT      CHOLECYSTECTOMY      COLON RESECTION      fusion      LEFT HEART CATHETERIZATION Left 05/18/2023    Procedure: Left heart cath;  Surgeon: Kahlil Martin MD;  Location: [facility] CATH LAB;  Service: Cardiology;  Laterality: Left;  C +/- PCI    TONSILLECTOMY         Time Tracking:     OT Date of Treatment: 01/10/25  OT Start Time: 1101  OT Stop Time: 1120  OT Total Time (min): 19 min    Billable Minutes:Evaluation moderate complexity 19 min     1/10/2025

## 2025-01-10 NOTE — PROGRESS NOTES
TERTIARY TRAUMA SURVEY (TTS)    List Injuries Identified to Date:   1. Right patella fracture   2. L1 superior endplate compression deformity    List Operations and Procedures:   1. None    Past Surgical History:   Procedure Laterality Date    AORTIC VALVE REPLACEMENT      CHOLECYSTECTOMY      COLON RESECTION      fusion      LEFT HEART CATHETERIZATION Left 05/18/2023    Procedure: Left heart cath;  Surgeon: Kahlil Martin MD;  Location: [facility] CATH LAB;  Service: Cardiology;  Laterality: Left;  LHC +/- PCI    TONSILLECTOMY         Incidental findings:   1. None    Past Medical History:   1. As below, notable for the mechanical valve on Coumadin    Active Ambulatory Problems     Diagnosis Date Noted    Hypoxia 02/20/2023    Troponin level elevated 02/20/2023    Atrial fibrillation 02/20/2023    Dyspnea on exertion 03/13/2023    Tobacco dependency 10/24/2024    Chronic obstructive pulmonary disease 10/24/2024    CHF with unknown LVEF 10/24/2024    LESLI (obstructive sleep apnea) 12/17/2024    Congestive heart failure 12/24/2024     Resolved Ambulatory Problems     Diagnosis Date Noted    CAP (community acquired pneumonia) 02/18/2023     Past Medical History:   Diagnosis Date    A-fib     Anticoagulant long-term use     Depression     Diabetes mellitus     Hyperlipidemia     Sleep apnea      Past Medical History:   Diagnosis Date    A-fib     Anticoagulant long-term use     Depression     Diabetes mellitus     Hyperlipidemia     Sleep apnea        Tertiary Physical Exam:     Physical Exam  Constitutional:       Appearance: Normal appearance.   HENT:      Head: Normocephalic and atraumatic.      Nose: Nose normal.   Eyes:      Pupils: Pupils are equal, round, and reactive to light.   Cardiovascular:      Rate and Rhythm: Normal rate.      Pulses: Normal pulses.      Comments: Normal peripheral pulses  Pulmonary:      Effort: Pulmonary effort is normal. No respiratory distress.   Chest:      Chest wall: No  tenderness.   Abdominal:      General: Abdomen is flat. Bowel sounds are normal. There is no distension.      Palpations: Abdomen is soft.      Tenderness: There is no abdominal tenderness.   Musculoskeletal:         General: Tenderness and signs of injury present. No swelling or deformity.      Cervical back: Normal range of motion and neck supple. No tenderness.   Skin:     General: Skin is warm and dry.      Capillary Refill: Capillary refill takes less than 2 seconds.      Findings: No lesion.   Neurological:      General: No focal deficit present.      Mental Status: She is alert and oriented to person, place, and time. Mental status is at baseline.   Psychiatric:         Mood and Affect: Mood normal.         Behavior: Behavior normal.         Thought Content: Thought content normal.         Judgment: Judgment normal.         Imaging Review:     Imaging Results              X-Ray Thoracolumbar Spine AP Lateral (Preliminary result)  Result time 01/09/25 22:47:38      Preliminary result by Abbe Pollard MD (01/09/25 22:47:38)                   Narrative:    START OF REPORT:  Technique: AP and lateral thoracic spine views were performed.    Comparison: None.    Clinical History: Trauma.    Findings: There is bone cement seen in T8 and T9.  Curvature: Subtle straightening of the visualized thoracic kyphosis is seen. This may be positional with an element of myospasm possible.  Scoliosis: None.  Mineralization of the thoracic spine bony structures: Normal.  Endplate Sclerosis: No significant endplate sclerosis.  Disc Height: There is multi-level mild degenerative disc disease along the thoracolumbar spine.  Orthopedic hardware:  Fractures: No acute thoracic spine fracture dislocation or subluxation is seen. Grade II anterior wedge compression deformity isseen involving L1. Correlate clinically as regards additional evaluation and follow-up possibly with MRI.    Miscellaneous: There are surgical clips projecting  over the right upper quadrant. There is an aortic valve prosthesis noted.      Impression:  1. No acute thoracic spine fracture dislocation or subluxation is seen. Correlate clinically as regards additional evaluation and follow-up possibly with MRI.  2. Degenerative changes and other findings as above.                                         CT Cervical Spine Without Contrast (Final result)  Result time 01/09/25 13:43:15      Final result by Juanito Chopra MD (01/09/25 13:43:15)                   Impression:      No acute fracture or malalignment identified.      Electronically signed by: Juanito Chopra  Date:    01/09/2025  Time:    13:43               Narrative:    EXAMINATION:  CT CERVICAL SPINE WITHOUT CONTRAST    CLINICAL HISTORY:  Trauma.    TECHNIQUE:  Multidetector axial images were performed of the cervical spine without and.  Images were reconstructed.    Automated exposure control was utilized to minimize radiation dose.  .    COMPARISON:  None available.    FINDINGS:  There is solid ACDF at C5, C6 and C7.  Cervical vertebrae stature is maintained.  There is grade 1 anterolisthesis of C4 on C5 and C7 on T1 no acute fracture or malalignment identified.  There are degenerative changes which cause mild narrowing of left neural foramen at C4-C5.  There is no prevertebral soft tissue prominence.    This study does not exclude the possibility of intrathecal soft tissue, ligamentous or vascular injury.                                       CT Chest Abdomen Pelvis With IV Contrast (XPD) NO Oral Contrast (Final result)  Result time 01/09/25 13:48:46      Final result by Dennis Chang MD (01/09/25 13:48:46)                   Impression:      1. Mild L1 superior endplate compression deformity is more conspicuous compared to the CT performed earlier today.  2. Otherwise no acute findings chest, abdomen or pelvis.      Electronically signed by: Dennis Chang  Date:    01/09/2025  Time:    13:48                Narrative:    EXAMINATION:  CT CHEST ABDOMEN PELVIS WITH IV CONTRAST (XPD)    CLINICAL HISTORY:  Polytrauma, blunt;    TECHNIQUE:  Helical acquisition from the thoracic inlet through the ischia with  IV contrast. Three plane reconstructions made available for review.  mGycm. Automatic exposure control, adjustment of mA/kV or iterative reconstruction technique was used to reduce radiation.    COMPARISON:  CT earlier today, 20 December 2024, 25 August 2014    FINDINGS:  Chest.    Heart is not enlarged.  No pericardial effusion.  There are coronary artery calcifications.  Dense aortic atherosclerosis.  Mildly dilated ascending thoracic aorta measuring 4 cm.    No mediastinal hematoma.  There is no thoracic adenopathy.    There is no pneumothorax or pleural effusion.  Mild bibasilar atelectasis or scarring.    Abdomen and pelvis.    There is no significant abnormality of the liver, spleen, pancreas or adrenals.  The gallbladder is surgically absent.  No hydronephrosis or suspicious renal lesion.    No bowel obstruction.  There is colonic diverticulosis.  There is a suture line upper rectum.  Moderate stool in the colon.  No free air    Urinary bladder is unremarkable.  No pelvic free fluid.  Abdominal aorta is normal in caliber.  Dense atherosclerotic disease.    There is a mild L1 superior endplate compression deformity which is more conspicuous compared to the CT performed earlier today.  Otherwise no acute osseous findings.                                       CT Lumbar Spine Without Contrast (Final result)  Result time 01/09/25 10:40:04      Final result by Phoenix Albert MD (01/09/25 10:40:04)                   Impression:      No acute fracture or subluxation.    Mild multilevel lumbar degeneration as detailed above.      Electronically signed by: Phoenix Albert  Date:    01/09/2025  Time:    10:40               Narrative:    EXAMINATION:  CT LUMBAR SPINE WITHOUT CONTRAST    CLINICAL HISTORY:  Low back pain,  trauma;Back trauma, no prior imaging (Age >= 16y);    TECHNIQUE:  Low-dose axial, sagittal and coronal reformations are obtained through the lumbar spine. 3D reconstructions were performed. Contrast was not administered. Dose reduction techniques including automatic exposure control (AEC) were utilized.    Dose (DLP): 1060 mGycm    COMPARISON:  CT abdomen and pelvis 08/25/2014.    FINDINGS:  Five lumbar-type vertebral bodies.    Vertebral column alignment is normal.  No fractures demonstrated.  Vertebral body heights are preserved.    Mild multilevel lumbar disc degeneration.  Left foraminal disc protrusion at L3-L4 causing mild left foraminal stenosis encroaching upon the exiting left L3 nerve.  Shallow disc bulges at L1-L2 through L4-5 which indent the ventral thecal sac.  No significant central canal stenosis.    Paravertebral soft tissues are normal.    Cholecystectomy.  Calcific atherosclerosis of the abdominal aorta and its branches.  Anastomotic sutures are noted in the sigmoid colon.  Diverticulosis of the descending and sigmoid colon.                                       CT Head Without Contrast (Final result)  Result time 01/09/25 10:37:48      Final result by Wood Fortune MD (01/09/25 10:37:48)                   Narrative:    EXAMINATION  CT HEAD WITHOUT CONTRAST    CLINICAL HISTORY  fall, on thinners, nausea/vomiting;    TECHNIQUE  Axial non-contrast CT images of the head were acquired and multiplanar reconstructions accomplished by a CT technologist at a separate workstation, pushed to PACS for physician review.    COMPARISON  18 February 2023    FINDINGS  Images were reviewed in subdural, brain, soft tissue, and bone windows.    Exam quality: Motion/streak artifact limits assessment of the posterior fossa.    Hemorrhage: No evidence of acute hyperattenuating blood products.    Parenchyma: There is diffuse bilateral supratentorial white matter hypoattenuation, typical of chronic microvascular  changes. No discrete mass, mass effect, or CT evidence of acute large vascular territory insult. Gray-white differentiation is preserved. No midline shift is evident.    CSF spaces: Proportional appearance of ventricular and sulcal enlargement. No hydrocephalus. No masses or fluid collections.    Other findings: No focally hyperdense artery. Scattered carotid siphon calcifications are present. No abnormal densities within the dural sinuses. No abnormalities of the scalp or subjacent osseous structures. Mastoids are well aerated. No focal abnormality of the sella. The included facial structures are unremarkable.    IMPRESSION  1. No convincing acute intracranial abnormality.  2. Chronic age-related changes again noted.  3. Additional secondary details discussed above, relatively unchanged from the comparison CT appearance.    RADIATION DOSE  Automated tube current modulation, weight-based exposure dosing, and/or iterative reconstruction technique utilized to reach lowest reasonably achievable exposure rate.    DLP: 1012 mGy*cm      Electronically signed by: Wood Fortune  Date:    01/09/2025  Time:    10:37                                     X-Ray Knee Complete 4 Or More Views Right (Final result)  Result time 01/09/25 10:13:37      Final result by Norma Leblanc MD (01/09/25 10:13:37)                   Impression:      Nondisplaced mid patellar fracture with small joint effusion.      Electronically signed by: Norma Leblanc  Date:    01/09/2025  Time:    10:13               Narrative:    EXAMINATION:  XR KNEE COMP 4 OR MORE VIEWS RIGHT    CLINICAL HISTORY:  Unspecified fall, initial encounter    COMPARISON:  None.    FINDINGS:  There is a nondisplaced fracture through the mid patella.  There is a small knee joint effusion.  Vascular calcifications are noted.                                       X-Ray Pelvis Routine AP (Final result)  Result time 01/09/25 10:12:35      Final result by Norma Leblanc  MD (01/09/25 10:12:35)                   Impression:      No acute bony abnormality.      Electronically signed by: Norma Leblanc  Date:    01/09/2025  Time:    10:12               Narrative:    EXAMINATION:  XR PELVIS ROUTINE AP    CLINICAL HISTORY:  pain;    COMPARISON:  None.    FINDINGS:  There is no displaced fracture identified.  Surgical clips project over the left hip.                                       Lab Review:   CBC:  Recent Labs   Lab Result Units 10/23/24  1520 10/24/24  0421 10/25/24  0449 10/26/24  1250 12/20/24  1046 12/24/24  1121 12/25/24  0648 12/26/24  0505 01/09/25  0959 01/10/25  0512   WBC x10(3)/mcL 7.79 8.30 7.89 9.53 7.65 9.07 7.48 6.93 11.28 10.56   RBC x10(6)/mcL 4.18* 4.02* 3.84* 4.12* 3.84* 3.82* 3.67* 3.69* 4.84 4.15*   Hgb g/dL 12.1 11.5* 11.2* 11.9* 10.6* 10.6* 10.2* 10.2* 13.3 11.4*   Hct % 38.8 38.4 36.1* 39.1 36.7* 35.8* 34.5* 35.1* 44.1 38.9   Platelet x10(3)/mcL 339 330 314 356 241 269 253 254 361 321   MCV fL 92.8 95.5* 94.0 94.9* 95.6* 93.7 94.0 95.1* 91.1 93.7   MCH pg 28.9 28.6 29.2 28.9 27.6 27.7 27.8 27.6 27.5 27.5   MCHC g/dL 31.2* 29.9* 31.0* 30.4* 28.9* 29.6* 29.6* 29.1* 30.2* 29.3*       CMP:  Recent Labs   Lab Result Units 10/23/24  1520 10/24/24  0421 10/25/24  0449 12/24/24  1121 12/25/24  0648 12/26/24  0505 01/09/25  0959 01/10/25  0512   Calcium mg/dL 8.8 8.9   < > 9.2 8.5   < > 9.0 8.4   Albumin g/dL 3.5 3.3*  --  3.6 3.0*  --  3.9 3.3*   Sodium mmol/L 141 142   < > 147* 143   < > 139 139   Potassium mmol/L 4.5 4.2   < > 3.7 3.3*   < > 5.2* 4.2   CO2 mmol/L 37* 35*   < > 39* 34*   < > 33* 37*   Chloride mmol/L 99 98   < > 98 102   < > 95* 97*   Blood Urea Nitrogen mg/dL 35.8* 32.8*   < > 25.7* 21.9*   < > 29.8* 26.7*   Creatinine mg/dL 1.19* 1.08*   < > 0.84 0.86   < > 1.10* 0.97   ALP unit/L 125 118  --  108 100  --  133 116   ALT unit/L 24 21  --  29 22  --  20 18   AST unit/L 20 19  --  29 24  --  44* 28   Bilirubin Total mg/dL 0.5 0.4  --  0.9 0.7   "--  0.5 0.6    < > = values in this interval not displayed.       Troponin:  Recent Labs   Lab Result Units 10/23/24  1520 12/24/24  1121   Troponin-I ng/mL <0.010 0.035       ETOH:  No results for input(s): "ETHANOL" in the last 72 hours.     Urine Drug Screen:  No results for input(s): "COCAINE", "OPIATE", "BARBITURATE", "AMPHETAMINE", "FENTANYL", "CANNABINOIDS", "MDMA" in the last 72 hours.    Invalid input(s): "BENZODIAZEPINE", "PHENCYCLIDINE"   Plan:   Right patella fracture  Per Ortho: non-op, outpatient follow up, WB in full extension, no ROM     Closed fracture of lumbar vertebra  Per neurosurgery, no MRI, upright Xrs (done and stable)  Brace in place  [facility]  Holding Lovenox for now, supratherapeutic INR  INR above goal, consider restarting 1/11 pending INR  PT/OT after Neurosurgery clear    Dandre Song NP  c - [phone]    "

## 2025-01-10 NOTE — PLAN OF CARE
Problem: Occupational Therapy  Goal: Occupational Therapy Goal  Description: Goals to be met by: 2/10/2025    Patient will increase functional independence with ADLs by performing:    UE Dressing with Modified [place].  LE Dressing with Modified [place].  Grooming while standing at sink with Modified [place].  Toileting from toilet with SBA for hygiene and clothing management.   Toilet transfer to toilet with SBA.    Outcome: Progressing

## 2025-01-11 LAB
ALBUMIN SERPL-MCNC: 3.2 G/DL (ref 3.4–4.8)
ALBUMIN/GLOB SERPL: 1 RATIO (ref 1.1–2)
ALP SERPL-CCNC: 103 UNIT/L (ref 40–150)
ALT SERPL-CCNC: 18 UNIT/L (ref 0–55)
ANION GAP SERPL CALC-SCNC: 7 MEQ/L
AST SERPL-CCNC: 29 UNIT/L (ref 5–34)
BASOPHILS # BLD AUTO: 0.03 X10(3)/MCL
BASOPHILS NFR BLD AUTO: 0.3 %
BILIRUB SERPL-MCNC: 0.6 MG/DL
BUN SERPL-MCNC: 33.3 MG/DL (ref 9.8–20.1)
CALCIUM SERPL-MCNC: 8.6 MG/DL (ref 8.4–10.2)
CHLORIDE SERPL-SCNC: 95 MMOL/L (ref 98–107)
CO2 SERPL-SCNC: 37 MMOL/L (ref 23–31)
CREAT SERPL-MCNC: 0.93 MG/DL (ref 0.55–1.02)
CREAT/UREA NIT SERPL: 36
EOSINOPHIL # BLD AUTO: 0.16 X10(3)/MCL (ref 0–0.9)
EOSINOPHIL NFR BLD AUTO: 1.7 %
ERYTHROCYTE [DISTWIDTH] IN BLOOD BY AUTOMATED COUNT: 16.3 % (ref 11.5–17)
GFR SERPLBLD CREATININE-BSD FMLA CKD-EPI: >60 ML/MIN/1.73/M2
GLOBULIN SER-MCNC: 3.3 GM/DL (ref 2.4–3.5)
GLUCOSE SERPL-MCNC: 160 MG/DL (ref 82–115)
HCT VFR BLD AUTO: 35.8 % (ref 37–47)
HGB BLD-MCNC: 11 G/DL (ref 12–16)
IMM GRANULOCYTES # BLD AUTO: 0.03 X10(3)/MCL (ref 0–0.04)
IMM GRANULOCYTES NFR BLD AUTO: 0.3 %
INR PPP: 3.4
LYMPHOCYTES # BLD AUTO: 1.27 X10(3)/MCL (ref 0.6–4.6)
LYMPHOCYTES NFR BLD AUTO: 13.5 %
MCH RBC QN AUTO: 27.8 PG (ref 27–31)
MCHC RBC AUTO-ENTMCNC: 30.7 G/DL (ref 33–36)
MCV RBC AUTO: 90.4 FL (ref 80–94)
MONOCYTES # BLD AUTO: 0.63 X10(3)/MCL (ref 0.1–1.3)
MONOCYTES NFR BLD AUTO: 6.7 %
NEUTROPHILS # BLD AUTO: 7.29 X10(3)/MCL (ref 2.1–9.2)
NEUTROPHILS NFR BLD AUTO: 77.5 %
NRBC BLD AUTO-RTO: 0 %
PLATELET # BLD AUTO: 285 X10(3)/MCL (ref 130–400)
PMV BLD AUTO: 9.9 FL (ref 7.4–10.4)
POCT GLUCOSE: 146 MG/DL (ref 70–110)
POCT GLUCOSE: 223 MG/DL (ref 70–110)
POTASSIUM SERPL-SCNC: 4 MMOL/L (ref 3.5–5.1)
PROT SERPL-MCNC: 6.5 GM/DL (ref 5.8–7.6)
PROTHROMBIN TIME: 34.9 SECONDS (ref 12.5–14.5)
RBC # BLD AUTO: 3.96 X10(6)/MCL (ref 4.2–5.4)
SODIUM SERPL-SCNC: 139 MMOL/L (ref 136–145)
WBC # BLD AUTO: 9.41 X10(3)/MCL (ref 4.5–11.5)

## 2025-01-11 PROCEDURE — 36415 COLL VENOUS BLD VENIPUNCTURE: CPT | Performed by: NURSE PRACTITIONER

## 2025-01-11 PROCEDURE — 80053 COMPREHEN METABOLIC PANEL: CPT | Performed by: NURSE PRACTITIONER

## 2025-01-11 PROCEDURE — 85610 PROTHROMBIN TIME: CPT | Performed by: NURSE PRACTITIONER

## 2025-01-11 PROCEDURE — 27000221 HC OXYGEN, UP TO 24 HOURS

## 2025-01-11 PROCEDURE — 11000001 HC ACUTE MED/SURG PRIVATE ROOM

## 2025-01-11 PROCEDURE — 63600175 PHARM REV CODE 636 W HCPCS: Performed by: NURSE PRACTITIONER

## 2025-01-11 PROCEDURE — 25000003 PHARM REV CODE 250: Performed by: STUDENT IN AN ORGANIZED HEALTH CARE EDUCATION/TRAINING PROGRAM

## 2025-01-11 PROCEDURE — 99233 SBSQ HOSP IP/OBS HIGH 50: CPT | Mod: ,,, | Performed by: SURGERY

## 2025-01-11 PROCEDURE — 99900035 HC TECH TIME PER 15 MIN (STAT)

## 2025-01-11 PROCEDURE — 85025 COMPLETE CBC W/AUTO DIFF WBC: CPT | Performed by: NURSE PRACTITIONER

## 2025-01-11 PROCEDURE — 25000003 PHARM REV CODE 250

## 2025-01-11 PROCEDURE — 25000003 PHARM REV CODE 250: Performed by: NURSE PRACTITIONER

## 2025-01-11 RX ADMIN — ISOSORBIDE MONONITRATE 30 MG: 30 TABLET, EXTENDED RELEASE ORAL at 10:01

## 2025-01-11 RX ADMIN — DOCUSATE SODIUM 100 MG: 100 CAPSULE, LIQUID FILLED ORAL at 10:01

## 2025-01-11 RX ADMIN — OXYCODONE HYDROCHLORIDE 10 MG: 10 TABLET ORAL at 09:01

## 2025-01-11 RX ADMIN — SOTALOL HYDROCHLORIDE 80 MG: 80 TABLET ORAL at 10:01

## 2025-01-11 RX ADMIN — SOTALOL HYDROCHLORIDE 80 MG: 80 TABLET ORAL at 09:01

## 2025-01-11 RX ADMIN — CLONAZEPAM 0.5 MG: 0.5 TABLET ORAL at 10:01

## 2025-01-11 RX ADMIN — ASPIRIN 81 MG: 81 TABLET, COATED ORAL at 10:01

## 2025-01-11 RX ADMIN — POLYETHYLENE GLYCOL 3350 17 G: 17 POWDER, FOR SOLUTION ORAL at 10:01

## 2025-01-11 RX ADMIN — ACETAMINOPHEN 325MG 650 MG: 325 TABLET ORAL at 05:01

## 2025-01-11 RX ADMIN — METOPROLOL SUCCINATE 12.5 MG: 25 TABLET, EXTENDED RELEASE ORAL at 09:01

## 2025-01-11 RX ADMIN — ACETAMINOPHEN 325MG 650 MG: 325 TABLET ORAL at 12:01

## 2025-01-11 RX ADMIN — LOSARTAN POTASSIUM 25 MG: 25 TABLET, FILM COATED ORAL at 10:01

## 2025-01-11 RX ADMIN — INSULIN ASPART 2 UNITS: 100 INJECTION, SOLUTION INTRAVENOUS; SUBCUTANEOUS at 10:01

## 2025-01-11 RX ADMIN — ACETAMINOPHEN 325MG 650 MG: 325 TABLET ORAL at 10:01

## 2025-01-11 RX ADMIN — SPIRONOLACTONE 25 MG: 25 TABLET ORAL at 10:01

## 2025-01-11 RX ADMIN — FUROSEMIDE 40 MG: 40 TABLET ORAL at 10:01

## 2025-01-11 RX ADMIN — METOPROLOL SUCCINATE 12.5 MG: 25 TABLET, EXTENDED RELEASE ORAL at 10:01

## 2025-01-11 RX ADMIN — FUROSEMIDE 40 MG: 40 TABLET ORAL at 09:01

## 2025-01-11 RX ADMIN — ACETAMINOPHEN 325MG 650 MG: 325 TABLET ORAL at 06:01

## 2025-01-11 RX ADMIN — OXYCODONE HYDROCHLORIDE 10 MG: 10 TABLET ORAL at 02:01

## 2025-01-11 RX ADMIN — DOCUSATE SODIUM 100 MG: 100 CAPSULE, LIQUID FILLED ORAL at 09:01

## 2025-01-11 RX ADMIN — OLANZAPINE 5 MG: 5 TABLET, FILM COATED ORAL at 09:01

## 2025-01-11 RX ADMIN — INSULIN ASPART 2 UNITS: 100 INJECTION, SOLUTION INTRAVENOUS; SUBCUTANEOUS at 05:01

## 2025-01-11 RX ADMIN — FLUOXETINE 40 MG: 20 CAPSULE ORAL at 10:01

## 2025-01-11 NOTE — PROGRESS NOTES
Trauma Surgery   Progress Note  Admit Date: 1/9/2025  HD#2  POD#* No surgery found *    Subjective:   Interval history:  NAEON  AFVSS, mild hypertension  Patient had mild confusion last night possibly related to robaxin, medication dc'd  Patient A&O X 3 this morning, aware of current situation, no confusion noted.  Patient c/o back pain, using brace when OOB,  Right leg with hinged knee brace in extended position, worked with PT/OT yesterday, recommending high intensity.   Patient without much appetite, eating some, passing gas, no BM since admission      Home Meds:   Current Outpatient Medications   Medication Instructions    albuterol-ipratropium (DUO-NEB) 2.5 mg-0.5 mg/3 mL nebulizer solution 3 mLs, Every 4 hours PRN    aspirin (ECOTRIN) 81 mg, Daily    atorvastatin (LIPITOR) 40 mg, Daily    clonazePAM (KLONOPIN) 0.5 mg, 3 times daily PRN    dapagliflozin propanediol (FARXIGA) 10 mg, Oral, Daily    ergocalciferol (ERGOCALCIFEROL) 50,000 Units, Oral, Every 7 days    FLUoxetine 40 mg, Daily    furosemide (LASIX) 40 mg, Oral, 2 times daily    gabapentin (NEURONTIN) 100 mg, 3 times daily    HYDROcodone-acetaminophen (NORCO) 5-325 mg per tablet 1 tablet, Oral, Every 8 hours PRN    isosorbide mononitrate (IMDUR) 30 mg, Oral, Daily    losartan (COZAAR) 25 mg, Oral, Daily    metoprolol succinate (TOPROL-XL) 12.5 mg, Oral, 2 times daily    MOUNJARO 15 mg, Every 7 days    nicotine (NICODERM CQ) 14 mg/24 hr 1 patch, Transdermal, Daily    OLANZapine (ZYPREXA) 5 mg, Nightly    sotaloL (BETAPACE) 80 mg, Every 12 hours    spironolactone (ALDACTONE) 25 mg, Oral, Daily    traZODone (DESYREL) 25-50 mg, Nightly PRN    warfarin (COUMADIN) 5 mg, Daily      Scheduled Meds:   acetaminophen  650 mg Oral Q6H    aspirin  81 mg Oral Daily    docusate sodium  100 mg Oral BID    FLUoxetine  40 mg Oral Daily    furosemide  40 mg Oral BID    isosorbide mononitrate  30 mg Oral Daily    losartan  25 mg Oral Daily    metoprolol succinate  12.5  "mg Oral BID    OLANZapine  5 mg Oral QHS    polyethylene glycol  17 g Oral BID    sotaloL  80 mg Oral Q12H    spironolactone  25 mg Oral Daily     Continuous Infusions:  PRN Meds:  Current Facility-Administered Medications:     albuterol-ipratropium, 3 mL, Nebulization, Q4H PRN    clonazePAM, 0.5 mg, Oral, TID PRN    dextrose 50%, 12.5 g, Intravenous, PRN    dextrose 50%, 25 g, Intravenous, PRN    glucagon (human recombinant), 1 mg, Intramuscular, PRN    glucose, 16 g, Oral, PRN    glucose, 24 g, Oral, PRN    insulin aspart U-100, 0-5 Units, Subcutaneous, QID (AC + HS) PRN    magnesium hydroxide 400 mg/5 ml, 30 mL, Oral, Daily PRN    melatonin, 6 mg, Oral, Nightly PRN    oxyCODONE, 5 mg, Oral, Q4H PRN    oxyCODONE, 10 mg, Oral, Q4H PRN    traZODone, 50 mg, Oral, Nightly PRN     Objective:     VITAL SIGNS: 24 HR MIN & MAX LAST   Temp  Min: 97.4 °F (36.3 °C)  Max: 98.4 °F (36.9 °C)  98.4 °F (36.9 °C)   BP  Min: 105/65  Max: 153/80  (!) 153/80    Pulse  Min: 59  Max: 64  64    Resp  Min: 18  Max: 22  20    SpO2  Min: 92 %  Max: 96 %  (!) 93 %      HT: 5' 6" (167.6 cm)  WT: 97.5 kg (215 lb)  BMI: 34.7     Intake/output:  Intake/Output - Last 3 Shifts         01/09 0700  01/10 0659 01/10 0700 01/11 0659 01/11 0700 01/12 0659    P.O.  900     Total Intake(mL/kg)  900 (9.2)     Urine (mL/kg/hr)  500 (0.2)     Total Output  500     Net  +400            Urine Occurrence  3 x             Intake/Output Summary (Last 24 hours) at 1/11/2025 1006  Last data filed at 1/11/2025 0600  Gross per 24 hour   Intake 780 ml   Output 500 ml   Net 280 ml         Lines/drains/airway:       Peripheral IV - Single Lumen 01/11/25 0842 22 G No Left;Posterior Forearm (Active)   Number of days: 0       Physical examination:  Gen: NAD, AAOx3, answering questions appropriately  HEENT: normocephalic, atraumatic  CV: RR  Resp: NWOB  Abd: S/NT/ND  Msk: moving all extremities spontaneously and purposefully  Neuro: CN II-XII grossly " "intact  Skin/wounds: warm, well perfused    Labs:  Renal:  Recent Labs     01/09/25  0959 01/10/25  0512   BUN 29.8* 26.7*   CREATININE 1.10* 0.97     No results for input(s): "LACTIC" in the last 72 hours.  FEN/GI:  Recent Labs     01/09/25  0959 01/10/25  0512    139   K 5.2* 4.2   CL 95* 97*   CO2 33* 37*   CALCIUM 9.0 8.4   MG  --  2.50   PHOS  --  4.3   ALBUMIN 3.9 3.3*   BILITOT 0.5 0.6   AST 44* 28   ALKPHOS 133 116   ALT 20 18     Heme:  Recent Labs     01/09/25  0959 01/10/25  0512   HGB 13.3 11.4*   HCT 44.1 38.9    321   INR 2.7* 3.6*     ID:  Recent Labs     01/09/25  0959 01/10/25  0512   WBC 11.28 10.56     CBG:  Recent Labs     01/09/25  0959 01/10/25  0512   GLUCOSE 238* 152*      Recent Labs     01/09/25  1845 01/09/25  2123 01/10/25  0402 01/10/25  1051 01/10/25  1737 01/11/25  0343   POCTGLUCOSE 138* 167* 152* 190* 159* 146*      Cardiovascular:  No results for input(s): "TROPONINI", "CKTOTAL", "CKMB", "BNP" in the last 168 hours.  I have reviewed all pertinent lab results within the past 24 hours.    Imaging:  X-Ray Thoracolumbar Spine AP Lateral   Final Result      Progressive moderate height loss at L1 on upright radiographs.         Electronically signed by: Norma Leblanc   Date:    01/10/2025   Time:    09:13      CT Cervical Spine Without Contrast   Final Result      No acute fracture or malalignment identified.         Electronically signed by: Juanito Chopra   Date:    01/09/2025   Time:    13:43      CT Chest Abdomen Pelvis With IV Contrast (XPD) NO Oral Contrast   Final Result      1. Mild L1 superior endplate compression deformity is more conspicuous compared to the CT performed earlier today.   2. Otherwise no acute findings chest, abdomen or pelvis.         Electronically signed by: Dennis Chang   Date:    01/09/2025   Time:    13:48      CT Lumbar Spine Without Contrast   Final Result      No acute fracture or subluxation.      Mild multilevel lumbar degeneration as " detailed above.         Electronically signed by: Phoenix Albert   Date:    01/09/2025   Time:    10:40      CT Head Without Contrast   Final Result      X-Ray Knee Complete 4 Or More Views Right   Final Result      Nondisplaced mid patellar fracture with small joint effusion.         Electronically signed by: Norma Leblanc   Date:    01/09/2025   Time:    10:13      X-Ray Pelvis Routine AP   Final Result      No acute bony abnormality.         Electronically signed by: Norma Leblanc   Date:    01/09/2025   Time:    10:12         I have reviewed all pertinent imaging results/findings within the past 24 hours.    Micro/Path/Other:  Microbiology Results (last 7 days)       ** No results found for the last 168 hours. **           Pathology Results  (Last 7 days)      None             Problems list:  Active Problem List with Overview Notes    Diagnosis Date Noted    Closed fracture of lumbar vertebra 01/09/2025    Right patella fracture 01/09/2025    Congestive heart failure 12/24/2024    LESLI (obstructive sleep apnea) 12/17/2024    Tobacco dependency 10/24/2024    Chronic obstructive pulmonary disease 10/24/2024    CHF with unknown LVEF 10/24/2024    Dyspnea on exertion 03/13/2023    Hypoxia 02/20/2023    Troponin level elevated 02/20/2023    Atrial fibrillation 02/20/2023        Assessment & Plan:   Right patella fracture  - Per Ortho: non-op, outpatient follow up, WB in full extension, no ROM  [facility]  [facility]  - Hinged knee [facility]     Closed fracture of lumbar vertebra  - Per neurosurgery, no MRI, upright Xrs (done and stable)  - TLSO brace in place  - [facility]  - Holding Lovenox for now, supratherapeutic INR  - INR above goal, consider restarting 1/11 pending INR  - PT/OT    Fall   - [facility]  - [facility] med[facility]  - Daily labs  - Diabetic diet  - POCT glucose w/SSI  - NV checks q4h  - SCDs  - PT/OT  - IS    Patricia Stoll P  Trauma Surgery

## 2025-01-11 NOTE — NURSING
1801: Pt progressively getting confused after administration of PO Robaxin.  is concerned about medication. Trauma team notified. VSS/NADN at this time. Will continue to monitor for any acute changes during shift.     1824: Robaxin d/c'd by MD. Pt orientation improving. Will continue to monitor for any acute changes during shift. Pt resting in bed with complaints.

## 2025-01-12 LAB
ALBUMIN SERPL-MCNC: 3 G/DL (ref 3.4–4.8)
ALBUMIN/GLOB SERPL: 1 RATIO (ref 1.1–2)
ALP SERPL-CCNC: 91 UNIT/L (ref 40–150)
ALT SERPL-CCNC: 17 UNIT/L (ref 0–55)
ANION GAP SERPL CALC-SCNC: 9 MEQ/L
AST SERPL-CCNC: 19 UNIT/L (ref 5–34)
BASOPHILS # BLD AUTO: 0.04 X10(3)/MCL
BASOPHILS NFR BLD AUTO: 0.4 %
BILIRUB SERPL-MCNC: 0.8 MG/DL
BUN SERPL-MCNC: 31.1 MG/DL (ref 9.8–20.1)
CALCIUM SERPL-MCNC: 8.4 MG/DL (ref 8.4–10.2)
CHLORIDE SERPL-SCNC: 97 MMOL/L (ref 98–107)
CO2 SERPL-SCNC: 33 MMOL/L (ref 23–31)
CREAT SERPL-MCNC: 0.9 MG/DL (ref 0.55–1.02)
CREAT/UREA NIT SERPL: 35
EOSINOPHIL # BLD AUTO: 0.24 X10(3)/MCL (ref 0–0.9)
EOSINOPHIL NFR BLD AUTO: 2.5 %
ERYTHROCYTE [DISTWIDTH] IN BLOOD BY AUTOMATED COUNT: 16.4 % (ref 11.5–17)
GFR SERPLBLD CREATININE-BSD FMLA CKD-EPI: >60 ML/MIN/1.73/M2
GLOBULIN SER-MCNC: 3 GM/DL (ref 2.4–3.5)
GLUCOSE SERPL-MCNC: 163 MG/DL (ref 82–115)
HCT VFR BLD AUTO: 35.1 % (ref 37–47)
HGB BLD-MCNC: 10.5 G/DL (ref 12–16)
IMM GRANULOCYTES # BLD AUTO: 0.03 X10(3)/MCL (ref 0–0.04)
IMM GRANULOCYTES NFR BLD AUTO: 0.3 %
INR PPP: 1.9
LYMPHOCYTES # BLD AUTO: 1.15 X10(3)/MCL (ref 0.6–4.6)
LYMPHOCYTES NFR BLD AUTO: 12 %
MCH RBC QN AUTO: 27.2 PG (ref 27–31)
MCHC RBC AUTO-ENTMCNC: 29.9 G/DL (ref 33–36)
MCV RBC AUTO: 90.9 FL (ref 80–94)
MONOCYTES # BLD AUTO: 0.59 X10(3)/MCL (ref 0.1–1.3)
MONOCYTES NFR BLD AUTO: 6.2 %
NEUTROPHILS # BLD AUTO: 7.54 X10(3)/MCL (ref 2.1–9.2)
NEUTROPHILS NFR BLD AUTO: 78.6 %
NRBC BLD AUTO-RTO: 0 %
PLATELET # BLD AUTO: 300 X10(3)/MCL (ref 130–400)
PMV BLD AUTO: 10.6 FL (ref 7.4–10.4)
POCT GLUCOSE: 129 MG/DL (ref 70–110)
POCT GLUCOSE: 138 MG/DL (ref 70–110)
POCT GLUCOSE: 144 MG/DL (ref 70–110)
POCT GLUCOSE: 156 MG/DL (ref 70–110)
POCT GLUCOSE: 178 MG/DL (ref 70–110)
POCT GLUCOSE: 233 MG/DL (ref 70–110)
POCT GLUCOSE: 244 MG/DL (ref 70–110)
POTASSIUM SERPL-SCNC: 3.9 MMOL/L (ref 3.5–5.1)
PROT SERPL-MCNC: 6 GM/DL (ref 5.8–7.6)
PROTHROMBIN TIME: 21.9 SECONDS (ref 12.5–14.5)
RBC # BLD AUTO: 3.86 X10(6)/MCL (ref 4.2–5.4)
SODIUM SERPL-SCNC: 139 MMOL/L (ref 136–145)
WBC # BLD AUTO: 9.59 X10(3)/MCL (ref 4.5–11.5)

## 2025-01-12 PROCEDURE — 36415 COLL VENOUS BLD VENIPUNCTURE: CPT | Performed by: NURSE PRACTITIONER

## 2025-01-12 PROCEDURE — 85025 COMPLETE CBC W/AUTO DIFF WBC: CPT | Performed by: NURSE PRACTITIONER

## 2025-01-12 PROCEDURE — 99233 SBSQ HOSP IP/OBS HIGH 50: CPT | Mod: ,,, | Performed by: SURGERY

## 2025-01-12 PROCEDURE — 97116 GAIT TRAINING THERAPY: CPT | Mod: CQ

## 2025-01-12 PROCEDURE — 63600175 PHARM REV CODE 636 W HCPCS: Performed by: NURSE PRACTITIONER

## 2025-01-12 PROCEDURE — 25000003 PHARM REV CODE 250

## 2025-01-12 PROCEDURE — 25000003 PHARM REV CODE 250: Performed by: NURSE PRACTITIONER

## 2025-01-12 PROCEDURE — 25000003 PHARM REV CODE 250: Performed by: STUDENT IN AN ORGANIZED HEALTH CARE EDUCATION/TRAINING PROGRAM

## 2025-01-12 PROCEDURE — 80053 COMPREHEN METABOLIC PANEL: CPT | Performed by: NURSE PRACTITIONER

## 2025-01-12 PROCEDURE — 11000001 HC ACUTE MED/SURG PRIVATE ROOM

## 2025-01-12 PROCEDURE — 97530 THERAPEUTIC ACTIVITIES: CPT | Mod: CQ

## 2025-01-12 PROCEDURE — 85610 PROTHROMBIN TIME: CPT | Performed by: NURSE PRACTITIONER

## 2025-01-12 RX ORDER — WARFARIN SODIUM 5 MG/1
5 TABLET ORAL DAILY
Status: DISCONTINUED | OUTPATIENT
Start: 2025-01-12 | End: 2025-01-13 | Stop reason: HOSPADM

## 2025-01-12 RX ADMIN — ISOSORBIDE MONONITRATE 30 MG: 30 TABLET, EXTENDED RELEASE ORAL at 09:01

## 2025-01-12 RX ADMIN — OXYCODONE HYDROCHLORIDE 10 MG: 10 TABLET ORAL at 04:01

## 2025-01-12 RX ADMIN — INSULIN ASPART 2 UNITS: 100 INJECTION, SOLUTION INTRAVENOUS; SUBCUTANEOUS at 05:01

## 2025-01-12 RX ADMIN — OLANZAPINE 5 MG: 5 TABLET, FILM COATED ORAL at 08:01

## 2025-01-12 RX ADMIN — ACETAMINOPHEN 325MG 650 MG: 325 TABLET ORAL at 05:01

## 2025-01-12 RX ADMIN — CLONAZEPAM 0.5 MG: 0.5 TABLET ORAL at 01:01

## 2025-01-12 RX ADMIN — OXYCODONE HYDROCHLORIDE 10 MG: 10 TABLET ORAL at 01:01

## 2025-01-12 RX ADMIN — OXYCODONE HYDROCHLORIDE 10 MG: 10 TABLET ORAL at 08:01

## 2025-01-12 RX ADMIN — WARFARIN SODIUM 5 MG: 5 TABLET ORAL at 04:01

## 2025-01-12 RX ADMIN — FUROSEMIDE 40 MG: 40 TABLET ORAL at 09:01

## 2025-01-12 RX ADMIN — FUROSEMIDE 40 MG: 40 TABLET ORAL at 08:01

## 2025-01-12 RX ADMIN — METOPROLOL SUCCINATE 12.5 MG: 25 TABLET, EXTENDED RELEASE ORAL at 08:01

## 2025-01-12 RX ADMIN — DOCUSATE SODIUM 100 MG: 100 CAPSULE, LIQUID FILLED ORAL at 08:01

## 2025-01-12 RX ADMIN — OXYCODONE HYDROCHLORIDE 5 MG: 5 TABLET ORAL at 12:01

## 2025-01-12 RX ADMIN — DOCUSATE SODIUM 100 MG: 100 CAPSULE, LIQUID FILLED ORAL at 09:01

## 2025-01-12 RX ADMIN — ASPIRIN 81 MG: 81 TABLET, COATED ORAL at 09:01

## 2025-01-12 RX ADMIN — ACETAMINOPHEN 325MG 650 MG: 325 TABLET ORAL at 06:01

## 2025-01-12 RX ADMIN — SPIRONOLACTONE 25 MG: 25 TABLET ORAL at 09:01

## 2025-01-12 RX ADMIN — ACETAMINOPHEN 325MG 650 MG: 325 TABLET ORAL at 12:01

## 2025-01-12 RX ADMIN — SOTALOL HYDROCHLORIDE 80 MG: 80 TABLET ORAL at 08:01

## 2025-01-12 RX ADMIN — FLUOXETINE 40 MG: 20 CAPSULE ORAL at 09:01

## 2025-01-12 RX ADMIN — LOSARTAN POTASSIUM 25 MG: 25 TABLET, FILM COATED ORAL at 09:01

## 2025-01-12 NOTE — PT/OT/SLP PROGRESS
Physical Therapy Treatment    Patient Name:  Kayla Ruiz   MRN:  [id number]    Recommendations:     Discharge therapy intensity: High Intensity Therapy   Discharge Equipment Recommendations: to be determined by next level of care  Barriers to discharge: Impaired mobility    Assessment:     Kayla Ruiz is a 69 y.o. female admitted with a medical diagnosis of fall at home resulting in R patellar fx (non-op), L1 compression fx. Pt will need to wear TLSO and a HKB in extension, and is clear to WB with HKB. .  She presents with the following impairments/functional limitations: weakness, gait instability, impaired balance, impaired endurance, decreased safety awareness, impaired functional mobility .    Pt states she would like to d/c home if possible instead of going to rehab. Pt has potential to progress quickly and be appropriate for this. Has already improved a great deal since last tx session. Will work with pt again tomorrow and be in communication with CM in regards to d/c recs.     Rehab Prognosis: Good; patient would benefit from acute skilled PT services to address these deficits and reach maximum level of function.    Recent Surgery: * No surgery found *      Plan:     During this hospitalization, patient would benefit from acute PT services 6 x/week to address the identified rehab impairments via gait training, therapeutic activities, therapeutic exercises and progress toward the following goals:    Plan of Care Expires:  02/10/25    Subjective     Chief Complaint:   Patient/Family Comments/goals:   Pain/Comfort:  Pain Rating 1: 5/10  Location - Side 1: Right  Location 1: leg  Pain Addressed 1: Reposition, Distraction      Objective:     Communicated with NSG prior to session.  Patient found HOB elevated with TLSO, peripheral IV upon PT entry to room.     General Precautions: Standard, fall  Orthopedic Precautions:  (RLE WBAT in HKB (no ROM of knee). spinal pxns-TLSO)  Braces: Hinged knee brace,  TLSO  Respiratory Status: Room air  Blood Pressure:   Skin Integrity: Visible skin intact      Functional Mobility:  Bed Mobility:     Scooting: contact guard assistance  Supine to Sit: contact guard assistance  Transfers:     Sit to Stand:  contact guard assistance with rolling walker and 1 trial from EOB and 1 trial from bedside chair   Bed to Chair: minimum assistance with  rolling walker  using  Step Transfer  Gait: pt amb 22ft with RW Rafael. Pt with step-to gait pattern. Limited 2/2 fatigue and pain.     Pt required increased time for bed mobility.       Patient left up in chair with all lines intact and call button in reach    GOALS:   Multidisciplinary Problems       Physical Therapy Goals          Problem: Physical Therapy    Goal Priority Disciplines Outcome Interventions   Physical Therapy Goal     PT, PT/OT Progressing    Description: Goals to be met by: 2/10/25     Patient will increase functional independence with mobility by performin. Supine to sit with Contact Guard Assistance  2. Sit to supine with Contact Guard Assistance  3. Sit to stand transfer with Contact Guard Assistance  4. Bed to chair transfer with Contact Guard Assistance using Rolling Walker  5. Gait  x 100 feet with Contact Guard Assistance using Rolling Walker.                          Time Tracking:     PT Received On: 25  PT Start Time: 1003     PT Stop Time: 1036  PT Total Time (min): 33 min     Billable Minutes: Gait Training 16 and Therapeutic Activity 17    Treatment Type: Treatment  PT/PTA: PTA     Number of PTA visits since last PT visit: 2025

## 2025-01-12 NOTE — PROGRESS NOTES
Trauma Surgery   Progress Note  Admit Date: 1/9/2025  HD#3  POD#* No surgery found *    Subjective:   Interval history:  NAEON  AFVSS, mild hypertension  Patient c/o back pain, wearing TLSO brace,  Right leg with hinged knee brace in extended position, leg soft compressible. 2+DP  Tolerating regular diet, last BM 1/11  INR 1.9 today, Coumadin restarted    Home Meds:   Current Outpatient Medications   Medication Instructions    albuterol-ipratropium (DUO-NEB) 2.5 mg-0.5 mg/3 mL nebulizer solution 3 mLs, Every 4 hours PRN    aspirin (ECOTRIN) 81 mg, Daily    atorvastatin (LIPITOR) 40 mg, Daily    clonazePAM (KLONOPIN) 0.5 mg, 3 times daily PRN    dapagliflozin propanediol (FARXIGA) 10 mg, Oral, Daily    ergocalciferol (ERGOCALCIFEROL) 50,000 Units, Oral, Every 7 days    FLUoxetine 40 mg, Daily    furosemide (LASIX) 40 mg, Oral, 2 times daily    gabapentin (NEURONTIN) 100 mg, 3 times daily    HYDROcodone-acetaminophen (NORCO) 5-325 mg per tablet 1 tablet, Oral, Every 8 hours PRN    isosorbide mononitrate (IMDUR) 30 mg, Oral, Daily    losartan (COZAAR) 25 mg, Oral, Daily    metoprolol succinate (TOPROL-XL) 12.5 mg, Oral, 2 times daily    MOUNJARO 15 mg, Every 7 days    nicotine (NICODERM CQ) 14 mg/24 hr 1 patch, Transdermal, Daily    OLANZapine (ZYPREXA) 5 mg, Nightly    sotaloL (BETAPACE) 80 mg, Every 12 hours    spironolactone (ALDACTONE) 25 mg, Oral, Daily    traZODone (DESYREL) 25-50 mg, Nightly PRN    warfarin (COUMADIN) 5 mg, Daily      Scheduled Meds:   acetaminophen  650 mg Oral Q6H    aspirin  81 mg Oral Daily    docusate sodium  100 mg Oral BID    FLUoxetine  40 mg Oral Daily    furosemide  40 mg Oral BID    isosorbide mononitrate  30 mg Oral Daily    losartan  25 mg Oral Daily    metoprolol succinate  12.5 mg Oral BID    OLANZapine  5 mg Oral QHS    polyethylene glycol  17 g Oral BID    sotaloL  80 mg Oral Q12H    spironolactone  25 mg Oral Daily    warfarin  5 mg Oral Daily     Continuous Infusions:  PRN  "Meds:  Current Facility-Administered Medications:     albuterol-ipratropium, 3 mL, Nebulization, Q4H PRN    clonazePAM, 0.5 mg, Oral, TID PRN    dextrose 50%, 12.5 g, Intravenous, PRN    dextrose 50%, 25 g, Intravenous, PRN    glucagon (human recombinant), 1 mg, Intramuscular, PRN    glucose, 16 g, Oral, PRN    glucose, 24 g, Oral, PRN    insulin aspart U-100, 0-5 Units, Subcutaneous, QID (AC + HS) PRN    magnesium hydroxide 400 mg/5 ml, 30 mL, Oral, Daily PRN    melatonin, 6 mg, Oral, Nightly PRN    oxyCODONE, 5 mg, Oral, Q4H PRN    oxyCODONE, 10 mg, Oral, Q4H PRN    traZODone, 50 mg, Oral, Nightly PRN     Objective:     VITAL SIGNS: 24 HR MIN & MAX LAST   Temp  Min: 97.9 °F (36.6 °C)  Max: 98.9 °F (37.2 °C)  98 °F (36.7 °C)   BP  Min: 122/63  Max: 154/70  (!) 139/57    Pulse  Min: 53  Max: 61  (!) 53    Resp  Min: 16  Max: 20  16    SpO2  Min: 90 %  Max: 95 %  95 %      HT: 5' 6" (167.6 cm)  WT: 97.5 kg (215 lb)  BMI: 34.7     Intake/output:  Intake/Output - Last 3 Shifts         01/10 0700  01/11 0659 01/11 0700 01/12 0659 01/12 0700 01/13 0659    P.O. 900 720     Total Intake(mL/kg) 900 (9.2) 720 (7.4)     Urine (mL/kg/hr) 500 (0.2) 550 (0.2)     Stool  0     Total Output 500 550     Net +400 +170            Urine Occurrence 3 x 1 x     Stool Occurrence  1 x             Intake/Output Summary (Last 24 hours) at 1/12/2025 0906  Last data filed at 1/11/2025 2300  Gross per 24 hour   Intake 720 ml   Output 550 ml   Net 170 ml         Lines/drains/airway:       Peripheral IV - Single Lumen 01/11/25 0842 22 G No Left;Posterior Forearm (Active)   Number of days: 0       Physical examination:  Gen: NAD, AAOx3, answering questions appropriately  HEENT: normocephalic, atraumatic  CV: RR  Resp: NWOB  Abd: S/NT/ND  Msk: moving all extremities spontaneously and purposefully  Neuro: CN II-XII grossly intact  Skin/wounds: warm, well perfused    Labs:  Renal:  Recent Labs     01/09/25  0959 01/10/25  0512 01/11/25  1017 " "01/12/25  0523   BUN 29.8* 26.7* 33.3* 31.1*   CREATININE 1.10* 0.97 0.93 0.90     No results for input(s): "LACTIC" in the last 72 hours.  FEN/GI:  Recent Labs     01/09/25  0959 01/10/25  0512 01/11/25  1017 01/12/25  0523    139 139 139   K 5.2* 4.2 4.0 3.9   CL 95* 97* 95* 97*   CO2 33* 37* 37* 33*   CALCIUM 9.0 8.4 8.6 8.4   MG  --  2.50  --   --    PHOS  --  4.3  --   --    ALBUMIN 3.9 3.3* 3.2* 3.0*   BILITOT 0.5 0.6 0.6 0.8   AST 44* 28 29 19   ALKPHOS 133 116 103 91   ALT 20 18 18 17     Heme:  Recent Labs     01/09/25  0959 01/10/25  0512 01/11/25  1017 01/12/25  0523   HGB 13.3 11.4* 11.0* 10.5*   HCT 44.1 38.9 35.8* 35.1*    321 285 300   INR 2.7* 3.6* 3.4* 1.9*     ID:  Recent Labs     01/09/25  0959 01/10/25  0512 01/11/25  1017 01/12/25  0523   WBC 11.28 10.56 9.41 9.59     CBG:  Recent Labs     01/09/25  0959 01/10/25  0512 01/11/25  1017 01/12/25  0523   GLUCOSE 238* 152* 160* 163*      Recent Labs     01/09/25  2123 01/10/25  0402 01/10/25  1051 01/10/25  1737 01/11/25  0343 01/11/25  1043 01/11/25  2131 01/12/25  0603   POCTGLUCOSE 167* 152* 190* 159* 146* 223* 138* 144*      Cardiovascular:  No results for input(s): "TROPONINI", "CKTOTAL", "CKMB", "BNP" in the last 168 hours.  I have reviewed all pertinent lab results within the past 24 hours.    Imaging:  X-Ray Thoracolumbar Spine AP Lateral   Final Result      Progressive moderate height loss at L1 on upright radiographs.         Electronically signed by: Norma Leblanc   Date:    01/10/2025   Time:    09:13      CT Cervical Spine Without Contrast   Final Result      No acute fracture or malalignment identified.         Electronically signed by: Juanito Chopra   Date:    01/09/2025   Time:    13:43      CT Chest Abdomen Pelvis With IV Contrast (XPD) NO Oral Contrast   Final Result      1. Mild L1 superior endplate compression deformity is more conspicuous compared to the CT performed earlier today.   2. Otherwise no acute findings " chest, abdomen or pelvis.         Electronically signed by: Dennis Chang   Date:    01/09/2025   Time:    13:48      CT Lumbar Spine Without Contrast   Final Result      No acute fracture or subluxation.      Mild multilevel lumbar degeneration as detailed above.         Electronically signed by: Phoenix Albert   Date:    01/09/2025   Time:    10:40      CT Head Without Contrast   Final Result      X-Ray Knee Complete 4 Or More Views Right   Final Result      Nondisplaced mid patellar fracture with small joint effusion.         Electronically signed by: Norma Leblanc   Date:    01/09/2025   Time:    10:13      X-Ray Pelvis Routine AP   Final Result      No acute bony abnormality.         Electronically signed by: Norma Leblanc   Date:    01/09/2025   Time:    10:12         I have reviewed all pertinent imaging results/findings within the past 24 hours.    Micro/Path/Other:  Microbiology Results (last 7 days)       ** No results found for the last 168 hours. **           Pathology Results  (Last 7 days)      None             Problems list:  Active Problem List with Overview Notes    Diagnosis Date Noted    Closed fracture of lumbar vertebra 01/09/2025    Right patella fracture 01/09/2025    Congestive heart failure 12/24/2024    LESLI (obstructive sleep apnea) 12/17/2024    Tobacco dependency 10/24/2024    Chronic obstructive pulmonary disease 10/24/2024    CHF with unknown LVEF 10/24/2024    Dyspnea on exertion 03/13/2023    Hypoxia 02/20/2023    Troponin level elevated 02/20/2023    Atrial fibrillation 02/20/2023        Assessment & Plan:   Right patella fracture  - Per Ortho: non-op, outpatient follow up, WB in full extension, no ROM  - PT/OT  - [facility]  - Hinged knee brace     Closed fracture of lumbar vertebra  - Per neurosurgery, no MRI, upright Xrs (done and stable)  - TLSO brace in place  - [facility]  - Coumadin restarted  - PT/OT    Fall   - [facility]  - home meds  - Daily labs  - Diabetic diet  - POCT glucose w/SSI  -  NV checks q4h  - SCDs  - PT/OT  - IS    Patricia Stoll FNP  Trauma Surgery

## 2025-01-13 VITALS
HEIGHT: 66 IN | WEIGHT: 215 LBS | SYSTOLIC BLOOD PRESSURE: 165 MMHG | OXYGEN SATURATION: 92 % | TEMPERATURE: 98 F | HEART RATE: 56 BPM | DIASTOLIC BLOOD PRESSURE: 70 MMHG | BODY MASS INDEX: 34.55 KG/M2 | RESPIRATION RATE: 20 BRPM

## 2025-01-13 LAB
ALBUMIN SERPL-MCNC: 3 G/DL (ref 3.4–4.8)
ALBUMIN/GLOB SERPL: 0.8 RATIO (ref 1.1–2)
ALP SERPL-CCNC: 91 UNIT/L (ref 40–150)
ALT SERPL-CCNC: 15 UNIT/L (ref 0–55)
ANION GAP SERPL CALC-SCNC: 7 MEQ/L
AST SERPL-CCNC: 18 UNIT/L (ref 5–34)
BASOPHILS # BLD AUTO: 0.04 X10(3)/MCL
BASOPHILS NFR BLD AUTO: 0.5 %
BILIRUB SERPL-MCNC: 0.6 MG/DL
BUN SERPL-MCNC: 23 MG/DL (ref 9.8–20.1)
CALCIUM SERPL-MCNC: 9.2 MG/DL (ref 8.4–10.2)
CHLORIDE SERPL-SCNC: 96 MMOL/L (ref 98–107)
CO2 SERPL-SCNC: 37 MMOL/L (ref 23–31)
CREAT SERPL-MCNC: 0.84 MG/DL (ref 0.55–1.02)
CREAT/UREA NIT SERPL: 27
CRP SERPL-MCNC: 53.9 MG/L
EOSINOPHIL # BLD AUTO: 0.32 X10(3)/MCL (ref 0–0.9)
EOSINOPHIL NFR BLD AUTO: 4.1 %
ERYTHROCYTE [DISTWIDTH] IN BLOOD BY AUTOMATED COUNT: 16.2 % (ref 11.5–17)
GFR SERPLBLD CREATININE-BSD FMLA CKD-EPI: >60 ML/MIN/1.73/M2
GLOBULIN SER-MCNC: 4 GM/DL (ref 2.4–3.5)
GLUCOSE SERPL-MCNC: 126 MG/DL (ref 82–115)
HCT VFR BLD AUTO: 36.9 % (ref 37–47)
HGB BLD-MCNC: 11.2 G/DL (ref 12–16)
IMM GRANULOCYTES # BLD AUTO: 0.02 X10(3)/MCL (ref 0–0.04)
IMM GRANULOCYTES NFR BLD AUTO: 0.3 %
INR PPP: 1.7
LYMPHOCYTES # BLD AUTO: 1.68 X10(3)/MCL (ref 0.6–4.6)
LYMPHOCYTES NFR BLD AUTO: 21.4 %
MCH RBC QN AUTO: 27.3 PG (ref 27–31)
MCHC RBC AUTO-ENTMCNC: 30.4 G/DL (ref 33–36)
MCV RBC AUTO: 89.8 FL (ref 80–94)
MONOCYTES # BLD AUTO: 0.64 X10(3)/MCL (ref 0.1–1.3)
MONOCYTES NFR BLD AUTO: 8.2 %
NEUTROPHILS # BLD AUTO: 5.14 X10(3)/MCL (ref 2.1–9.2)
NEUTROPHILS NFR BLD AUTO: 65.5 %
NRBC BLD AUTO-RTO: 0 %
PLATELET # BLD AUTO: 337 X10(3)/MCL (ref 130–400)
PMV BLD AUTO: 10.3 FL (ref 7.4–10.4)
POCT GLUCOSE: 136 MG/DL (ref 70–110)
POTASSIUM SERPL-SCNC: 3.5 MMOL/L (ref 3.5–5.1)
PREALB SERPL-MCNC: 13.2 MG/DL (ref 14–37)
PROT SERPL-MCNC: 7 GM/DL (ref 5.8–7.6)
PROTHROMBIN TIME: 20.1 SECONDS (ref 12.5–14.5)
RBC # BLD AUTO: 4.11 X10(6)/MCL (ref 4.2–5.4)
SODIUM SERPL-SCNC: 140 MMOL/L (ref 136–145)
WBC # BLD AUTO: 7.84 X10(3)/MCL (ref 4.5–11.5)

## 2025-01-13 PROCEDURE — 25000003 PHARM REV CODE 250: Performed by: NURSE PRACTITIONER

## 2025-01-13 PROCEDURE — 80053 COMPREHEN METABOLIC PANEL: CPT | Performed by: NURSE PRACTITIONER

## 2025-01-13 PROCEDURE — 36415 COLL VENOUS BLD VENIPUNCTURE: CPT | Performed by: NURSE PRACTITIONER

## 2025-01-13 PROCEDURE — 97530 THERAPEUTIC ACTIVITIES: CPT | Mod: CQ

## 2025-01-13 PROCEDURE — 25000003 PHARM REV CODE 250

## 2025-01-13 PROCEDURE — 84134 ASSAY OF PREALBUMIN: CPT | Performed by: NURSE PRACTITIONER

## 2025-01-13 PROCEDURE — 97535 SELF CARE MNGMENT TRAINING: CPT | Mod: CO

## 2025-01-13 PROCEDURE — 86140 C-REACTIVE PROTEIN: CPT | Performed by: NURSE PRACTITIONER

## 2025-01-13 PROCEDURE — 99233 SBSQ HOSP IP/OBS HIGH 50: CPT | Mod: ,,, | Performed by: SURGERY

## 2025-01-13 PROCEDURE — 25000003 PHARM REV CODE 250: Performed by: STUDENT IN AN ORGANIZED HEALTH CARE EDUCATION/TRAINING PROGRAM

## 2025-01-13 PROCEDURE — 85610 PROTHROMBIN TIME: CPT | Performed by: NURSE PRACTITIONER

## 2025-01-13 PROCEDURE — 85025 COMPLETE CBC W/AUTO DIFF WBC: CPT | Performed by: NURSE PRACTITIONER

## 2025-01-13 PROCEDURE — 97116 GAIT TRAINING THERAPY: CPT | Mod: CQ

## 2025-01-13 RX ORDER — ADHESIVE BANDAGE
30 BANDAGE TOPICAL DAILY PRN
Status: DISCONTINUED | OUTPATIENT
Start: 2025-01-13 | End: 2025-01-13

## 2025-01-13 RX ORDER — OXYCODONE HYDROCHLORIDE 5 MG/1
5 TABLET ORAL EVERY 4 HOURS PRN
Qty: 18 TABLET | Refills: 0 | Status: SHIPPED | OUTPATIENT
Start: 2025-01-13 | End: 2025-01-20 | Stop reason: SDUPTHER

## 2025-01-13 RX ADMIN — METOPROLOL SUCCINATE 12.5 MG: 25 TABLET, EXTENDED RELEASE ORAL at 10:01

## 2025-01-13 RX ADMIN — SOTALOL HYDROCHLORIDE 80 MG: 80 TABLET ORAL at 10:01

## 2025-01-13 RX ADMIN — ISOSORBIDE MONONITRATE 30 MG: 30 TABLET, EXTENDED RELEASE ORAL at 10:01

## 2025-01-13 RX ADMIN — SPIRONOLACTONE 25 MG: 25 TABLET ORAL at 10:01

## 2025-01-13 RX ADMIN — OXYCODONE HYDROCHLORIDE 10 MG: 10 TABLET ORAL at 10:01

## 2025-01-13 RX ADMIN — LOSARTAN POTASSIUM 25 MG: 25 TABLET, FILM COATED ORAL at 10:01

## 2025-01-13 RX ADMIN — FLUOXETINE 40 MG: 20 CAPSULE ORAL at 10:01

## 2025-01-13 RX ADMIN — FUROSEMIDE 40 MG: 40 TABLET ORAL at 10:01

## 2025-01-13 RX ADMIN — ASPIRIN 81 MG: 81 TABLET, COATED ORAL at 10:01

## 2025-01-13 RX ADMIN — DOCUSATE SODIUM 100 MG: 100 CAPSULE, LIQUID FILLED ORAL at 10:01

## 2025-01-13 RX ADMIN — OXYCODONE HYDROCHLORIDE 10 MG: 10 TABLET ORAL at 03:01

## 2025-01-13 NOTE — PROGRESS NOTES
Trauma Surgery   Progress Note  Patient Name: Kayla Ruiz                   : 1955     MRN: [id number]   Date of Admission: 2025  Code Status: Full Code  Date of Exam: 2025  HD#4  POD#* No surgery found *  Attending Provider: Andrew Luis Jr., *    Subjective:   Interval history:  NAEON  AFVSS, mild hypertension  Patient c/o back pain, wearing TLSO brace,  Right leg with hinged knee brace in extended position, leg soft compressible. 2+DP  Tolerating regular diet, last BM   INR 1.7 today, Coumadin continued     Home Meds:   Current Outpatient Medications   Medication Instructions    albuterol-ipratropium (DUO-NEB) 2.5 mg-0.5 mg/3 mL nebulizer solution 3 mLs, Every 4 hours PRN    aspirin (ECOTRIN) 81 mg, Daily    atorvastatin (LIPITOR) 40 mg, Daily    clonazePAM (KLONOPIN) 0.5 mg, 3 times daily PRN    dapagliflozin propanediol (FARXIGA) 10 mg, Oral, Daily    ergocalciferol (ERGOCALCIFEROL) 50,000 Units, Oral, Every 7 days    FLUoxetine 40 mg, Daily    furosemide (LASIX) 40 mg, Oral, 2 times daily    gabapentin (NEURONTIN) 100 mg, 3 times daily    HYDROcodone-acetaminophen (NORCO) 5-325 mg per tablet 1 tablet, Oral, Every 8 hours PRN    isosorbide mononitrate (IMDUR) 30 mg, Oral, Daily    losartan (COZAAR) 25 mg, Oral, Daily    metoprolol succinate (TOPROL-XL) 12.5 mg, Oral, 2 times daily    MOUNJARO 15 mg, Every 7 days    nicotine (NICODERM CQ) 14 mg/24 hr 1 patch, Transdermal, Daily    OLANZapine (ZYPREXA) 5 mg, Nightly    sotaloL (BETAPACE) 80 mg, Every 12 hours    spironolactone (ALDACTONE) 25 mg, Oral, Daily    traZODone (DESYREL) 25-50 mg, Nightly PRN    warfarin (COUMADIN) 5 mg, Daily      Scheduled Meds:   acetaminophen  650 mg Oral Q6H    aspirin  81 mg Oral Daily    docusate sodium  100 mg Oral BID    FLUoxetine  40 mg Oral Daily    furosemide  40 mg Oral BID    isosorbide mononitrate  30 mg Oral Daily    losartan  25 mg Oral Daily    metoprolol succinate  12.5 mg Oral BID     "OLANZapine  5 mg Oral QHS    polyethylene glycol  17 g Oral BID    sotaloL  80 mg Oral Q12H    spironolactone  25 mg Oral Daily    warfarin  5 mg Oral Daily     Continuous Infusions:  PRN Meds:  Current Facility-Administered Medications:     albuterol-ipratropium, 3 mL, Nebulization, Q4H PRN    clonazePAM, 0.5 mg, Oral, TID PRN    dextrose 50%, 12.5 g, Intravenous, PRN    dextrose 50%, 25 g, Intravenous, PRN    glucagon (human recombinant), 1 mg, Intramuscular, PRN    glucose, 16 g, Oral, PRN    glucose, 24 g, Oral, PRN    insulin aspart U-100, 0-5 Units, Subcutaneous, QID (AC + HS) PRN    magnesium hydroxide 400 mg/5 ml, 30 mL, Oral, Daily PRN    melatonin, 6 mg, Oral, Nightly PRN    oxyCODONE, 5 mg, Oral, Q4H PRN    oxyCODONE, 10 mg, Oral, Q4H PRN    traZODone, 50 mg, Oral, Nightly PRN     Objective:     VITAL SIGNS: 24 HR MIN & MAX LAST   Temp  Min: 97.6 °F (36.4 °C)  Max: 98.2 °F (36.8 °C)  97.9 °F (36.6 °C)   BP  Min: 131/61  Max: 157/74  (!) 147/61    Pulse  Min: 53  Max: 68  (!) 57    Resp  Min: 16  Max: 18  16    SpO2  Min: 91 %  Max: 95 %  (!) 91 %      HT: 5' 6" (167.6 cm)  WT: 97.5 kg (215 lb)  BMI: 34.7     Intake/output:  Intake/Output - Last 3 Shifts         01/11 0700  01/12 0659 01/12 0700 01/13 0659    P.O. 720 720    Total Intake(mL/kg) 720 (7.4) 720 (7.4)    Urine (mL/kg/hr) 550 (0.2) 500 (0.2)    Stool 0     Total Output 550 500    Net +170 +220          Urine Occurrence 1 x 1 x    Stool Occurrence 1 x             Intake/Output Summary (Last 24 hours) at 1/13/2025 0651  Last data filed at 1/13/2025 0531  Gross per 24 hour   Intake 720 ml   Output 500 ml   Net 220 ml         Lines/drains/airway:       Peripheral IV - Single Lumen 01/11/25 0842 22 G No Left;Posterior Forearm (Active)   Site Assessment Clean;Dry;Intact 01/13/25 0400   Line Status Flushed;Capped;Saline locked 01/12/25 1600   Dressing Status Clean;Dry;Intact 01/12/25 1600   Dressing Intervention Integrity maintained 01/12/25 1600 " "  Number of days: 1       Physical examination:  Gen: NAD, AAOx3, answering questions appropriately  HEENT: NC/AT  CV: RR  Resp: NWOB  Abd: S/NT/ND  Msk: moving all extremities spontaneously and purposefully  Neuro: CN II-XII grossly intact  Skin/wounds: Dry and warm     Labs:  Renal:  Recent Labs     01/11/25  1017 01/12/25  0523 01/13/25  0516   BUN 33.3* 31.1* 23.0*   CREATININE 0.93 0.90 0.84     No results for input(s): "LACTIC" in the last 72 hours.  FEN/GI:  Recent Labs     01/11/25  1017 01/12/25  0523 01/13/25  0516    139 140   K 4.0 3.9 3.5   CL 95* 97* 96*   CO2 37* 33* 37*   CALCIUM 8.6 8.4 9.2   ALBUMIN 3.2* 3.0* 3.0*   BILITOT 0.6 0.8 0.6   AST 29 19 18   ALKPHOS 103 91 91   ALT 18 17 15     Heme:  Recent Labs     01/11/25  1017 01/12/25  0523 01/13/25  0449 01/13/25  0516   HGB 11.0* 10.5* 11.2*  --    HCT 35.8* 35.1* 36.9*  --     300 337  --    INR 3.4* 1.9*  --  1.7*     ID:  Recent Labs     01/11/25  1017 01/12/25  0523 01/13/25  0449   WBC 9.41 9.59 7.84     CBG:  Recent Labs     01/11/25  1017 01/12/25  0523 01/13/25  0516   GLUCOSE 160* 163* 126*      Recent Labs     01/11/25  1636 01/11/25  2109 01/11/25  2131 01/12/25  0603 01/12/25  1034 01/12/25  1621 01/12/25  2045 01/13/25  0430   POCTGLUCOSE 244* 129* 138* 144* 178* 233* 156* 136*      Cardiovascular:  No results for input(s): "TROPONINI", "CKTOTAL", "CKMB", "BNP" in the last 168 hours.  I have reviewed all pertinent lab results within the past 24 hours.    Imaging:  X-Ray Thoracolumbar Spine AP Lateral   Final Result      Progressive moderate height loss at L1 on upright radiographs.         Electronically signed by: Norma Leblanc   Date:    01/10/2025   Time:    09:13      CT Cervical Spine Without Contrast   Final Result      No acute fracture or malalignment identified.         Electronically signed by: Juanito Chopra   Date:    01/09/2025   Time:    13:43      CT Chest Abdomen Pelvis With IV Contrast (XPD) NO Oral " Contrast   Final Result      1. Mild L1 superior endplate compression deformity is more conspicuous compared to the CT performed earlier today.   2. Otherwise no acute findings chest, abdomen or pelvis.         Electronically signed by: Dennis Chang   Date:    01/09/2025   Time:    13:48      CT Lumbar Spine Without Contrast   Final Result      No acute fracture or subluxation.      Mild multilevel lumbar degeneration as detailed above.         Electronically signed by: Phoenix Albert   Date:    01/09/2025   Time:    10:40      CT Head Without Contrast   Final Result      X-Ray Knee Complete 4 Or More Views Right   Final Result      Nondisplaced mid patellar fracture with small joint effusion.         Electronically signed by: Norma Leblanc   Date:    01/09/2025   Time:    10:13      X-Ray Pelvis Routine AP   Final Result      No acute bony abnormality.         Electronically signed by: Norma Leblanc   Date:    01/09/2025   Time:    10:12         I have reviewed all pertinent imaging results/findings within the past 24 hours.    Micro/Path/Other:  Microbiology Results (last 7 days)       ** No results found for the last 168 hours. **           Pathology Results  (Last 7 days)      None             Problems list:  Active Problem List with Overview Notes    Diagnosis Date Noted    Closed fracture of lumbar vertebra 01/09/2025    Right patella fracture 01/09/2025    Congestive heart failure 12/24/2024    LESLI (obstructive sleep apnea) 12/17/2024    Tobacco dependency 10/24/2024    Chronic obstructive pulmonary disease 10/24/2024    CHF with unknown LVEF 10/24/2024    Dyspnea on exertion 03/13/2023    Hypoxia 02/20/2023    Troponin level elevated 02/20/2023    Atrial fibrillation 02/20/2023      Active Diagnoses:    Diagnosis Date Noted POA    Closed fracture of lumbar vertebra [S32.009A] 01/09/2025 Yes    Right patella fracture [S82.001A] 01/09/2025 Yes      Problems Resolved During this Admission:       Assessment  & Plan:   Right patella fracture  - Per Ortho: non-op, outpatient follow up, WB in full extension, no ROM  - PT/OT  - [facility]  - Hinged knee brace     Closed fracture of lumbar vertebra  - Per neurosurgery, no MRI, upright Xrs (done and stable)  - TLSO brace in place  - [facility]  - Coumadin restarted  - PT/OT  - Monitor INR in range of 2.5-3.5    Constipation  - Last BM on 1/11/2025  - Start scheduled magnesium hydroxide on day-by-day basis     Fall   - [facility]  - home meds  - Daily labs  - Diabetic diet  - POCT glucose w/SSI  - NV checks q4h  - SCDs  - PT/OT  - IS    Uriah Horan MD  [facility], [facility], -1  01/13/2025

## 2025-01-13 NOTE — NURSING
Discharge paperwork reviewed and given to pt/ who verbalized understanding. PIV removed. VSS/NADN. Answered all questions and concerns. Waiting on fly transportation.

## 2025-01-13 NOTE — PLAN OF CARE
01/13/25 1135   Final Note   Assessment Type Final Discharge Note   Anticipated Discharge Disposition Home-Health   What phone number can be called within the next 1-3 days to see how you are doing after discharge? [phone]   Hospital Resources/Appts/Education Provided Post-Acute resouces added to AVS   Post-Acute Status   Post-Acute Authorization Home Health   Home Health Status Set-up Complete/Auth obtained   Discharge Delays None known at this time     Pt being discharged home today. She is current with [facility] . DC summary and AVS sent via Epic. No dc needs at this time.

## 2025-01-13 NOTE — PT/OT/SLP PROGRESS
Occupational Therapy   Treatment    Name: Kayla Ruiz  MRN: [id number]    Recommendations:     Recommended therapy intensity at discharge: Low Intensity Therapy (with 24hr family assist)   Discharge Equipment Recommendations:  none  Barriers to discharge:       Assessment:     Kayla Ruiz is a 69 y.o. female with a medical diagnosis of fall at home resulting in R patellar fx (non-op), L1 compression fx. Pt will need to wear TLSO and a HKB in extension, and is clear to WB with HKB.  She presents with improved activity tolerance. Performance deficits affecting function are weakness, impaired self care skills, impaired balance, impaired endurance, impaired functional mobility, decreased upper extremity function, pain, gait instability, decreased lower extremity function, orthopedic precautions.     Pt mobilizing and completing ADLs with CGA-SBA  Has 24hr assist at home per her and  and has all equipment needed. CM and trauma team aware of d/c rec change to low intensity.        Rehab Prognosis:  Good; patient would benefit from acute skilled OT services to address these deficits and reach maximum level of function.       Plan:     Patient to be seen 6 x/week to address the above listed problems via self-care/home management  Plan of Care Expires: 02/10/25  Plan of Care Reviewed with: patient, spouse    Subjective     Pain/Comfort:  Pain Rating 1: 0/10    Objective:     Communicated with: RN prior to session.  Patient found supine with peripheral IV upon OT entry to room.    General Precautions: Standard, fall    Orthopedic Precautions:spinal precautions (RLE WBAT in HKB, no knee ROM)  Braces: TLSO, Hinged knee brace  Respiratory Status: Nasal cannula, flow 2 L/min     Occupational Performance:     Bed Mobility:    Patient completed Scooting/Bridging with stand by assistance  Patient completed Supine to Sit with stand by assistance     Functional Mobility/Transfers:  Patient completed Sit <> Stand Transfer  with contact guard assistance  with  rolling walker   Patient completed Toilet Transfer Step Transfer technique with contact guard assistance with  rolling walker  Functional Mobility: ambulated to bathroom with CGA and RW. No LOB.     Activities of Daily Living:  Toileting: SBA for seated pericare after +void.   UE dressing: donned TLSO seated EOB with Min A.     Therapeutic Positioning    OT interventions performed during the course of today's session in an effort to prevent and/or reduce acquired pressure injuries:   Education was provided on benefits of and recommendations for therapeutic positioning    [facility] 6 Click ADL: 21    Patient Education:  Patient provided with verbal education education regarding OT role/goals/POC, fall prevention, safety awareness, and Discharge/DME recommendations.  Understanding was verbalized.      Patient left up in chair with all lines intact, call button in reach, and CM notified.    GOALS:   Multidisciplinary Problems       Occupational Therapy Goals          Problem: Occupational Therapy    Goal Priority Disciplines Outcome Interventions   Occupational Therapy Goal     OT, PT/OT Progressing    Description: Goals to be met by: 2/10/2025    Patient will increase functional independence with ADLs by performing:    UE Dressing with Modified [place].  LE Dressing with Modified [place].  Grooming while standing at sink with Modified [place].  Toileting from toilet with SBA for hygiene and clothing management.   Toilet transfer to toilet with SBA.                         Time Tracking:     OT Date of Treatment: 01/13/25  OT Start Time: 0910  OT Stop Time: 0935  OT Total Time (min): 25 min    Billable Minutes:Self Care/Home Management 25    OT/SUAD: SUAD     Number of SUAD visits since last OT visit: 1 1/13/2025

## 2025-01-13 NOTE — PLAN OF CARE
Problem: Adult Inpatient Plan of Care  Goal: Plan of Care Review  2025 1224 by Megan Hernandes RN  Outcome: Met  2025 1112 by Megan Hernandes RN  Outcome: Progressing  Goal: Patient-Specific Goal (Individualized)  2025 1224 by Megan Hernandes RN  Outcome: Met  2025 1112 by Megan Hernandes RN  Outcome: Progressing  Goal: Absence of Hospital-Acquired Illness or Injury  2025 1224 by Megan Hernandes RN  Outcome: Met  2025 1112 by Megan Hernandes RN  Outcome: Progressing  Goal: Optimal Comfort and Wellbeing  2025 1224 by Megan Hernandes RN  Outcome: Met  2025 1112 by Megan Hernandes RN  Outcome: Progressing  Goal: Readiness for Transition of Care  2025 1224 by Megan Hernandes RN  Outcome: Met  2025 1112 by Megan Hernandes RN  Outcome: Progressing     Problem: Fall Injury Risk  Goal: Absence of Fall and Fall-Related Injury  2025 1224 by Megan Hernandes RN  Outcome: Met  2025 1112 by Megan Hernandes RN  Outcome: Progressing     Problem: Physical Therapy  Goal: Physical Therapy Goal  Description: Goals to be met by: 2/10/25     Patient will increase functional independence with mobility by performin. Supine to sit with Contact Guard Assistance  2. Sit to supine with Contact Guard Assistance  3. Sit to stand transfer with Contact Guard Assistance  4. Bed to chair transfer with Contact Guard Assistance using Rolling Walker  5. Gait  x 100 feet with Contact Guard Assistance using Rolling Walker.     Outcome: Met     Problem: Occupational Therapy  Goal: Occupational Therapy Goal  Description: Goals to be met by: 2/10/2025    Patient will increase functional independence with ADLs by performing:    UE Dressing with Modified [place].  LE Dressing with Modified [place].  Grooming while standing at sink with Modified [place].  Toileting from toilet with SBA for hygiene and clothing management.   Toilet transfer to toilet with SBA.    Outcome: Met

## 2025-01-13 NOTE — PT/OT/SLP PROGRESS
Pt having issues with HKB staying locked in extension. Therapist messaged ortho who stated pt okay to be in knee immobilizer at this time which was already present in her room. Pt sitting on couch upon arrival with no TLSO on. Therapist switched pt from HKB to knee immobilizer. Pt then assisted sit>standing modA (2/2 low surface) and pt able to stand step T/F to bedside chair with RW CGA. pt reclined back in chair and her and  educated on how to don/doff both knee immobilizer and TLSO and the purpose of both of them. Therapist also administered gait belt for them to take home. No further questions or concerns. Time spent: 9716-4033.
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
## 2025-01-13 NOTE — PHYSICIAN QUERY
Please provide the respiratory diagnosis:    No respiratory failure, maintained on vent for routine care or airway protection - purposely intubated for airway protection (e.g.: angioedema, stroke, trauma); without meeting the criteria for respiratory failure

## 2025-01-13 NOTE — PT/OT/SLP PROGRESS
Physical Therapy Treatment    Patient Name:  Kayla Ruiz   MRN:  [id number]    Recommendations:     Discharge therapy intensity: Low Intensity Therapy (with 24hr family assist)   Discharge Equipment Recommendations: none  Barriers to discharge: Impaired mobility    Assessment:     Kayla Ruiz is a 69 y.o. female admitted with a medical diagnosis of fall at home resulting in R patellar fx (non-op), L1 compression fx. Pt will need to wear TLSO and a HKB in extension, and is clear to WB with HKB. .  She presents with the following impairments/functional limitations: weakness, gait instability, impaired balance, impaired endurance, decreased safety awareness, impaired functional mobility .    Pt mobilizing CGA-SBA for all mobility. Has 24hr assist at home per her and . Already has all equipment needed. CM and trauma team aware of d/c rec change.     Extensive time spent 2x adjusting HKB to remain locked in knee extension.     Rehab Prognosis: Good; patient would benefit from acute skilled PT services to address these deficits and reach maximum level of function.    Recent Surgery: * No surgery found *      Plan:     During this hospitalization, patient would benefit from acute PT services 6 x/week to address the identified rehab impairments via gait training, therapeutic activities, therapeutic exercises and progress toward the following goals:    Plan of Care Expires:  02/10/25    Subjective     Chief Complaint:   Patient/Family Comments/goals:   Pain/Comfort:  Location - Orientation 1: generalized  Pain Addressed 1: Reposition, Distraction      Objective:     Communicated with NSG prior to session.  Patient found HOB elevated with TLSO, peripheral IV upon PT entry to room.     General Precautions: Standard, fall  Orthopedic Precautions: spinal precautions, N/A (RLE WBAT in HKB: no ROM of knee.)  Braces: TLSO  Respiratory Status: Room air  Blood Pressure:   Skin Integrity: Visible skin intact      Functional  Mobility:  Bed Mobility:     Scooting: stand by assistance  Supine to Sit: stand by assistance  Transfers:     Sit to Stand:  contact guard assistance with rolling walker and 1 trial from EOB and 1 trial from toilet    Toilet Transfer: contact guard assistance with  rolling walker  using  Step Transfer  Gait: pt amb 16ft 2x with RW CGA. Pt with step-to gait pattern. Limited 2/2 fatigue and pain.seated rest  between trials        Patient left up in chair with all lines intact and call button in reach    GOALS:   Multidisciplinary Problems       Physical Therapy Goals          Problem: Physical Therapy    Goal Priority Disciplines Outcome Interventions   Physical Therapy Goal     PT, PT/OT Progressing    Description: Goals to be met by: 2/10/25     Patient will increase functional independence with mobility by performin. Supine to sit with Contact Guard Assistance  2. Sit to supine with Contact Guard Assistance  3. Sit to stand transfer with Contact Guard Assistance  4. Bed to chair transfer with Contact Guard Assistance using Rolling Walker  5. Gait  x 100 feet with Contact Guard Assistance using Rolling Walker.                          Time Tracking:     PT Received On: 25  PT Start Time: 909     PT Stop Time: 935  PT Total Time (min): 26 min     Billable Minutes: Gait Training 16 and Therapeutic Activity 10    Treatment Type: Treatment  PT/PTA: PTA     Number of PTA visits since last PT visit: 2     2025

## 2025-01-13 NOTE — PLAN OF CARE
Referral sent to [facility] inpt rehab failed. Resent via [facility].

## 2025-01-14 ENCOUNTER — PATIENT OUTREACH (OUTPATIENT)
Dept: ADMINISTRATIVE | Facility: CLINIC | Age: 70
End: 2025-01-14
Payer: MEDICARE

## 2025-01-14 NOTE — PROGRESS NOTES
C3 nurse spoke with Kayla Ruiz's spouse, Christopher for a TCC post hospital discharge follow up call. The patient does not have a scheduled HOSFU appointment with Jennifer Chavez NP-C within 5-7 days post hospital discharge date 1/13/25. C3 nurse was unable to schedule HOSFU appointment in [facility].  Message sent to PCP staff requesting they contact patient and schedule follow up appointment.

## 2025-01-16 ENCOUNTER — TELEPHONE (OUTPATIENT)
Dept: NEUROSURGERY | Facility: CLINIC | Age: 70
End: 2025-01-16
Payer: MEDICARE

## 2025-01-16 DIAGNOSIS — S32.008D OTHER CLOSED FRACTURE OF LUMBAR VERTEBRA WITH ROUTINE HEALING, UNSPECIFIED LUMBAR VERTEBRAL LEVEL, SUBSEQUENT ENCOUNTER: Primary | ICD-10-CM

## 2025-01-16 NOTE — TELEPHONE ENCOUNTER
I called and spoke to patient to schedule hospital follow up appointment. Per Dr. Jordan patient is to follow up in 4 weeks with an ARDEN and repeat upright XR Thoracolumbar Spine. I scheduled patient on 2/13/25 at 10:30 with Darcy WATSON with XR T Spine for 9:30 at [facility]. Patient verbalized understanding and was complaint with date and times of appointment.

## 2025-01-20 ENCOUNTER — TELEPHONE (OUTPATIENT)
Dept: ORTHOPEDICS | Facility: CLINIC | Age: 70
End: 2025-01-20
Payer: MEDICARE

## 2025-01-20 DIAGNOSIS — S82.091D OTHER CLOSED FRACTURE OF RIGHT PATELLA WITH ROUTINE HEALING, SUBSEQUENT ENCOUNTER: Primary | ICD-10-CM

## 2025-01-20 RX ORDER — METHOCARBAMOL 500 MG/1
500 TABLET, FILM COATED ORAL 4 TIMES DAILY
Qty: 40 TABLET | Refills: 0 | Status: SHIPPED | OUTPATIENT
Start: 2025-01-20 | End: 2025-01-30

## 2025-01-20 RX ORDER — OXYCODONE HYDROCHLORIDE 5 MG/1
5 TABLET ORAL EVERY 4 HOURS PRN
Qty: 42 TABLET | Refills: 0 | Status: SHIPPED | OUTPATIENT
Start: 2025-01-20 | End: 2025-01-23 | Stop reason: SDUPTHER

## 2025-01-20 NOTE — TELEPHONE ENCOUNTER
C/o of back burning and severe pain. States out of pain meds. Asking for refill of meds.     States not on any muscle relaxer either as well.       Pharmacy confirmed at [facility] on [address]    Advised would send a request to providers but could not guarantee  would get filled. Pt verbalized understanding and will call with any questions or concerns.
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
## 2025-01-23 DIAGNOSIS — S82.091D OTHER CLOSED FRACTURE OF RIGHT PATELLA WITH ROUTINE HEALING, SUBSEQUENT ENCOUNTER: ICD-10-CM

## 2025-01-23 RX ORDER — OXYCODONE HYDROCHLORIDE 5 MG/1
5 TABLET ORAL EVERY 6 HOURS PRN
Qty: 28 TABLET | Refills: 0 | Status: SHIPPED | OUTPATIENT
Start: 2025-01-23 | End: 2025-01-25 | Stop reason: ALTCHOICE

## 2025-01-24 ENCOUNTER — TELEPHONE (OUTPATIENT)
Dept: ORTHOPEDICS | Facility: CLINIC | Age: 70
End: 2025-01-24

## 2025-01-25 ENCOUNTER — HOSPITAL ENCOUNTER (EMERGENCY)
Facility: HOSPITAL | Age: 70
Discharge: HOME | End: 2025-01-25
Attending: EMERGENCY MEDICINE
Payer: MEDICARE

## 2025-01-25 VITALS
HEIGHT: 66 IN | RESPIRATION RATE: 20 BRPM | WEIGHT: 215 LBS | HEART RATE: 80 BPM | OXYGEN SATURATION: 95 % | DIASTOLIC BLOOD PRESSURE: 71 MMHG | SYSTOLIC BLOOD PRESSURE: 104 MMHG | BODY MASS INDEX: 34.55 KG/M2 | TEMPERATURE: 99 F

## 2025-01-25 DIAGNOSIS — S32.018D OTHER CLOSED FRACTURE OF FIRST LUMBAR VERTEBRA WITH ROUTINE HEALING, SUBSEQUENT ENCOUNTER: Primary | ICD-10-CM

## 2025-01-25 DIAGNOSIS — E87.6 HYPOKALEMIA: ICD-10-CM

## 2025-01-25 DIAGNOSIS — I10 BENIGN ESSENTIAL HTN: ICD-10-CM

## 2025-01-25 LAB
ALBUMIN SERPL-MCNC: 3.5 G/DL (ref 3.4–4.8)
ALBUMIN/GLOB SERPL: 1.1 RATIO (ref 1.1–2)
ALP SERPL-CCNC: 154 UNIT/L (ref 40–150)
ALT SERPL-CCNC: 15 UNIT/L (ref 0–55)
ANION GAP SERPL CALC-SCNC: 13 MEQ/L
AST SERPL-CCNC: 22 UNIT/L (ref 5–34)
BASOPHILS # BLD AUTO: 0.03 X10(3)/MCL
BASOPHILS NFR BLD AUTO: 0.3 %
BILIRUB SERPL-MCNC: 0.9 MG/DL
BNP BLD-MCNC: 231.2 PG/ML
BUN SERPL-MCNC: 16.9 MG/DL (ref 9.8–20.1)
CALCIUM SERPL-MCNC: 8.8 MG/DL (ref 8.4–10.2)
CHLORIDE SERPL-SCNC: 96 MMOL/L (ref 98–107)
CO2 SERPL-SCNC: 32 MMOL/L (ref 23–31)
CREAT SERPL-MCNC: 0.87 MG/DL (ref 0.55–1.02)
CREAT/UREA NIT SERPL: 19
EOSINOPHIL # BLD AUTO: 0.05 X10(3)/MCL (ref 0–0.9)
EOSINOPHIL NFR BLD AUTO: 0.5 %
ERYTHROCYTE [DISTWIDTH] IN BLOOD BY AUTOMATED COUNT: 16.1 % (ref 11.5–17)
GFR SERPLBLD CREATININE-BSD FMLA CKD-EPI: >60 ML/MIN/1.73/M2
GLOBULIN SER-MCNC: 3.1 GM/DL (ref 2.4–3.5)
GLUCOSE SERPL-MCNC: 189 MG/DL (ref 82–115)
HCT VFR BLD AUTO: 43 % (ref 37–47)
HGB BLD-MCNC: 13.1 G/DL (ref 12–16)
IMM GRANULOCYTES # BLD AUTO: 0.03 X10(3)/MCL (ref 0–0.04)
IMM GRANULOCYTES NFR BLD AUTO: 0.3 %
LYMPHOCYTES # BLD AUTO: 1.19 X10(3)/MCL (ref 0.6–4.6)
LYMPHOCYTES NFR BLD AUTO: 11.8 %
MCH RBC QN AUTO: 27.1 PG (ref 27–31)
MCHC RBC AUTO-ENTMCNC: 30.5 G/DL (ref 33–36)
MCV RBC AUTO: 88.8 FL (ref 80–94)
MONOCYTES # BLD AUTO: 0.51 X10(3)/MCL (ref 0.1–1.3)
MONOCYTES NFR BLD AUTO: 5 %
NEUTROPHILS # BLD AUTO: 8.29 X10(3)/MCL (ref 2.1–9.2)
NEUTROPHILS NFR BLD AUTO: 82.1 %
NRBC BLD AUTO-RTO: 0 %
PLATELET # BLD AUTO: 367 X10(3)/MCL (ref 130–400)
PMV BLD AUTO: 9.7 FL (ref 7.4–10.4)
POTASSIUM SERPL-SCNC: 3.4 MMOL/L (ref 3.5–5.1)
PROT SERPL-MCNC: 6.6 GM/DL (ref 5.8–7.6)
RBC # BLD AUTO: 4.84 X10(6)/MCL (ref 4.2–5.4)
SODIUM SERPL-SCNC: 141 MMOL/L (ref 136–145)
TROPONIN I SERPL-MCNC: 0.02 NG/ML (ref 0–0.04)
WBC # BLD AUTO: 10.1 X10(3)/MCL (ref 4.5–11.5)

## 2025-01-25 PROCEDURE — 84484 ASSAY OF TROPONIN QUANT: CPT | Performed by: EMERGENCY MEDICINE

## 2025-01-25 PROCEDURE — 80053 COMPREHEN METABOLIC PANEL: CPT | Performed by: EMERGENCY MEDICINE

## 2025-01-25 PROCEDURE — 85025 COMPLETE CBC W/AUTO DIFF WBC: CPT | Performed by: EMERGENCY MEDICINE

## 2025-01-25 PROCEDURE — 83880 ASSAY OF NATRIURETIC PEPTIDE: CPT | Performed by: EMERGENCY MEDICINE

## 2025-01-25 PROCEDURE — 99284 EMERGENCY DEPT VISIT MOD MDM: CPT | Mod: 25

## 2025-01-25 PROCEDURE — 25000003 PHARM REV CODE 250: Performed by: EMERGENCY MEDICINE

## 2025-01-25 PROCEDURE — 63600175 PHARM REV CODE 636 W HCPCS: Performed by: EMERGENCY MEDICINE

## 2025-01-25 PROCEDURE — 96374 THER/PROPH/DIAG INJ IV PUSH: CPT

## 2025-01-25 RX ORDER — METHOCARBAMOL 100 MG/ML
1000 INJECTION, SOLUTION INTRAMUSCULAR; INTRAVENOUS ONCE
Status: COMPLETED | OUTPATIENT
Start: 2025-01-25 | End: 2025-01-25

## 2025-01-25 RX ORDER — HYDROCODONE BITARTRATE AND ACETAMINOPHEN 7.5; 325 MG/1; MG/1
1 TABLET ORAL EVERY 6 HOURS PRN
Qty: 12 TABLET | Refills: 0 | Status: SHIPPED | OUTPATIENT
Start: 2025-01-25

## 2025-01-25 RX ORDER — HYDROCODONE BITARTRATE AND ACETAMINOPHEN 10; 325 MG/1; MG/1
1 TABLET ORAL
Status: COMPLETED | OUTPATIENT
Start: 2025-01-25 | End: 2025-01-25

## 2025-01-25 RX ORDER — LIDOCAINE 50 MG/G
1 PATCH TOPICAL DAILY
Qty: 15 PATCH | Refills: 0 | Status: SHIPPED | OUTPATIENT
Start: 2025-01-25

## 2025-01-25 RX ORDER — LIDOCAINE 50 MG/G
1 PATCH TOPICAL
Status: DISCONTINUED | OUTPATIENT
Start: 2025-01-25 | End: 2025-01-25 | Stop reason: HOSPADM

## 2025-01-25 RX ADMIN — POTASSIUM BICARBONATE 20 MEQ: 391 TABLET, EFFERVESCENT ORAL at 06:01

## 2025-01-25 RX ADMIN — LIDOCAINE 5% 1 PATCH: 700 PATCH TOPICAL at 06:01

## 2025-01-25 RX ADMIN — HYDROCODONE BITARTRATE AND ACETAMINOPHEN 1 TABLET: 10; 325 TABLET ORAL at 06:01

## 2025-01-25 RX ADMIN — METHOCARBAMOL 1000 MG: 100 INJECTION INTRAMUSCULAR; INTRAVENOUS at 05:01

## 2025-01-25 NOTE — ED NOTES
Pt used walker to entrance of her room. Needed frequent breaks because of leg weakness, but back pain consistently stayed 5/10.

## 2025-01-25 NOTE — DISCHARGE INSTRUCTIONS
Wear your brace when you are out of bed  Take 1.5 tablets of the hydrocodone every 4-6 hours to better control the pain.  You can take either 1.5 or 2 tablets of the robaxin (methocarbamol) every 6-8 hours for muscle spasm  If her blood pressure is high, first make sure her pain is controlled, have her sit and relax and recheck in 30 minutes to an hour. If she has decreased urination, severe headache, chest pain or shortness of breath with this, she should be evaluated.  Her INR was not checked in the ER and should still be checked by her nurse.   Her oxycodone is supposed to last until the 27th, you likely cannot fill the new prescription until after that time

## 2025-01-25 NOTE — ED PROVIDER NOTES
Encounter Date: 1/25/2025    SCRIBE #1 NOTE: I, Lety Moya, am scribing for, and in the presence of,  Antionette Faust MD. I have scribed the following portions of the note - Other sections scribed: HPI, ROS, PE.       History     Chief Complaint   Patient presents with    Back Pain     Arrives with [facility] with back pain had on fall on 01/09/2025 dx with lumbar fx. Has back brace but not on at this time. Denies any recent falls.      Patient is a 68 y/o female with a PMHx of a-fib, depression, DM, and HLD presents to the ED for lower back pain. Patient reports falling on 1/9. She reports having lower back pain that is worse with movement. She reports taking norco with no relief. She reports bladder incontinence. She reports taking lasix. She denies any weakness, numbness, or tingling in her lower extremities. She denies fever or chills. She reports living at home with her  and son.     The history is provided by the patient and medical records. No  was used.     Review of patient's allergies indicates:   Allergen Reactions    Ace inhibitors     Naproxen sodium     Nifedipine     Venlafaxine      Past Medical History:   Diagnosis Date    A-fib     Anticoagulant long-term use     Depression     Diabetes mellitus     Hyperlipidemia     Sleep apnea      Past Surgical History:   Procedure Laterality Date    AORTIC VALVE REPLACEMENT      CHOLECYSTECTOMY      COLON RESECTION      fusion      LEFT HEART CATHETERIZATION Left 05/18/2023    Procedure: Left heart cath;  Surgeon: Kahlil Martin MD;  Location: [facility] CATH LAB;  Service: Cardiology;  Laterality: Left;  LHC +/- PCI    TONSILLECTOMY       Family History   Problem Relation Name Age of Onset    COPD Mother      Hypertension Mother      Heart disease Father       Social History     Tobacco Use    Smoking status: Every Day     Current packs/day: 1.00     Average packs/day: 1 pack/day for 50.0 years (50.0 ttl pk-yrs)     Types:  Cigarettes    Smokeless tobacco: Never   Substance Use Topics    Alcohol use: Never    Drug use: Never     Review of Systems   Constitutional:  Negative for chills and fever.   Genitourinary:  Positive for difficulty urinating (incontinence).   Musculoskeletal:  Positive for back pain (lower).   Neurological:  Negative for weakness (in lower extremities) and numbness (in lower extremities).       Physical Exam     Initial Vitals [01/25/25 0450]   BP Pulse Resp Temp SpO2   (!) 178/65 73 18 98.7 °F (37.1 °C) 95 %      MAP       --         Physical Exam    Nursing note and vitals reviewed.  Constitutional: She appears well-developed and well-nourished. She is not diaphoretic. No distress.   HENT:   Head: Normocephalic and atraumatic.   Nose: Nose normal. Mouth/Throat: Oropharynx is clear and moist.   Eyes: Conjunctivae and EOM are normal. Pupils are equal, round, and reactive to light.   Neck: Trachea normal. Neck supple.   Normal range of motion.  Cardiovascular:  Normal rate, regular rhythm, normal heart sounds and intact distal pulses.           No murmur heard.  Pulmonary/Chest: Breath sounds normal. No respiratory distress. She has no wheezes. She has no rhonchi. She has no rales. She exhibits no tenderness.   Abdominal: Abdomen is soft. Bowel sounds are normal. She exhibits no distension and no mass. There is no abdominal tenderness. There is no rebound and no guarding.   Musculoskeletal:         General: No tenderness or edema. Normal range of motion.      Cervical back: Normal range of motion and neck supple.      Lumbar back: Normal. Normal range of motion.      Comments: Pain with movement.     Neurological: She is alert and oriented to person, place, and time. She has normal strength. No cranial nerve deficit or sensory deficit.   Skin: Skin is warm and dry. Capillary refill takes less than 2 seconds. No abscess noted. No erythema. No pallor.   Psychiatric: She has a normal mood and affect. Her behavior is  normal. Judgment and thought content normal.         ED Course   Procedures  Labs Reviewed   COMPREHENSIVE METABOLIC PANEL - Abnormal       Result Value    Sodium 141      Potassium 3.4 (*)     Chloride 96 (*)     CO2 32 (*)     Glucose 189 (*)     Blood Urea Nitrogen 16.9      Creatinine 0.87      Calcium 8.8      Protein Total 6.6      Albumin 3.5      Globulin 3.1      Albumin/Globulin Ratio 1.1      Bilirubin Total 0.9       (*)     ALT 15      AST 22      eGFR >60      Anion Gap 13.0      BUN/Creatinine Ratio 19     B-TYPE NATRIURETIC PEPTIDE - Abnormal    Natriuretic Peptide 231.2 (*)    CBC WITH DIFFERENTIAL - Abnormal    WBC 10.10      RBC 4.84      Hgb 13.1      Hct 43.0      MCV 88.8      MCH 27.1      MCHC 30.5 (*)     RDW 16.1      Platelet 367      MPV 9.7      Neut % 82.1      Lymph % 11.8      Mono % 5.0      Eos % 0.5      Basophil % 0.3      Imm Grans % 0.3      Neut # 8.29      Lymph # 1.19      Mono # 0.51      Eos # 0.05      Baso # 0.03      Imm Gran # 0.03      NRBC% 0.0     TROPONIN I - Normal    Troponin-I 0.018     CBC W/ AUTO DIFFERENTIAL    Narrative:     The following orders were created for panel order CBC auto differential.  Procedure                               Abnormality         Status                     ---------                               -----------         ------                     CBC with Differential[[id number]]       Abnormal            Final result                 Please view results for these tests on the individual orders.          Imaging Results              X-Ray Chest AP Portable (In process)                   X-Rays:   Independently Interpreted Readings:   Chest X-Ray: No acute change     Medications   LIDOcaine 5 % patch 1 patch (1 patch Transdermal Patch Applied 1/25/25 4680)   methocarbamoL injection 1,000 mg (1,000 mg Intravenous Given 1/25/25 0546)   HYDROcodone-acetaminophen  mg per tablet 1 tablet (1 tablet Oral Given 1/25/25 0606)    potassium bicarbonate disintegrating tablet 20 mEq (20 mEq Oral Given 1/25/25 6163)     Medical Decision Making  The differential diagnosis includes, but is not limited to, fracture, sprain, and low back pain. Chf, acs, anemia  Pt arrives iwthout her brace, question compliance with that  Cbc, cmp, trop, bnp, cxr ordered and reviewed  Reviewed recent notes  Given robaxin, lidocaine patch, po meds with improvement, po potassium  Bp improved, valentine controlled, rx meds, has close f/u  Able to ambulate, did feel a little overmedicated so observed but when I intiially spoke with patient and  there were no concerns about her needing inpt rehab or anything along those lines and they feel confident keeping her home    Problems Addressed:  Benign essential HTN: chronic illness or injury  Hypokalemia: acute illness or injury that poses a threat to life or bodily functions  Other closed fracture of first lumbar vertebra with routine healing, subsequent encounter: acute illness or injury    Amount and/or Complexity of Data Reviewed  External Data Reviewed: notes.     Details: Admit for fracture  Labs: ordered.  Radiology: ordered and independent interpretation performed.    Risk  OTC drugs.  Prescription drug management.  Decision regarding hospitalization.  Risk Details: Agreeable with dc            Scribe Attestation:   Scribe #1: I performed the above scribed service and the documentation accurately describes the services I performed. I attest to the accuracy of the note.  Comments: Attending:   Physician Attestation Statement for Scribe #1: I, Antionette Faust MD, personally performed the services described in this documentation. All medical record entries made by the scribe were at my direction and in my presence.  I have reviewed the chart and agree that the record reflects my personal performance and is accurate and complete.        Attending Attestation:           Physician Attestation for Scribe:  Physician  Attestation Statement for Scribe #1: I, Antionette Faust MD, reviewed documentation, as scribed by Lety Moya in my presence, and it is both accurate and complete.             ED Course as of 01/25/25 0808   Sat Jan 25, 2025   0645 Pain is improved, discussed augmenting current prescription treatment for better pain control until follow up appointment Wednesday, printed rx for once those run out to help until wednesday [BS]   0707 Called about troponin result, should return soon [BS]      ED Course User Index  [BS] Antionette Faust MD                           Clinical Impression:  Final diagnoses:  [S32.018D] Other closed fracture of first lumbar vertebra with routine healing, subsequent encounter (Primary)  [I10] Benign essential HTN  [E87.6] Hypokalemia          ED Disposition Condition    Discharge Stable          ED Prescriptions       Medication Sig Dispense Start Date End Date Auth. Provider    LIDOcaine (LIDODERM) 5 % Place 1 patch onto the skin once daily. Remove & Discard patch within 12 hours or as directed by MD 15 patch 1/25/2025 -- Antionette Faust MD    HYDROcodone-acetaminophen (NORCO) 7.5-325 mg per tablet Take 1 tablet by mouth every 6 (six) hours as needed for Pain (severe pain only). 12 tablet 1/25/2025 -- Antionette Faust MD          Follow-up Information       Follow up With Specialties Details Why Contact Info    Jennifer Chavez, NP-C Family Medicine Schedule an appointment as soon as possible for a visit   [address] [place] [id number]  [phone]      Wallace Li MD Neurosurgery Go to  as scheduled [address] [place] [address] Dr Giron  [facility] [address]  [phone]      [facility] - Emergency Dept Emergency Medicine  As needed, If symptoms worsen Critical access hospital[facility] [id number] [phone]             Antionette Faust MD  01/25/25 0730       Antionette Faust MD  01/25/25 0808

## 2025-01-29 ENCOUNTER — OFFICE VISIT (OUTPATIENT)
Dept: ORTHOPEDICS | Facility: CLINIC | Age: 70
End: 2025-01-29
Payer: MEDICARE

## 2025-01-29 ENCOUNTER — HOSPITAL ENCOUNTER (OUTPATIENT)
Dept: RADIOLOGY | Facility: CLINIC | Age: 70
Discharge: HOME | End: 2025-01-29
Attending: ORTHOPAEDIC SURGERY
Payer: MEDICARE

## 2025-01-29 ENCOUNTER — TELEPHONE (OUTPATIENT)
Dept: NEUROSURGERY | Facility: CLINIC | Age: 70
End: 2025-01-29
Payer: MEDICARE

## 2025-01-29 VITALS
BODY MASS INDEX: 34.55 KG/M2 | SYSTOLIC BLOOD PRESSURE: 84 MMHG | WEIGHT: 214.94 LBS | HEART RATE: 50 BPM | HEIGHT: 66 IN | DIASTOLIC BLOOD PRESSURE: 54 MMHG

## 2025-01-29 DIAGNOSIS — S82.091D OTHER CLOSED FRACTURE OF RIGHT PATELLA WITH ROUTINE HEALING, SUBSEQUENT ENCOUNTER: ICD-10-CM

## 2025-01-29 DIAGNOSIS — S82.091D OTHER CLOSED FRACTURE OF RIGHT PATELLA WITH ROUTINE HEALING, SUBSEQUENT ENCOUNTER: Primary | ICD-10-CM

## 2025-01-29 PROCEDURE — 3074F SYST BP LT 130 MM HG: CPT | Mod: CPTII,,, | Performed by: ORTHOPAEDIC SURGERY

## 2025-01-29 PROCEDURE — 73560 X-RAY EXAM OF KNEE 1 OR 2: CPT | Mod: RT,,, | Performed by: ORTHOPAEDIC SURGERY

## 2025-01-29 PROCEDURE — 3008F BODY MASS INDEX DOCD: CPT | Mod: CPTII,,, | Performed by: ORTHOPAEDIC SURGERY

## 2025-01-29 PROCEDURE — 1101F PT FALLS ASSESS-DOCD LE1/YR: CPT | Mod: CPTII,,, | Performed by: ORTHOPAEDIC SURGERY

## 2025-01-29 PROCEDURE — 99213 OFFICE O/P EST LOW 20 MIN: CPT | Mod: 57,,, | Performed by: ORTHOPAEDIC SURGERY

## 2025-01-29 PROCEDURE — 3078F DIAST BP <80 MM HG: CPT | Mod: CPTII,,, | Performed by: ORTHOPAEDIC SURGERY

## 2025-01-29 PROCEDURE — 3288F FALL RISK ASSESSMENT DOCD: CPT | Mod: CPTII,,, | Performed by: ORTHOPAEDIC SURGERY

## 2025-01-29 PROCEDURE — 1111F DSCHRG MED/CURRENT MED MERGE: CPT | Mod: CPTII,,, | Performed by: ORTHOPAEDIC SURGERY

## 2025-01-29 PROCEDURE — 27520 TREAT KNEECAP FRACTURE: CPT | Mod: RT,,, | Performed by: ORTHOPAEDIC SURGERY

## 2025-01-29 PROCEDURE — 1160F RVW MEDS BY RX/DR IN RCRD: CPT | Mod: CPTII,,, | Performed by: ORTHOPAEDIC SURGERY

## 2025-01-29 PROCEDURE — 1159F MED LIST DOCD IN RCRD: CPT | Mod: CPTII,,, | Performed by: ORTHOPAEDIC SURGERY

## 2025-01-29 NOTE — PROGRESS NOTES
Subjective:       Patient ID: Kayla Ruiz is a 69 y.o. female.    Chief Complaint   Patient presents with    Right Knee - Injury     2.5 wks, Rt knee fx, hosp consult 1/10/25, pt states had a trip and fall, presents in brace, nwb, currently in wheelchair, states constant pain managed with pain meds, no other complaints,         Patient is here today for follow-up evaluation 2 weeks status post fracture of right patella, nondisplaced.  She was placed into a hinged knee brace in the hospital.  It has been locked full extension.  She is limited in her ability to mobilize at baseline.  She has family with her today.  She states she has soreness in the knee but it is well-controlled.    Injury  Pertinent negatives include no abdominal pain, chest pain, chills, congestion, coughing, fever, nausea, neck pain, numbness or vomiting.       Review of Systems   Constitutional: Negative for chills, fever and malaise/fatigue.   HENT:  Negative for congestion and hearing loss.    Eyes:  Negative for visual disturbance.   Cardiovascular:  Negative for chest pain and syncope.   Respiratory:  Negative for cough and shortness of breath.    Hematologic/Lymphatic: Does not bruise/bleed easily.   Skin:  Negative for color change and suspicious lesions.   Musculoskeletal:  Negative for falls and neck pain.   Gastrointestinal:  Negative for abdominal pain, nausea and vomiting.   Genitourinary:  Negative for dysuria and hematuria.   Neurological:  Negative for numbness and sensory change.   Psychiatric/Behavioral:  Negative for altered mental status. The patient is not nervous/anxious.         Current Outpatient Medications on File Prior to Visit   Medication Sig Dispense Refill    albuterol-ipratropium (DUO-NEB) 2.5 mg-0.5 mg/3 mL nebulizer solution Take 3 mLs by nebulization every 4 (four) hours as needed for Wheezing or Shortness of Breath.      aspirin (ECOTRIN) 81 MG EC tablet Take 81 mg by mouth once daily.      atorvastatin  (LIPITOR) 40 MG tablet Take 40 mg by mouth once daily.      clonazePAM (KLONOPIN) 1 MG tablet Take 0.5 mg by mouth 3 (three) times daily as needed for Anxiety.      dapagliflozin propanediol (FARXIGA) 10 mg tablet Take 1 tablet (10 mg total) by mouth once daily. 30 tablet 5    ergocalciferol (ERGOCALCIFEROL) 50,000 unit Cap Take 1 capsule (50,000 Units total) by mouth every 7 days. 12 capsule 0    FLUoxetine 40 MG capsule Take 40 mg by mouth once daily.      furosemide (LASIX) 40 MG tablet Take 1 tablet (40 mg total) by mouth 2 (two) times daily. 60 tablet 11    gabapentin (NEURONTIN) 100 MG capsule Take 100 mg by mouth 3 (three) times daily.      HYDROcodone-acetaminophen (NORCO) 7.5-325 mg per tablet Take 1 tablet by mouth every 6 (six) hours as needed for Pain (severe pain only). 12 tablet 0    LIDOcaine (LIDODERM) 5 % Place 1 patch onto the skin once daily. Remove & Discard patch within 12 hours or as directed by MD 15 patch 0    losartan (COZAAR) 25 MG tablet Take 1 tablet (25 mg total) by mouth once daily. 90 tablet 3    methocarbamoL (ROBAXIN) 500 MG Tab Take 1 tablet (500 mg total) by mouth 4 (four) times daily. for 10 days 40 tablet 0    metoprolol succinate (TOPROL-XL) 25 MG 24 hr tablet Take 0.5 tablets (12.5 mg total) by mouth 2 (two) times daily. 90 tablet 3    OLANZapine (ZYPREXA) 5 MG tablet Take 5 mg by mouth every evening.      sotaloL (BETAPACE) 120 MG Tab Take 80 mg by mouth every 12 (twelve) hours. Take 1/2 tablet BID      spironolactone (ALDACTONE) 25 MG tablet Take 1 tablet (25 mg total) by mouth once daily. 30 tablet 11    traZODone (DESYREL) 50 MG tablet Take 25-50 mg by mouth nightly as needed.      warfarin (COUMADIN) 5 MG tablet Take 5 mg by mouth Daily.      isosorbide mononitrate (IMDUR) 30 MG 24 hr tablet Take 1 tablet (30 mg total) by mouth once daily. 60 tablet 0    MOUNJARO 15 mg/0.5 mL PnIj Inject 15 mg into the skin every 7 days. (Patient not taking: Reported on 1/29/2025)    "    Current Facility-Administered Medications on File Prior to Visit   Medication Dose Route Frequency Provider Last Rate Last Admin    0.9%  NaCl infusion   Intravenous Once Kahlil Martin MD        diazePAM tablet 10 mg  10 mg Oral On Call Procedure Kahlil Martin MD   10 mg at 05/18/23 0911    diphenhydrAMINE capsule 50 mg  50 mg Oral On Call Procedure Kahlil Martin MD   50 mg at 05/18/23 0911          Objective:      BP (!) 84/54   Pulse (!) 50   Ht 5' 6" (1.676 m)   Wt 97.5 kg (214 lb 15.2 oz)   BMI 34.69 kg/m²   Physical Exam  Constitutional:       General: She is not in acute distress.     Appearance: Normal appearance. She is not ill-appearing.   HENT:      Head: Normocephalic and atraumatic.      Nose: No congestion.   Eyes:      Extraocular Movements: Extraocular movements intact.   Cardiovascular:      Rate and Rhythm: Normal rate and regular rhythm.      Pulses: Normal pulses.   Pulmonary:      Effort: Pulmonary effort is normal.      Breath sounds: Normal breath sounds.   Abdominal:      General: There is no distension.      Palpations: Abdomen is soft.      Tenderness: There is no abdominal tenderness.   Musculoskeletal:      Comments: Right lower extremity:  Knee immobilizer is open from 0 to 60° today.  She tolerates passive range motion of the knee to a proximally 75° without significant pain.  Mild swelling noted to the knee.  No fracture fragments palpable.  No calf swelling or tenderness, no signs of DVT.  Good range motion of the ankle and digits distally.   Skin:     General: Skin is warm and dry.   Neurological:      Mental Status: She is alert and oriented to person, place, and time. Mental status is at baseline.   Psychiatric:         Mood and Affect: Mood normal.         Behavior: Behavior normal.         Thought Content: Thought content normal.         Judgment: Judgment normal.        Body mass index is 34.69 kg/m².    Radiology:   Right knee two views:  Alignment " unchanged compared to previous films.  Fracture alignment remains amenable to continued nonoperative management.      Assessment:         1. Other closed fracture of right patella with routine healing, subsequent encounter  X-Ray Knee 1 or 2 View Right              Plan:       Her fracture is completely nondisplaced and has not had any change in alignment compared to her initial films.  I have open up her brace at 0-60 at rest.  She will continue ambulating with it in full extension.  I have shown the family how to manage her brace.  She has minimal discomfort in the knee.  She has fractures in her spine that are being managed by Neurosurgery.  I will see her back in a month for repeat x-rays at which time we will open it up to 0-90 degrees.  They understand and agree with all that we have discussed and all questions and concerns were addressed.      This note/OR report was created with the assistance of  voice recognition software or phone  dictation.  There may be transcription errors as a result of using this technology however minimal. Effort has been made to assure accuracy of transcription but any obvious errors or omissions should be clarified with the author of the document.       Devyn Young MD  Orthopedic Trauma  [facility]      Follow up in about 4 weeks (around 2/26/2025).    Other closed fracture of right patella with routine healing, subsequent encounter  -     X-Ray Knee 1 or 2 View Right; Future; Expected date: 01/29/2025              Orders Placed This Encounter   Procedures    X-Ray Knee 1 or 2 View Right     Standing Status:   Future     Number of Occurrences:   1     Standing Expiration Date:   1/28/2026     Order Specific Question:   May the Radiologist modify the order per protocol to meet the clinical needs of the patient?     Answer:   Yes     Order Specific Question:   Release to patient     Answer:   Immediate       Future Appointments   Date Time Provider Department  [place]   2/13/2025  9:30 AM [facility] XR1 DR Lozada [facility] XRAY [facility]   2/13/2025 10:30 AM Darcy Matthews PA-C [facility] DOMINIK Carver   3/10/2025  3:00 PM Shravan Arias MD [facility] GBR Hali Martino

## 2025-01-31 NOTE — TELEPHONE ENCOUNTER
Per Dr. Jordan, he would prefer her get with her PCP regarding changing medication. I spoke with patient, she voiced understanding.

## 2025-02-04 ENCOUNTER — HOSPITAL ENCOUNTER (INPATIENT)
Facility: HOSPITAL | Age: 70
LOS: 12 days | Discharge: HOME HEALTH | DRG: 907 | End: 2025-02-17
Attending: STUDENT IN AN ORGANIZED HEALTH CARE EDUCATION/TRAINING PROGRAM | Admitting: INTERNAL MEDICINE
Payer: MEDICARE

## 2025-02-04 ENCOUNTER — LAB REQUISITION (OUTPATIENT)
Dept: LAB | Facility: HOSPITAL | Age: 70
End: 2025-02-04
Attending: INTERNAL MEDICINE
Payer: MEDICARE

## 2025-02-04 DIAGNOSIS — I63.9 CEREBROVASCULAR ACCIDENT (CVA), UNSPECIFIED MECHANISM: ICD-10-CM

## 2025-02-04 DIAGNOSIS — H53.9 VISION CHANGES: ICD-10-CM

## 2025-02-04 DIAGNOSIS — E44.0 MODERATE MALNUTRITION: ICD-10-CM

## 2025-02-04 DIAGNOSIS — I63.9 CVA (CEREBRAL VASCULAR ACCIDENT): ICD-10-CM

## 2025-02-04 DIAGNOSIS — H53.8 BLURRED VISION: ICD-10-CM
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
## 2025-02-04 DIAGNOSIS — S32.010A CLOSED COMPRESSION FRACTURE OF L1 VERTEBRA, INITIAL ENCOUNTER: ICD-10-CM

## 2025-02-04 DIAGNOSIS — I25.10 CAD (CORONARY ARTERY DISEASE): ICD-10-CM

## 2025-02-04 DIAGNOSIS — R42 DIZZINESS: ICD-10-CM

## 2025-02-04 DIAGNOSIS — S32.010G CLOSED COMPRESSION FRACTURE OF L1 LUMBAR VERTEBRA WITH DELAYED HEALING, SUBSEQUENT ENCOUNTER: ICD-10-CM

## 2025-02-04 DIAGNOSIS — Z95.2 H/O HEART VALVE REPLACEMENT WITH MECHANICAL VALVE: ICD-10-CM

## 2025-02-04 DIAGNOSIS — E78.5 HYPERLIPIDEMIA, UNSPECIFIED: ICD-10-CM

## 2025-02-04 DIAGNOSIS — I11.0 HYPERTENSIVE HEART DISEASE WITH HEART FAILURE: ICD-10-CM

## 2025-02-04 DIAGNOSIS — E11.9 TYPE 2 DIABETES MELLITUS WITHOUT COMPLICATIONS: ICD-10-CM

## 2025-02-04 LAB
ALBUMIN SERPL-MCNC: 3.3 G/DL (ref 3.4–4.8)
ALBUMIN SERPL-MCNC: 3.9 G/DL (ref 3.4–4.8)
ALBUMIN/GLOB SERPL: 0.9 RATIO (ref 1.1–2)
ALBUMIN/GLOB SERPL: 1.2 RATIO (ref 1.1–2)
ALP SERPL-CCNC: 152 UNIT/L (ref 40–150)
ALP SERPL-CCNC: 153 UNIT/L (ref 40–150)
ALT SERPL-CCNC: 19 UNIT/L (ref 0–55)
ALT SERPL-CCNC: 20 UNIT/L (ref 0–55)
ANION GAP SERPL CALC-SCNC: 15 MEQ/L
ANION GAP SERPL CALC-SCNC: 9 MEQ/L
APTT PPP: 54.3 SECONDS (ref 23.2–33.7)
AST SERPL-CCNC: 28 UNIT/L (ref 5–34)
AST SERPL-CCNC: 29 UNIT/L (ref 5–34)
BASOPHILS # BLD AUTO: 0.05 X10(3)/MCL
BASOPHILS NFR BLD AUTO: 0.7 %
BILIRUB SERPL-MCNC: 0.3 MG/DL
BILIRUB SERPL-MCNC: 0.3 MG/DL
BUN SERPL-MCNC: 27.1 MG/DL (ref 9.8–20.1)
BUN SERPL-MCNC: 28.7 MG/DL (ref 9.8–20.1)
CALCIUM SERPL-MCNC: 9 MG/DL (ref 8.4–10.2)
CALCIUM SERPL-MCNC: 9.1 MG/DL (ref 8.4–10.2)
CHLORIDE SERPL-SCNC: 101 MMOL/L (ref 98–107)
CHLORIDE SERPL-SCNC: 104 MMOL/L (ref 98–107)
CHOLEST SERPL-MCNC: 116 MG/DL
CHOLEST/HDLC SERPL: 3 {RATIO} (ref 0–5)
CO2 SERPL-SCNC: 20 MMOL/L (ref 23–31)
CO2 SERPL-SCNC: 28 MMOL/L (ref 23–31)
CREAT SERPL-MCNC: 0.87 MG/DL (ref 0.55–1.02)
CREAT SERPL-MCNC: 0.98 MG/DL (ref 0.55–1.02)
CREAT/UREA NIT SERPL: 28
CREAT/UREA NIT SERPL: 33
EOSINOPHIL # BLD AUTO: 0.2 X10(3)/MCL (ref 0–0.9)
EOSINOPHIL NFR BLD AUTO: 2.9 %
ERYTHROCYTE [DISTWIDTH] IN BLOOD BY AUTOMATED COUNT: 16.6 % (ref 11.5–17)
GFR SERPLBLD CREATININE-BSD FMLA CKD-EPI: >60 ML/MIN/1.73/M2
GFR SERPLBLD CREATININE-BSD FMLA CKD-EPI: >60 ML/MIN/1.73/M2
GLOBULIN SER-MCNC: 3.3 GM/DL (ref 2.4–3.5)
GLOBULIN SER-MCNC: 3.5 GM/DL (ref 2.4–3.5)
GLUCOSE SERPL-MCNC: 107 MG/DL (ref 82–115)
GLUCOSE SERPL-MCNC: 174 MG/DL (ref 82–115)
GROUP & RH: NORMAL
HCT VFR BLD AUTO: 46.6 % (ref 37–47)
HDLC SERPL-MCNC: 35 MG/DL (ref 35–60)
HGB BLD-MCNC: 14.1 G/DL (ref 12–16)
IMM GRANULOCYTES # BLD AUTO: 0.02 X10(3)/MCL (ref 0–0.04)
IMM GRANULOCYTES NFR BLD AUTO: 0.3 %
INDIRECT COOMBS: NORMAL
INR PPP: 7
INR PPP: 8.8
LDLC SERPL CALC-MCNC: 54 MG/DL (ref 50–140)
LYMPHOCYTES # BLD AUTO: 1.95 X10(3)/MCL (ref 0.6–4.6)
LYMPHOCYTES NFR BLD AUTO: 28.3 %
MCH RBC QN AUTO: 26.9 PG (ref 27–31)
MCHC RBC AUTO-ENTMCNC: 30.3 G/DL (ref 33–36)
MCV RBC AUTO: 88.9 FL (ref 80–94)
MONOCYTES # BLD AUTO: 0.44 X10(3)/MCL (ref 0.1–1.3)
MONOCYTES NFR BLD AUTO: 6.4 %
NEUTROPHILS # BLD AUTO: 4.24 X10(3)/MCL (ref 2.1–9.2)
NEUTROPHILS NFR BLD AUTO: 61.4 %
NRBC BLD AUTO-RTO: 0 %
PLATELET # BLD AUTO: 388 X10(3)/MCL (ref 130–400)
PMV BLD AUTO: 9.6 FL (ref 7.4–10.4)
POTASSIUM SERPL-SCNC: 4.4 MMOL/L (ref 3.5–5.1)
POTASSIUM SERPL-SCNC: 5.1 MMOL/L (ref 3.5–5.1)
PROT SERPL-MCNC: 6.8 GM/DL (ref 5.8–7.6)
PROT SERPL-MCNC: 7.2 GM/DL (ref 5.8–7.6)
PROTHROMBIN TIME: 61.7 SECONDS (ref 12.5–14.5)
PROTHROMBIN TIME: 79.7 SECONDS (ref 12.5–14.5)
RBC # BLD AUTO: 5.24 X10(6)/MCL (ref 4.2–5.4)
SODIUM SERPL-SCNC: 136 MMOL/L (ref 136–145)
SODIUM SERPL-SCNC: 141 MMOL/L (ref 136–145)
SPECIMEN OUTDATE: NORMAL
TRIGL SERPL-MCNC: 135 MG/DL (ref 37–140)
TROPONIN I SERPL-MCNC: 0.02 NG/ML (ref 0–0.04)
VLDLC SERPL CALC-MCNC: 27 MG/DL
WBC # BLD AUTO: 6.9 X10(3)/MCL (ref 4.5–11.5)

## 2025-02-04 PROCEDURE — 86900 BLOOD TYPING SEROLOGIC ABO: CPT | Performed by: STUDENT IN AN ORGANIZED HEALTH CARE EDUCATION/TRAINING PROGRAM

## 2025-02-04 PROCEDURE — 96374 THER/PROPH/DIAG INJ IV PUSH: CPT

## 2025-02-04 PROCEDURE — 80053 COMPREHEN METABOLIC PANEL: CPT

## 2025-02-04 PROCEDURE — 85610 PROTHROMBIN TIME: CPT | Performed by: STUDENT IN AN ORGANIZED HEALTH CARE EDUCATION/TRAINING PROGRAM

## 2025-02-04 PROCEDURE — 80053 COMPREHEN METABOLIC PANEL: CPT | Performed by: INTERNAL MEDICINE

## 2025-02-04 PROCEDURE — 63600175 PHARM REV CODE 636 W HCPCS: Performed by: STUDENT IN AN ORGANIZED HEALTH CARE EDUCATION/TRAINING PROGRAM

## 2025-02-04 PROCEDURE — 80061 LIPID PANEL: CPT | Performed by: INTERNAL MEDICINE

## 2025-02-04 PROCEDURE — 96375 TX/PRO/DX INJ NEW DRUG ADDON: CPT

## 2025-02-04 PROCEDURE — 85610 PROTHROMBIN TIME: CPT | Performed by: INTERNAL MEDICINE

## 2025-02-04 PROCEDURE — 84484 ASSAY OF TROPONIN QUANT: CPT

## 2025-02-04 PROCEDURE — 85730 THROMBOPLASTIN TIME PARTIAL: CPT | Performed by: STUDENT IN AN ORGANIZED HEALTH CARE EDUCATION/TRAINING PROGRAM

## 2025-02-04 PROCEDURE — 85025 COMPLETE CBC W/AUTO DIFF WBC: CPT

## 2025-02-04 PROCEDURE — 99285 EMERGENCY DEPT VISIT HI MDM: CPT | Mod: 25

## 2025-02-04 PROCEDURE — 93005 ELECTROCARDIOGRAM TRACING: CPT

## 2025-02-04 RX ORDER — MORPHINE SULFATE 4 MG/ML
4 INJECTION, SOLUTION INTRAMUSCULAR; INTRAVENOUS
Status: COMPLETED | OUTPATIENT
Start: 2025-02-04 | End: 2025-02-04

## 2025-02-04 RX ORDER — ONDANSETRON HYDROCHLORIDE 2 MG/ML
4 INJECTION, SOLUTION INTRAVENOUS
Status: COMPLETED | OUTPATIENT
Start: 2025-02-04 | End: 2025-02-04

## 2025-02-04 RX ADMIN — ONDANSETRON 4 MG: 2 INJECTION INTRAMUSCULAR; INTRAVENOUS at 11:02

## 2025-02-04 RX ADMIN — MORPHINE SULFATE 4 MG: 4 INJECTION, SOLUTION INTRAMUSCULAR; INTRAVENOUS at 11:02

## 2025-02-05 PROBLEM — I63.9 STROKE: Status: ACTIVE | Noted: 2025-02-05

## 2025-02-05 PROBLEM — H53.8 BLURRED VISION: Status: ACTIVE | Noted: 2025-02-05

## 2025-02-05 PROBLEM — H53.2 DIPLOPIA: Status: ACTIVE | Noted: 2025-02-05

## 2025-02-05 LAB
APICAL FOUR CHAMBER EJECTION FRACTION: 62 %
APICAL TWO CHAMBER EJECTION FRACTION: 63 %
APTT PPP: 57.9 SECONDS (ref 23.2–33.7)
AV INDEX (PROSTH): 0.53
AV MEAN GRADIENT: 16 MMHG
AV PEAK GRADIENT: 34 MMHG
AV VALVE AREA BY VELOCITY RATIO: 1.3 CM²
AV VALVE AREA: 1.6 CM²
AV VELOCITY RATIO: 0.41
BACTERIA #/AREA URNS AUTO: ABNORMAL /HPF
BILIRUB UR QL STRIP.AUTO: NEGATIVE
BSA FOR ECHO PROCEDURE: 2.05 M2
CLARITY UR: CLEAR
COLOR UR AUTO: ABNORMAL
CV ECHO LV RWT: 0.44 CM
DOP CALC AO PEAK VEL: 2.9 M/S
DOP CALC AO VTI: 47.8 CM
DOP CALC LVOT AREA: 3.1 CM2
DOP CALC LVOT DIAMETER: 2 CM
DOP CALC LVOT PEAK VEL: 1.2 M/S
DOP CALC LVOT STROKE VOLUME: 78.8 CM3
DOP CALC MV VTI: 35.8 CM
DOP CALCLVOT PEAK VEL VTI: 25.1 CM
E WAVE DECELERATION TIME: 283 MSEC
E/A RATIO: 0.44
E/E' RATIO: 8 M/S
ECHO LV POSTERIOR WALL: 1 CM (ref 0.6–1.1)
EST. AVERAGE GLUCOSE BLD GHB EST-MCNC: 151.3 MG/DL
FRACTIONAL SHORTENING: 33.3 % (ref 28–44)
GLUCOSE UR QL STRIP: ABNORMAL
HBA1C MFR BLD: 6.9 %
HGB UR QL STRIP: ABNORMAL
HR MV ECHO: 67 BPM
INR PPP: 7
INTERVENTRICULAR SEPTUM: 1.1 CM (ref 0.6–1.1)
KETONES UR QL STRIP: NEGATIVE
LEFT ATRIUM AREA SYSTOLIC (APICAL 2 CHAMBER): 18.8 CM2
LEFT ATRIUM AREA SYSTOLIC (APICAL 4 CHAMBER): 19 CM2
LEFT ATRIUM SIZE: 4.3 CM
LEFT ATRIUM VOLUME INDEX MOD: 27 ML/M2
LEFT ATRIUM VOLUME MOD: 53 ML
LEFT INTERNAL DIMENSION IN SYSTOLE: 3 CM (ref 2.1–4)
LEFT VENTRICLE DIASTOLIC VOLUME INDEX: 45.48 ML/M2
LEFT VENTRICLE DIASTOLIC VOLUME: 90.5 ML
LEFT VENTRICLE END DIASTOLIC VOLUME APICAL 2 CHAMBER: 60.8 ML
LEFT VENTRICLE END DIASTOLIC VOLUME APICAL 4 CHAMBER: 71.4 ML
LEFT VENTRICLE END SYSTOLIC VOLUME APICAL 2 CHAMBER: 56.2 ML
LEFT VENTRICLE END SYSTOLIC VOLUME APICAL 4 CHAMBER: 50.9 ML
LEFT VENTRICLE MASS INDEX: 82.4 G/M2
LEFT VENTRICLE SYSTOLIC VOLUME INDEX: 17.9 ML/M2
LEFT VENTRICLE SYSTOLIC VOLUME: 35.6 ML
LEFT VENTRICULAR INTERNAL DIMENSION IN DIASTOLE: 4.5 CM (ref 3.5–6)
LEFT VENTRICULAR MASS: 164 G
LEUKOCYTE ESTERASE UR QL STRIP: NEGATIVE
LV LATERAL E/E' RATIO: 6 M/S
LV SEPTAL E/E' RATIO: 13.5 M/S
LVED V (TEICH): 90.5 ML
LVES V (TEICH): 35.6 ML
LVOT MG: 3 MMHG
LVOT MV: 0.78 CM/S
MUCOUS THREADS URNS QL MICRO: ABNORMAL /LPF
MV MEAN GRADIENT: 3 MMHG
MV PEAK A VEL: 1.22 M/S
MV PEAK E VEL: 0.54 M/S
MV PEAK GRADIENT: 7 MMHG
MV STENOSIS PRESSURE HALF TIME: 137 MS
MV VALVE AREA BY CONTINUITY EQUATION: 2.2 CM2
MV VALVE AREA P 1/2 METHOD: 1.61 CM2
NITRITE UR QL STRIP: NEGATIVE
OHS LV EJECTION FRACTION SIMPSONS BIPLANE MOD: 63 %
OHS QRS DURATION: 76 MS
OHS QTC CALCULATION: 416 MS
PH UR STRIP: 5.5 [PH]
PROT UR QL STRIP: ABNORMAL
PROTHROMBIN TIME: 61.3 SECONDS (ref 12.5–14.5)
RA PRESSURE ESTIMATED: 3 MMHG
RBC #/AREA URNS AUTO: ABNORMAL /HPF
SINUS: 2.8 CM
SP GR UR STRIP.AUTO: 1.05 (ref 1–1.03)
SQUAMOUS #/AREA URNS LPF: ABNORMAL /HPF
TDI LATERAL: 0.09 M/S
TDI SEPTAL: 0.04 M/S
TDI: 0.07 M/S
TRICUSPID ANNULAR PLANE SYSTOLIC EXCURSION: 1.83 CM
TROPONIN I SERPL-MCNC: <0.01 NG/ML (ref 0–0.04)
TSH SERPL-ACNC: 0.59 UIU/ML (ref 0.35–4.94)
UROBILINOGEN UR STRIP-ACNC: NORMAL
WBC #/AREA URNS AUTO: ABNORMAL /HPF
Z-SCORE OF LEFT VENTRICULAR DIMENSION IN END DIASTOLE: -2.49
Z-SCORE OF LEFT VENTRICULAR DIMENSION IN END SYSTOLE: -1.32

## 2025-02-05 PROCEDURE — 81001 URINALYSIS AUTO W/SCOPE: CPT | Performed by: INTERNAL MEDICINE

## 2025-02-05 PROCEDURE — 99900035 HC TECH TIME PER 15 MIN (STAT)

## 2025-02-05 PROCEDURE — 85730 THROMBOPLASTIN TIME PARTIAL: CPT | Performed by: INTERNAL MEDICINE

## 2025-02-05 PROCEDURE — 84484 ASSAY OF TROPONIN QUANT: CPT | Performed by: INTERNAL MEDICINE

## 2025-02-05 PROCEDURE — 84443 ASSAY THYROID STIM HORMONE: CPT | Performed by: INTERNAL MEDICINE

## 2025-02-05 PROCEDURE — 83036 HEMOGLOBIN GLYCOSYLATED A1C: CPT | Performed by: NURSE PRACTITIONER

## 2025-02-05 PROCEDURE — A9577 INJ MULTIHANCE: HCPCS | Performed by: INTERNAL MEDICINE

## 2025-02-05 PROCEDURE — 92523 SPEECH SOUND LANG COMPREHEN: CPT

## 2025-02-05 PROCEDURE — 25000003 PHARM REV CODE 250: Performed by: INTERNAL MEDICINE

## 2025-02-05 PROCEDURE — 11000001 HC ACUTE MED/SURG PRIVATE ROOM

## 2025-02-05 PROCEDURE — 27000221 HC OXYGEN, UP TO 24 HOURS

## 2025-02-05 PROCEDURE — 36415 COLL VENOUS BLD VENIPUNCTURE: CPT | Performed by: NURSE PRACTITIONER

## 2025-02-05 PROCEDURE — 25000003 PHARM REV CODE 250: Performed by: STUDENT IN AN ORGANIZED HEALTH CARE EDUCATION/TRAINING PROGRAM

## 2025-02-05 PROCEDURE — 21400001 HC TELEMETRY ROOM

## 2025-02-05 PROCEDURE — 99900031 HC PATIENT EDUCATION (STAT)

## 2025-02-05 PROCEDURE — 94761 N-INVAS EAR/PLS OXIMETRY MLT: CPT

## 2025-02-05 PROCEDURE — 25500020 PHARM REV CODE 255: Performed by: INTERNAL MEDICINE

## 2025-02-05 PROCEDURE — 85610 PROTHROMBIN TIME: CPT | Performed by: INTERNAL MEDICINE

## 2025-02-05 RX ORDER — DAPAGLIFLOZIN 10 MG/1
10 TABLET, FILM COATED ORAL DAILY
Status: DISCONTINUED | OUTPATIENT
Start: 2025-02-06 | End: 2025-02-17 | Stop reason: HOSPADM

## 2025-02-05 RX ORDER — ACETAMINOPHEN 500 MG
1000 TABLET ORAL EVERY 6 HOURS PRN
Status: DISCONTINUED | OUTPATIENT
Start: 2025-02-05 | End: 2025-02-17 | Stop reason: HOSPADM

## 2025-02-05 RX ORDER — HYDRALAZINE HYDROCHLORIDE 20 MG/ML
10 INJECTION INTRAMUSCULAR; INTRAVENOUS EVERY 4 HOURS PRN
Status: DISCONTINUED | OUTPATIENT
Start: 2025-02-05 | End: 2025-02-17 | Stop reason: HOSPADM

## 2025-02-05 RX ORDER — IPRATROPIUM BROMIDE AND ALBUTEROL SULFATE 2.5; .5 MG/3ML; MG/3ML
3 SOLUTION RESPIRATORY (INHALATION) EVERY 4 HOURS PRN
Status: DISCONTINUED | OUTPATIENT
Start: 2025-02-05 | End: 2025-02-17 | Stop reason: HOSPADM

## 2025-02-05 RX ORDER — SODIUM CHLORIDE 0.9 % (FLUSH) 0.9 %
10 SYRINGE (ML) INJECTION
Status: DISCONTINUED | OUTPATIENT
Start: 2025-02-05 | End: 2025-02-17 | Stop reason: HOSPADM

## 2025-02-05 RX ORDER — CLONAZEPAM 0.5 MG/1
0.5 TABLET ORAL 3 TIMES DAILY PRN
Status: DISCONTINUED | OUTPATIENT
Start: 2025-02-05 | End: 2025-02-17 | Stop reason: HOSPADM

## 2025-02-05 RX ORDER — BISACODYL 10 MG/1
10 SUPPOSITORY RECTAL DAILY PRN
Status: DISCONTINUED | OUTPATIENT
Start: 2025-02-05 | End: 2025-02-17 | Stop reason: HOSPADM

## 2025-02-05 RX ORDER — FLUOXETINE HYDROCHLORIDE 20 MG/1
40 CAPSULE ORAL DAILY
Status: DISCONTINUED | OUTPATIENT
Start: 2025-02-06 | End: 2025-02-17 | Stop reason: HOSPADM

## 2025-02-05 RX ORDER — ISOSORBIDE MONONITRATE 30 MG/1
30 TABLET, EXTENDED RELEASE ORAL DAILY
Status: DISCONTINUED | OUTPATIENT
Start: 2025-02-06 | End: 2025-02-17 | Stop reason: HOSPADM

## 2025-02-05 RX ORDER — OLANZAPINE 5 MG/1
5 TABLET ORAL NIGHTLY
Status: DISCONTINUED | OUTPATIENT
Start: 2025-02-05 | End: 2025-02-17 | Stop reason: HOSPADM

## 2025-02-05 RX ORDER — TRAZODONE HYDROCHLORIDE 50 MG/1
50 TABLET ORAL NIGHTLY PRN
Status: DISCONTINUED | OUTPATIENT
Start: 2025-02-05 | End: 2025-02-17 | Stop reason: HOSPADM

## 2025-02-05 RX ORDER — LABETALOL HYDROCHLORIDE 5 MG/ML
10 INJECTION, SOLUTION INTRAVENOUS
Status: DISCONTINUED | OUTPATIENT
Start: 2025-02-05 | End: 2025-02-05

## 2025-02-05 RX ORDER — HYDROCODONE BITARTRATE AND ACETAMINOPHEN 5; 325 MG/1; MG/1
1 TABLET ORAL
Status: COMPLETED | OUTPATIENT
Start: 2025-02-05 | End: 2025-02-05

## 2025-02-05 RX ORDER — ATORVASTATIN CALCIUM 40 MG/1
40 TABLET, FILM COATED ORAL DAILY
Status: DISCONTINUED | OUTPATIENT
Start: 2025-02-05 | End: 2025-02-17 | Stop reason: HOSPADM

## 2025-02-05 RX ORDER — HYDRALAZINE HYDROCHLORIDE 20 MG/ML
10 INJECTION INTRAMUSCULAR; INTRAVENOUS
Status: DISPENSED | OUTPATIENT
Start: 2025-02-05 | End: 2025-02-05

## 2025-02-05 RX ORDER — GABAPENTIN 100 MG/1
100 CAPSULE ORAL 3 TIMES DAILY
Status: DISCONTINUED | OUTPATIENT
Start: 2025-02-05 | End: 2025-02-17 | Stop reason: HOSPADM

## 2025-02-05 RX ORDER — LABETALOL HYDROCHLORIDE 5 MG/ML
10 INJECTION, SOLUTION INTRAVENOUS EVERY 4 HOURS PRN
Status: DISCONTINUED | OUTPATIENT
Start: 2025-02-05 | End: 2025-02-17 | Stop reason: HOSPADM

## 2025-02-05 RX ORDER — HYDROCODONE BITARTRATE AND ACETAMINOPHEN 7.5; 325 MG/1; MG/1
1 TABLET ORAL EVERY 6 HOURS PRN
Status: DISCONTINUED | OUTPATIENT
Start: 2025-02-05 | End: 2025-02-09

## 2025-02-05 RX ADMIN — ACETAMINOPHEN 1000 MG: 500 TABLET ORAL at 12:02

## 2025-02-05 RX ADMIN — IOHEXOL 100 ML: 350 INJECTION, SOLUTION INTRAVENOUS at 03:02

## 2025-02-05 RX ADMIN — OLANZAPINE 5 MG: 5 TABLET, FILM COATED ORAL at 08:02

## 2025-02-05 RX ADMIN — HYDROCODONE BITARTRATE AND ACETAMINOPHEN 1 TABLET: 5; 325 TABLET ORAL at 03:02

## 2025-02-05 RX ADMIN — ATORVASTATIN CALCIUM 40 MG: 40 TABLET, FILM COATED ORAL at 08:02

## 2025-02-05 RX ADMIN — GADOBENATE DIMEGLUMINE 18 ML: 529 INJECTION, SOLUTION INTRAVENOUS at 06:02

## 2025-02-05 RX ADMIN — GABAPENTIN 100 MG: 100 CAPSULE ORAL at 08:02

## 2025-02-05 NOTE — PT/OT/SLP PROGRESS
Physical Therapy      Patient Name:  Kayla Ruiz   MRN:  [id number]    PT orders received and chart reviewed. Pt with an INR of 7.0. Therapy contraindicated for today. PT will follow up as appropriate.     2/5/2025

## 2025-02-05 NOTE — H&P
Hospital Medicine  History & Physical Examination       Patient: Kayla Ruiz  MRN: [id number]  STATUS: IP- Inpatient   DOS: 2/5/2025   PCP: Jennifer Chavez, [facility]      CC:  Supratherapeutic INR       HISTORY OF PRESENT ILLNESS   69 y.o. female with a history that includes PAF and mechanical AVR, on Coumadin therapy was referred to ED by her Cardiology due to INR of 8.8 on outpatient labs.  Additional complaints include headache and vision changes, though denied focal weakness or speech changes.  CT head performed and revealed cortical based hypodensities in the bilateral occipital lobes.  CTA revealed moderate atheromatous calcification of the cavernous clinoid and supra clinoid segments of the bilateral internal carotid arteries with mild-to-moderate left greater than right stenosis.  A subsequent MRI brain has noted Symmetric restricted diffusion and edema in the bilateral posterior occipital lobes, with small foci in the left parietal lobe and left cerebellum, favored to represent changes of posterior reversible encephalopathy syndrome, and additionally noted small areas of hemorrhage in B/L occipital lobes.      REVIEW OF SYSTEMS     Except as documented, all other systems reviewed and negative       PAST MEDICAL HISTORY     Hypertension   Diabetes mellitus   Hyperlipidemia   LESLI  COPD   PAF   VHD s/p mechanical MVR      PAST SURGICAL HISTORY     Aortic valve replacement    Cholecystectomy    Colon resection    Fusion    Left heart catheterization 05/18/2023   Tonsillectomy        FAMILY HISTORY     Reviewed, negative in relation to patient's current condition.      SOCIAL HISTORY     Current smoker, 50 pk-yrs. Denies alcohol or drug abuse  Screening for Social Drivers of health:  []Housing/Food Insecurity []Transportation Needs []Utility Difficulties []Interpersonal safety [x]Noncontributory      ALLERGIES     ACE INHIBITORS  Naproxen  Nifedipine  Venlafaxine      HOME MEDICATIONS       albuterol-ipratropium (DUO-NEB) 2.5 mg-0.5 mg/3 mL neb soln 3 mLs, Every 4 hours PRN    aspirin (ECOTRIN) 81 mg, Daily    atorvastatin (LIPITOR) 40 mg, Daily    clonazePAM (KLONOPIN) 0.5 mg, 3 times daily PRN    dapagliflozin propanediol (FARXIGA) 10 mg, Oral, Daily    FLUoxetine 40 mg, Daily    furosemide (LASIX) 40 mg, Oral, 2 times daily    gabapentin (NEURONTIN) 100 mg, 3 times daily    HYDROcodone-acetaminophen (NORCO) 7.5-325 mg tablet 1 tablet, Oral, Every 6 hours PRN    isosorbide mononitrate (IMDUR) 30 mg, Oral, Daily    LIDOcaine (LIDODERM) 5 % 1 patch, Transdermal, Daily    losartan (COZAAR) 25 mg, Oral, Daily    metoprolol succinate (TOPROL-XL) 12.5 mg, Oral, 2 times daily    MOUNJARO 15 mg, Every 7 days    OLANZapine (ZYPREXA) 5 mg, Nightly    sotaloL (BETAPACE) 80 mg, Every 12 hours    spironolactone (ALDACTONE) 25 mg, Oral, Daily    traZODone (DESYREL) 25-50 mg, Nightly PRN    warfarin (COUMADIN) 5 mg, Daily       PHYSICAL EXAM   VITALS: T 97.7 °F (36.5 °C)   /73   P 71   RR 20   O2 (!) 92 %    GENERAL: Awake and in NAD  HEENT: NC/AT  NECK: Supple  LUNGS: CTA anteriorly  CVS: Normal rate  GI/: Soft, NT/ND, bowel sounds positive.  EXTREMITIES: Peripheral pulses 2+, no peripheral edema  DERM: Warm, dry.  No rashes or lesions noted.  NEURO: AAOx3  PSYCH: Cooperative, appropriate mood and affect       DIAGNOSTICS     Recent Labs     02/04/25  1917   WBC 6.90   RBC 5.24   HGB 14.1   HCT 46.6   MCV 88.9   MCH 26.9*   MCHC 30.3*   RDW 16.6        Recent Labs     02/05/25  0329   INR 7.0*   APTT 57.9*     Recent Labs     02/04/25  0800 02/05/25  0329   HGBA1C  --  6.9   CHOL 116  --    TRIG 135  --    LDL 54.00  --    VLDL 27  --    HDL 35  --       Recent Labs     02/04/25  0800 02/04/25  1917 02/05/25 0329    136  --    K 4.4 5.1  --    CO2 28 20*  --    BUN 28.7* 27.1*  --    CREATININE 0.87 0.98  --    GLUCOSE 107 174*  --    CALCIUM 9.1 9.0  --    ALBUMIN 3.3* 3.9  --    GLOBULIN  3.5 3.3  --    ALKPHOS 153* 152*  --    ALT 19 20  --    AST 28 29  --    BILITOT 0.3 0.3  --    TSH  --   --  0.589     Recent Labs     02/04/25  1917 02/05/25  0329   TROPONINI 0.023 <0.010          Echo    Left Ventricle: The left ventricle is normal in size. Normal wall   thickness. There is normal systolic function with a visually estimated   ejection fraction of 55 - 60%. Grade I diastolic dysfunction.    Right Ventricle: Normal right ventricular cavity size. Wall thickness   is normal. Systolic function is normal. TAPSE is 1.83 cm.    Left Atrium: Left atrium is moderately dilated. Agitated saline study   of the atrial septum is negative, suggesting absence of intracardiac shunt   at the atrial level.    Aortic Valve: There is a mechanical valve in the aortic position.   Aortic valve area by VTI is 1.6 cm². Aortic valve peak velocity is 2.9   m/s. Mean gradient is 16 mmHg. The dimensionless index is 0.53.    Mitral Valve: There is mitral annular calcification present. There is   trace regurgitation.    IVC/SVC: Normal venous pressure at 3 mmHg.    Consider KATERIN    MRI Brain W WO Contrast  Narrative:   There is symmetric restricted diffusion and edema in the bilateral posterior occipital lobes, with few small foci in the left parietal lobe and left cerebellum.  There are small areas of associated hemorrhage with likely areas of curvilinear enhancement.  Mild patchy T2/FLAIR hyperintense in the subcortical and periventricular white matter likely represent chronic microvascular ischemic changes.  There is local mass effect.  There is no midline shift or herniation.  The basal cisterns are patent.  There is mild diffuse parenchymal volume loss.  There is no hydrocephalus or abnormal extra-axial fluid collection.  Impression:   1. Symmetric restricted diffusion and edema in the bilateral posterior occipital lobes, with small foci in the left parietal lobe and left cerebellum, favored to represent changes of  posterior reversible encephalopathy syndrome with ischemia in the differential.  Follow-up can be obtained to ensure resolution.  2. Small areas of associated hemorrhage in the bilateral occipital lobes.  Finding of intracranial hemorrhage given to Jina HOLT at the time of dictation.  Electronically signed by: Norma Leblanc  Date:    02/05/2025  Time:    09:56    X-Ray Chest AP Portable  Narrative:  No alveolar consolidation, effusion, or pneumothorax is seen.   The thoracic aorta is normal  cardiac silhouette, central pulmonary vessels and mediastinum are normal in size and are grossly unremarkable.   visualized osseous structures are grossly unremarkable.  Impression:   No acute chest disease is identified.    Electronically signed by: Bandar Vera  Date:    02/05/2025  Time:    09:08    CTA Head and Neck (xpd)  Narrative:   Head CT with contrast:  No interval changes when compared to the previous CT.  No significant enhancing abnormalities.  If present, stenosis of the carotid bulbs is measured based on NASCET criteria,  i.e. area of maximal stenosis compared to the cervical ICA distal to the bulb.  Cervical CTA:  The origins of the great vessels are patent with moderate calcifications  The common carotid arteries are patent.  There are mild calcifications at the carotid bulbs without hemodynamically significant stenosis.  The internal carotid arteries are patent.  The vertebral arteries are patent  Intracranial CTA:  There are calcifications along the course the carotid siphons with mild narrowing bilaterally.  The middle cerebral arteries and anterior cerebral arteries are patent  The vertebral arteries, basilar artery and posterior cerebral arteries are patent.  The dural venous sinuses are patent.  Impression:   No large vessel occlusion or flow-limiting stenosis.  Electronically signed by: Norma Leblanc  Date:    02/05/2025  Time:    08:32      ASSESSMENT   PRES vs B/L occipital infarcts, associated  with small hemorrhagic foci, B/L occipital lobes  VHD s/p mechanical AVR, on Coumadin   Supratherapeutic INR  PAF   Diabetes mellitus type II   Essential HTN   h/o COPD   Tobacco abuse    PLAN   Appreciate neurology and Cardiology input   Continue with BP control, will treat with antihypertensives as needed  Continue to hold Coumadin and trend INR  Will forego reversal given mechanical AVR and small secondary hemorrhagic foci  plan to resume Coumadin once INR trends down to therapeuti (has been cleared for such by Neurology      Prophylaxis: AC as above  Code Status: Full      Bryn Boone MD  Hospital Medicine            If the patient has been admitted under observation status, it is at my discretion and under our care with hospital medicine services. [TWA]

## 2025-02-05 NOTE — PT/OT/SLP EVAL
[facility]  Speech Language Pathology Department  Cognitive-Communication Evaluation    Patient Name:  Kayla Ruiz   MRN:  [id number]    Recommendations     General recommendations:  SLP intervention not indicated  Communication strategies:  none  Barriers to safe discharge: none    History     Kayla Ruiz is a/n 69 y.o. female admitted following headache and complaints of seeing constant white circles. CT head performed and revealed cortical based hypodensities in the bilateral occipital lobe. Pt passed swallow screen.     Past Medical History:   Diagnosis Date    A-fib     Anticoagulant long-term use     Depression     Diabetes mellitus     Hyperlipidemia     Sleep apnea      Past Surgical History:   Procedure Laterality Date    AORTIC VALVE REPLACEMENT      CHOLECYSTECTOMY      COLON RESECTION      fusion      LEFT HEART CATHETERIZATION Left 05/18/2023    Procedure: Left heart cath;  Surgeon: Kahlil Martin MD;  Location: [facility] CATH LAB;  Service: Cardiology;  Laterality: Left;  LHC +/- PCI    TONSILLECTOMY       Previous level of Function  Education:high school  Lives: with spouse  Handed: Right  Glasses: no  Hearing Aids: no    Subjective     Patient awake and alert.  Spiritual/Cultural/Buddhism Beliefs/Practices that affect care:  no  Pain/Comfort:  0/10    Objective     ORAL MUSCULATURE  Dentition: own teeth  Facial Movement: WFL  Buccal Strength & Mobility: WFL  Mandibular Strength & Mobility: WFL    SPEECH PRODUCTION  Phoneme Production: adequate  Voice Quality: adequate  Voice Production: adequate  Speech Rate: appropriate  Loudness: acceptable  Speech Intelligibility  Known Context: Greater that 90%  Unknown Context: Greater that 90%    AUDITORY COMPREHENSION  Identification:  Body parts: 100%  Objects: 100%  Following Directions:  1-Step: 100%  2-Step: 100%  Yes/No Questions:  Biographical: 100%  Environmental: 100%  Simple: 100%  Complex: 100%    VERBAL  EXPRESSION  Automatic Speech:  Months of the year: 100%  Countin%  Phrase Completion: 100%  Confrontation Naming  Body Parts: 100%  Objects: 100%  Wh- Questions:  Object name: 100%  Object function: 100%    COGNITION  Orientation:  Person: yes  Place: yes  Time: yes  Situation: yes   Memory:  Immediate: Within Functional Limits  Delayed: Within Functional Limits  Problem Solving  Functional simple: Within Functional Limits  Organization:  Convergent thinking: Within Functional Limits  Divergent thinking: Within Functional Limits  Executive Function:  Planning: Within Functional Limits    Assessment     Pt presents with functional speech and language skills, cognitive linguistic skills note to be at baseline. No skilled SLP intervention is warranted at this time.     Patient Education     Patient provided with verbal education regarding POC.  Understanding was verbalized.    Plan     Plan of Care reviewed with:   patient      Time Tracking     SLP Treatment Date:   25  Speech Start Time:  920  Speech Stop Time:  935     Speech Total Time (min):  15 min    Billable minutes:  Evaluation of Speech Sound Production with Comprehension and Expression, 15 minutes     2025

## 2025-02-05 NOTE — ASSESSMENT & PLAN NOTE
- presented with headache, double vision and seeing circles in vision 1.5 - 2 weeks.   - Stroke RF: HTN,HLD, Afib and artifical aortic valve replacement on Coumadin  - Intervention: None, out of window. Low NIH. No LVO.   - Etiology: TBD    Stroke workup:  -CTh: Impression:  Cortical based hypodensities in the bilateral occipital lobes, may represent changes of posterior reversible encephalopathy syndrome.   -CTA h/n: Preliminary: Impression:  1. Moderate atheromatous calcification of the cavernous clinoid and supraclinoid segments of the bilateral internal carotid arteries is seen with mild to moderate left greater than right stenosis (Series 26 Images 200-216).  2. The previously noted hypodensities in the bilateral occipital lobes previously ascribed to posterior reversible encephalopathy are again seen stable from prior without associated abnormal intracranial enhancement on post-contrast images.   -MRI brain: Impression:  1. Symmetric restricted diffusion and edema in the bilateral posterior occipital lobes, with small foci in the left parietal lobe and left cerebellum, favored to represent changes of posterior reversible encephalopathy syndrome with ischemia in the differential.  Follow-up can be obtained to ensure resolution.  2. Small areas of associated hemorrhage in the bilateral occipital lobes.   -ECHO: EF 55-60%. Agitated saline study of the atrial septum is negative, suggesting absence of intracardiac shunt at the atrial level.    -CUS: The right internal carotid artery is patent with less than 50% stenosis.  The left internal carotid artery is patent with less than 50% stenosis.  Bilateral vertebral arteries are patent with antegrade flow.   -LDL: 54   -A1c: 6.9  -TSH: 0.589   -home medications include: coumadin   NIH Stroke Scale      1a  Level of consciousness: 0=alert; keenly responsive   1b. LOC questions:  0=Answers both tasks correctly   1c. LOC commands: 0=Answers both tasks correctly   2.   Best Gaze: 0=normal   3.  Visual: 1=Partial hemianopia   4. Facial Palsy: 0=Normal symmetric movement   5a.  Motor left arm: 0=No drift, limb holds 90 (or 45) degrees for full 10 seconds   5b.  Motor right arm: 0=No drift, limb holds 90 (or 45) degrees for full 10 seconds   6a. motor left le=No drift, limb holds 90 (or 45) degrees for full 10 seconds   6b  Motor right le=No drift, limb holds 90 (or 45) degrees for full 10 seconds   7. Limb Ataxia: 0=Absent   8.  Sensory: 0=Normal; no sensory loss   9. Best Language:  0=No aphasia, normal   10. Dysarthria: 0=Normal   11. Extinction and Inattention: 0=No abnormality   12. Distal motor function: 0=Normal    Total:   1         Plan:  -MRI brain symmetric restricted diffusion and edema in the bilateral posterior occipital lobes, with small foci in the left parietal lobe and left cerebellum. Small areas of associated hemorrhage in the bilateral occipital lobes.  -from stroke neurology stand point when INR is acceptable, resume anticoagulant.   -continue Atorvastatin 40mg daily  -PT/OT/ST to evaluate  -SCD for DVT prophylaxis   - ok to normalize blood pressure  - Neuro checks q4hr ... stat CTh if any neuro change   - Continuous telemetry monitoring  - NPO until passes Jewell or cleared by SLP  - No bedrest, ok for patient to ambulate, RN to observe first ambulation for safety  -continue care with primary team and cardiology  -stroke neurology signing off   -Other recommendations may follow from MD

## 2025-02-05 NOTE — PT/OT/SLP PROGRESS
Occupational Therapy      Patient Name:  Kayla Ruiz   MRN:  [id number]    OT orders received, chart reviewed. Pt with INR of 7.0. Contraindicated for therapy at this time. Will follow-up as appropriate and as scheduling allows.    2/5/2025

## 2025-02-05 NOTE — ED PROVIDER NOTES
Encounter Date: 2/4/2025    SCRIBE #1 NOTE: I, Angélica Nickerson, am scribing for, and in the presence of,  Wilmar Townsend MD. I have scribed the following portions of the note - Other sections scribed: HPI, ROS, PE.       History     Chief Complaint   Patient presents with    Fall     Pt reports fall 1/7 and sent by Dr. Leon for INR of 8.8. Hx of artifical valve on coumadin. C/o lower back pain, R knee pain, headaches, and blurry vision since fall on 1/7.      Patient is a 69-year-old female with a history of A-fib, DM, and HLD presenting to the ED for abnormal blood work. The pt states she was sent by her cardiologist Dr. Leon for an INR of 8.8. She has a past surgical history of an aortic valve replacement on Coumadin. She states she has been out of her prescribed pain medications for the past week and has been medicating at home with tylenol without relief. The pt is also reporting vision changes onset one week ago. She denies any unilateral weakness or speech changes.    The history is provided by the patient. No  was used.     Review of patient's allergies indicates:   Allergen Reactions    Ace inhibitors     Naproxen sodium     Nifedipine     Venlafaxine      Past Medical History:   Diagnosis Date    A-fib     Anticoagulant long-term use     Depression     Diabetes mellitus     Hyperlipidemia     Sleep apnea      Past Surgical History:   Procedure Laterality Date    AORTIC VALVE REPLACEMENT      CHOLECYSTECTOMY      COLON RESECTION      fusion      LEFT HEART CATHETERIZATION Left 05/18/2023    Procedure: Left heart cath;  Surgeon: Kahlil Martin MD;  Location: [facility] CATH LAB;  Service: Cardiology;  Laterality: Left;  LHC +/- PCI    TONSILLECTOMY       Family History   Problem Relation Name Age of Onset    COPD Mother      Hypertension Mother      Heart disease Father       Social History     Tobacco Use    Smoking status: Every Day     Current packs/day: 1.00     Average packs/day: 1  pack/day for 50.0 years (50.0 ttl pk-yrs)     Types: Cigarettes    Smokeless tobacco: Never   Substance Use Topics    Alcohol use: Never    Drug use: Never     Review of Systems   Constitutional:  Negative for chills and fever.   HENT:  Negative for congestion, drooling and sore throat.    Eyes:  Positive for visual disturbance. Negative for pain.   Respiratory:  Negative for chest tightness, shortness of breath and wheezing.    Cardiovascular:  Negative for chest pain, palpitations and leg swelling.   Gastrointestinal:  Negative for abdominal pain, nausea and vomiting.   Genitourinary:  Negative for dysuria and hematuria.   Musculoskeletal:  Positive for arthralgias (right knee) and back pain. Negative for neck pain and neck stiffness.   Skin:  Negative for pallor and rash.   Neurological:  Negative for weakness and numbness.   Hematological:  Does not bruise/bleed easily.       Physical Exam     Initial Vitals   BP Pulse Resp Temp SpO2   02/04/25 1803 02/04/25 1800 02/04/25 1800 02/04/25 1800 02/04/25 1800   (!) 142/65 61 20 98 °F (36.7 °C) (!) 94 %      MAP       --                Physical Exam    Nursing note and vitals reviewed.  Constitutional: She appears well-developed and well-nourished. She is not diaphoretic. No distress.   The pt is elderly and frail-appearing.   HENT:   Head: Normocephalic and atraumatic.   Nose: Nose normal. Mouth/Throat: Oropharynx is clear and moist.   Eyes: EOM are normal. Pupils are equal, round, and reactive to light.   Neck: Neck supple.   Normal range of motion.  Cardiovascular:  Normal rate and regular rhythm.           No murmur heard.  Pulmonary/Chest: Breath sounds normal. No respiratory distress. She has no wheezes. She has no rales.   Abdominal: Abdomen is soft. She exhibits no distension. There is no abdominal tenderness.   Musculoskeletal:      Cervical back: Normal range of motion and neck supple.      Comments: No midline spinal tenderness.     Neurological: She is  alert and oriented to person, place, and time. She has normal strength. No cranial nerve deficit or sensory deficit. GCS eye subscore is 4. GCS verbal subscore is 5. GCS motor subscore is 6.   Moves all extremities.   Skin: Skin is warm. Capillary refill takes less than 2 seconds. No rash noted.         ED Course   Critical Care    Date/Time: 2/5/2025 4:25 AM    Performed by: Wilmar Townsend MD  Authorized by: Wilmar Townsend MD  Direct patient critical care time: 35 minutes  Total critical care time (exclusive of procedural time) : 35 minutes  Critical care time was exclusive of separately billable procedures and treating other patients.  Critical care was necessary to treat or prevent imminent or life-threatening deterioration of the following conditions: CNS failure or compromise.  Critical care was time spent personally by me on the following activities: development of treatment plan with patient or surrogate, discussions with consultants, evaluation of patient's response to treatment, examination of patient, obtaining history from patient or surrogate, pulse oximetry, re-evaluation of patient's condition, review of old charts, ordering and review of radiographic studies, ordering and review of laboratory studies and ordering and performing treatments and interventions.        Labs Reviewed   COMPREHENSIVE METABOLIC PANEL - Abnormal       Result Value    Sodium 136      Potassium 5.1      Chloride 101      CO2 20 (*)     Glucose 174 (*)     Blood Urea Nitrogen 27.1 (*)     Creatinine 0.98      Calcium 9.0      Protein Total 7.2      Albumin 3.9      Globulin 3.3      Albumin/Globulin Ratio 1.2      Bilirubin Total 0.3       (*)     ALT 20      AST 29      eGFR >60      Anion Gap 15.0      BUN/Creatinine Ratio 28     CBC WITH DIFFERENTIAL - Abnormal    WBC 6.90      RBC 5.24      Hgb 14.1      Hct 46.6      MCV 88.9      MCH 26.9 (*)     MCHC 30.3 (*)     RDW 16.6      Platelet 388      MPV 9.6       Neut % 61.4      Lymph % 28.3      Mono % 6.4      Eos % 2.9      Basophil % 0.7      Imm Grans % 0.3      Neut # 4.24      Lymph # 1.95      Mono # 0.44      Eos # 0.20      Baso # 0.05      Imm Gran # 0.02      NRBC% 0.0     PROTIME-INR - Abnormal    PT 61.7 (*)     INR 7.0 (*)    APTT - Abnormal    PTT 54.3 (*)    TROPONIN I - Normal    Troponin-I 0.023     CBC W/ AUTO DIFFERENTIAL    Narrative:     The following orders were created for panel order CBC auto differential.  Procedure                               Abnormality         Status                     ---------                               -----------         ------                     CBC with Differential[[id number]]       Abnormal            Final result                 Please view results for these tests on the individual orders.   URINALYSIS, REFLEX TO URINE CULTURE   TSH   TROPONIN I   APTT   PROTIME-INR   TYPE & SCREEN    Group & Rh O POS      Indirect Mirna GEL NEG      Specimen Outdate 02/07/2025 23:59            Imaging Results              CTA Head and Neck (xpd) (Preliminary result)  Result time 02/05/25 04:10:05      Preliminary result by Albert Cruz Jr., MD (02/05/25 04:10:05)                   Narrative:    START OF REPORT:  Technique: CT angiogram of the intracranial and neck vessels was performed with intravenous contrast with direct axial as well as sagittal and coronal reformations.    Comparison: Comparison is with study dated2025-02-04 18:22:03.    Clinical history: Blurry vision.    Findings:  Intracranial Vascular structures:  Internal carotid arteries: Moderate atheromatous calcification of the cavernous clinoid and supraclinoid segments of the bilateral internal carotid arteries is seen with mild to moderate left greater than right stenosis (Series 26 Images 200-216).  Middle cerebral arteries: Unremarkable.  Anterior cerebral arteries: Unremarkable.  Vertebral arteries: Mild atheromatous change of the bilateral  vertebral arteries is seen with no significant stenosis.  Basilar artery: Unremarkable.  Posterior cerebral arteries: Unremarkable.  Posterior communicating arteries: Unremarkable.  Neck Vascular structures: Mild atheromatous calcification with no significant stenosis is seen at the origin of the brachiocephalic left subclavian left common carotid arteries.  Carotids:  Common carotid arteries: The left common carotid artery appears unremarkable.  Right common carotid artery: Mild atheromatous calcification of the mid distal segment of the right common carotid artery is seen with no significant stenosis.  Internal carotid artery: Mild atheromatous calcification with mild stenosis at the origin of the bilateral internal carotid arteries is seen.  Vertebral arteries: Unremarkable.  Jugular Veins and venous sinuses: Unremarkable.  Hemorrhage: No acute intracranial hemorrhage is seen.  CSF spaces: The ventricles, sulci and basal cisterns all appear moderately prominent consistent with global cerebral atrophy stable from prior.  Brain parenchyma: No acute infarct is identified. Mild stable appearing scattered microvascular change is seen in portions of the periventricular and deep white matter tracts. The previously noted hypodensities in the bilateral occipital lobes previously ascribed to posterior reversible encephalopathy are again seen stable from prior without associated abnormal intracranial enhancement on post-contrast images.  Cerebellum: Unremarkable.  Vascular: Unremarkable venous sinuses.  Sella and skull base: The sella appears to be within normal limits.  Cerebellopontine angles: Within normal limits.  Herniation: None.  Intracranial calcifications: Incidental note is made of bilateral choroid plexus calcification. Incidental note is made of some pineal region calcification. Incidental note is made of some calcification of the falx.      Impression:  1. Moderate atheromatous calcification of the cavernous  clinoid and supraclinoid segments of the bilateral internal carotid arteries is seen with mild to moderate left greater than right stenosis (Series 26 Images 200-216).  2. The previously noted hypodensities in the bilateral occipital lobes previously ascribed to posterior reversible encephalopathy are again seen stable from prior without associated abnormal intracranial enhancement on post-contrast images.  3. Details and other findings as described above.                                         X-Ray Chest AP Portable (In process)                      CT Cervical Spine Without Contrast (Final result)  Result time 02/04/25 21:25:00      Final result by Juanito Chopra MD (02/04/25 21:25:00)                   Impression:      No acute fracture or malalignment identified.      Electronically signed by: Juanito Chopra  Date:    02/04/2025  Time:    21:25               Narrative:    EXAMINATION:  CT CERVICAL SPINE WITHOUT CONTRAST    CLINICAL HISTORY:  Trauma.    TECHNIQUE:  Multidetector axial images were performed of the cervical spine without and.  Images were reconstructed.    Automated exposure control was utilized to minimize radiation dose.  .    COMPARISON:  None available.    FINDINGS:  Cervical vertebrae stature is maintained.  There is grade 1 anterolisthesis of C4 on C5 and C7 on T1.  Solid ACDF at C5, C6 and C7.  No acute fracture or malalignment identified.  There is moderate degenerative narrowing of the left neural foramen at C4-C5.  There is no prevertebral soft tissue prominence.    This study does not exclude the possibility of intrathecal soft tissue, ligamentous or vascular injury.                                       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  MRI brain is available for further evaluation.      Electronically signed by: Norma Leblanc  Date:    02/04/2025  Time:    18:42               Narrative:    EXAMINATION:  CT HEAD WITHOUT CONTRAST    CLINICAL HISTORY:  Head trauma, minor (Age >= 65y);    TECHNIQUE:  Axial scans were obtained from skull base to the vertex.    Coronal and sagittal reconstructions obtained from the axial data.    Automatic exposure control was utilized to limit radiation dose.    Contrast: None    Radiation Dose:    Total DLP: 934 mGy*cm    COMPARISON:  CT head dated 01/09/2025    FINDINGS:  There is no acute intracranial hemorrhage.  There are cortical based hypodensities in the bilateral occipital lobes.  Patchy hypodensities in the subcortical and periventricular white matter likely represent chronic microvascular ischemic changes.    There is local mass effect.  There is no midline shift or herniation.  The basal cisterns are patent.  There is diffuse parenchymal volume loss.  There is no hydrocephalus or abnormal extra-axial fluid collection.  Carotid and vertebral artery calcifications are noted.  The calvarium and skull base are intact.                                       Medications   hydrALAZINE injection 10 mg (0 mg Intravenous Hold 2/5/25 0039)   sodium chloride 0.9% flush 10 mL (has no administration in time range)   labetaloL injection 10 mg (has no administration in time range)   bisacodyL suppository 10 mg (has no administration in time range)   atorvastatin tablet 40 mg (has no administration in time range)   morphine injection 4 mg (4 mg Intravenous Given 2/4/25 3581)   ondansetron injection 4 mg (4 mg Intravenous Given 2/4/25 6501)   HYDROcodone-acetaminophen 5-325 mg per tablet 1 tablet (1 tablet Oral Given 2/5/25 7367)   iohexoL (OMNIPAQUE 350) injection 100 mL (100 mLs Intravenous Given 2/5/25 8552)     Medical Decision Making  Problems Addressed:  Dizziness: acute illness or  injury  Fall: acute illness or injury  Vision changes: acute illness or injury    Amount and/or Complexity of Data Reviewed  Labs: ordered.  Radiology: ordered.    Risk  Prescription drug management.  Decision regarding hospitalization.    Differential diagnosis (includes but is not limited to):   CVA, TIA, ICH, electrolyte abnormalities, dehydration, kidney injury, medication misadventure, supratherapeutic INR, PRES    MDM Narrative  69-year-old female presents for multiple complaints including blurry vision that has been ongoing for the past week as well as chronic lower back and knee pain after a fall 3 weeks ago.  Patient was noted to have a supratherapeutic INR of 8.8 so the patient was referred to us for further evaluation and management.  Repeat labs reviewed, INR 7.0.  No active bleeding at this time, given the patient's mechanical heart valve all hold off on vitamin K at this time although low threshold to initiate if active bleeding identified.  Cis consulted and will continue to follow, agrees with management thus far, no indication for reversal at this time.  CT of the head performed due the patient's reported bilateral vision changes over the past week.  CT remarkable for findings that may be related to PRES.  CT C-spine negative.  MRI of the brain ordered.  Case discussed with hospitalist, will admit for further care.    Dispo: Admit    My independent radiology interpretation: as above  Point of care US (independently performed and interpreted):   Decision rules/clinical scoring:     Sepsis Perfusion Assessment:     Amount and/or Complexity of Data Reviewed  Independent historian: none   Summary of history:   External data reviewed: notes from previous ED visits and notes from clinic visits  Summary of data reviewed: Prior records reviewed  Risk and benefits of testing: discussed   Labs: ordered and reviewed  Radiology: ordered and independent interpretation performed (see above or ED  course)  ECG/medicine tests: ordered and independent interpretation performed (see above or ED course)  Discussion of management or test interpretation with external provider(s): discussed with hospitalist physician and discussed with [facility] consultant   Summary of discussion: as above    Risk  Parenteral controlled substances   Drug therapy requiring intense monitoring for toxicity   Decision regarding hospitalization  Shared decision making     Critical Care  30-74 minutes     Data Reviewed/Counseling: I have personally reviewed the patient's vital signs, nursing notes, and other relevant tests, information, and imaging. I had a detailed discussion regarding the historical points, exam findings, and any diagnostic results supporting the discharge diagnosis. I personally performed the history, PE, MDM and procedures as documented above and agree with the scribe's documentation.    Portions of this note were dictated using voice recognition software. Although it was reviewed for accuracy, some inherent voice recognition errors may have occurred and may be present in this document.         Scribe Attestation:   Scribe #1: I performed the above scribed service and the documentation accurately describes the services I performed. I attest to the accuracy of the note.    Attending Attestation:           Physician Attestation for Scribe:  Physician Attestation Statement for Scribe #1: I, Wilmar Townsend MD, reviewed documentation, as scribed by Angélica Nickerson in my presence, and it is both accurate and complete.             ED Course as of 02/05/25 0432 Tue Feb 04, 2025 2200 EKG independently interpreted by me.  EKG: SB @ 53, sinus arrhythmia, Qtc 416 [MC]   1659 Paged [facility]. [RB]   5856 Discussed with Ok DANGELO, will see in consult given supratherapeutic INR and mechanical heart valve. [MC]   6218 Paged hospital medicine. [RB]      ED Course User Index  [MC] Wilmar Townsend MD  [RB] Angélica Nickerson                              Clinical Impression:  Final diagnoses:  [R42] Dizziness  [W19.XXXA] Fall  [H53.9] Vision changes          ED Disposition Condition    Admit Stable                Wilmar Townsend MD  02/05/25 2626

## 2025-02-05 NOTE — PLAN OF CARE
02/05/25 1003   Discharge Assessment   Assessment Type Discharge Planning Assessment       ischarge Assessment   Assessment Type Discharge Planning Assessment   Confirmed/corrected address, phone number and insurance Yes   Confirmed Demographics Correct on Facesheet   Source of Information patient;family   When was your last doctors appointment? 10/10/25   Communicated PETR with patient/caregiver Date not available/Unable to determine   Reason For Admission back pain   People in Home spouse;child(guilherme), adult   Do you expect to return to your current living situation? Yes   Do you have help at home or someone to help you manage your care at home? Yes   Who are your caregiver(s) and their phone number(s)? Christopher Ruiz [phone]   Prior to hospitilization cognitive status: Alert/Oriented   Current cognitive status: Alert/Oriented   Walking or Climbing Stairs Difficulty yes   Walking or Climbing Stairs ambulation difficulty, requires equipment   Mobility Management rollator, std walker, wheelchair   Dressing/Bathing Difficulty yes   Dressing/Bathing bathing difficulty, requires equipment   Dressing/Bathing Management grab bars, shower chair, bath bench   Do you have any problems with: Errands/Grocery   Home Accessibility stairs to enter home   Number of Stairs, Main Entrance one   Stair Railings, Main Entrance none   Home Layout Able to live on 1st floor   Equipment Currently Used at Home bath bench;shower chair;rollator;walker, standard;wheelchair;glucometer   Patient currently being followed by outpatient case management? No   Do you currently have service(s) that help you manage your care at home? No   Do you take prescription medications? Yes   Do you have prescription coverage? Yes   Do you have any problems affording any of your prescribed medications? No   Is the patient taking medications as prescribed? yes   Who is going to help you get home at discharge? Christopher Ruiz   How do you get to doctors  appointments? family or friend will provide   Are you on dialysis? No   Do you take coumadin? Yes   Who monitors your labs? [facility]   Discharge Plan A snf   Discharge Plan B Home Health   DME Needed Upon Discharge     (TBD)   Discharge Plan discussed with: Patient;Spouse/sig other   Name(s) and Number(s) Christopher Ruiz [phone]   Transition of Care Barriers Mobility   OTHER   Name(s) of People in Home Christopher Ruiz [phone]      Pt lives with her spouse and adult son in a 2 story home. She is able to live on first floor..  She has used  [facility]. . Patient uses [facility] [employer] off [address]      .        Revision History

## 2025-02-05 NOTE — CONSULTS
[facility] - Neurology  Neurology  Consult Note    Patient Name: Kayla Ruiz  MRN: [id number]  Admission Date: 2/4/2025  Hospital Length of Stay: 0 days  Code Status: Full Code   Attending Provider: Radhames Acevedo MD   Consulting Provider: Lupe Burrows NP  Primary Care Physician: Jennifer Chavez NP-C  Principal Problem:<principal problem not specified>    Inpatient consult to Neurology Services (Vascular Neurology)  Consult performed by: Lupe Burrows NP  Consult ordered by: Radhames Acevedo MD         Subjective:     Chief Complaint:  Double vision and circles in vision, headache.    HPI:   Kayla Ruiz is a 69 y.o. female past medical history AFib,T2DM, and HDL [place] [facility] to abnormal blood work, sent by cardiologist, Dr. EAST for an INR of 8.8. On Coumadin after aortic valve replacement. She also had complaint of lower back pain, right knee pain, since fall on 1/7, state fall caused by tripping on a blanket on floor in her home. Did not hit head, no LOC. She reported she has been out of prescription pain medication and taking Tylenol without relief of back and knee pain.  Also complaint of frontal headache 4/10, not daily over past week. No headache over past 2 days. Also complaint of seeing constant white circles in bilateral vision with intermittent horizontal double vision over past 1.5 - 2 weeks.  No unilateral weakness or speech changes.  CT head performed and revealed cortical based hypodensities in the bilateral occipital lobes may represent changes of posterior reversible encephalopathy syndrome.  MRI brain recommended for further evaluation. CTA h/n revealed moderate atheromatous calcification of the cavernous clinoid and supra clinoid segments of the bilateral internal carotid arteries with mild-to-moderate left greater than right stenosis.  Hypodensities in the bilateral occipital lobes previously ascribed to posterior reversal  encephalopathy appears stable from prior without associated abnormal intracranial enhancement on the postcontrast images.  Stroke neurology consulted for continued evaluation.        Past Medical History:   Diagnosis Date    A-fib     Anticoagulant long-term use     Depression     Diabetes mellitus     Hyperlipidemia     Sleep apnea        Past Surgical History:   Procedure Laterality Date    AORTIC VALVE REPLACEMENT      CHOLECYSTECTOMY      COLON RESECTION      fusion      LEFT HEART CATHETERIZATION Left 05/18/2023    Procedure: Left heart cath;  Surgeon: Kahlil Martin MD;  Location: [facility] CATH LAB;  Service: Cardiology;  Laterality: Left;  LHC +/- PCI    TONSILLECTOMY         Review of patient's allergies indicates:   Allergen Reactions    Ace inhibitors     Naproxen sodium     Nifedipine     Venlafaxine        Current Facility-Administered Medications on File Prior to Encounter   Medication    0.9%  NaCl infusion    diazePAM tablet 10 mg    diphenhydrAMINE capsule 50 mg     Current Outpatient Medications on File Prior to Encounter   Medication Sig    warfarin (COUMADIN) 5 MG tablet Take 5 mg by mouth Daily.    albuterol-ipratropium (DUO-NEB) 2.5 mg-0.5 mg/3 mL nebulizer solution Take 3 mLs by nebulization every 4 (four) hours as needed for Wheezing or Shortness of Breath.    aspirin (ECOTRIN) 81 MG EC tablet Take 81 mg by mouth once daily.    atorvastatin (LIPITOR) 40 MG tablet Take 40 mg by mouth once daily.    clonazePAM (KLONOPIN) 1 MG tablet Take 0.5 mg by mouth 3 (three) times daily as needed for Anxiety.    dapagliflozin propanediol (FARXIGA) 10 mg tablet Take 1 tablet (10 mg total) by mouth once daily.    FLUoxetine 40 MG capsule Take 40 mg by mouth once daily.    furosemide (LASIX) 40 MG tablet Take 1 tablet (40 mg total) by mouth 2 (two) times daily.    gabapentin (NEURONTIN) 100 MG capsule Take 100 mg by mouth 3 (three) times daily.    HYDROcodone-acetaminophen (NORCO) 7.5-325 mg per tablet Take 1  tablet by mouth every 6 (six) hours as needed for Pain (severe pain only).    isosorbide mononitrate (IMDUR) 30 MG 24 hr tablet Take 1 tablet (30 mg total) by mouth once daily.    LIDOcaine (LIDODERM) 5 % Place 1 patch onto the skin once daily. Remove & Discard patch within 12 hours or as directed by MD    losartan (COZAAR) 25 MG tablet Take 1 tablet (25 mg total) by mouth once daily.    metoprolol succinate (TOPROL-XL) 25 MG 24 hr tablet Take 0.5 tablets (12.5 mg total) by mouth 2 (two) times daily.    MOUNJARO 15 mg/0.5 mL PnIj Inject 15 mg into the skin every 7 days. (Patient not taking: Reported on 1/29/2025)    OLANZapine (ZYPREXA) 5 MG tablet Take 5 mg by mouth every evening.    sotaloL (BETAPACE) 120 MG Tab Take 80 mg by mouth every 12 (twelve) hours. Take 1/2 tablet BID    spironolactone (ALDACTONE) 25 MG tablet Take 1 tablet (25 mg total) by mouth once daily.    traZODone (DESYREL) 50 MG tablet Take 25-50 mg by mouth nightly as needed.     Family History       Problem Relation (Age of Onset)    COPD Mother    Heart disease Father    Hypertension Mother          Tobacco Use    Smoking status: Every Day     Current packs/day: 1.00     Average packs/day: 1 pack/day for 50.0 years (50.0 ttl pk-yrs)     Types: Cigarettes    Smokeless tobacco: Never   Substance and Sexual Activity    Alcohol use: Never    Drug use: Never    Sexual activity: Not on file     Review of Systems   Eyes:  Positive for visual disturbance (white circles bilatera constant x 2 weeks. double vision intermittent x 2 weeks). Negative for photophobia, pain, discharge, redness and itching.   Neurological:  Negative for dizziness, tremors, seizures, syncope, facial asymmetry, speech difficulty, weakness, light-headedness, numbness and headaches (no headache over past 2 days).     Objective:     Vital Signs (Most Recent):  Temp: 97.8 °F (36.6 °C) (02/05/25 0509)  Pulse: 63 (02/05/25 0509)  Resp: 20 (02/05/25 0509)  BP: (!) 156/66 (02/05/25  0509)  SpO2: 97 % (02/05/25 0509) Vital Signs (24h Range):  Temp:  [97.8 °F (36.6 °C)-98.4 °F (36.9 °C)] 97.8 °F (36.6 °C)  Pulse:  [59-72] 63  Resp:  [10-25] 20  SpO2:  [90 %-97 %] 97 %  BP: (112-219)/() 156/66     Weight: 91.3 kg (201 lb 3.2 oz)  Body mass index is 32.97 kg/m².     Physical Exam  Vitals and nursing note reviewed.   Constitutional:       General: She is not in acute distress.     Appearance: Normal appearance. She is well-developed. She is not toxic-appearing.   HENT:      Head: Normocephalic.      Right Ear: External ear normal.      Left Ear: External ear normal.      Nose: Nose normal.   Eyes:      Conjunctiva/sclera: Conjunctivae normal.   Cardiovascular:      Rate and Rhythm: Normal rate.   Pulmonary:      Effort: Pulmonary effort is normal.   Abdominal:      Palpations: Abdomen is soft.   Musculoskeletal:         General: Normal range of motion.      Cervical back: Normal range of motion and neck supple.   Skin:     General: Skin is warm and dry.   Neurological:      Mental Status: She is alert and oriented to person, place, and time.      Coordination: Finger-Nose-Finger Test abnormal (right with dept preception/distorted vs vision loss right). Heel to Patel Test normal.   Psychiatric:         Mood and Affect: Mood normal.         Behavior: Behavior normal. Behavior is cooperative.         Thought Content: Thought content normal.         Judgment: Judgment normal.          NEUROLOGICAL EXAMINATION:     MENTAL STATUS   Oriented to person, place, and time.     CRANIAL NERVES     CN II   Right visual field deficit: upper temporal (seeing many white circles in bilateral visual fields. right eye depth preception is decreased.) quadrant(s)    CN III, IV, VI   Right pupil: Size: 4 mm. Shape: regular. Reactivity: brisk.   Left pupil: Size: 4 mm. Shape: regular. Reactivity: brisk.   Diplopia: horizontal    CN V   Facial sensation intact.     CN VII   Facial expression full, symmetric.     CN XII    Tongue deviation: none    MOTOR EXAM   Muscle bulk: normal  Overall muscle tone: normal       No drift of extremities.  Normal strength of upper extremities and left lower extremity.  Chronic 4/5 strength right lower extremity.       SENSORY EXAM   Light touch normal.     GAIT AND COORDINATION      Coordination   Finger to nose coordination: abnormal (right with dept preception/distorted vs vision loss right)  Heel to shin coordination: normal      Significant Labs: All pertinent lab results from the past 24 hours have been reviewed.    Significant Imaging: I have reviewed all pertinent imaging results/findings within the past 24 hours.  Assessment and Plan:     Stroke  - presented with headache, double vision and seeing circles in vision 1.5 - 2 weeks.   - Stroke RF: HTN,HLD, Afib and artifical aortic valve replacement on Coumadin  - Intervention: None, out of window. Low NIH. No LVO.   - Etiology: TBD    Stroke workup:  -CTh: Impression:  Cortical based hypodensities in the bilateral occipital lobes, may represent changes of posterior reversible encephalopathy syndrome.   -CTA h/n: Preliminary: Impression:  1. Moderate atheromatous calcification of the cavernous clinoid and supraclinoid segments of the bilateral internal carotid arteries is seen with mild to moderate left greater than right stenosis (Series 26 Images 200-216).  2. The previously noted hypodensities in the bilateral occipital lobes previously ascribed to posterior reversible encephalopathy are again seen stable from prior without associated abnormal intracranial enhancement on post-contrast images.   -MRI brain: Impression:  1. Symmetric restricted diffusion and edema in the bilateral posterior occipital lobes, with small foci in the left parietal lobe and left cerebellum, favored to represent changes of posterior reversible encephalopathy syndrome with ischemia in the differential.  Follow-up can be obtained to ensure resolution.  2. Small areas  of associated hemorrhage in the bilateral occipital lobes.   -ECHO: EF 55-60%. Agitated saline study of the atrial septum is negative, suggesting absence of intracardiac shunt at the atrial level.    -CUS: The right internal carotid artery is patent with less than 50% stenosis.  The left internal carotid artery is patent with less than 50% stenosis.  Bilateral vertebral arteries are patent with antegrade flow.   -LDL: 54   -A1c: 6.9  -TSH: 0.589   -home medications include: coumadin   NIH Stroke Scale      1a  Level of consciousness: 0=alert; keenly responsive   1b. LOC questions:  0=Answers both tasks correctly   1c. LOC commands: 0=Answers both tasks correctly   2.  Best Gaze: 0=normal   3.  Visual: 1=Partial hemianopia   4. Facial Palsy: 0=Normal symmetric movement   5a.  Motor left arm: 0=No drift, limb holds 90 (or 45) degrees for full 10 seconds   5b.  Motor right arm: 0=No drift, limb holds 90 (or 45) degrees for full 10 seconds   6a. motor left le=No drift, limb holds 90 (or 45) degrees for full 10 seconds   6b  Motor right le=No drift, limb holds 90 (or 45) degrees for full 10 seconds   7. Limb Ataxia: 0=Absent   8.  Sensory: 0=Normal; no sensory loss   9. Best Language:  0=No aphasia, normal   10. Dysarthria: 0=Normal   11. Extinction and Inattention: 0=No abnormality   12. Distal motor function: 0=Normal    Total:   1         Plan:  -MRI brain symmetric restricted diffusion and edema in the bilateral posterior occipital lobes, with small foci in the left parietal lobe and left cerebellum. Small areas of associated hemorrhage in the bilateral occipital lobes.  -from stroke neurology stand point when INR is acceptable, resume anticoagulant.   -continue Atorvastatin 40mg daily  -PT/OT/ST to evaluate  -SCD for DVT prophylaxis   - ok to normalize blood pressure  - Neuro checks q4hr ... stat CTh if any neuro change   - Continuous telemetry monitoring  - NPO until passes Jewell or cleared by SLP  - No  bedrest, ok for patient to ambulate, RN to observe first ambulation for safety  -continue care with primary team and cardiology  -stroke neurology signing off   -Other recommendations may follow from MD            VTE Risk Mitigation (From admission, onward)           Ordered     IP VTE HIGH RISK PATIENT  Once         02/05/25 0314     Place sequential compression device  Until discontinued         02/05/25 0314                    Thank you for your consult.     Lupe Burrows NP  Neurology  [facility] - Neurology

## 2025-02-05 NOTE — CONSULTS
Inpatient consult to Cardiology  Consult performed by: Jesus Acevedo ANP  Consult ordered by: Diana Gudino FNP  Reason for consult: OAC Recommendations        [facility] *    Cardiology  Consult Note    Patient Name: Kayla Ruiz  MRN: [id number]  Admission Date: 2/4/2025  Hospital Length of Stay: 0 days  Code Status: Full Code   Attending Provider: Radhames Acevedo MD   Consulting Provider: YOUNG Justin  Primary Care Physician: Jennifer Chavez NP-C  Principal Problem:<principal problem not specified>    Patient information was obtained from patient, past medical records, and ER records.     Subjective:     Chief Complaint/Reason for Consult: Anticoagulation Recommendations    HPI: Ms. Ruiz is a 68 y/o female who is known to [facility], Dr. Leon. The patient presented to [facility] on 2.4.25 with c/o Headache, Double Vision and Circles in Vision. The patient reported that she fell the other day after tripping on a blanket. She denied hitting her head and LOC. She reported that she developed a frontal headache about 2 days ago which was around the fall. She then developed visual disturbances of double vision and circles in her vision. She presented to the ER and a CT of the Head revealed Cortical Based Hypodensities in the Bilateral Occipital Lobes which may represent changes of the Posterior Reversible Encephalopathy Syndrome. MRI of the Brain was recommended for further workup and evaluation. CTA Head/Neck revealed Moderate Atheromatous Calcification of the Cavernous Clinoid and Supra Clinoid segments of the Bilateral Internal Carotid Arteries with Mild-Mod L > R Stenosis. She was admitted to  and Stroke Neurology was Consulted and [facility] was consulted for Anticoagulation Recommendations.     PMH: PAF, Mechanical AV on Warfarin, DM II, HTN, HLD, COPD  PSH: Aortic Valve Replacement, Cholecystectomy, Colon Resection, Angiogram, Tonsillectomy  Family History: Father- COPD, Mother- COPD,  Sister- Cancer, Sister- Cancer   Social History: Current Everyday Smoker, 1ppd for 40+ Years; Denies Illicit Drug and ETOH Use      Previous Diagnostics:  ECHO 10.24.24:   Left Ventricle: The left ventricle is normal in size. Moderately increased wall thickness. There is normal systolic function with a visually estimated ejection fraction of 55 - 60%. There is normal diastolic function.  Right Ventricle: Normal right ventricular cavity size. Systolic function is normal. TAPSE is 2.70 cm.  Left Atrium: Left atrium is moderately dilated.  Aortic Valve: There is a bioprosthetic valve in the aortic position. Aortic valve area by VTI is 2.8 cm². Aortic valve peak velocity is 2.6 m/s. Mean gradient is 14.0 mmHg. The dimensionless index is 0.63. There is trace aortic regurgitation.  Mitral Valve: There is mild stenosis. The mean pressure gradient across the mitral valve is 4 mmHg at a heart rate of  bpm. There is mild regurgitation.  Tricuspid Valve: There is mild regurgitation with a RVSP of 34mmHg.  IVC/SVC: Intermediate venous pressure at 8 mmHg.     C 5.11.23  Calcified widely patent coronary anatomy  The mechanical aortic valve demonstrated normal functioning within study limits.  Heavily calcified aortic root  There was heavy mitral annular calcification noted.  Radial approach  The estimated blood loss was none.  Resume warfarin today  Procedure:   Selective coronary angiography  Left ventriculography  Left heart catheterization  Moderate (Conscious) Sedation   Indication: Angina equivalent  Coronary findings: The aortic root was heavily calcified  Dominance: right   Left main: no obstructive disease with <10% epicardial stenosis  Left anterior descending artery:  Patent and highly tortuous Type 2 vessel that gave rise to a very large bifurcating diagonal branch.  No obstructive disease with <10% epicardial stenosis.  Circumflex artery:  Non dominant patent vessel that gives rise to 2 tortuous moderate-sized  obtuse marginal branches.  No obstructive disease with <10% epicardial stenosis  Right coronary artery:  The patent calcified dominant vessel terminates in a large PDA and a small PL segment.  No obstructive disease with <10% epicardial stenosis  Left ventriculography: Not performed due to mechanical AVR  Impression  No evidence of obstructive CAD  Plan   Resume warfarin today and continue GDMT     Holter Monitor 11.21.23  1.This is a good quality study.  2.Predominant rhythm is sinus bradycardia.  3.The minimum heart rate recorded was 48 beats / minute (11/21/2023). The maximum heart rate is 77 beats / minute (11/22/2023). The mean heart rate is 54 beats / minute.  4.Rare premature atrial contractions noted.  5.No evidence of supraventricular tachycardia is noted.  6.No evidence of atrial fibrillation noted.  7.Rare premature ventricular contractions noted.  8.No evidence of ventricular tachycardia is noted.  9.No pauses were noted.  10. IN SINUS BRADYCARDIA 85% OF THE STUDY TIME NO SYMPTOMS REPORTED     Review of patient's allergies indicates:   Allergen Reactions    Ace inhibitors     Naproxen sodium     Nifedipine     Venlafaxine      Current Facility-Administered Medications on File Prior to Encounter   Medication    0.9%  NaCl infusion    diazePAM tablet 10 mg    diphenhydrAMINE capsule 50 mg     Current Outpatient Medications on File Prior to Encounter   Medication Sig    warfarin (COUMADIN) 5 MG tablet Take 5 mg by mouth Daily.    albuterol-ipratropium (DUO-NEB) 2.5 mg-0.5 mg/3 mL nebulizer solution Take 3 mLs by nebulization every 4 (four) hours as needed for Wheezing or Shortness of Breath.    aspirin (ECOTRIN) 81 MG EC tablet Take 81 mg by mouth once daily.    atorvastatin (LIPITOR) 40 MG tablet Take 40 mg by mouth once daily.    clonazePAM (KLONOPIN) 1 MG tablet Take 0.5 mg by mouth 3 (three) times daily as needed for Anxiety.    dapagliflozin propanediol (FARXIGA) 10 mg tablet Take 1 tablet (10 mg  total) by mouth once daily.    FLUoxetine 40 MG capsule Take 40 mg by mouth once daily.    furosemide (LASIX) 40 MG tablet Take 1 tablet (40 mg total) by mouth 2 (two) times daily.    gabapentin (NEURONTIN) 100 MG capsule Take 100 mg by mouth 3 (three) times daily.    HYDROcodone-acetaminophen (NORCO) 7.5-325 mg per tablet Take 1 tablet by mouth every 6 (six) hours as needed for Pain (severe pain only).    isosorbide mononitrate (IMDUR) 30 MG 24 hr tablet Take 1 tablet (30 mg total) by mouth once daily.    LIDOcaine (LIDODERM) 5 % Place 1 patch onto the skin once daily. Remove & Discard patch within 12 hours or as directed by MD    losartan (COZAAR) 25 MG tablet Take 1 tablet (25 mg total) by mouth once daily.    metoprolol succinate (TOPROL-XL) 25 MG 24 hr tablet Take 0.5 tablets (12.5 mg total) by mouth 2 (two) times daily.    MOUNJARO 15 mg/0.5 mL PnIj Inject 15 mg into the skin every 7 days. (Patient not taking: Reported on 1/29/2025)    OLANZapine (ZYPREXA) 5 MG tablet Take 5 mg by mouth every evening.    sotaloL (BETAPACE) 120 MG Tab Take 80 mg by mouth every 12 (twelve) hours. Take 1/2 tablet BID    spironolactone (ALDACTONE) 25 MG tablet Take 1 tablet (25 mg total) by mouth once daily.    traZODone (DESYREL) 50 MG tablet Take 25-50 mg by mouth nightly as needed.     Review of Systems   Constitutional:  Positive for fatigue.   Respiratory:  Negative for shortness of breath.    Cardiovascular:  Negative for chest pain, palpitations and leg swelling.   Neurological:  Negative for weakness.        Some Transient Confused Conversation   All other systems reviewed and are negative.    Objective:     Vital Signs (Most Recent):  Temp: 97.6 °F (36.4 °C) (02/05/25 1131)  Pulse: 76 (02/05/25 1151)  Resp: 20 (02/05/25 0509)  BP: (!) 149/84 (02/05/25 1151)  SpO2: (!) 91 % (02/05/25 1131) Vital Signs (24h Range):  Temp:  [97.5 °F (36.4 °C)-98.4 °F (36.9 °C)] 97.6 °F (36.4 °C)  Pulse:  [59-76] 76  Resp:  [10-25] 20  SpO2:   [90 %-97 %] 91 %  BP: (112-219)/() 149/84   Weight: 91.3 kg (201 lb 3.2 oz)  Body mass index is 32.97 kg/m².  SpO2: (!) 91 %     No intake or output data in the 24 hours ending 02/05/25 1340  Lines/Drains/Airways       Peripheral Intravenous Line  Duration                  Peripheral IV - Single Lumen 02/04/25 0000 20 G Left Antecubital 1 day                  Significant Labs:   Chemistries:   Recent Labs   Lab 02/04/25  0800 02/04/25 1917 02/05/25 0329    136  --    K 4.4 5.1  --     101  --    CO2 28 20*  --    BUN 28.7* 27.1*  --    CREATININE 0.87 0.98  --    CALCIUM 9.1 9.0  --    BILITOT 0.3 0.3  --    ALKPHOS 153* 152*  --    ALT 19 20  --    AST 28 29  --    GLUCOSE 107 174*  --    TROPONINI  --  0.023 <0.010        CBC/Anemia Labs: Coags:    Recent Labs   Lab 02/04/25 1917   WBC 6.90   HGB 14.1   HCT 46.6      MCV 88.9   RDW 16.6    Recent Labs   Lab 02/04/25  0800 02/04/25 2122 02/05/25 0329   INR 8.8* 7.0* 7.0*   APTT  --  54.3* 57.9*        Significant Imaging:  Imaging Results              MRI Brain W WO Contrast (Final result)  Result time 02/05/25 09:56:17      Final result by Norma Leblanc MD (02/05/25 09:56:17)                   Impression:      1. Symmetric restricted diffusion and edema in the bilateral posterior occipital lobes, with small foci in the left parietal lobe and left cerebellum, favored to represent changes of posterior reversible encephalopathy syndrome with ischemia in the differential.  Follow-up can be obtained to ensure resolution.  2. Small areas of associated hemorrhage in the bilateral occipital lobes.  Finding of intracranial hemorrhage given to Jina HOLT at the time of dictation.      Electronically signed by: Norma Leblanc  Date:    02/05/2025  Time:    09:56               Narrative:    EXAMINATION:  MRI BRAIN W WO CONTRAST    CLINICAL HISTORY:  Mental status change, unknown cause;vision changes x1 week, abnormal  CT;    TECHNIQUE:  Multiplanar, multisequence MR images of the brain were obtained with and without administration of intravenous contrast.    COMPARISON:  CT head dated 02/04/2025    FINDINGS:  There is symmetric restricted diffusion and edema in the bilateral posterior occipital lobes, with few small foci in the left parietal lobe and left cerebellum.  There are small areas of associated hemorrhage with likely areas of curvilinear enhancement.  Mild patchy T2/FLAIR hyperintense in the subcortical and periventricular white matter likely represent chronic microvascular ischemic changes.    There is local mass effect.  There is no midline shift or herniation.  The basal cisterns are patent.  There is mild diffuse parenchymal volume loss.  There is no hydrocephalus or abnormal extra-axial fluid collection.                                       CTA Head and Neck (xpd) (Final result)  Result time 02/05/25 08:32:51      Final result by Norma Leblanc MD (02/05/25 08:32:51)                   Impression:      No large vessel occlusion or flow-limiting stenosis.    No significant change from the [facility] interpretation.      Electronically signed by: Norma Leblanc  Date:    02/05/2025  Time:    08:32               Narrative:    EXAMINATION:  CTA HEAD AND NECK (XPD)    CLINICAL HISTORY:  Stroke/TIA, determine embolic source;    TECHNIQUE:  Axial images obtained through the cervical region and [place] before and after the administration of intravenous contrast.    Coronal, sagittal, MIP and 3D reconstructions were obtained from the axial data.    Automatic exposure control was utilized to limit radiation dose.    Radiation Dose:    Total DLP: 2870 mGy*cm    COMPARISON:  CT head dated 02/04/2025    FINDINGS:  Head CT with contrast:    No interval changes when compared to the previous CT.    No significant enhancing abnormalities.    If present, stenosis of the carotid bulbs is measured based on NASCET  criteria,    i.e. area of maximal stenosis compared to the cervical ICA distal to the bulb.    Cervical CTA:    The origins of the great vessels are patent with moderate calcifications    The common carotid arteries are patent.  There are mild calcifications at the carotid bulbs without hemodynamically significant stenosis.  The internal carotid arteries are patent.    The vertebral arteries are patent    Intracranial CTA:    There are calcifications along the course the carotid siphons with mild narrowing bilaterally.  The middle cerebral arteries and anterior cerebral arteries are patent    The vertebral arteries, basilar artery and posterior cerebral arteries are patent.    The dural venous sinuses are patent.                        Preliminary result by Albert Cruz Jr., MD (02/05/25 04:10:05)                   Impression:    1. Moderate atheromatous calcification of the cavernous clinoid and supraclinoid segments of the bilateral internal carotid arteries is seen with mild to moderate left greater than right stenosis (Series 26 Images 200-216).  2. The previously noted hypodensities in the bilateral occipital lobes previously ascribed to posterior reversible encephalopathy are again seen stable from prior without associated abnormal intracranial enhancement on post-contrast images.  3. Details and other findings as described above.               Narrative:    START OF REPORT:  Technique: CT angiogram of the intracranial and neck vessels was performed with intravenous contrast with direct axial as well as sagittal and coronal reformations.    Comparison: Comparison is with study [id number] 18:22:03.    Clinical history: Blurry vision.    Findings:  Intracranial Vascular structures:  Internal carotid arteries: Moderate atheromatous calcification of the cavernous clinoid and supraclinoid segments of the bilateral internal carotid arteries is seen with mild to moderate left greater than right stenosis  (Series 26 Images 200-216).  Middle cerebral arteries: Unremarkable.  Anterior cerebral arteries: Unremarkable.  Vertebral arteries: Mild atheromatous change of the bilateral vertebral arteries is seen with no significant stenosis.  Basilar artery: Unremarkable.  Posterior cerebral arteries: Unremarkable.  Posterior communicating arteries: Unremarkable.  Neck Vascular structures: Mild atheromatous calcification with no significant stenosis is seen at the origin of the brachiocephalic left subclavian left common carotid arteries.  Carotids:  Common carotid arteries: The left common carotid artery appears unremarkable.  Right common carotid artery: Mild atheromatous calcification of the mid distal segment of the right common carotid artery is seen with no significant stenosis.  Internal carotid artery: Mild atheromatous calcification with mild stenosis at the origin of the bilateral internal carotid arteries is seen.  Vertebral arteries: Unremarkable.  Jugular Veins and venous sinuses: Unremarkable.  Hemorrhage: No acute intracranial hemorrhage is seen.  CSF spaces: The ventricles, sulci and basal cisterns all appear moderately prominent consistent with global cerebral atrophy stable from prior.  Brain parenchyma: No acute infarct is identified. Mild stable appearing scattered microvascular change is seen in portions of the periventricular and deep white matter tracts. The previously noted hypodensities in the bilateral occipital lobes previously ascribed to posterior reversible encephalopathy are again seen stable from prior without associated abnormal intracranial enhancement on post-contrast images.  Cerebellum: Unremarkable.  Vascular: Unremarkable venous sinuses.  Sella and skull base: The sella appears to be within normal limits.  Cerebellopontine angles: Within normal limits.  Herniation: None.  Intracranial calcifications: Incidental note is made of bilateral choroid plexus calcification. Incidental note is  made of some pineal region calcification. Incidental note is made of some calcification of the falx.                                         X-Ray Chest AP Portable (Final result)  Result time 02/05/25 09:08:25      Final result by Bandar Vera MD (02/05/25 09:08:25)                   Impression:      No acute chest disease is identified.      Electronically signed by: Bandar Vera  Date:    02/05/2025  Time:    09:08               Narrative:    EXAMINATION:  XR CHEST AP PORTABLE    CLINICAL HISTORY:  , Unspecified fall, initial encounter.    COMPARISON:  January 25, 2025    FINDINGS:  No alveolar consolidation, effusion, or pneumothorax is seen.   The thoracic aorta is normal  cardiac silhouette, central pulmonary vessels and mediastinum are normal in size and are grossly unremarkable.   visualized osseous structures are grossly unremarkable.                                       CT Cervical Spine Without Contrast (Final result)  Result time 02/04/25 21:25:00      Final result by Juanito Chopra MD (02/04/25 21:25:00)                   Impression:      No acute fracture or malalignment identified.      Electronically signed by: Juanito Chopra  Date:    02/04/2025  Time:    21:25               Narrative:    EXAMINATION:  CT CERVICAL SPINE WITHOUT CONTRAST    CLINICAL HISTORY:  Trauma.    TECHNIQUE:  Multidetector axial images were performed of the cervical spine without and.  Images were reconstructed.    Automated exposure control was utilized to minimize radiation dose.  .    COMPARISON:  None available.    FINDINGS:  Cervical vertebrae stature is maintained.  There is grade 1 anterolisthesis of C4 on C5 and C7 on T1.  Solid ACDF at C5, C6 and C7.  No acute fracture or malalignment identified.  There is moderate degenerative narrowing of the left neural foramen at C4-C5.  There is no prevertebral soft tissue prominence.    This study does not exclude the possibility of intrathecal soft tissue,  ligamentous or vascular injury.                                       CT Head Without Contrast (Final result)  Result time 02/04/25 18:42:34      Final result by Norma Leblanc MD (02/04/25 18:42:34)                   Impression:      Cortical based hypodensities in the bilateral occipital lobes, may represent changes of posterior reversible encephalopathy syndrome.  MRI brain is available for further evaluation.      Electronically signed by: Norma Leblanc  Date:    02/04/2025  Time:    18:42               Narrative:    EXAMINATION:  CT HEAD WITHOUT CONTRAST    CLINICAL HISTORY:  Head trauma, minor (Age >= 65y);    TECHNIQUE:  Axial scans were obtained from skull base to the vertex.    Coronal and sagittal reconstructions obtained from the axial data.    Automatic exposure control was utilized to limit radiation dose.    Contrast: None    Radiation Dose:    Total DLP: 934 mGy*cm    COMPARISON:  CT head dated 01/09/2025    FINDINGS:  There is no acute intracranial hemorrhage.  There are cortical based hypodensities in the bilateral occipital lobes.  Patchy hypodensities in the subcortical and periventricular white matter likely represent chronic microvascular ischemic changes.    There is local mass effect.  There is no midline shift or herniation.  The basal cisterns are patent.  There is diffuse parenchymal volume loss.  There is no hydrocephalus or abnormal extra-axial fluid collection.  Carotid and vertebral artery calcifications are noted.  The calvarium and skull base are intact.                                    EKG:       Telemetry: SR    Physical Exam  Constitutional:       Appearance: Normal appearance.   HENT:      Head: Normocephalic.      Mouth/Throat:      Mouth: Mucous membranes are moist.   Eyes:      Extraocular Movements: Extraocular movements intact.   Cardiovascular:      Rate and Rhythm: Normal rate and regular rhythm.      Pulses: Normal pulses.      Comments: + Mechanical  Click  Pulmonary:      Effort: Pulmonary effort is normal. No respiratory distress.      Breath sounds: Normal breath sounds.      Comments: RA  Abdominal:      Palpations: Abdomen is soft.   Musculoskeletal:         General: No swelling. Normal range of motion.      Right lower leg: No edema.      Left lower leg: No edema.   Skin:     General: Skin is warm and dry.   Neurological:      General: No focal deficit present.      Mental Status: She is alert and oriented to person, place, and time. Mental status is at baseline.      Comments: Transient Confused Conversation   Psychiatric:         Mood and Affect: Mood normal.         Behavior: Behavior normal.         Judgment: Judgment normal.       Home Medications:   Current Facility-Administered Medications on File Prior to Encounter   Medication Dose Route Frequency Provider Last Rate Last Admin    0.9%  NaCl infusion   Intravenous Once Kahlil Martin MD        diazePAM tablet 10 mg  10 mg Oral On Call Procedure Kahlil Martin MD   10 mg at 05/18/23 0911    diphenhydrAMINE capsule 50 mg  50 mg Oral On Call Procedure Kahlil Martin MD   50 mg at 05/18/23 0911     Current Outpatient Medications on File Prior to Encounter   Medication Sig Dispense Refill    warfarin (COUMADIN) 5 MG tablet Take 5 mg by mouth Daily.      albuterol-ipratropium (DUO-NEB) 2.5 mg-0.5 mg/3 mL nebulizer solution Take 3 mLs by nebulization every 4 (four) hours as needed for Wheezing or Shortness of Breath.      aspirin (ECOTRIN) 81 MG EC tablet Take 81 mg by mouth once daily.      atorvastatin (LIPITOR) 40 MG tablet Take 40 mg by mouth once daily.      clonazePAM (KLONOPIN) 1 MG tablet Take 0.5 mg by mouth 3 (three) times daily as needed for Anxiety.      dapagliflozin propanediol (FARXIGA) 10 mg tablet Take 1 tablet (10 mg total) by mouth once daily. 30 tablet 5    FLUoxetine 40 MG capsule Take 40 mg by mouth once daily.      furosemide (LASIX) 40 MG tablet Take 1 tablet (40 mg  total) by mouth 2 (two) times daily. 60 tablet 11    gabapentin (NEURONTIN) 100 MG capsule Take 100 mg by mouth 3 (three) times daily.      HYDROcodone-acetaminophen (NORCO) 7.5-325 mg per tablet Take 1 tablet by mouth every 6 (six) hours as needed for Pain (severe pain only). 12 tablet 0    isosorbide mononitrate (IMDUR) 30 MG 24 hr tablet Take 1 tablet (30 mg total) by mouth once daily. 60 tablet 0    LIDOcaine (LIDODERM) 5 % Place 1 patch onto the skin once daily. Remove & Discard patch within 12 hours or as directed by MD 15 patch 0    losartan (COZAAR) 25 MG tablet Take 1 tablet (25 mg total) by mouth once daily. 90 tablet 3    metoprolol succinate (TOPROL-XL) 25 MG 24 hr tablet Take 0.5 tablets (12.5 mg total) by mouth 2 (two) times daily. 90 tablet 3    MOUNJARO 15 mg/0.5 mL PnIj Inject 15 mg into the skin every 7 days. (Patient not taking: Reported on 1/29/2025)      OLANZapine (ZYPREXA) 5 MG tablet Take 5 mg by mouth every evening.      sotaloL (BETAPACE) 120 MG Tab Take 80 mg by mouth every 12 (twelve) hours. Take 1/2 tablet BID      spironolactone (ALDACTONE) 25 MG tablet Take 1 tablet (25 mg total) by mouth once daily. 30 tablet 11    traZODone (DESYREL) 50 MG tablet Take 25-50 mg by mouth nightly as needed.       Current Schedule Inpatient Medications:   atorvastatin  40 mg Oral Daily     Assessment:   Diplopia  Mechanical Fall at Ground Level tripping over [place]  PAF    - CHADsVASc - 6 Points - 9.7% Stroke Risk per Year     - Warfarin as OP  Supratherapeutic INR     - INR (2.5.25) - 7.0     - INR on Admission (2.4.25) - 8.8  Hx of Mechanical Aortic Valve    - PT/INR Goal Range: 2.5-3.5  DM II  HTN  HLD  COPD  Tobacco Use  No Hx of GIB     Plan:   Daily PT/INR  Resume Warfarin at some point when OK with Neurology for INR Goal 2.5-3.5 in the Setting of AF/Stroke Risk Reduction and Mechanical AVR  Will Continue to Follow  Labs and EKG in AM: CBC, CMP, PT/INR and Mg     Thank you for your consult.      Jesus Acevedo, ANP  Cardiology  [facility]     Physician addendum:  I have seen and examined this patient as a split-shared visit with the [facility] d/t complicated medical management of above problems written in assessment and high acuity requiring physician expertise in medical decision-making. I performed the substantive portion of the history and exam. Above medical decision-making is also formulated by me.    Cardiovascular exam:  S1, S2  Lungs:  fine crackles at bases.  Extremities:  + edema bilaterally    Plan:  Medications as above.  Continue supportive therapy.     Brendan Wang MD  Cardiologist

## 2025-02-05 NOTE — ASSESSMENT & PLAN NOTE
- presented with headache, blurred vision x 1 week  - Stroke RF: HTN,HLD, Afib and artifical aortic valve replacement on Coumadin  - Intervention: None, out of window. Low NIH. No LVO.   - Etiology: TBD    Stroke workup:  -CTh: Impression:  Cortical based hypodensities in the bilateral occipital lobes, may represent changes of posterior reversible encephalopathy syndrome.  MRI brain is available for further evaluation.  -CTA h/n: Preliminary: Impression:  1. Moderate atheromatous calcification of the cavernous clinoid and supraclinoid segments of the bilateral internal carotid arteries is seen with mild to moderate left greater than right stenosis (Series 26 Images 200-216).  2. The previously noted hypodensities in the bilateral occipital lobes previously ascribed to posterior reversible encephalopathy are again seen stable from prior without associated abnormal intracranial enhancement on post-contrast images.   -MRI brain: ordered   -ECHO: ordered   -CUS: ordered  -LDL: 54   -A1c: 6.9  -TSH: 0.589   -home medications include: coumadin   NIH Stroke Scale      1a  Level of consciousness: 0=alert; keenly responsive   1b. LOC questions:  0=Answers both tasks correctly   1c. LOC commands: 0=Answers both tasks correctly   2.  Best Gaze: 0=normal   3.  Visual: 1=Partial hemianopia   4. Facial Palsy: 0=Normal symmetric movement   5a.  Motor left arm: 0=No drift, limb holds 90 (or 45) degrees for full 10 seconds   5b.  Motor right arm: 0=No drift, limb holds 90 (or 45) degrees for full 10 seconds   6a. motor left le=No drift, limb holds 90 (or 45) degrees for full 10 seconds   6b  Motor right le=No drift, limb holds 90 (or 45) degrees for full 10 seconds   7. Limb Ataxia: 0=Absent   8.  Sensory: 0=Normal; no sensory loss   9. Best Language:  0=No aphasia, normal   10. Dysarthria: 0=Normal   11. Extinction and Inattention: 0=No abnormality   12. Distal motor function: 0=Normal    Total:   1          Plan:  -continue stroke workup (MRI brain)  -continue to hold antiplatelets and anticoagulants, INR 7.0 this morning.  -continue Atorvastatin 40mg daily  -PT/OT/ST to evaluate  -SCD for DVT prophylaxis   - Allow permissive HTN ... prn hydralazine and labetalol for SBP > 220 or DBP > 120     - after 24 hours from symptom onset, ok to normalize blood pressure  - Neuro checks q4hr ... stat CTh if any neuro change   - Continuous telemetry monitoring  - NPO until passes Jewell or cleared by SLP  - No bedrest, ok for patient to ambulate, RN to observe first ambulation for safety  -Other recommendations may follow from MD

## 2025-02-05 NOTE — SUBJECTIVE & OBJECTIVE
Past Medical History:   Diagnosis Date    A-fib     Anticoagulant long-term use     Depression     Diabetes mellitus     Hyperlipidemia     Sleep apnea        Past Surgical History:   Procedure Laterality Date    AORTIC VALVE REPLACEMENT      CHOLECYSTECTOMY      COLON RESECTION      fusion      LEFT HEART CATHETERIZATION Left 05/18/2023    Procedure: Left heart cath;  Surgeon: Kahlil Martin MD;  Location: [facility] CATH LAB;  Service: Cardiology;  Laterality: Left;  LHC +/- PCI    TONSILLECTOMY         Review of patient's allergies indicates:   Allergen Reactions    Ace inhibitors     Naproxen sodium     Nifedipine     Venlafaxine        Current Facility-Administered Medications on File Prior to Encounter   Medication    0.9%  NaCl infusion    diazePAM tablet 10 mg    diphenhydrAMINE capsule 50 mg     Current Outpatient Medications on File Prior to Encounter   Medication Sig    warfarin (COUMADIN) 5 MG tablet Take 5 mg by mouth Daily.    albuterol-ipratropium (DUO-NEB) 2.5 mg-0.5 mg/3 mL nebulizer solution Take 3 mLs by nebulization every 4 (four) hours as needed for Wheezing or Shortness of Breath.    aspirin (ECOTRIN) 81 MG EC tablet Take 81 mg by mouth once daily.    atorvastatin (LIPITOR) 40 MG tablet Take 40 mg by mouth once daily.    clonazePAM (KLONOPIN) 1 MG tablet Take 0.5 mg by mouth 3 (three) times daily as needed for Anxiety.    dapagliflozin propanediol (FARXIGA) 10 mg tablet Take 1 tablet (10 mg total) by mouth once daily.    FLUoxetine 40 MG capsule Take 40 mg by mouth once daily.    furosemide (LASIX) 40 MG tablet Take 1 tablet (40 mg total) by mouth 2 (two) times daily.    gabapentin (NEURONTIN) 100 MG capsule Take 100 mg by mouth 3 (three) times daily.    HYDROcodone-acetaminophen (NORCO) 7.5-325 mg per tablet Take 1 tablet by mouth every 6 (six) hours as needed for Pain (severe pain only).    isosorbide mononitrate (IMDUR) 30 MG 24 hr tablet Take 1 tablet (30 mg total) by mouth once daily.     LIDOcaine (LIDODERM) 5 % Place 1 patch onto the skin once daily. Remove & Discard patch within 12 hours or as directed by MD    losartan (COZAAR) 25 MG tablet Take 1 tablet (25 mg total) by mouth once daily.    metoprolol succinate (TOPROL-XL) 25 MG 24 hr tablet Take 0.5 tablets (12.5 mg total) by mouth 2 (two) times daily.    MOUNJARO 15 mg/0.5 mL PnIj Inject 15 mg into the skin every 7 days. (Patient not taking: Reported on 1/29/2025)    OLANZapine (ZYPREXA) 5 MG tablet Take 5 mg by mouth every evening.    sotaloL (BETAPACE) 120 MG Tab Take 80 mg by mouth every 12 (twelve) hours. Take 1/2 tablet BID    spironolactone (ALDACTONE) 25 MG tablet Take 1 tablet (25 mg total) by mouth once daily.    traZODone (DESYREL) 50 MG tablet Take 25-50 mg by mouth nightly as needed.     Family History       Problem Relation (Age of Onset)    COPD Mother    Heart disease Father    Hypertension Mother          Tobacco Use    Smoking status: Every Day     Current packs/day: 1.00     Average packs/day: 1 pack/day for 50.0 years (50.0 ttl pk-yrs)     Types: Cigarettes    Smokeless tobacco: Never   Substance and Sexual Activity    Alcohol use: Never    Drug use: Never    Sexual activity: Not on file     Review of Systems   Eyes:  Positive for visual disturbance (white circles bilatera constant x 2 weeks. double vision intermittent x 2 weeks). Negative for photophobia, pain, discharge, redness and itching.   Neurological:  Negative for dizziness, tremors, seizures, syncope, facial asymmetry, speech difficulty, weakness, light-headedness, numbness and headaches (no headache over past 2 days).     Objective:     Vital Signs (Most Recent):  Temp: 97.8 °F (36.6 °C) (02/05/25 0509)  Pulse: 63 (02/05/25 0509)  Resp: 20 (02/05/25 0509)  BP: (!) 156/66 (02/05/25 0509)  SpO2: 97 % (02/05/25 0509) Vital Signs (24h Range):  Temp:  [97.8 °F (36.6 °C)-98.4 °F (36.9 °C)] 97.8 °F (36.6 °C)  Pulse:  [59-72] 63  Resp:  [10-25] 20  SpO2:  [90 %-97 %] 97  %  BP: (112-219)/() 156/66     Weight: 91.3 kg (201 lb 3.2 oz)  Body mass index is 32.97 kg/m².     Physical Exam  Vitals and nursing note reviewed.   Constitutional:       General: She is not in acute distress.     Appearance: Normal appearance. She is well-developed. She is not toxic-appearing.   HENT:      Head: Normocephalic.      Right Ear: External ear normal.      Left Ear: External ear normal.      Nose: Nose normal.   Eyes:      Conjunctiva/sclera: Conjunctivae normal.   Cardiovascular:      Rate and Rhythm: Normal rate.   Pulmonary:      Effort: Pulmonary effort is normal.   Abdominal:      Palpations: Abdomen is soft.   Musculoskeletal:         General: Normal range of motion.      Cervical back: Normal range of motion and neck supple.   Skin:     General: Skin is warm and dry.   Neurological:      Mental Status: She is alert and oriented to person, place, and time.      Coordination: Finger-Nose-Finger Test abnormal (right with dept preception/distorted vs vision loss right). Heel to Patel Test normal.   Psychiatric:         Mood and Affect: Mood normal.         Behavior: Behavior normal. Behavior is cooperative.         Thought Content: Thought content normal.         Judgment: Judgment normal.          NEUROLOGICAL EXAMINATION:     MENTAL STATUS   Oriented to person, place, and time.     CRANIAL NERVES     CN II   Right visual field deficit: upper temporal (seeing many white circles in bilateral visual fields. right eye depth preception is decreased.) quadrant(s)    CN III, IV, VI   Right pupil: Size: 4 mm. Shape: regular. Reactivity: brisk.   Left pupil: Size: 4 mm. Shape: regular. Reactivity: brisk.   Diplopia: horizontal    CN V   Facial sensation intact.     CN VII   Facial expression full, symmetric.     CN XII   Tongue deviation: none    MOTOR EXAM   Muscle bulk: normal  Overall muscle tone: normal       No drift of extremities.  Normal strength of upper extremities and left lower extremity.     Chronic 4/5 weakness right lower extremity and chronic right footdrop since 2020.      SENSORY EXAM   Light touch normal.     GAIT AND COORDINATION      Coordination   Finger to nose coordination: abnormal (right with dept preception/distorted vs vision loss right)  Heel to shin coordination: normal      Significant Labs: All pertinent lab results from the past 24 hours have been reviewed.    Significant Imaging: I have reviewed all pertinent imaging results/findings within the past 24 hours.

## 2025-02-05 NOTE — HPI
Kayla Ruiz is a 69 y.o. female past medical history AFib,T2DM, and HDL presents to [facility] [place] ED related to abnormal blood work, sent by cardiologist, Dr. EAST for an INR of 8.8. On Coumadin after aortic valve replacement. She also had complaint of lower back pain, right knee pain, since fall on 1/7, state fall caused by tripping on a blanket on floor in her home. Did not hit head, no LOC. She reported she has been out of prescription pain medication and taking Tylenol without relief of back and knee pain.  Also complaint of frontal headache 4/10, not daily over past week. No headache over past 2 days. Also complaint of seeing constant white circles in bilateral vision with intermittent horizontal double vision over past 1.5 - 2 weeks.  No unilateral weakness or speech changes.  CT head performed and revealed cortical based hypodensities in the bilateral occipital lobes may represent changes of posterior reversible encephalopathy syndrome.  MRI brain recommended for further evaluation. CTA h/n revealed moderate atheromatous calcification of the cavernous clinoid and supra clinoid segments of the bilateral internal carotid arteries with mild-to-moderate left greater than right stenosis.  Hypodensities in the bilateral occipital lobes previously ascribed to posterior reversal encephalopathy appears stable from prior without associated abnormal intracranial enhancement on the postcontrast images.  Stroke neurology consulted for continued evaluation.

## 2025-02-06 LAB
ALBUMIN SERPL-MCNC: 3.3 G/DL (ref 3.4–4.8)
ALBUMIN/GLOB SERPL: 1 RATIO (ref 1.1–2)
ALP SERPL-CCNC: 154 UNIT/L (ref 40–150)
ALT SERPL-CCNC: 21 UNIT/L (ref 0–55)
ANION GAP SERPL CALC-SCNC: 8 MEQ/L
APAP SERPL-MCNC: <3 UG/ML (ref 10–30)
AST SERPL-CCNC: 25 UNIT/L (ref 5–34)
BASOPHILS # BLD AUTO: 0.04 X10(3)/MCL
BASOPHILS NFR BLD AUTO: 0.6 %
BILIRUB SERPL-MCNC: 0.4 MG/DL
BUN SERPL-MCNC: 31.5 MG/DL (ref 9.8–20.1)
CALCIUM SERPL-MCNC: 9 MG/DL (ref 8.4–10.2)
CHLORIDE SERPL-SCNC: 102 MMOL/L (ref 98–107)
CO2 SERPL-SCNC: 30 MMOL/L (ref 23–31)
CREAT SERPL-MCNC: 1.26 MG/DL (ref 0.55–1.02)
CREAT/UREA NIT SERPL: 25
EOSINOPHIL # BLD AUTO: 0.19 X10(3)/MCL (ref 0–0.9)
EOSINOPHIL NFR BLD AUTO: 2.9 %
ERYTHROCYTE [DISTWIDTH] IN BLOOD BY AUTOMATED COUNT: 16.6 % (ref 11.5–17)
GFR SERPLBLD CREATININE-BSD FMLA CKD-EPI: 46 ML/MIN/1.73/M2
GLOBULIN SER-MCNC: 3.4 GM/DL (ref 2.4–3.5)
GLUCOSE SERPL-MCNC: 118 MG/DL (ref 82–115)
HCT VFR BLD AUTO: 43.7 % (ref 37–47)
HGB BLD-MCNC: 13.1 G/DL (ref 12–16)
IMM GRANULOCYTES # BLD AUTO: 0.02 X10(3)/MCL (ref 0–0.04)
IMM GRANULOCYTES NFR BLD AUTO: 0.3 %
INR PPP: 5.7
LYMPHOCYTES # BLD AUTO: 2.1 X10(3)/MCL (ref 0.6–4.6)
LYMPHOCYTES NFR BLD AUTO: 31.5 %
MAGNESIUM SERPL-MCNC: 2.4 MG/DL (ref 1.6–2.6)
MCH RBC QN AUTO: 27 PG (ref 27–31)
MCHC RBC AUTO-ENTMCNC: 30 G/DL (ref 33–36)
MCV RBC AUTO: 90.1 FL (ref 80–94)
MONOCYTES # BLD AUTO: 0.59 X10(3)/MCL (ref 0.1–1.3)
MONOCYTES NFR BLD AUTO: 8.9 %
NEUTROPHILS # BLD AUTO: 3.72 X10(3)/MCL (ref 2.1–9.2)
NEUTROPHILS NFR BLD AUTO: 55.8 %
NRBC BLD AUTO-RTO: 0 %
OHS QRS DURATION: 82 MS
OHS QTC CALCULATION: 443 MS
PHOSPHATE SERPL-MCNC: 4.2 MG/DL (ref 2.3–4.7)
PLATELET # BLD AUTO: 310 X10(3)/MCL (ref 130–400)
PMV BLD AUTO: 10.1 FL (ref 7.4–10.4)
POTASSIUM SERPL-SCNC: 4.6 MMOL/L (ref 3.5–5.1)
PROT SERPL-MCNC: 6.7 GM/DL (ref 5.8–7.6)
PROTHROMBIN TIME: 52.6 SECONDS (ref 12.5–14.5)
RBC # BLD AUTO: 4.85 X10(6)/MCL (ref 4.2–5.4)
SODIUM SERPL-SCNC: 140 MMOL/L (ref 136–145)
WBC # BLD AUTO: 6.66 X10(3)/MCL (ref 4.5–11.5)

## 2025-02-06 PROCEDURE — 11000001 HC ACUTE MED/SURG PRIVATE ROOM

## 2025-02-06 PROCEDURE — 85610 PROTHROMBIN TIME: CPT | Performed by: NURSE PRACTITIONER

## 2025-02-06 PROCEDURE — 84100 ASSAY OF PHOSPHORUS: CPT | Performed by: INTERNAL MEDICINE

## 2025-02-06 PROCEDURE — 97530 THERAPEUTIC ACTIVITIES: CPT

## 2025-02-06 PROCEDURE — 36415 COLL VENOUS BLD VENIPUNCTURE: CPT | Performed by: INTERNAL MEDICINE

## 2025-02-06 PROCEDURE — 25000003 PHARM REV CODE 250: Performed by: INTERNAL MEDICINE

## 2025-02-06 PROCEDURE — 83735 ASSAY OF MAGNESIUM: CPT | Performed by: INTERNAL MEDICINE

## 2025-02-06 PROCEDURE — 21400001 HC TELEMETRY ROOM

## 2025-02-06 PROCEDURE — 97163 PT EVAL HIGH COMPLEX 45 MIN: CPT

## 2025-02-06 PROCEDURE — 93005 ELECTROCARDIOGRAM TRACING: CPT

## 2025-02-06 PROCEDURE — 97167 OT EVAL HIGH COMPLEX 60 MIN: CPT

## 2025-02-06 PROCEDURE — 80143 DRUG ASSAY ACETAMINOPHEN: CPT | Performed by: INTERNAL MEDICINE

## 2025-02-06 PROCEDURE — 27000221 HC OXYGEN, UP TO 24 HOURS

## 2025-02-06 PROCEDURE — 85025 COMPLETE CBC W/AUTO DIFF WBC: CPT | Performed by: INTERNAL MEDICINE

## 2025-02-06 PROCEDURE — 80053 COMPREHEN METABOLIC PANEL: CPT | Performed by: INTERNAL MEDICINE

## 2025-02-06 RX ORDER — TRAMADOL HYDROCHLORIDE 50 MG/1
50 TABLET ORAL EVERY 6 HOURS PRN
Status: DISCONTINUED | OUTPATIENT
Start: 2025-02-06 | End: 2025-02-17 | Stop reason: HOSPADM

## 2025-02-06 RX ORDER — LIDOCAINE 50 MG/G
1 PATCH TOPICAL
Status: DISCONTINUED | OUTPATIENT
Start: 2025-02-06 | End: 2025-02-16

## 2025-02-06 RX ORDER — ALUMINUM HYDROXIDE, MAGNESIUM HYDROXIDE, AND SIMETHICONE 1200; 120; 1200 MG/30ML; MG/30ML; MG/30ML
30 SUSPENSION ORAL EVERY 6 HOURS PRN
Status: DISCONTINUED | OUTPATIENT
Start: 2025-02-06 | End: 2025-02-17 | Stop reason: HOSPADM

## 2025-02-06 RX ADMIN — OLANZAPINE 5 MG: 5 TABLET, FILM COATED ORAL at 07:02

## 2025-02-06 RX ADMIN — DAPAGLIFLOZIN 10 MG: 10 TABLET, FILM COATED ORAL at 08:02

## 2025-02-06 RX ADMIN — ISOSORBIDE MONONITRATE 30 MG: 30 TABLET, EXTENDED RELEASE ORAL at 08:02

## 2025-02-06 RX ADMIN — HYDROCODONE BITARTRATE AND ACETAMINOPHEN 1 TABLET: 7.5; 325 TABLET ORAL at 10:02

## 2025-02-06 RX ADMIN — GABAPENTIN 100 MG: 100 CAPSULE ORAL at 08:02

## 2025-02-06 RX ADMIN — HYDROCODONE BITARTRATE AND ACETAMINOPHEN 1 TABLET: 7.5; 325 TABLET ORAL at 03:02

## 2025-02-06 RX ADMIN — FLUOXETINE HYDROCHLORIDE 40 MG: 20 CAPSULE ORAL at 08:02

## 2025-02-06 RX ADMIN — LIDOCAINE 5% 1 PATCH: 700 PATCH TOPICAL at 12:02

## 2025-02-06 RX ADMIN — GABAPENTIN 100 MG: 100 CAPSULE ORAL at 02:02

## 2025-02-06 RX ADMIN — TRAMADOL HYDROCHLORIDE 50 MG: 50 TABLET, COATED ORAL at 07:02

## 2025-02-06 RX ADMIN — ATORVASTATIN CALCIUM 40 MG: 40 TABLET, FILM COATED ORAL at 08:02

## 2025-02-06 RX ADMIN — GABAPENTIN 100 MG: 100 CAPSULE ORAL at 07:02

## 2025-02-06 RX ADMIN — TRAMADOL HYDROCHLORIDE 50 MG: 50 TABLET, COATED ORAL at 01:02

## 2025-02-06 NOTE — PROGRESS NOTES
[facility] *    Cardiology  Progress Note    Patient Name: Kayla Ruiz  MRN: [id number]  Admission Date: 2/4/2025  Hospital Length of Stay: 1 days  Code Status: Full Code   Attending Provider: Radhames Acevedo MD   Consulting Provider: Judi Macario NP  Primary Care Physician: Jennifer Chavez, NP-DERIK  Principal Problem:<principal problem not specified>    Patient information was obtained from patient, past medical records, and ER records.     Subjective:     Reason for Consult: Anticoagulation Recommendations    HPI: Ms. Ruiz is a 70 y/o female who is known to [facility], Dr. Leon. The patient presented to [facility] on 2.4.25 with c/o Headache, Double Vision and Circles in Vision. The patient reported that she fell the other day after tripping on a blanket. She denied hitting her head and LOC. She reported that she developed a frontal headache about 2 days ago which was around the fall. She then developed visual disturbances of double vision and circles in her vision. She presented to the ER and a CT of the Head revealed Cortical Based Hypodensities in the Bilateral Occipital Lobes which may represent changes of the Posterior Reversible Encephalopathy Syndrome. MRI of the Brain was recommended for further workup and evaluation. CTA Head/Neck revealed Moderate Atheromatous Calcification of the Cavernous Clinoid and Supra Clinoid segments of the Bilateral Internal Carotid Arteries with Mild-Mod L > R Stenosis. She was admitted to  and Stroke Neurology was Consulted and [facility] was consulted for Anticoagulation Recommendations.     Hospital Course:   2.6.25: NAD, VSS. She deneis SOB CP or nasuea. INR 5.7 today    PMH: PAF, Mechanical AV on Warfarin, DM II, HTN, HLD, COPD  PSH: Aortic Valve Replacement, Cholecystectomy, Colon Resection, Angiogram, Tonsillectomy  Family History: Father- COPD, Mother- COPD, Sister- Cancer, Sister- Cancer   Social History: Current Everyday Smoker, 1ppd for 40+ Years;  Denies Illicit Drug and ETOH Use      Previous Diagnostics:  ECHO 10.24.24:   Left Ventricle: The left ventricle is normal in size. Moderately increased wall thickness. There is normal systolic function with a visually estimated ejection fraction of 55 - 60%. There is normal diastolic function.  Right Ventricle: Normal right ventricular cavity size. Systolic function is normal. TAPSE is 2.70 cm.  Left Atrium: Left atrium is moderately dilated.  Aortic Valve: There is a bioprosthetic valve in the aortic position. Aortic valve area by VTI is 2.8 cm². Aortic valve peak velocity is 2.6 m/s. Mean gradient is 14.0 mmHg. The dimensionless index is 0.63. There is trace aortic regurgitation.  Mitral Valve: There is mild stenosis. The mean pressure gradient across the mitral valve is 4 mmHg at a heart rate of  bpm. There is mild regurgitation.  Tricuspid Valve: There is mild regurgitation with a RVSP of 34mmHg.  IVC/SVC: Intermediate venous pressure at 8 mmHg.     LHC 5.11.23  Calcified widely patent coronary anatomy  The mechanical aortic valve demonstrated normal functioning within study limits.  Heavily calcified aortic root  There was heavy mitral annular calcification noted.  Radial approach  The estimated blood loss was none.  Resume warfarin today  Procedure:   Selective coronary angiography  Left ventriculography  Left heart catheterization  Moderate (Conscious) Sedation   Indication: Angina equivalent  Coronary findings: The aortic root was heavily calcified  Dominance: right   Left main: no obstructive disease with <10% epicardial stenosis  Left anterior descending artery:  Patent and highly tortuous Type 2 vessel that gave rise to a very large bifurcating diagonal branch.  No obstructive disease with <10% epicardial stenosis.  Circumflex artery:  Non dominant patent vessel that gives rise to 2 tortuous moderate-sized obtuse marginal branches.  No obstructive disease with <10% epicardial stenosis  Right coronary  artery:  The patent calcified dominant vessel terminates in a large PDA and a small PL segment.  No obstructive disease with <10% epicardial stenosis  Left ventriculography: Not performed due to mechanical AVR  Impression  No evidence of obstructive CAD  Plan   [facility]     Holter Monitor 11.21.23  1.This is a good quality study.  2.Predominant rhythm is sinus bradycardia.  3.The minimum heart rate recorded was 48 beats / minute (11/21/2023). The maximum heart rate is 77 beats / minute (11/22/2023). The mean heart rate is 54 beats / minute.  4.Rare premature atrial contractions noted.  5.No evidence of supraventricular tachycardia is noted.  6.No evidence of atrial fibrillation noted.  7.Rare premature ventricular contractions noted.  8.No evidence of ventricular tachycardia is noted.  9.No pauses were noted.  10. IN SINUS BRADYCARDIA 85% OF THE STUDY TIME NO SYMPTOMS REPORTED     Review of patient's allergies indicates:   Allergen Reactions    Ace inhibitors     Naproxen sodium     Nifedipine     Venlafaxine      Current Facility-Administered Medications on File Prior to Encounter   Medication    0.9%  NaCl infusion    diazePAM tablet 10 mg    diphenhydrAMINE capsule 50 mg     Current Outpatient Medications on File Prior to Encounter   Medication Sig    warfarin (COUMADIN) 5 MG tablet Take 5 mg by mouth Daily.    albuterol-ipratropium (DUO-NEB) 2.5 mg-0.5 mg/3 mL nebulizer solution Take 3 mLs by nebulization every 4 (four) hours as needed for Wheezing or Shortness of Breath.    aspirin (ECOTRIN) 81 MG EC tablet Take 81 mg by mouth once daily.    atorvastatin (LIPITOR) 40 MG tablet Take 40 mg by mouth once daily.    clonazePAM (KLONOPIN) 1 MG tablet Take 0.5 mg by mouth 3 (three) times daily as needed for Anxiety.    dapagliflozin propanediol (FARXIGA) 10 mg tablet Take 1 tablet (10 mg total) by mouth once daily.    FLUoxetine 40 MG capsule Take 40 mg by mouth once daily.    furosemide  (LASIX) 40 MG tablet Take 1 tablet (40 mg total) by mouth 2 (two) times daily.    gabapentin (NEURONTIN) 100 MG capsule Take 100 mg by mouth 3 (three) times daily.    HYDROcodone-acetaminophen (NORCO) 7.5-325 mg per tablet Take 1 tablet by mouth every 6 (six) hours as needed for Pain (severe pain only).    isosorbide mononitrate (IMDUR) 30 MG 24 hr tablet Take 1 tablet (30 mg total) by mouth once daily.    LIDOcaine (LIDODERM) 5 % Place 1 patch onto the skin once daily. Remove & Discard patch within 12 hours or as directed by MD    losartan (COZAAR) 25 MG tablet Take 1 tablet (25 mg total) by mouth once daily.    metoprolol succinate (TOPROL-XL) 25 MG 24 hr tablet Take 0.5 tablets (12.5 mg total) by mouth 2 (two) times daily.    MOUNJARO 15 mg/0.5 mL PnIj Inject 15 mg into the skin every 7 days. (Patient not taking: Reported on 1/29/2025)    OLANZapine (ZYPREXA) 5 MG tablet Take 5 mg by mouth every evening.    sotaloL (BETAPACE) 120 MG Tab Take 80 mg by mouth every 12 (twelve) hours. Take 1/2 tablet BID    spironolactone (ALDACTONE) 25 MG tablet Take 1 tablet (25 mg total) by mouth once daily.    traZODone (DESYREL) 50 MG tablet Take 25-50 mg by mouth nightly as needed.     Review of Systems   Constitutional:  Positive for fatigue.   Respiratory:  Negative for shortness of breath.    Cardiovascular:  Negative for chest pain, palpitations and leg swelling.   Neurological:  Negative for weakness.        Some Transient Confused Conversation   All other systems reviewed and are negative.    Objective:     Vital Signs (Most Recent):  Temp: 97.3 °F (36.3 °C) (02/06/25 1528)  Pulse: 78 (02/06/25 1528)  Resp: 20 (02/06/25 1358)  BP: 135/78 (02/06/25 1528)  SpO2: 96 % (02/06/25 1528) Vital Signs (24h Range):  Temp:  [96.5 °F (35.8 °C)-97.9 °F (36.6 °C)] 97.3 °F (36.3 °C)  Pulse:  [68-78] 78  Resp:  [18-20] 20  SpO2:  [89 %-97 %] 96 %  BP: (114-145)/(68-84) 135/78   Weight: 91.3 kg (201 lb 3.2 oz)  Body mass index is 32.97  kg/m².  SpO2: 96 %       Intake/Output Summary (Last 24 hours) at 2/6/2025 1637  Last data filed at 2/6/2025 0000  Gross per 24 hour   Intake --   Output 500 ml   Net -500 ml     Lines/Drains/Airways       Peripheral Intravenous Line  Duration                  Peripheral IV - Single Lumen 02/04/25 0000 20 G Left Antecubital 2 days                  Significant Labs:   Chemistries:   Recent Labs   Lab 02/04/25  0800 02/04/25 1917 02/05/25 0329 02/06/25  0309    136  --  140   K 4.4 5.1  --  4.6    101  --  102   CO2 28 20*  --  30   BUN 28.7* 27.1*  --  31.5*   CREATININE 0.87 0.98  --  1.26*   CALCIUM 9.1 9.0  --  9.0   BILITOT 0.3 0.3  --  0.4   ALKPHOS 153* 152*  --  154*   ALT 19 20  --  21   AST 28 29  --  25   GLUCOSE 107 174*  --  118*   MG  --   --   --  2.40   PHOS  --   --   --  4.2   TROPONINI  --  0.023 <0.010  --         CBC/Anemia Labs: Coags:    Recent Labs   Lab 02/04/25 1917 02/06/25  0309   WBC 6.90 6.66   HGB 14.1 13.1   HCT 46.6 43.7    310   MCV 88.9 90.1   RDW 16.6 16.6    Recent Labs   Lab 02/04/25  2122 02/05/25 0329 02/06/25  0309   INR 7.0* 7.0* 5.7*   APTT 54.3* 57.9*  --         Significant Imaging:  Imaging Results              MRI Brain W WO Contrast (Final result)  Result time 02/05/25 09:56:17      Final result by Norma Leblanc MD (02/05/25 09:56:17)                   Impression:      1. Symmetric restricted diffusion and edema in the bilateral posterior occipital lobes, with small foci in the left parietal lobe and left cerebellum, favored to represent changes of posterior reversible encephalopathy syndrome with ischemia in the differential.  Follow-up can be obtained to ensure resolution.  2. Small areas of associated hemorrhage in the bilateral occipital lobes.  Finding of intracranial hemorrhage given to Jina HOLT at the time of dictation.      Electronically signed by: Norma Leblanc  Date:    02/05/2025  Time:    09:56               Narrative:     EXAMINATION:  MRI BRAIN W WO CONTRAST    CLINICAL HISTORY:  Mental status change, unknown cause;vision changes x1 week, abnormal CT;    TECHNIQUE:  Multiplanar, multisequence MR images of the brain were obtained with and without administration of intravenous contrast.    COMPARISON:  CT head dated 02/04/2025    FINDINGS:  There is symmetric restricted diffusion and edema in the bilateral posterior occipital lobes, with few small foci in the left parietal lobe and left cerebellum.  There are small areas of associated hemorrhage with likely areas of curvilinear enhancement.  Mild patchy T2/FLAIR hyperintense in the subcortical and periventricular white matter likely represent chronic microvascular ischemic changes.    There is local mass effect.  There is no midline shift or herniation.  The basal cisterns are patent.  There is mild diffuse parenchymal volume loss.  There is no hydrocephalus or abnormal extra-axial fluid collection.                                       CTA Head and Neck (xpd) (Final result)  Result time 02/05/25 08:32:51      Final result by Norma Leblanc MD (02/05/25 08:32:51)                   Impression:      No large vessel occlusion or flow-limiting stenosis.    No significant change from the [facility] interpretation.      Electronically signed by: Norma Leblanc  Date:    02/05/2025  Time:    08:32               Narrative:    EXAMINATION:  CTA HEAD AND NECK (XPD)    CLINICAL HISTORY:  Stroke/TIA, determine embolic source;    TECHNIQUE:  Axial images obtained through the cervical region and [place] before and after the administration of intravenous contrast.    Coronal, sagittal, MIP and 3D reconstructions were obtained from the axial data.    Automatic exposure control was utilized to limit radiation dose.    Radiation Dose:    Total DLP: 2870 mGy*cm    COMPARISON:  CT head dated 02/04/2025    FINDINGS:  Head CT with contrast:    No interval changes when compared to the  previous CT.    No significant enhancing abnormalities.    If present, stenosis of the carotid bulbs is measured based on NASCET criteria,    i.e. area of maximal stenosis compared to the cervical ICA distal to the bulb.    Cervical CTA:    The origins of the great vessels are patent with moderate calcifications    The common carotid arteries are patent.  There are mild calcifications at the carotid bulbs without hemodynamically significant stenosis.  The internal carotid arteries are patent.    The vertebral arteries are patent    Intracranial CTA:    There are calcifications along the course the carotid siphons with mild narrowing bilaterally.  The middle cerebral arteries and anterior cerebral arteries are patent    The vertebral arteries, basilar artery and posterior cerebral arteries are patent.    The dural venous sinuses are patent.                        Preliminary result by Albert Cruz Jr., MD (02/05/25 04:10:05)                   Impression:    1. Moderate atheromatous calcification of the cavernous clinoid and supraclinoid segments of the bilateral internal carotid arteries is seen with mild to moderate left greater than right stenosis (Series 26 Images 200-216).  2. The previously noted hypodensities in the bilateral occipital lobes previously ascribed to posterior reversible encephalopathy are again seen stable from prior without associated abnormal intracranial enhancement on post-contrast images.  3. Details and other findings as described above.               Narrative:    START OF REPORT:  Technique: CT angiogram of the intracranial and neck vessels was performed with intravenous contrast with direct axial as well as sagittal and coronal reformations.    Comparison: Comparison is with study [id number] 18:22:03.    Clinical history: Blurry vision.    Findings:  Intracranial Vascular structures:  Internal carotid arteries: Moderate atheromatous calcification of the cavernous clinoid  and supraclinoid segments of the bilateral internal carotid arteries is seen with mild to moderate left greater than right stenosis (Series 26 Images 200-216).  Middle cerebral arteries: Unremarkable.  Anterior cerebral arteries: Unremarkable.  Vertebral arteries: Mild atheromatous change of the bilateral vertebral arteries is seen with no significant stenosis.  Basilar artery: Unremarkable.  Posterior cerebral arteries: Unremarkable.  Posterior communicating arteries: Unremarkable.  Neck Vascular structures: Mild atheromatous calcification with no significant stenosis is seen at the origin of the brachiocephalic left subclavian left common carotid arteries.  Carotids:  Common carotid arteries: The left common carotid artery appears unremarkable.  Right common carotid artery: Mild atheromatous calcification of the mid distal segment of the right common carotid artery is seen with no significant stenosis.  Internal carotid artery: Mild atheromatous calcification with mild stenosis at the origin of the bilateral internal carotid arteries is seen.  Vertebral arteries: Unremarkable.  Jugular Veins and venous sinuses: Unremarkable.  Hemorrhage: No acute intracranial hemorrhage is seen.  CSF spaces: The ventricles, sulci and basal cisterns all appear moderately prominent consistent with global cerebral atrophy stable from prior.  Brain parenchyma: No acute infarct is identified. Mild stable appearing scattered microvascular change is seen in portions of the periventricular and deep white matter tracts. The previously noted hypodensities in the bilateral occipital lobes previously ascribed to posterior reversible encephalopathy are again seen stable from prior without associated abnormal intracranial enhancement on post-contrast images.  Cerebellum: Unremarkable.  Vascular: Unremarkable venous sinuses.  Sella and skull base: The sella appears to be within normal limits.  Cerebellopontine angles: Within normal  limits.  Herniation: None.  Intracranial calcifications: Incidental note is made of bilateral choroid plexus calcification. Incidental note is made of some pineal region calcification. Incidental note is made of some calcification of the falx.                                         X-Ray Chest AP Portable (Final result)  Result time 02/05/25 09:08:25      Final result by Bandar Vera MD (02/05/25 09:08:25)                   Impression:      No acute chest disease is identified.      Electronically signed by: Bandar Vera  Date:    02/05/2025  Time:    09:08               Narrative:    EXAMINATION:  XR CHEST AP PORTABLE    CLINICAL HISTORY:  , Unspecified fall, initial encounter.    COMPARISON:  January 25, 2025    FINDINGS:  No alveolar consolidation, effusion, or pneumothorax is seen.   The thoracic aorta is normal  cardiac silhouette, central pulmonary vessels and mediastinum are normal in size and are grossly unremarkable.   visualized osseous structures are grossly unremarkable.                                       CT Cervical Spine Without Contrast (Final result)  Result time 02/04/25 21:25:00      Final result by Juanito Chopra MD (02/04/25 21:25:00)                   Impression:      No acute fracture or malalignment identified.      Electronically signed by: Juanito Chopra  Date:    02/04/2025  Time:    21:25               Narrative:    EXAMINATION:  CT CERVICAL SPINE WITHOUT CONTRAST    CLINICAL HISTORY:  Trauma.    TECHNIQUE:  Multidetector axial images were performed of the cervical spine without and.  Images were reconstructed.    Automated exposure control was utilized to minimize radiation dose.  .    COMPARISON:  None available.    FINDINGS:  Cervical vertebrae stature is maintained.  There is grade 1 anterolisthesis of C4 on C5 and C7 on T1.  Solid ACDF at C5, C6 and C7.  No acute fracture or malalignment identified.  There is moderate degenerative narrowing of the left neural  foramen at C4-C5.  There is no prevertebral soft tissue prominence.    This study does not exclude the possibility of intrathecal soft tissue, ligamentous or vascular injury.                                       CT Head Without Contrast (Final result)  Result time 02/04/25 18:42:34      Final result by Norma Leblanc MD (02/04/25 18:42:34)                   Impression:      Cortical based hypodensities in the bilateral occipital lobes, may represent changes of posterior reversible encephalopathy syndrome.  MRI brain is available for further evaluation.      Electronically signed by: Norma Leblanc  Date:    02/04/2025  Time:    18:42               Narrative:    EXAMINATION:  CT HEAD WITHOUT CONTRAST    CLINICAL HISTORY:  Head trauma, minor (Age >= 65y);    TECHNIQUE:  Axial scans were obtained from skull base to the vertex.    Coronal and sagittal reconstructions obtained from the axial data.    Automatic exposure control was utilized to limit radiation dose.    Contrast: None    Radiation Dose:    Total DLP: 934 mGy*cm    COMPARISON:  CT head dated 01/09/2025    FINDINGS:  There is no acute intracranial hemorrhage.  There are cortical based hypodensities in the bilateral occipital lobes.  Patchy hypodensities in the subcortical and periventricular white matter likely represent chronic microvascular ischemic changes.    There is local mass effect.  There is no midline shift or herniation.  The basal cisterns are patent.  There is diffuse parenchymal volume loss.  There is no hydrocephalus or abnormal extra-axial fluid collection.  Carotid and vertebral artery calcifications are noted.  The calvarium and skull base are intact.                                    EKG:       Telemetry: SR    Physical Exam  Constitutional:       Appearance: Normal appearance.   HENT:      Head: Normocephalic.      Mouth/Throat:      Mouth: Mucous membranes are moist.   Eyes:      Extraocular Movements: Extraocular movements  intact.   Cardiovascular:      Rate and Rhythm: Normal rate and regular rhythm.      Pulses: Normal pulses.      Comments: + Mechanical Click  Pulmonary:      Effort: Pulmonary effort is normal. No respiratory distress.      Breath sounds: Normal breath sounds.      Comments: RA  Abdominal:      Palpations: Abdomen is soft.   Musculoskeletal:         General: No swelling. Normal range of motion.      Right lower leg: No edema.      Left lower leg: No edema.   Skin:     General: Skin is warm and dry.   Neurological:      General: No focal deficit present.      Mental Status: She is alert and oriented to person, place, and time. Mental status is at baseline.      Comments: Transient Confused Conversation   Psychiatric:         Mood and Affect: Mood normal.         Behavior: Behavior normal.         Judgment: Judgment normal.       Home Medications:   Current Facility-Administered Medications on File Prior to Encounter   Medication Dose Route Frequency Provider Last Rate Last Admin    0.9%  NaCl infusion   Intravenous Once Kahlil Martin MD        diazePAM tablet 10 mg  10 mg Oral On Call Procedure Kahlil Martin MD   10 mg at 05/18/23 0911    diphenhydrAMINE capsule 50 mg  50 mg Oral On Call Procedure Kahlil Martin MD   50 mg at 05/18/23 0911     Current Outpatient Medications on File Prior to Encounter   Medication Sig Dispense Refill    warfarin (COUMADIN) 5 MG tablet Take 5 mg by mouth Daily.      albuterol-ipratropium (DUO-NEB) 2.5 mg-0.5 mg/3 mL nebulizer solution Take 3 mLs by nebulization every 4 (four) hours as needed for Wheezing or Shortness of Breath.      aspirin (ECOTRIN) 81 MG EC tablet Take 81 mg by mouth once daily.      atorvastatin (LIPITOR) 40 MG tablet Take 40 mg by mouth once daily.      clonazePAM (KLONOPIN) 1 MG tablet Take 0.5 mg by mouth 3 (three) times daily as needed for Anxiety.      dapagliflozin propanediol (FARXIGA) 10 mg tablet Take 1 tablet (10 mg total) by mouth once  daily. 30 tablet 5    FLUoxetine 40 MG capsule Take 40 mg by mouth once daily.      furosemide (LASIX) 40 MG tablet Take 1 tablet (40 mg total) by mouth 2 (two) times daily. 60 tablet 11    gabapentin (NEURONTIN) 100 MG capsule Take 100 mg by mouth 3 (three) times daily.      HYDROcodone-acetaminophen (NORCO) 7.5-325 mg per tablet Take 1 tablet by mouth every 6 (six) hours as needed for Pain (severe pain only). 12 tablet 0    isosorbide mononitrate (IMDUR) 30 MG 24 hr tablet Take 1 tablet (30 mg total) by mouth once daily. 60 tablet 0    LIDOcaine (LIDODERM) 5 % Place 1 patch onto the skin once daily. Remove & Discard patch within 12 hours or as directed by MD 15 patch 0    losartan (COZAAR) 25 MG tablet Take 1 tablet (25 mg total) by mouth once daily. 90 tablet 3    metoprolol succinate (TOPROL-XL) 25 MG 24 hr tablet Take 0.5 tablets (12.5 mg total) by mouth 2 (two) times daily. 90 tablet 3    MOUNJARO 15 mg/0.5 mL PnIj Inject 15 mg into the skin every 7 days. (Patient not taking: Reported on 1/29/2025)      OLANZapine (ZYPREXA) 5 MG tablet Take 5 mg by mouth every evening.      sotaloL (BETAPACE) 120 MG Tab Take 80 mg by mouth every 12 (twelve) hours. Take 1/2 tablet BID      spironolactone (ALDACTONE) 25 MG tablet Take 1 tablet (25 mg total) by mouth once daily. 30 tablet 11    traZODone (DESYREL) 50 MG tablet Take 25-50 mg by mouth nightly as needed.       Current Schedule Inpatient Medications:   atorvastatin  40 mg Oral Daily    dapagliflozin propanediol  10 mg Oral Daily    FLUoxetine  40 mg Oral Daily    gabapentin  100 mg Oral TID    isosorbide mononitrate  30 mg Oral Daily    LIDOcaine  1 patch Transdermal Q24H    OLANZapine  5 mg Oral QHS     Assessment:   [place]  Mechanical Fall at Ground Level tripping over [place]  PAF    - CHADsVASc - 6 Points - 9.7% Stroke Risk per Year     - Warfarin as OP  Supratherapeutic INR     - INR (2.6.25) - 5.7    - INR on Admission (2.4.25) - 8.8  Hx of Mechanical Aortic  Valve    - PT/INR Goal Range: 2.5-3.5  DM II  HTN  HLD  COPD  Tobacco Use  No Hx of GIB     Plan:   Daily PT/INR  Resume Warfarin at some point when OK with Neurology for INR Goal 2.5-3.5 in the Setting of AF/Stroke Risk Reduction and Mechanical AVR  Will Continue to Follow  Labs and EKG in AM: CBC, CMP, PT/INR and Mg     Thank you for your consult.     Judi Macario NP  Cardiology  [facility]

## 2025-02-06 NOTE — PLAN OF CARE
Problem: Physical Therapy  Goal: Physical Therapy Goal  Description: Goals to be met by: 3/6/2025     Patient will increase functional independence with mobility by performin. Supine to sit with Modified [place]  2. Sit to supine with Modified [place]  3. Rolling to Left and Right with Modified [place]. (Log roll)  4. Sit to stand transfer with Stand-by Assistance  5. Bed to chair transfer with Stand-by Assistance using Rolling Walker  6. Gait  x 50 feet with Stand-by Assistance using Rolling Walker.     Outcome: Progressing

## 2025-02-06 NOTE — PROGRESS NOTES
[facility] MEDICINE ~ PROGRESS NOTE        CHIEF COMPLAINT   Hospital follow up    HOSPITAL COURSE   69 y.o. female with a history that includes PAF and mechanical AVR, on Coumadin therapy was referred to ED by her Cardiology due to INR of 8.8 on outpatient labs.  Additional complaints include headache and vision changes, though denied focal weakness or speech changes.  CT head performed and revealed cortical based hypodensities in the bilateral occipital lobes.  CTA revealed moderate atheromatous calcification of the cavernous clinoid and supra clinoid segments of the bilateral internal carotid arteries with mild-to-moderate left greater than right stenosis.  A subsequent MRI brain has noted Symmetric restricted diffusion and edema in the bilateral posterior occipital lobes, with small foci in the left parietal lobe and left cerebellum, favored to represent changes of posterior reversible encephalopathy syndrome, and additionally noted small areas of hemorrhage in B/L occipital lobes.      Neurology was consulted and neurologist felt she had bilateral occipital lobe infarcts secondary to being subtherapeutic with clot formation and then had embolism which led to the vision loss.  Recommends resuming warfarin once cleared by Cardiology.    Today  Upon further discussion with the family members they stated that they had run out of the Norco and has been giving her 2 to 3 500 mg tablets every 4 hours.  INR this morning 5.7.        OBJECTIVE/PHYSICAL EXAM     VITAL SIGNS (MOST RECENT):  Temp: 97.6 °F (36.4 °C) (02/06/25 0759)  Pulse: 72 (02/06/25 0759)  Resp: 19 (02/06/25 1003)  BP: (!) 145/81 (02/06/25 0759)  SpO2: 97 % (02/06/25 0759) VITAL SIGNS (24 HOUR RANGE):  Temp:  [97.6 °F (36.4 °C)-97.9 °F (36.6 °C)] 97.6 °F (36.4 °C)  Pulse:  [68-76] 72  Resp:  [18-19] 19  SpO2:  [89 %-97 %] 97 %  BP: (118-149)/(68-84) 145/81   GENERAL: In no acute distress, afebrile  HEENT:  CHEST: Clear  to auscultation bilaterally  HEART: S1, S2, no appreciable murmur  ABDOMEN: Soft, nontender, BS +  MSK: Warm, no lower extremity edema, no clubbing or cyanosis  NEUROLOGIC:  Blind out of the left eye, right eye with seeing multiple of the same, extraocular muscles seems to be intact, good strength in all 4 extremities   INTEGUMENTARY:  PSYCHIATRY:        ASSESSMENT/PLAN   Bilateral posterior occipital lobe ischemic CVA  Supratherapeutic INR  Unintentional Tylenol overdose  VHD s/p mechanical AVR, on Coumadin   PAF   Diabetes mellitus type II   Essential HTN   h/o COPD   Tobacco abuse      Family requested x-ray and Neurosurgery evaluation inpatient since her appointment is coming up soon.  I will start with getting the x-rays and see how that is looking.  Stroke Neurology signed off.  Recommends resuming Coumadin once okay.    Continue to hold Coumadin for now and monitor daily INR.  Goal INR 2.5 dash 3.5.    Cardiology following.  Per the  she was getting anywhere between 6 g to 9 g of Tylenol per day.  This is likely the cause of her supratherapeutic INR.  Check Tylenol level today.  Give a trial of tramadol for pain.  Okay to work with PT and OT    DVT prophylaxis:  Supratherapeutic INR    Anticipated discharge and disposition:   __________________________________________________________________________    NUTRITIONAL STATUS     Patient meets ASPEN criteria for   malnutrition of   per RD assessment as evidenced by:                       A minimum of two characteristics is recommended for diagnosis of either severe or non-severe malnutrition.     LABS/MICRO/MEDS/DIAGNOSTICS       LABS  Recent Labs     02/06/25  0309      K 4.6   CO2 30   BUN 31.5*   CREATININE 1.26*   GLUCOSE 118*   CALCIUM 9.0   ALKPHOS 154*   AST 25   ALT 21   ALBUMIN 3.3*     Recent Labs     02/06/25  0309   WBC 6.66   RBC 4.85   HCT 43.7   MCV 90.1          MICROBIOLOGY  Microbiology Results (last 7 days)       ** No  results found for the last 168 hours. **               MEDICATIONS   atorvastatin  40 mg Oral Daily    dapagliflozin propanediol  10 mg Oral Daily    FLUoxetine  40 mg Oral Daily    gabapentin  100 mg Oral TID    isosorbide mononitrate  30 mg Oral Daily    OLANZapine  5 mg Oral QHS         INFUSIONS         DIAGNOSTIC TESTS  MRI Brain W WO Contrast   Final Result      1. Symmetric restricted diffusion and edema in the bilateral posterior occipital lobes, with small foci in the left parietal lobe and left cerebellum, favored to represent changes of posterior reversible encephalopathy syndrome with ischemia in the differential.  Follow-up can be obtained to ensure resolution.   2. Small areas of associated hemorrhage in the bilateral occipital lobes.   Finding of intracranial hemorrhage given to Jina HOLT at the time of dictation.         Electronically signed by: Norma Leblanc   Date:    02/05/2025   Time:    09:56      CTA Head and Neck (xpd)   Final Result      No large vessel occlusion or flow-limiting stenosis.      No significant change from the [facility] interpretation.         Electronically signed by: Norma Leblanc   Date:    02/05/2025   Time:    08:32      X-Ray Chest AP Portable   Final Result      No acute chest disease is identified.         Electronically signed by: Bandar Vera   Date:    02/05/2025   Time:    09:08      CT Cervical Spine Without Contrast   Final Result      No acute fracture or malalignment identified.         Electronically signed by: Juanito Chopra   Date:    02/04/2025   Time:    21:25      CT Head Without Contrast   Final Result      Cortical based hypodensities in the bilateral occipital lobes, may represent changes of posterior reversible encephalopathy syndrome.  MRI brain is available for further evaluation.         Electronically signed by: Norma Leblanc   Date:    02/04/2025   Time:    18:42           Echo    Result Date: 2/5/2025    Left Ventricle: The left  ventricle is normal in size. Normal wall   thickness. There is normal systolic function with a visually estimated   ejection fraction of 55 - 60%. Grade I diastolic dysfunction.    Right Ventricle: Normal right ventricular cavity size. Wall thickness   is normal. Systolic function is normal. TAPSE is 1.83 cm.    Left Atrium: Left atrium is moderately dilated. Agitated saline study   of the atrial septum is negative, suggesting absence of intracardiac shunt   at the atrial level.    Aortic Valve: There is a mechanical valve in the aortic position.   Aortic valve area by VTI is 1.6 cm². Aortic valve peak velocity is 2.9   m/s. Mean gradient is 16 mmHg. The dimensionless index is 0.53.    Mitral Valve: There is mitral annular calcification present. There is   trace regurgitation.    IVC/SVC: Normal venous pressure at 3 mmHg.    Consider KATERIN              Case related differential diagnoses have been reviewed; assessment and plan has been documented. I have personally reviewed the labs and test results that are currently available; I have reviewed the patients medication list. I have reviewed the consulting providers recommendations. I have reviewed or attempted to review medical records based upon their availability.  All of the patient's and/or family's questions have been addressed and answered to the best of my ability.  I will continue to monitor closely and make adjustments to medical management as needed.  This document was created using M*Modal Fluency Direct.  Transcription errors may have been made.  Please contact me if any questions may rise regarding documentation to clarify transcription.        Jorge Pham MD   Internal Medicine  Department of [facility]  [facility] - Neurology

## 2025-02-06 NOTE — PT/OT/SLP EVAL
Physical Therapy Evaluation    Patient Name:  Kayla Ruiz   MRN:  [id number]    Recommendations:     Discharge therapy intensity: Moderate Intensity Therapy   Discharge Equipment Recommendations: walker, rolling   Barriers to discharge: Impaired mobility and Ongoing medical needs    Assessment:     Kayla Ruiz is a 69 y.o. female admitted with a medical diagnosis of Bilateral posterior occipital lobe ischemic CVA, Supratherapeutic INR, Unintentional Tylenol overdose, VHD s/p mechanical AVR, on Coumadin , PAF , Diabetes mellitus type II, Essential HTN , h/o COPD and Tobacco abuse. Pt cleared for mobility today by MD.     Pt was admitted to this hospital last month due to a fall sustaining R patellar fx (non-op), L1 compression fx. Pt  instructed to wear TLSO and a HKB in extension, cleared to WB with HKB (on last admission).    Today, pt presents with increased back pain, impaired vision, generalized weakness, and gait instability. TLSO and hinged knee brace donned prior to mobility. Pt required Min A for semi-supine to sit and Min A for transfers. Upon standing, pt demo shakiness and needing to sit back down. Pt provided a sitting rest break and then was encouraged to mobilize to the chair via stand step transfer. Pt required min A using RW to step to chair demo BLE unsteadiness and decreased coordination. Max VC for sequencing and direction secondary to visual impairments. Pt would benefit from moderate intensity therapy upon discharge to improve strength and regain independence prior to going home.       Rehab Prognosis: Good; patient would benefit from acute skilled PT services to address these deficits and reach maximum level of function.    Recent Surgery: * No surgery found *      Plan:     During this hospitalization, patient would benefit from acute PT services 6 x/week to address the identified rehab impairments via gait training, therapeutic activities, therapeutic exercises, neuromuscular re-education  and progress toward the following goals:    Plan of Care Expires:  03/06/25    Subjective     Chief Complaint: back pain   Patient/Family Comments/goals: get better  Pain/Comfort:  Pain Rating 1: 8/10  Location 1: back    Patients cultural, spiritual, Shinto conflicts given the current situation:  No    Living Environment:  Pt lives with her spouse and her child. 2 story home (able to reside on first floor).   Prior to admission, patients level of function was Mod I using standard walker (prior to January).  Equipment used at home: walker, standard, cane, straight.  DME owned (not currently used): none.  Upon discharge, patient will have assistance from family.    Objective:     Communicated with nsg prior to session.  Patient found HOB elevated with peripheral IV, pulse ox (continuous), oxygen, telemetry  upon PT entry to room.    General Precautions: Standard, fall  Orthopedic Precautions:spinal precautions (knee brace 0-60 degrees; locked in extension for ambulation)  Braces: Hinged knee brace  Respiratory Status: Nasal cannula, flow 2 L/min; 98%  Blood Pressure:   Sitting EOB: 112/69 prior to standing, 126/74 after completing sit to stand and side steps      Exams:  Cognitive Exam:  Patient is oriented to Person, Place, Time, and Situation  RLE ROM: WFL at hip; did not assess at knee 2/2 brace  RLE Strength: at least >3/5 at hip, did not assess at knee 2/2 brace  LLE ROM: WFL  LLE Strength: 4-/5 grossly  Skin integrity: Visible skin intact      Functional Mobility:  Bed Mobility:     Rolling Left:  minimum assistance  Scooting: minimum assistance  Supine to Sit: minimum assistance  Transfers:     Sit to Stand:  minimum assistance with rolling walker  Bed to chair: Min A using RW demo step transfer   Max VC for sequencing due to visual impairments  Min A to maintain balance  Pre-Gait: Pt completed 2 lateral steps towards HOB with Min A using RW.        AM-PAC 6 CLICK MOBILITY  Total Score:11       Treatment  & Education:    Patient provided with verbal education education regarding PT role/goals/POC, fall prevention, and safety awareness.  Understanding was verbalized, however additional teaching warranted.     Patient left up in chair with all lines intact, call button in reach, and nsg notified and cousin present.    GOALS:   Multidisciplinary Problems       Physical Therapy Goals          Problem: Physical Therapy    Goal Priority Disciplines Outcome Interventions   Physical Therapy Goal     PT, PT/OT Progressing    Description: Goals to be met by: 3/6/2025     Patient will increase functional independence with mobility by performin. Supine to sit with Modified [place]  2. Sit to supine with Modified [place]  3. Rolling to Left and Right with Modified [place]. (Log roll)  4. Sit to stand transfer with Stand-by Assistance  5. Bed to chair transfer with Stand-by Assistance using Rolling Walker  6. Gait  x 50 feet with Stand-by Assistance using Rolling Walker.                          History:     Past Medical History:   Diagnosis Date    A-fib     Anticoagulant long-term use     Depression     Diabetes mellitus     Hyperlipidemia     Sleep apnea        Past Surgical History:   Procedure Laterality Date    AORTIC VALVE REPLACEMENT      CHOLECYSTECTOMY      COLON RESECTION      fusion      LEFT HEART CATHETERIZATION Left 2023    Procedure: Left heart cath;  Surgeon: Kahlil Martin MD;  Location: [facility] CATH LAB;  Service: Cardiology;  Laterality: Left;  LHC +/- PCI    TONSILLECTOMY         Time Tracking:     PT Received On: 25  PT Start Time: 1354     PT Stop Time: 1437  PT Total Time (min): 43 min     Billable Minutes: Evaluation High  and Therapeutic Activity 15      2025

## 2025-02-06 NOTE — PT/OT/SLP EVAL
Occupational Therapy  Evaluation    Name: Kayla Ruiz  MRN: [id number]  Admitting Diagnosis: fall and vision changes   Recent Surgery: * No surgery found *      Recommendations:     Discharge therapy intensity: Moderate Intensity Therapy   Discharge Equipment Recommendations:  walker, rolling  Barriers to discharge:  Other (Comment) (ongoing medical needs)    Assessment:     Kayla Ruiz is a 69 y.o. female with a medical diagnosis of Bilateral posterior occipital lobe ischemic CVA w/ supra therapeutic INR. Pt known from previous admit 1/9/25-1/13/25 for R patella fx (non-op), Lumbar 1 compression fx (per family ortho cleared pt for 60* knee flexion in HKB), w/ history of VHD s/p mechanical AVR on Coumadin, PAF, Diabetes mellitus type II, Essential HTN, and h/o COPD.  She presents with the following performance deficits affecting function: weakness, impaired functional mobility, impaired endurance, visual deficits, impaired self care skills, impaired balance, decreased upper extremity function, decreased lower extremity function, gait instability, decreased coordination, orthopedic precautions.     Pt alert and oriented x4 this date. TLSO and hinged knee brace donned prior to mobility about the bed. Min A was provided for all bed mobility. Stand t/f was completed w/ min A and RW. Pt reported feeling weak upon stand. T/f to chair from bed was performed w/ step t/f and min A for guidance. BSC would be safest bet for toileting given pt's mobility level at this time. Pt's high PLOF make her an appropriate candidate for mod intensity therapy post d/c.     Rehab Prognosis: Good; patient would benefit from acute skilled OT services to address these deficits and reach maximum level of function.       Plan:     Patient to be seen 6 x/week to address the above listed problems via self-care/home management, therapeutic activities, therapeutic exercises  Plan of Care Expires: 03/08/25  Plan of Care Reviewed with: patient,  "family    Subjective     Chief Complaint: Back pain   Patient/Family Comments/goals: Mobilize pt     Occupational Profile:  Living Environment: Spouse and child lives in home, 2 SH able to live on 1st floor, tub w/ seat.   Previous level of function: Independent prior to fall, now requires some assist for mobility   Roles and Routines: mother wife and friend   Equipment Used at Home: walker, standard, cane, straight, rollator, shower chair, grab bar  Assistance upon Discharge: Family     Pain/Comfort:  Pain Rating 1: 8/10 (with movement)  Location 1: back  Pain Addressed 1: Reposition, Distraction, Cessation of Activity    Patients cultural, spiritual, [employer]aritan conflicts given the current situation: no    Objective:     OT communicated with RN prior to session.      Patient was found supine with peripheral IV, pulse ox (continuous), oxygen, telemetry upon OT entry to room.    General Precautions: Standard, fall  Orthopedic Precautions: spinal precautions (knee extension, flexion no more than 60 deg)  Braces: Hinged knee brace, TLSO    Vital Signs: Blood Pressure: 112/69, 126/74     Bed Mobility:    Patient completed Rolling/Turning to Left with  minimum assistance  Patient completed Supine to Sit with minimum assistance    Functional Mobility/Transfers:  Patient completed Sit <> Stand Transfer with minimum assistance  with  rolling walker   Patient completed Bed <> Chair Transfer using Step Transfer technique with minimum assistance with rolling walker  Functional Mobility: Not attempted due to weakness     Activities of Daily Living:  Lower Body Dressing: total assistance for donning of socks seated EOB     AMPAC 6 Click ADL:  AMPAC Total Score: 18    Functional Cognition:  Intact  Orientation: oriented to Person, Place, Time, and Situation    Visual Perceptual Skills:  Visual Disturbances Identified: pt reports decreased visual acuity  "Circles" inhibit acuity, reports not being able to see faces    Pursuits: " Impaired. Difficult tracking to right field   Visual Fields: Impaired. 0% accuracy.   Poor depth perception and difficulty w/ reaching for objects   Coordination appears impaired due to the above       Upper Extremity Function:  Right Upper Extremity:   Range of Motion and Strength: WFL  Coordination: Impaired. Likely due to depth perception deficit.      Left Upper Extremity:  Range of Motion and Strength: WFL  Coordination: Impaired. Likely due to depth perception deficit. ** more severe than RUE **       Balance:   Static sitting balance: WFL  Dynamic sitting balance:WFL  Static standing balance:Impaired. RW.  Dynamic standing balance:Impaired. RW and Min A for safety.     Therapeutic Positioning  Risk for acquired pressure injuries is increased due to impaired mobility.    OT interventions performed during the course of today's session:   Education was provided on benefits of and recommendations for therapeutic positioning    Skin assessment:  visible skin intact    Findings: no redness or breakdown noted    OT recommendations for therapeutic positioning throughout hospitalization:   Follow [facility] Pressure Injury Prevention Protocol  Geomat recommended for sacral protection while C    Patient Education:  Patient and family were provided with verbal education education regarding OT role/goals/POC, post op precautions, fall prevention, safety awareness, Discharge/DME recommendations, and pressure ulcer prevention.  Understanding was verbalized.     Patient left up in chair with all lines intact, call button in reach, RN notified, and family present.    GOALS:   Multidisciplinary Problems       Occupational Therapy Goals          Problem: Occupational Therapy    Goal Priority Disciplines Outcome Interventions   Occupational Therapy Goal     OT, PT/OT Progressing    Description: Goals to be met by: 3/8/2025     Patient will increase functional independence with ADLs by performing:    UE Dressing with Stand-by  Assistance.  LE Dressing with Stand-by Assistance.  Grooming while standing at sink with Stand-by Assistance.  Toileting from toilet with Stand-by Assistance for hygiene and clothing management.   Toilet transfer to toilet with Stand-by Assistance.                         History:     Past Medical History:   Diagnosis Date    A-fib     Anticoagulant long-term use     Depression     Diabetes mellitus     Hyperlipidemia     Sleep apnea          Past Surgical History:   Procedure Laterality Date    AORTIC VALVE REPLACEMENT      CHOLECYSTECTOMY      COLON RESECTION      fusion      LEFT HEART CATHETERIZATION Left 05/18/2023    Procedure: Left heart cath;  Surgeon: Kahlil Martin MD;  Location: [facility] CATH LAB;  Service: Cardiology;  Laterality: Left;  LHC +/- PCI    TONSILLECTOMY         Time Tracking:     OT Date of Treatment: 02/06/25  OT Start Time: 1353  OT Stop Time: 1435  OT Total Time (min): 42 min    Billable Minutes:Evaluation high complexity     2/6/2025

## 2025-02-06 NOTE — PLAN OF CARE
Problem: Occupational Therapy  Goal: Occupational Therapy Goal  Description: Goals to be met by: 3/8/2025     Patient will increase functional independence with ADLs by performing:    UE Dressing with Stand-by Assistance.  LE Dressing with Stand-by Assistance.  Grooming while standing at sink with Stand-by Assistance.  Toileting from toilet with Stand-by Assistance for hygiene and clothing management.   Toilet transfer to toilet with Stand-by Assistance.    Outcome: Progressing

## 2025-02-07 LAB
ALBUMIN SERPL-MCNC: 2.9 G/DL (ref 3.4–4.8)
ALBUMIN/GLOB SERPL: 0.9 RATIO (ref 1.1–2)
ALP SERPL-CCNC: 146 UNIT/L (ref 40–150)
ALT SERPL-CCNC: 17 UNIT/L (ref 0–55)
ANION GAP SERPL CALC-SCNC: 10 MEQ/L
AST SERPL-CCNC: 20 UNIT/L (ref 5–34)
BASOPHILS # BLD AUTO: 0.03 X10(3)/MCL
BASOPHILS NFR BLD AUTO: 0.5 %
BILIRUB SERPL-MCNC: 0.3 MG/DL
BUN SERPL-MCNC: 34.2 MG/DL (ref 9.8–20.1)
CALCIUM SERPL-MCNC: 8.7 MG/DL (ref 8.4–10.2)
CHLORIDE SERPL-SCNC: 103 MMOL/L (ref 98–107)
CO2 SERPL-SCNC: 22 MMOL/L (ref 23–31)
CREAT SERPL-MCNC: 0.86 MG/DL (ref 0.55–1.02)
CREAT/UREA NIT SERPL: 40
EOSINOPHIL # BLD AUTO: 0.2 X10(3)/MCL (ref 0–0.9)
EOSINOPHIL NFR BLD AUTO: 3.7 %
ERYTHROCYTE [DISTWIDTH] IN BLOOD BY AUTOMATED COUNT: 16.4 % (ref 11.5–17)
GFR SERPLBLD CREATININE-BSD FMLA CKD-EPI: >60 ML/MIN/1.73/M2
GLOBULIN SER-MCNC: 3.4 GM/DL (ref 2.4–3.5)
GLUCOSE SERPL-MCNC: 130 MG/DL (ref 82–115)
HCT VFR BLD AUTO: 40.1 % (ref 37–47)
HGB BLD-MCNC: 11.9 G/DL (ref 12–16)
IMM GRANULOCYTES # BLD AUTO: 0.02 X10(3)/MCL (ref 0–0.04)
IMM GRANULOCYTES NFR BLD AUTO: 0.4 %
INR PPP: 3.7
LYMPHOCYTES # BLD AUTO: 1.72 X10(3)/MCL (ref 0.6–4.6)
LYMPHOCYTES NFR BLD AUTO: 31.4 %
MAGNESIUM SERPL-MCNC: 2.4 MG/DL (ref 1.6–2.6)
MCH RBC QN AUTO: 27.4 PG (ref 27–31)
MCHC RBC AUTO-ENTMCNC: 29.7 G/DL (ref 33–36)
MCV RBC AUTO: 92.4 FL (ref 80–94)
MONOCYTES # BLD AUTO: 0.5 X10(3)/MCL (ref 0.1–1.3)
MONOCYTES NFR BLD AUTO: 9.1 %
NEUTROPHILS # BLD AUTO: 3 X10(3)/MCL (ref 2.1–9.2)
NEUTROPHILS NFR BLD AUTO: 54.9 %
NRBC BLD AUTO-RTO: 0 %
PLATELET # BLD AUTO: 251 X10(3)/MCL (ref 130–400)
PMV BLD AUTO: 10.2 FL (ref 7.4–10.4)
POTASSIUM SERPL-SCNC: 4.4 MMOL/L (ref 3.5–5.1)
PROT SERPL-MCNC: 6.3 GM/DL (ref 5.8–7.6)
PROTHROMBIN TIME: 37 SECONDS (ref 12.5–14.5)
RBC # BLD AUTO: 4.34 X10(6)/MCL (ref 4.2–5.4)
SODIUM SERPL-SCNC: 135 MMOL/L (ref 136–145)
WBC # BLD AUTO: 5.47 X10(3)/MCL (ref 4.5–11.5)

## 2025-02-07 PROCEDURE — 21400001 HC TELEMETRY ROOM

## 2025-02-07 PROCEDURE — 11000001 HC ACUTE MED/SURG PRIVATE ROOM

## 2025-02-07 PROCEDURE — 94760 N-INVAS EAR/PLS OXIMETRY 1: CPT

## 2025-02-07 PROCEDURE — 99900035 HC TECH TIME PER 15 MIN (STAT)

## 2025-02-07 PROCEDURE — 85610 PROTHROMBIN TIME: CPT | Performed by: NURSE PRACTITIONER

## 2025-02-07 PROCEDURE — 83735 ASSAY OF MAGNESIUM: CPT | Performed by: NURSE PRACTITIONER

## 2025-02-07 PROCEDURE — 36415 COLL VENOUS BLD VENIPUNCTURE: CPT | Performed by: INTERNAL MEDICINE

## 2025-02-07 PROCEDURE — 97530 THERAPEUTIC ACTIVITIES: CPT

## 2025-02-07 PROCEDURE — 25000003 PHARM REV CODE 250: Performed by: INTERNAL MEDICINE

## 2025-02-07 PROCEDURE — 80053 COMPREHEN METABOLIC PANEL: CPT | Performed by: INTERNAL MEDICINE

## 2025-02-07 PROCEDURE — 85025 COMPLETE CBC W/AUTO DIFF WBC: CPT | Performed by: INTERNAL MEDICINE

## 2025-02-07 RX ORDER — PANTOPRAZOLE SODIUM 40 MG/1
40 TABLET, DELAYED RELEASE ORAL DAILY
Status: DISCONTINUED | OUTPATIENT
Start: 2025-02-07 | End: 2025-02-07

## 2025-02-07 RX ADMIN — ISOSORBIDE MONONITRATE 30 MG: 30 TABLET, EXTENDED RELEASE ORAL at 08:02

## 2025-02-07 RX ADMIN — ATORVASTATIN CALCIUM 40 MG: 40 TABLET, FILM COATED ORAL at 08:02

## 2025-02-07 RX ADMIN — GABAPENTIN 100 MG: 100 CAPSULE ORAL at 08:02

## 2025-02-07 RX ADMIN — FLUOXETINE HYDROCHLORIDE 40 MG: 20 CAPSULE ORAL at 08:02

## 2025-02-07 RX ADMIN — GABAPENTIN 100 MG: 100 CAPSULE ORAL at 03:02

## 2025-02-07 RX ADMIN — OLANZAPINE 5 MG: 5 TABLET, FILM COATED ORAL at 08:02

## 2025-02-07 RX ADMIN — TRAMADOL HYDROCHLORIDE 50 MG: 50 TABLET, COATED ORAL at 08:02

## 2025-02-07 RX ADMIN — DAPAGLIFLOZIN 10 MG: 10 TABLET, FILM COATED ORAL at 08:02

## 2025-02-07 RX ADMIN — TRAMADOL HYDROCHLORIDE 50 MG: 50 TABLET, COATED ORAL at 11:02

## 2025-02-07 RX ADMIN — LIDOCAINE 5% 1 PATCH: 700 PATCH TOPICAL at 11:02

## 2025-02-07 RX ADMIN — CLONAZEPAM 0.5 MG: 0.5 TABLET ORAL at 09:02

## 2025-02-07 RX ADMIN — TRAMADOL HYDROCHLORIDE 50 MG: 50 TABLET, COATED ORAL at 04:02

## 2025-02-07 NOTE — PROGRESS NOTES
[facility] *    Cardiology  Progress Note    Patient Name: Kayla Ruiz  MRN: [id number]  Admission Date: 2/4/2025  Hospital Length of Stay: 2 days  Code Status: Full Code   Attending Provider: Radhames Acevedo MD   Consulting Provider: Judi Macario NP  Primary Care Physician: Jennifer Chavez, NP-DERIK  Principal Problem:<principal problem not specified>    Patient information was obtained from patient, past medical records, and ER records.     Subjective:     Reason for Consult: Anticoagulation Recommendations    HPI: Ms. Ruiz is a 70 y/o female who is known to [facility], Dr. Leon. The patient presented to [facility] on 2.4.25 with c/o Headache, Double Vision and Circles in Vision. The patient reported that she fell the other day after tripping on a blanket. She denied hitting her head and LOC. She reported that she developed a frontal headache about 2 days ago which was around the fall. She then developed visual disturbances of double vision and circles in her vision. She presented to the ER and a CT of the Head revealed Cortical Based Hypodensities in the Bilateral Occipital Lobes which may represent changes of the Posterior Reversible Encephalopathy Syndrome. MRI of the Brain was recommended for further workup and evaluation. CTA Head/Neck revealed Moderate Atheromatous Calcification of the Cavernous Clinoid and Supra Clinoid segments of the Bilateral Internal Carotid Arteries with Mild-Mod L > R Stenosis. She was admitted to  and Stroke Neurology was Consulted and [facility] was consulted for Anticoagulation Recommendations.     Hospital Course:   2.6.25: NAD, VSS. She deneis SOB CP or nasuea. INR 5.7 today  2.7.25; NAD, VSS, She still reports visual disturbances, She denies SOB, CP, or nausea, INR 3.5 today.     PMH: PAF, Mechanical AV on Warfarin, DM II, HTN, HLD, COPD  PSH: Aortic Valve Replacement, Cholecystectomy, Colon Resection, Angiogram, Tonsillectomy  Family History: Father- COPD, Mother-  COPD, Sister- Cancer, Sister- Cancer   Social History: Current Everyday Smoker, 1ppd for 40+ Years; Denies Illicit Drug and ETOH Use      Previous Diagnostics:  ECHO 10.24.24:   Left Ventricle: The left ventricle is normal in size. Moderately increased wall thickness. There is normal systolic function with a visually estimated ejection fraction of 55 - 60%. There is normal diastolic function.  Right Ventricle: Normal right ventricular cavity size. Systolic function is normal. TAPSE is 2.70 cm.  Left Atrium: Left atrium is moderately dilated.  Aortic Valve: There is a bioprosthetic valve in the aortic position. Aortic valve area by VTI is 2.8 cm². Aortic valve peak velocity is 2.6 m/s. Mean gradient is 14.0 mmHg. The dimensionless index is 0.63. There is trace aortic regurgitation.  Mitral Valve: There is mild stenosis. The mean pressure gradient across the mitral valve is 4 mmHg at a heart rate of  bpm. There is mild regurgitation.  Tricuspid Valve: There is mild regurgitation with a RVSP of 34mmHg.  IVC/SVC: Intermediate venous pressure at 8 mmHg.     LHC 5.11.23  Calcified widely patent coronary anatomy  The mechanical aortic valve demonstrated normal functioning within study limits.  Heavily calcified aortic root  There was heavy mitral annular calcification noted.  Radial approach  The estimated blood loss was none.  Resume warfarin today  Procedure:   Selective coronary angiography  Left ventriculography  Left heart catheterization  Moderate (Conscious) Sedation   Indication: Angina equivalent  Coronary findings: The aortic root was heavily calcified  Dominance: right   Left main: no obstructive disease with <10% epicardial stenosis  Left anterior descending artery:  Patent and highly tortuous Type 2 vessel that gave rise to a very large bifurcating diagonal branch.  No obstructive disease with <10% epicardial stenosis.  Circumflex artery:  Non dominant patent vessel that gives rise to 2 tortuous moderate-sized  obtuse marginal branches.  No obstructive disease with <10% epicardial stenosis  Right coronary artery:  The patent calcified dominant vessel terminates in a large PDA and a small PL segment.  No obstructive disease with <10% epicardial stenosis  Left ventriculography: Not performed due to mechanical AVR  Impression  No evidence of obstructive CAD  Plan   Resume warfarin today and continue GDMT     Holter Monitor 11.21.23  1.This is a good quality study.  2.Predominant rhythm is sinus bradycardia.  3.The minimum heart rate recorded was 48 beats / minute (11/21/2023). The maximum heart rate is 77 beats / minute (11/22/2023). The mean heart rate is 54 beats / minute.  4.Rare premature atrial contractions noted.  5.No evidence of supraventricular tachycardia is noted.  6.No evidence of atrial fibrillation noted.  7.Rare premature ventricular contractions noted.  8.No evidence of ventricular tachycardia is noted.  9.No pauses were noted.  10. IN SINUS BRADYCARDIA 85% OF THE STUDY TIME NO SYMPTOMS REPORTED     Review of patient's allergies indicates:   Allergen Reactions    Ace inhibitors     Naproxen sodium     Nifedipine     Venlafaxine      Current Facility-Administered Medications on File Prior to Encounter   Medication    0.9%  NaCl infusion    diazePAM tablet 10 mg    diphenhydrAMINE capsule 50 mg     Current Outpatient Medications on File Prior to Encounter   Medication Sig    warfarin (COUMADIN) 5 MG tablet Take 5 mg by mouth Daily.    albuterol-ipratropium (DUO-NEB) 2.5 mg-0.5 mg/3 mL nebulizer solution Take 3 mLs by nebulization every 4 (four) hours as needed for Wheezing or Shortness of Breath.    aspirin (ECOTRIN) 81 MG EC tablet Take 81 mg by mouth once daily.    atorvastatin (LIPITOR) 40 MG tablet Take 40 mg by mouth once daily.    clonazePAM (KLONOPIN) 1 MG tablet Take 0.5 mg by mouth 3 (three) times daily as needed for Anxiety.    dapagliflozin propanediol (FARXIGA) 10 mg tablet Take 1 tablet (10 mg  total) by mouth once daily.    FLUoxetine 40 MG capsule Take 40 mg by mouth once daily.    furosemide (LASIX) 40 MG tablet Take 1 tablet (40 mg total) by mouth 2 (two) times daily.    gabapentin (NEURONTIN) 100 MG capsule Take 100 mg by mouth 3 (three) times daily.    HYDROcodone-acetaminophen (NORCO) 7.5-325 mg per tablet Take 1 tablet by mouth every 6 (six) hours as needed for Pain (severe pain only).    isosorbide mononitrate (IMDUR) 30 MG 24 hr tablet Take 1 tablet (30 mg total) by mouth once daily.    LIDOcaine (LIDODERM) 5 % Place 1 patch onto the skin once daily. Remove & Discard patch within 12 hours or as directed by MD    losartan (COZAAR) 25 MG tablet Take 1 tablet (25 mg total) by mouth once daily.    metoprolol succinate (TOPROL-XL) 25 MG 24 hr tablet Take 0.5 tablets (12.5 mg total) by mouth 2 (two) times daily.    MOUNJARO 15 mg/0.5 mL PnIj Inject 15 mg into the skin every 7 days. (Patient not taking: Reported on 1/29/2025)    OLANZapine (ZYPREXA) 5 MG tablet Take 5 mg by mouth every evening.    sotaloL (BETAPACE) 120 MG Tab Take 80 mg by mouth every 12 (twelve) hours. Take 1/2 tablet BID    spironolactone (ALDACTONE) 25 MG tablet Take 1 tablet (25 mg total) by mouth once daily.    traZODone (DESYREL) 50 MG tablet Take 25-50 mg by mouth nightly as needed.     Review of Systems   Constitutional:  Positive for fatigue. Negative for chills.   Respiratory:  Negative for choking and shortness of breath.    Cardiovascular:  Negative for chest pain and palpitations.   Gastrointestinal:  Negative for nausea and vomiting.   Musculoskeletal:  Negative for gait problem.   Neurological:  Negative for weakness.        + Visual disturbance   All other systems reviewed and are negative.    Objective:     Vital Signs (Most Recent):  Temp: 98 °F (36.7 °C) (02/07/25 1107)  Pulse: 78 (02/07/25 1107)  Resp: 20 (02/07/25 1124)  BP: 125/84 (02/07/25 1107)  SpO2: 95 % (02/07/25 1107) Vital Signs (24h Range):  Temp:  [97.3  °F (36.3 °C)-98 °F (36.7 °C)] 98 °F (36.7 °C)  Pulse:  [69-78] 78  Resp:  [16-20] 20  SpO2:  [92 %-96 %] 95 %  BP: (112-135)/(57-84) 125/84   Weight: 91.3 kg (201 lb 3.2 oz)  Body mass index is 32.97 kg/m².  SpO2: 95 %       Intake/Output Summary (Last 24 hours) at 2/7/2025 1157  Last data filed at 2/7/2025 0538  Gross per 24 hour   Intake --   Output 700 ml   Net -700 ml     Lines/Drains/Airways       Peripheral Intravenous Line  Duration                  Peripheral IV - Single Lumen 02/04/25 0000 20 G Left Antecubital 3 days                  Significant Labs:   Chemistries:   Recent Labs   Lab 02/04/25  0800 02/04/25 1917 02/05/25 0329 02/06/25 0309 02/07/25  0356    136  --  140 135*   K 4.4 5.1  --  4.6 4.4    101  --  102 103   CO2 28 20*  --  30 22*   BUN 28.7* 27.1*  --  31.5* 34.2*   CREATININE 0.87 0.98  --  1.26* 0.86   CALCIUM 9.1 9.0  --  9.0 8.7   BILITOT 0.3 0.3  --  0.4 0.3   ALKPHOS 153* 152*  --  154* 146   ALT 19 20  --  21 17   AST 28 29  --  25 20   GLUCOSE 107 174*  --  118* 130*   MG  --   --   --  2.40 2.40   PHOS  --   --   --  4.2  --    TROPONINI  --  0.023 <0.010  --   --         CBC/Anemia Labs: Coags:    Recent Labs   Lab 02/04/25 1917 02/06/25 0309 02/07/25  0356   WBC 6.90 6.66 5.47   HGB 14.1 13.1 11.9*   HCT 46.6 43.7 40.1    310 251   MCV 88.9 90.1 92.4   RDW 16.6 16.6 16.4    Recent Labs   Lab 02/04/25 2122 02/05/25 0329 02/06/25  0309 02/07/25  0356   INR 7.0* 7.0* 5.7* 3.7*   APTT 54.3* 57.9*  --   --         Significant Imaging:  Imaging Results              MRI Brain W WO Contrast (Final result)  Result time 02/05/25 09:56:17      Final result by Norma Leblanc MD (02/05/25 09:56:17)                   Impression:      1. Symmetric restricted diffusion and edema in the bilateral posterior occipital lobes, with small foci in the left parietal lobe and left cerebellum, favored to represent changes of posterior reversible encephalopathy syndrome with  ischemia in the differential.  Follow-up can be obtained to ensure resolution.  2. Small areas of associated hemorrhage in the bilateral occipital lobes.  Finding of intracranial hemorrhage given to Jina HOLT at the time of dictation.      Electronically signed by: Norma Leblanc  Date:    02/05/2025  Time:    09:56               Narrative:    EXAMINATION:  MRI BRAIN W WO CONTRAST    CLINICAL HISTORY:  Mental status change, unknown cause;vision changes x1 week, abnormal CT;    TECHNIQUE:  Multiplanar, multisequence MR images of the brain were obtained with and without administration of intravenous contrast.    COMPARISON:  CT head dated 02/04/2025    FINDINGS:  There is symmetric restricted diffusion and edema in the bilateral posterior occipital lobes, with few small foci in the left parietal lobe and left cerebellum.  There are small areas of associated hemorrhage with likely areas of curvilinear enhancement.  Mild patchy T2/FLAIR hyperintense in the subcortical and periventricular white matter likely represent chronic microvascular ischemic changes.    There is local mass effect.  There is no midline shift or herniation.  The basal cisterns are patent.  There is mild diffuse parenchymal volume loss.  There is no hydrocephalus or abnormal extra-axial fluid collection.                                       CTA Head and Neck (xpd) (Final result)  Result time 02/05/25 08:32:51      Final result by Norma Leblanc MD (02/05/25 08:32:51)                   Impression:      No large vessel occlusion or flow-limiting stenosis.    No significant change from the [facility] interpretation.      Electronically signed by: Norma Leblanc  Date:    02/05/2025  Time:    08:32               Narrative:    EXAMINATION:  CTA HEAD AND NECK (XPD)    CLINICAL HISTORY:  Stroke/TIA, determine embolic source;    TECHNIQUE:  Axial images obtained through the cervical region and [place] before and after the administration of  intravenous contrast.    Coronal, sagittal, MIP and 3D reconstructions were obtained from the axial data.    Automatic exposure control was utilized to limit radiation dose.    Radiation Dose:    Total DLP: 2870 mGy*cm    COMPARISON:  CT head dated 02/04/2025    FINDINGS:  Head CT with contrast:    No interval changes when compared to the previous CT.    No significant enhancing abnormalities.    If present, stenosis of the carotid bulbs is measured based on NASCET criteria,    i.e. area of maximal stenosis compared to the cervical ICA distal to the bulb.    Cervical CTA:    The origins of the great vessels are patent with moderate calcifications    The common carotid arteries are patent.  There are mild calcifications at the carotid bulbs without hemodynamically significant stenosis.  The internal carotid arteries are patent.    The vertebral arteries are patent    Intracranial CTA:    There are calcifications along the course the carotid siphons with mild narrowing bilaterally.  The middle cerebral arteries and anterior cerebral arteries are patent    The vertebral arteries, basilar artery and posterior cerebral arteries are patent.    The dural venous sinuses are patent.                        Preliminary result by Albert Cruz Jr., MD (02/05/25 04:10:05)                   Impression:    1. Moderate atheromatous calcification of the cavernous clinoid and supraclinoid segments of the bilateral internal carotid arteries is seen with mild to moderate left greater than right stenosis (Series 26 Images 200-216).  2. The previously noted hypodensities in the bilateral occipital lobes previously ascribed to posterior reversible encephalopathy are again seen stable from prior without associated abnormal intracranial enhancement on post-contrast images.  3. Details and other findings as described above.               Narrative:    START OF REPORT:  Technique: CT angiogram of the intracranial and neck vessels was  performed with intravenous contrast with direct axial as well as sagittal and coronal reformations.    Comparison: Comparison is with study [id number] 18:22:03.    Clinical history: Blurry vision.    Findings:  Intracranial Vascular structures:  Internal carotid arteries: Moderate atheromatous calcification of the cavernous clinoid and supraclinoid segments of the bilateral internal carotid arteries is seen with mild to moderate left greater than right stenosis (Series 26 Images 200-216).  Middle cerebral arteries: Unremarkable.  Anterior cerebral arteries: Unremarkable.  Vertebral arteries: Mild atheromatous change of the bilateral vertebral arteries is seen with no significant stenosis.  Basilar artery: Unremarkable.  Posterior cerebral arteries: Unremarkable.  Posterior communicating arteries: Unremarkable.  Neck Vascular structures: Mild atheromatous calcification with no significant stenosis is seen at the origin of the brachiocephalic left subclavian left common carotid arteries.  Carotids:  Common carotid arteries: The left common carotid artery appears unremarkable.  Right common carotid artery: Mild atheromatous calcification of the mid distal segment of the right common carotid artery is seen with no significant stenosis.  Internal carotid artery: Mild atheromatous calcification with mild stenosis at the origin of the bilateral internal carotid arteries is seen.  Vertebral arteries: Unremarkable.  Jugular Veins and venous sinuses: Unremarkable.  Hemorrhage: No acute intracranial hemorrhage is seen.  CSF spaces: The ventricles, sulci and basal cisterns all appear moderately prominent consistent with global cerebral atrophy stable from prior.  Brain parenchyma: No acute infarct is identified. Mild stable appearing scattered microvascular change is seen in portions of the periventricular and deep white matter tracts. The previously noted hypodensities in the bilateral occipital lobes previously ascribed  to posterior reversible encephalopathy are again seen stable from prior without associated abnormal intracranial enhancement on post-contrast images.  Cerebellum: Unremarkable.  Vascular: Unremarkable venous sinuses.  Sella and skull base: The sella appears to be within normal limits.  Cerebellopontine angles: Within normal limits.  Herniation: None.  Intracranial calcifications: Incidental note is made of bilateral choroid plexus calcification. Incidental note is made of some pineal region calcification. Incidental note is made of some calcification of the falx.                                         X-Ray Chest AP Portable (Final result)  Result time 02/05/25 09:08:25      Final result by Bandar Vera MD (02/05/25 09:08:25)                   Impression:      No acute chest disease is identified.      Electronically signed by: Bandar Vera  Date:    02/05/2025  Time:    09:08               Narrative:    EXAMINATION:  XR CHEST AP PORTABLE    CLINICAL HISTORY:  , Unspecified fall, initial encounter.    COMPARISON:  January 25, 2025    FINDINGS:  No alveolar consolidation, effusion, or pneumothorax is seen.   The thoracic aorta is normal  cardiac silhouette, central pulmonary vessels and mediastinum are normal in size and are grossly unremarkable.   visualized osseous structures are grossly unremarkable.                                       CT Cervical Spine Without Contrast (Final result)  Result time 02/04/25 21:25:00      Final result by Juanito Chopra MD (02/04/25 21:25:00)                   Impression:      No acute fracture or malalignment identified.      Electronically signed by: Juanito Chopra  Date:    02/04/2025  Time:    21:25               Narrative:    EXAMINATION:  CT CERVICAL SPINE WITHOUT CONTRAST    CLINICAL HISTORY:  Trauma.    TECHNIQUE:  Multidetector axial images were performed of the cervical spine without and.  Images were reconstructed.    Automated exposure control was utilized  to minimize radiation dose.  .    COMPARISON:  None available.    FINDINGS:  Cervical vertebrae stature is maintained.  There is grade 1 anterolisthesis of C4 on C5 and C7 on T1.  Solid ACDF at C5, C6 and C7.  No acute fracture or malalignment identified.  There is moderate degenerative narrowing of the left neural foramen at C4-C5.  There is no prevertebral soft tissue prominence.    This study does not exclude the possibility of intrathecal soft tissue, ligamentous or vascular injury.                                       CT Head Without Contrast (Final result)  Result time 02/04/25 18:42:34      Final result by Norma Leblanc MD (02/04/25 18:42:34)                   Impression:      Cortical based hypodensities in the bilateral occipital lobes, may represent changes of posterior reversible encephalopathy syndrome.  MRI brain is available for further evaluation.      Electronically signed by: Norma Leblanc  Date:    02/04/2025  Time:    18:42               Narrative:    EXAMINATION:  CT HEAD WITHOUT CONTRAST    CLINICAL HISTORY:  Head trauma, minor (Age >= 65y);    TECHNIQUE:  Axial scans were obtained from skull base to the vertex.    Coronal and sagittal reconstructions obtained from the axial data.    Automatic exposure control was utilized to limit radiation dose.    Contrast: None    Radiation Dose:    Total DLP: 934 mGy*cm    COMPARISON:  CT head dated 01/09/2025    FINDINGS:  There is no acute intracranial hemorrhage.  There are cortical based hypodensities in the bilateral occipital lobes.  Patchy hypodensities in the subcortical and periventricular white matter likely represent chronic microvascular ischemic changes.    There is local mass effect.  There is no midline shift or herniation.  The basal cisterns are patent.  There is diffuse parenchymal volume loss.  There is no hydrocephalus or abnormal extra-axial fluid collection.  Carotid and vertebral artery calcifications are noted.  The  calvarium and skull base are intact.                                    EKG:       Telemetry: SR    Physical Exam  Constitutional:       Appearance: Normal appearance.   HENT:      Head: Normocephalic.   Cardiovascular:      Rate and Rhythm: Normal rate and regular rhythm.      Pulses: Normal pulses.      Comments: + Mechanical Click  Pulmonary:      Effort: Pulmonary effort is normal. No respiratory distress.      Breath sounds: Normal breath sounds.      Comments: RA  Abdominal:      Palpations: Abdomen is soft.   Musculoskeletal:         General: No swelling. Normal range of motion.      Right lower leg: No edema.      Left lower leg: No edema.   Skin:     General: Skin is warm and dry.   Neurological:      General: No focal deficit present.      Mental Status: She is alert and oriented to person, place, and time. Mental status is at baseline.      Comments: Transient Confused Conversation   Psychiatric:         Mood and Affect: Mood normal.         Behavior: Behavior normal.         Judgment: Judgment normal.       Home Medications:   Current Facility-Administered Medications on File Prior to Encounter   Medication Dose Route Frequency Provider Last Rate Last Admin    0.9%  NaCl infusion   Intravenous Once Kahlil Martin MD        diazePAM tablet 10 mg  10 mg Oral On Call Procedure Kahlil Martin MD   10 mg at 05/18/23 0911    diphenhydrAMINE capsule 50 mg  50 mg Oral On Call Procedure Kahlil Martin MD   50 mg at 05/18/23 0911     Current Outpatient Medications on File Prior to Encounter   Medication Sig Dispense Refill    warfarin (COUMADIN) 5 MG tablet Take 5 mg by mouth Daily.      albuterol-ipratropium (DUO-NEB) 2.5 mg-0.5 mg/3 mL nebulizer solution Take 3 mLs by nebulization every 4 (four) hours as needed for Wheezing or Shortness of Breath.      aspirin (ECOTRIN) 81 MG EC tablet Take 81 mg by mouth once daily.      atorvastatin (LIPITOR) 40 MG tablet Take 40 mg by mouth once daily.       clonazePAM (KLONOPIN) 1 MG tablet Take 0.5 mg by mouth 3 (three) times daily as needed for Anxiety.      dapagliflozin propanediol (FARXIGA) 10 mg tablet Take 1 tablet (10 mg total) by mouth once daily. 30 tablet 5    FLUoxetine 40 MG capsule Take 40 mg by mouth once daily.      furosemide (LASIX) 40 MG tablet Take 1 tablet (40 mg total) by mouth 2 (two) times daily. 60 tablet 11    gabapentin (NEURONTIN) 100 MG capsule Take 100 mg by mouth 3 (three) times daily.      HYDROcodone-acetaminophen (NORCO) 7.5-325 mg per tablet Take 1 tablet by mouth every 6 (six) hours as needed for Pain (severe pain only). 12 tablet 0    isosorbide mononitrate (IMDUR) 30 MG 24 hr tablet Take 1 tablet (30 mg total) by mouth once daily. 60 tablet 0    LIDOcaine (LIDODERM) 5 % Place 1 patch onto the skin once daily. Remove & Discard patch within 12 hours or as directed by MD 15 patch 0    losartan (COZAAR) 25 MG tablet Take 1 tablet (25 mg total) by mouth once daily. 90 tablet 3    metoprolol succinate (TOPROL-XL) 25 MG 24 hr tablet Take 0.5 tablets (12.5 mg total) by mouth 2 (two) times daily. 90 tablet 3    MOUNJARO 15 mg/0.5 mL PnIj Inject 15 mg into the skin every 7 days. (Patient not taking: Reported on 1/29/2025)      OLANZapine (ZYPREXA) 5 MG tablet Take 5 mg by mouth every evening.      sotaloL (BETAPACE) 120 MG Tab Take 80 mg by mouth every 12 (twelve) hours. Take 1/2 tablet BID      spironolactone (ALDACTONE) 25 MG tablet Take 1 tablet (25 mg total) by mouth once daily. 30 tablet 11    traZODone (DESYREL) 50 MG tablet Take 25-50 mg by mouth nightly as needed.       Current Schedule Inpatient Medications:   atorvastatin  40 mg Oral Daily    dapagliflozin propanediol  10 mg Oral Daily    FLUoxetine  40 mg Oral Daily    gabapentin  100 mg Oral TID    isosorbide mononitrate  30 mg Oral Daily    LIDOcaine  1 patch Transdermal Q24H    OLANZapine  5 mg Oral QHS     Assessment:   [place]  Mechanical Fall at Ground Level tripping over  [place]  PAF    - CHADsVASc - 6 Points - 9.7% Stroke Risk per Year     - Warfarin as OP  Supratherapeutic INR     - INR (2.7.25) - 3.5 today     - INR on Admission (2.4.25) - 8.8  Hx of Mechanical Aortic Valve    - PT/INR Goal Range: 2.5-3.5  DM II  HTN  HLD  COPD  Tobacco Use  No Hx of GIB     Plan:   Daily PT/INR  Resume Warfarin at a reduced dose at some point when OK with Neurology for INR Goal 2.0-3.0 in the Setting of AF/Stroke Risk Reduction and Mechanical AVR  Will Continue to Follow  Labs and EKG in AM: CBC, CMP, PT/INR and Mg     Thank you for your consult.     Judi Macario, LORETO  Cardiology  [facility]     Physician addendum:  I have seen and examined this patient as a split-shared visit with the ARDEN d/t complicated medical management of above problems written in assessment and high acuity requiring physician expertise in medical decision-making. I performed the substantive portion of the history and exam. Above medical decision-making is also formulated by me.    Cardiovascular exam:  S1, S2  Lungs:  fine crackles at bases.  Extremities:  + edema bilaterally    Plan:  Medications as above.  Continue supportive therapy.     Brendan Wang MD  Cardiologist

## 2025-02-07 NOTE — PT/OT/SLP PROGRESS
"Physical Therapy Treatment    Patient Name:  Kayla Ruiz   MRN:  [id number]    Recommendations:     Discharge therapy intensity: Moderate Intensity Therapy   Discharge Equipment Recommendations: walker, rolling  Barriers to discharge: Impaired mobility and Ongoing medical needs    Assessment:     Kayla Ruiz is a 69 y.o. female admitted with a medical diagnosis of Bilateral posterior occipital lobe ischemic CVA, Supratherapeutic INR, Unintentional Tylenol overdose, VHD s/p mechanical AVR, on Coumadin , PAF , Diabetes mellitus type II, Essential HTN , h/o COPD and Tobacco abuse.  She presents with the following impairments/functional limitations: weakness, impaired endurance, gait instability, decreased lower extremity function, impaired functional mobility, impaired self care skills, decreased coordination, visual deficits, impaired balance, pain, orthopedic precautions.    Rehab Prognosis: Good; patient would benefit from acute skilled PT services to address these deficits and reach maximum level of function.    Recent Surgery: * No surgery found *      Plan:     During this hospitalization, patient would benefit from acute PT services 6 x/week to address the identified rehab impairments via gait training, therapeutic activities, therapeutic exercises, neuromuscular re-education and progress toward the following goals:    Plan of Care Expires:  03/06/25    Subjective     Chief Complaint: "wobbly"  Patient/Family Comments/goals: get better  Pain/Comfort:  Pain Rating 1: 0/10      Objective:     Communicated with nsg prior to session.  Patient found HOB elevated with peripheral IV, pulse ox (continuous), telemetry, PureWick upon PT entry to room.     General Precautions: Standard, fall  Orthopedic Precautions: spinal precautions  Braces: Hinged knee brace, TLSO  Respiratory Status: Nasal cannula, flow 2 L/min, 97% SpO2  Blood Pressure: 130/77 sitting EOB, 122/63 sitting in recliner after complaints of " dizziness  Skin Integrity: Visible skin intact      Functional Mobility:  Bed Mobility:     Supine to Sit: moderate assistance  Transfers:     Sit to Stand:  minimum assistance with rolling walker  Step transfer: Mod A using RW; noted shakiness and unsteadiness  Gait: Pt amb 10ft Min A using RW demo step through pattern, RLE in extension (due to knee brace), and mild ataxia. Improvement in gait quality with increased distance.     Therapeutic Activities/Exercises:   TSLO and hinged knee brace donned in supine prior to OOB mobility  Upon sitting patient with complaints of dizziness. BP assessed; WFL. Pt then agreeable to transfer to bed side chair. Pt seated in chair for a brief period of time for sitting rest break. Pt then ambulated a short distance in room. Pt left up in bed side chair with spouse present.     Education:  Patient and spouse were provided with verbal education education regarding PT role/goals/POC, post-op precautions, fall prevention, safety awareness, and discharge/DME recommendations.  Understanding was verbalized, however additional teaching warranted.     Patient left up in chair with all lines intact, call button in reach, nsg notified, and spouse present    GOALS:   Multidisciplinary Problems       Physical Therapy Goals          Problem: Physical Therapy    Goal Priority Disciplines Outcome Interventions   Physical Therapy Goal     PT, PT/OT Progressing    Description: Goals to be met by: 3/6/2025     Patient will increase functional independence with mobility by performin. Supine to sit with Modified [place]  2. Sit to supine with Modified Be[place]  3. Rolling to Left and Right with Modified Be[place]. (Log roll)  4. Sit to stand transfer with Stand-by Assistance  5. Bed to chair transfer with Stand-by Assistance using Rolling Walker  6. Gait  x 50 feet with Stand-by Assistance using Rolling Walker.                          Time Tracking:     PT Received On: 25  PT  Start Time: 1500     PT Stop Time: 1533  PT Total Time (min): 33 min     Billable Minutes: Therapeutic Activity 33    Treatment Type: Treatment  PT/PTA: PT     Number of PTA visits since last PT visit: 1 02/07/2025

## 2025-02-07 NOTE — PROGRESS NOTES
[facility] MEDICINE ~ PROGRESS NOTE        CHIEF COMPLAINT   Hospital follow up    HOSPITAL COURSE   69 y.o. female with a history that includes PAF and mechanical AVR, on Coumadin therapy was referred to ED by her Cardiology due to INR of 8.8 on outpatient labs.  Additional complaints include headache and vision changes, though denied focal weakness or speech changes.  CT head performed and revealed cortical based hypodensities in the bilateral occipital lobes.  CTA revealed moderate atheromatous calcification of the cavernous clinoid and supra clinoid segments of the bilateral internal carotid arteries with mild-to-moderate left greater than right stenosis.  A subsequent MRI brain has noted Symmetric restricted diffusion and edema in the bilateral posterior occipital lobes, with small foci in the left parietal lobe and left cerebellum, favored to represent changes of posterior reversible encephalopathy syndrome, and additionally noted small areas of hemorrhage in B/L occipital lobes.      Neurology was consulted and neurologist felt she had bilateral occipital lobe infarcts secondary to being subtherapeutic with clot formation and then had embolism which led to the vision loss.  Recommends resuming warfarin once cleared by Cardiology.    After further review it was deemed that she was taking excessive Tylenol about 2 to 3 500 mg tablets at a time every 4 hours per the  report.  She was taking accepted Tylenol use because she could not get anyone to prescribed Norco for her.    Today  Seen and examined this morning.  X-ray shows L1 compression fracture with progressive loss of height.  Consulted Neurosurgery for recommendations.  She can get kyphoplasty right now due to her anticoagulation.  States that the pain in the back is about a 7/10.  Ultram seems to be helping.        OBJECTIVE/PHYSICAL EXAM     VITAL SIGNS (MOST RECENT):  Temp: 97.3 °F (36.3 °C) (02/07/25  0327)  Pulse: 69 (02/07/25 0327)  Resp: 19 (02/07/25 0421)  BP: 112/61 (02/07/25 0327)  SpO2: (!) 92 % (02/07/25 0327) VITAL SIGNS (24 HOUR RANGE):  Temp:  [96.5 °F (35.8 °C)-97.9 °F (36.6 °C)] 97.3 °F (36.3 °C)  Pulse:  [68-78] 69  Resp:  [16-20] 19  SpO2:  [92 %-96 %] 92 %  BP: (112-135)/(57-81) 112/61   GENERAL: In no acute distress, afebrile  HEENT:  CHEST: Clear to auscultation bilaterally  HEART: S1, S2, no appreciable murmur  ABDOMEN: Soft, nontender, BS +  MSK: Warm, no lower extremity edema, no clubbing or cyanosis  NEUROLOGIC:  Blind out of the left eye, right eye with seeing multiple of the same, extraocular muscles seems to be intact, good strength in all 4 extremities   INTEGUMENTARY:  PSYCHIATRY:        ASSESSMENT/PLAN   Bilateral posterior occipital lobe ischemic CVA  Supratherapeutic INR  Unintentional Tylenol overdose  L1 compression fracture with progressive loss of height  VHD s/p mechanical AVR, on Coumadin   PAF   Diabetes mellitus type II   Essential HTN   h/o COPD   Tobacco abuse      Family requested x-ray and Neurosurgery evaluation inpatient since her appointment is coming up soon.    Stroke Neurology signed off.  Recommends resuming Coumadin once okay.    Continue to hold Coumadin for now and monitor daily INR.  Goal INR 2.5 dash 3.5.  Cardiology following.  Per the  she was getting anywhere between 6 g to 9 g of Tylenol per day.  This is likely the cause of her supratherapeutic INR.    Continue pain control with tramadol, back brace per NSGY  Okay to work with PT and OT  Case management consult for SNF placement    DVT prophylaxis:  Supratherapeutic INR    Anticipated discharge and disposition:   __________________________________________________________________________    NUTRITIONAL STATUS     Patient meets ASPEN criteria for   malnutrition of   per RD assessment as evidenced by:                       A minimum of two characteristics is recommended for diagnosis of either severe  or non-severe malnutrition.     LABS/MICRO/MEDS/DIAGNOSTICS       LABS  Recent Labs     02/07/25  0356   *   K 4.4   CO2 22*   BUN 34.2*   CREATININE 0.86   GLUCOSE 130*   CALCIUM 8.7   ALKPHOS 146   AST 20   ALT 17   ALBUMIN 2.9*     Recent Labs     02/07/25  0356   WBC 5.47   RBC 4.34   HCT 40.1   MCV 92.4          MICROBIOLOGY  Microbiology Results (last 7 days)       ** No results found for the last 168 hours. **               MEDICATIONS   atorvastatin  40 mg Oral Daily    dapagliflozin propanediol  10 mg Oral Daily    FLUoxetine  40 mg Oral Daily    gabapentin  100 mg Oral TID    isosorbide mononitrate  30 mg Oral Daily    LIDOcaine  1 patch Transdermal Q24H    OLANZapine  5 mg Oral QHS         INFUSIONS         DIAGNOSTIC TESTS  X-Ray Thoracolumbar Spine AP Lateral   Final Result      L1 compression fracture with progressive loss of height compared to 01/09/2025.         Electronically signed by: Norma Leblanc   Date:    02/06/2025   Time:    13:33      MRI Brain W WO Contrast   Final Result      1. Symmetric restricted diffusion and edema in the bilateral posterior occipital lobes, with small foci in the left parietal lobe and left cerebellum, favored to represent changes of posterior reversible encephalopathy syndrome with ischemia in the differential.  Follow-up can be obtained to ensure resolution.   2. Small areas of associated hemorrhage in the bilateral occipital lobes.   Finding of intracranial hemorrhage given to Jina HOLT at the time of dictation.         Electronically signed by: Norma Leblanc   Date:    02/05/2025   Time:    09:56      CTA Head and Neck (xpd)   Final Result      No large vessel occlusion or flow-limiting stenosis.      No significant change from the [facility] interpretation.         Electronically signed by: Norma Leblanc   Date:    02/05/2025   Time:    08:32      X-Ray Chest AP Portable   Final Result      No acute chest disease is identified.          Electronically signed by: Bandar Vera   Date:    02/05/2025   Time:    09:08      CT Cervical Spine Without Contrast   Final Result      No acute fracture or malalignment identified.         Electronically signed by: Juanito Cohpra   Date:    02/04/2025   Time:    21:25      CT Head Without Contrast   Final Result      Cortical based hypodensities in the bilateral occipital lobes, may represent changes of posterior reversible encephalopathy syndrome.  MRI brain is available for further evaluation.         Electronically signed by: Norma Leblanc   Date:    02/04/2025   Time:    18:42           Echo    Result Date: 2/5/2025    Left Ventricle: The left ventricle is normal in size. Normal wall   thickness. There is normal systolic function with a visually estimated   ejection fraction of 55 - 60%. Grade I diastolic dysfunction.    Right Ventricle: Normal right ventricular cavity size. Wall thickness   is normal. Systolic function is normal. TAPSE is 1.83 cm.    Left Atrium: Left atrium is moderately dilated. Agitated saline study   of the atrial septum is negative, suggesting absence of intracardiac shunt   at the atrial level.    Aortic Valve: There is a mechanical valve in the aortic position.   Aortic valve area by VTI is 1.6 cm². Aortic valve peak velocity is 2.9   m/s. Mean gradient is 16 mmHg. The dimensionless index is 0.53.    Mitral Valve: There is mitral annular calcification present. There is   trace regurgitation.    IVC/SVC: Normal venous pressure at 3 mmHg.    Consider KATERIN              Case related differential diagnoses have been reviewed; assessment and plan has been documented. I have personally reviewed the labs and test results that are currently available; I have reviewed the patients medication list. I have reviewed the consulting providers recommendations. I have reviewed or attempted to review medical records based upon their availability.  All of the patient's and/or family's questions  have been addressed and answered to the best of my ability.  I will continue to monitor closely and make adjustments to medical management as needed.  This document was created using M*Modal Fluency Direct.  Transcription errors may have been made.  Please contact me if any questions may rise regarding documentation to clarify transcription.        Jorge Pham MD   Internal Medicine  Department of [facility] Medicine  [facility] - Neurology

## 2025-02-07 NOTE — CONSULTS
[facility] - Neurology  Neurosurgery  Consult Note    Inpatient consult to Neurosurgery  Consult performed by: Juli Leong PA  Consult ordered by: Jorge Pham MD        Subjective:     Chief Complaint/Reason for Admission: L1 VCF with increased MARI, back pain    History of Present Illness: Patient is a 69 y.o. female with a PMHx significant for PAF and mechanical AVR, on Coumadin therapy was referred to ED on 2/4 by her Cardiology due to INR of 8.8 on outpatient labs. Additional complaints include headache and vision changes, though denied focal weakness or speech changes.  CT head performed and revealed cortical based hypodensities in the bilateral occipital lobes. CTA revealed moderate atheromatous calcification of the cavernous clinoid and supra clinoid segments of the bilateral internal carotid arteries with mild-to-moderate left greater than right stenosis.  A subsequent MRI brain has noted symmetric restricted diffusion and edema in the bilateral posterior occipital lobes, with small foci in the left parietal lobe and left cerebellum, favored to represent changes of posterior reversible encephalopathy syndrome, and additionally noted small areas of hemorrhage in B/L occipital lobes.       Neurology was consulted and neurologist felt she had bilateral occipital lobe infarcts secondary to being subtherapeutic with clot formation and then had embolism which led to the vision loss. Recommended resuming warfarin once cleared by Cardiology.     After further review it was deemed that she was taking excessive Tylenol about 2 to 3 500 mg tablets at a time every 4 hours per the  report.  She was taking accepted Tylenol use because she could not get anyone to prescribed Norco for her back pain. Patient was diagnosed with a L1 VCF on 1/9/25. She was being treated conservatively in a TLSO brace. XR of L spine during this hospitalization shows fx with progressive MARI. Dr. Jordan has been  reconsulted for evaluation and treatment recommendations.    On PE today she is lying in bed, NAD. She continues with low back pain, somewhat relieved with meds. She denies numbness and tingling in bilateral LE. She denies saddle anesthesia and bladder and bowel dysfunction.    PTA Medications   Medication Sig    warfarin (COUMADIN) 5 MG tablet Take 5 mg by mouth Daily.    albuterol-ipratropium (DUO-NEB) 2.5 mg-0.5 mg/3 mL nebulizer solution Take 3 mLs by nebulization every 4 (four) hours as needed for Wheezing or Shortness of Breath.    aspirin (ECOTRIN) 81 MG EC tablet Take 81 mg by mouth once daily.    atorvastatin (LIPITOR) 40 MG tablet Take 40 mg by mouth once daily.    clonazePAM (KLONOPIN) 1 MG tablet Take 0.5 mg by mouth 3 (three) times daily as needed for Anxiety.    dapagliflozin propanediol (FARXIGA) 10 mg tablet Take 1 tablet (10 mg total) by mouth once daily.    FLUoxetine 40 MG capsule Take 40 mg by mouth once daily.    furosemide (LASIX) 40 MG tablet Take 1 tablet (40 mg total) by mouth 2 (two) times daily.    gabapentin (NEURONTIN) 100 MG capsule Take 100 mg by mouth 3 (three) times daily.    HYDROcodone-acetaminophen (NORCO) 7.5-325 mg per tablet Take 1 tablet by mouth every 6 (six) hours as needed for Pain (severe pain only).    isosorbide mononitrate (IMDUR) 30 MG 24 hr tablet Take 1 tablet (30 mg total) by mouth once daily.    LIDOcaine (LIDODERM) 5 % Place 1 patch onto the skin once daily. Remove & Discard patch within 12 hours or as directed by MD    losartan (COZAAR) 25 MG tablet Take 1 tablet (25 mg total) by mouth once daily.    metoprolol succinate (TOPROL-XL) 25 MG 24 hr tablet Take 0.5 tablets (12.5 mg total) by mouth 2 (two) times daily.    MOUNJARO 15 mg/0.5 mL PnIj Inject 15 mg into the skin every 7 days. (Patient not taking: Reported on 1/29/2025)    OLANZapine (ZYPREXA) 5 MG tablet Take 5 mg by mouth every evening.    sotaloL (BETAPACE) 120 MG Tab Take 80 mg by mouth every 12  (twelve) hours. Take 1/2 tablet BID    spironolactone (ALDACTONE) 25 MG tablet Take 1 tablet (25 mg total) by mouth once daily.    traZODone (DESYREL) 50 MG tablet Take 25-50 mg by mouth nightly as needed.       Review of patient's allergies indicates:   Allergen Reactions    Ace inhibitors     Naproxen sodium     Nifedipine     Venlafaxine        Past Medical History:   Diagnosis Date    A-fib     Anticoagulant long-term use     Depression     Diabetes mellitus     Hyperlipidemia     Sleep apnea      Past Surgical History:   Procedure Laterality Date    AORTIC VALVE REPLACEMENT      CHOLECYSTECTOMY      COLON RESECTION      fusion      LEFT HEART CATHETERIZATION Left 05/18/2023    Procedure: Left heart cath;  Surgeon: Kahlil Martin MD;  Location: [facility] CATH LAB;  Service: Cardiology;  Laterality: Left;  LHC +/- PCI    TONSILLECTOMY       Family History       Problem Relation (Age of Onset)    COPD Mother    Heart disease Father    Hypertension Mother          Tobacco Use    Smoking status: Every Day     Current packs/day: 1.00     Average packs/day: 1 pack/day for 50.0 years (50.0 ttl pk-yrs)     Types: Cigarettes    Smokeless tobacco: Never   Substance and Sexual Activity    Alcohol use: Never    Drug use: Never    Sexual activity: Not on file     Review of Systems  Objective:   12 pt ROS WNL, except for HPI    Weight: 91.3 kg (201 lb 3.2 oz)  Body mass index is 32.97 kg/m².  Vital Signs (Most Recent):  Temp: 97.3 °F (36.3 °C) (02/07/25 0327)  Pulse: 69 (02/07/25 0327)  Resp: 19 (02/07/25 0421)  BP: 112/61 (02/07/25 0327)  SpO2: 96 % (02/07/25 0933) Vital Signs (24h Range):  Temp:  [96.5 °F (35.8 °C)-97.9 °F (36.6 °C)] 97.3 °F (36.3 °C)  Pulse:  [68-78] 69  Resp:  [16-20] 19  SpO2:  [92 %-96 %] 96 %  BP: (112-135)/(57-81) 112/61       Neurosurgery Physical Exam  Nursing note and vitals reviewed.     Constitutional: She appears well-developed and well-nourished. She is not diaphoretic. No distress.      Eyes:  Pupils are equal, round, and reactive to light. Conjunctivae and EOM are normal.      Cardiovascular: Normal rate.      Abdominal: Soft.      Skin: Skin displays no rash on trunk. Skin displays no lesions on trunk.      Psych/Behavior: She is alert. She is oriented to person, place, and time. She has a normal mood and affect.      Musculoskeletal:        Back: There is tenderness. Tenderness is located Lumbar region.        Right Upper Extremities: Muscle strength is 5/5.        Left Upper Extremities: Muscle strength is 5/5.       Right Lower Extremities: Range of motion is limited. Muscle strength is 5/5.        Left Lower Extremities: Muscle strength is 5/5.      Comments: 5/5 to all extremities, sensation intact  No paresthesias     No Colmenares's  No clonus  Negative Babinski  No saddle anesthesia     Mild-to-moderate lower back pain on initiation of movement      Neurological:        Sensory: There is no sensory deficit in the trunk. There is no sensory deficit in the extremities.        DTRs: DTRs are DTRS NORMAL AND SYMMETRICnormal and symmetric. She displays no Babinski's sign on the right side. She displays no Babinski's sign on the left side.        Cranial nerves: Cranial nerve(s) II, III, IV, V, VI, VII, VIII, IX, X, XI and XII are intact.     Significant Labs:  Recent Labs   Lab 02/06/25  0309 02/07/25 0356    135*   K 4.6 4.4    103   CO2 30 22*   BUN 31.5* 34.2*   CREATININE 1.26* 0.86   CALCIUM 9.0 8.7   MG 2.40 2.40     Recent Labs   Lab 02/06/25  0309 02/07/25  0356   WBC 6.66 5.47   HGB 13.1 11.9*   HCT 43.7 40.1    251     Recent Labs   Lab 02/06/25  0309 02/07/25  0356   INR 5.7* 3.7*     Microbiology Results (last 7 days)       ** No results found for the last 168 hours. **          Significant Diagnostics:  X-Ray Thoracolumbar Spine AP Lateral [[id number]] Resulted: 02/06/25 1333   Order Status: Completed Updated: 02/06/25 1335   Narrative:     EXAMINATION:  XR THORACOLUMBAR  SPINE AP LATERAL    CLINICAL HISTORY:  Follow-up compression fracture L1;    COMPARISON:  CT lumbar spine dated 01/09/2025    FINDINGS:  There is an L1 superior endplate compression fracture with mild-to-moderate loss of height, progressed from the prior exam.  The remaining vertebral body heights are preserved.  There are small multilevel marginal osteophytes.  There is mild facet arthropathy.  Vascular calcifications are noted.   Impression:       L1 compression fracture with progressive loss of height compared to 01/09/2025.       Assessment/Plan:     Active Diagnoses:    Diagnosis Date Noted POA    Stroke [I63.9] 02/05/2025 Yes      Problems Resolved During this Admission:     Patient with L1 VCF, continued back pain. She denies radicular complaints.  XR shows progressive MARI compared to previous imaging  Patient is not a good candidate for neurosurgical intervention given her multiple co-morbidities.  Would recommend consult to IR for kyphoplasty if anticoagulation could be held for this procedure.  Continue PT/OT  Continue TLSO when OOB    Thank you for your consult.     SHIRLEY Aguilar  Neurosurgery  [facility] - Neurology

## 2025-02-07 NOTE — PLAN OF CARE
Problem: Adult Inpatient Plan of Care  Goal: Plan of Care Review  Outcome: Progressing  Goal: Patient-Specific Goal (Individualized)  Outcome: Progressing  Goal: Absence of Hospital-Acquired Illness or Injury  Outcome: Progressing  Goal: Optimal Comfort and Wellbeing  Outcome: Progressing  Goal: Readiness for Transition of Care  Outcome: Progressing     Problem: Infection  Goal: Absence of Infection Signs and Symptoms  Outcome: Progressing     Problem: Stroke, Ischemic (Includes Transient Ischemic Attack)  Goal: Optimal Coping  Outcome: Progressing  Goal: Effective Bowel Elimination  Outcome: Progressing  Goal: Optimal Cerebral Tissue Perfusion  Outcome: Progressing  Goal: Optimal Cognitive Function  Outcome: Progressing  Goal: Improved Communication Skills  Outcome: Progressing  Goal: Optimal Functional Ability  Outcome: Progressing  Goal: Optimal Nutrition Intake  Outcome: Progressing  Goal: Effective Oxygenation and Ventilation  Outcome: Progressing  Goal: Improved Sensorimotor Function  Outcome: Progressing  Goal: Safe and Effective Swallow  Outcome: Progressing  Goal: Effective Urinary Elimination  Outcome: Progressing     Problem: Skin Injury Risk Increased  Goal: Skin Health and Integrity  Outcome: Progressing     Problem: Fall Injury Risk  Goal: Absence of Fall and Fall-Related Injury  Outcome: Progressing

## 2025-02-07 NOTE — PLAN OF CARE
Therapy recommending moderate intensity post care. CM completed [facility] and give n to IRVING Acuna for 142. CM printed choice list for SNF placement. And reviewed wirh patient and spouse. CM sent referral to [facility] . [facility] sent via [employer].

## 2025-02-08 ENCOUNTER — OPHTH EXAM (OUTPATIENT)
Dept: OPHTHALMOLOGY | Facility: HOSPITAL | Age: 70
End: 2025-02-08
Payer: MEDICARE

## 2025-02-08 LAB
ALBUMIN SERPL-MCNC: 3 G/DL (ref 3.4–4.8)
ALBUMIN/GLOB SERPL: 0.9 RATIO (ref 1.1–2)
ALP SERPL-CCNC: 139 UNIT/L (ref 40–150)
ALT SERPL-CCNC: 15 UNIT/L (ref 0–55)
ANION GAP SERPL CALC-SCNC: 9 MEQ/L
APTT PPP: 132.2 SECONDS (ref 23.2–33.7)
APTT PPP: 31.3 SECONDS (ref 23.2–33.7)
AST SERPL-CCNC: 21 UNIT/L (ref 5–34)
BASOPHILS # BLD AUTO: 0.02 X10(3)/MCL
BASOPHILS # BLD AUTO: 0.04 X10(3)/MCL
BASOPHILS NFR BLD AUTO: 0.3 %
BASOPHILS NFR BLD AUTO: 0.5 %
BILIRUB SERPL-MCNC: 0.3 MG/DL
BUN SERPL-MCNC: 27.5 MG/DL (ref 9.8–20.1)
CALCIUM SERPL-MCNC: 8.9 MG/DL (ref 8.4–10.2)
CHLORIDE SERPL-SCNC: 101 MMOL/L (ref 98–107)
CO2 SERPL-SCNC: 28 MMOL/L (ref 23–31)
CREAT SERPL-MCNC: 0.85 MG/DL (ref 0.55–1.02)
CREAT/UREA NIT SERPL: 32
EOSINOPHIL # BLD AUTO: 0.21 X10(3)/MCL (ref 0–0.9)
EOSINOPHIL # BLD AUTO: 0.24 X10(3)/MCL (ref 0–0.9)
EOSINOPHIL NFR BLD AUTO: 3 %
EOSINOPHIL NFR BLD AUTO: 3.1 %
ERYTHROCYTE [DISTWIDTH] IN BLOOD BY AUTOMATED COUNT: 16.5 % (ref 11.5–17)
ERYTHROCYTE [DISTWIDTH] IN BLOOD BY AUTOMATED COUNT: 16.6 % (ref 11.5–17)
GFR SERPLBLD CREATININE-BSD FMLA CKD-EPI: >60 ML/MIN/1.73/M2
GLOBULIN SER-MCNC: 3.3 GM/DL (ref 2.4–3.5)
GLUCOSE SERPL-MCNC: 139 MG/DL (ref 82–115)
HCT VFR BLD AUTO: 37.3 % (ref 37–47)
HCT VFR BLD AUTO: 38 % (ref 37–47)
HGB BLD-MCNC: 11.3 G/DL (ref 12–16)
HGB BLD-MCNC: 11.4 G/DL (ref 12–16)
IMM GRANULOCYTES # BLD AUTO: 0.02 X10(3)/MCL (ref 0–0.04)
IMM GRANULOCYTES # BLD AUTO: 0.02 X10(3)/MCL (ref 0–0.04)
IMM GRANULOCYTES NFR BLD AUTO: 0.3 %
IMM GRANULOCYTES NFR BLD AUTO: 0.3 %
INR PPP: 1.6
INR PPP: 2.1
LYMPHOCYTES # BLD AUTO: 1.87 X10(3)/MCL (ref 0.6–4.6)
LYMPHOCYTES # BLD AUTO: 1.98 X10(3)/MCL (ref 0.6–4.6)
LYMPHOCYTES NFR BLD AUTO: 25 %
LYMPHOCYTES NFR BLD AUTO: 28 %
MAGNESIUM SERPL-MCNC: 2.4 MG/DL (ref 1.6–2.6)
MCH RBC QN AUTO: 26.8 PG (ref 27–31)
MCH RBC QN AUTO: 27.6 PG (ref 27–31)
MCHC RBC AUTO-ENTMCNC: 29.7 G/DL (ref 33–36)
MCHC RBC AUTO-ENTMCNC: 30.6 G/DL (ref 33–36)
MCV RBC AUTO: 90.3 FL (ref 80–94)
MCV RBC AUTO: 90.3 FL (ref 80–94)
MONOCYTES # BLD AUTO: 0.53 X10(3)/MCL (ref 0.1–1.3)
MONOCYTES # BLD AUTO: 0.54 X10(3)/MCL (ref 0.1–1.3)
MONOCYTES NFR BLD AUTO: 6.8 %
MONOCYTES NFR BLD AUTO: 7.9 %
NEUTROPHILS # BLD AUTO: 4.04 X10(3)/MCL (ref 2.1–9.2)
NEUTROPHILS # BLD AUTO: 5.09 X10(3)/MCL (ref 2.1–9.2)
NEUTROPHILS NFR BLD AUTO: 60.4 %
NEUTROPHILS NFR BLD AUTO: 64.4 %
NRBC BLD AUTO-RTO: 0 %
NRBC BLD AUTO-RTO: 0 %
PLATELET # BLD AUTO: 296 X10(3)/MCL (ref 130–400)
PLATELET # BLD AUTO: 297 X10(3)/MCL (ref 130–400)
PMV BLD AUTO: 9.7 FL (ref 7.4–10.4)
PMV BLD AUTO: 9.9 FL (ref 7.4–10.4)
POTASSIUM SERPL-SCNC: 5 MMOL/L (ref 3.5–5.1)
PROT SERPL-MCNC: 6.3 GM/DL (ref 5.8–7.6)
PROTHROMBIN TIME: 19.7 SECONDS (ref 12.5–14.5)
PROTHROMBIN TIME: 23.7 SECONDS (ref 12.5–14.5)
RBC # BLD AUTO: 4.13 X10(6)/MCL (ref 4.2–5.4)
RBC # BLD AUTO: 4.21 X10(6)/MCL (ref 4.2–5.4)
SODIUM SERPL-SCNC: 138 MMOL/L (ref 136–145)
WBC # BLD AUTO: 6.69 X10(3)/MCL (ref 4.5–11.5)
WBC # BLD AUTO: 7.91 X10(3)/MCL (ref 4.5–11.5)

## 2025-02-08 PROCEDURE — 25000003 PHARM REV CODE 250: Performed by: INTERNAL MEDICINE

## 2025-02-08 PROCEDURE — 85730 THROMBOPLASTIN TIME PARTIAL: CPT | Performed by: INTERNAL MEDICINE

## 2025-02-08 PROCEDURE — 63600175 PHARM REV CODE 636 W HCPCS: Performed by: INTERNAL MEDICINE

## 2025-02-08 PROCEDURE — 83735 ASSAY OF MAGNESIUM: CPT | Performed by: NURSE PRACTITIONER

## 2025-02-08 PROCEDURE — 85610 PROTHROMBIN TIME: CPT | Performed by: INTERNAL MEDICINE

## 2025-02-08 PROCEDURE — 97116 GAIT TRAINING THERAPY: CPT | Mod: CQ

## 2025-02-08 PROCEDURE — 36415 COLL VENOUS BLD VENIPUNCTURE: CPT | Performed by: NURSE PRACTITIONER

## 2025-02-08 PROCEDURE — 97530 THERAPEUTIC ACTIVITIES: CPT | Mod: CO

## 2025-02-08 PROCEDURE — 80053 COMPREHEN METABOLIC PANEL: CPT | Performed by: NURSE PRACTITIONER

## 2025-02-08 PROCEDURE — 36415 COLL VENOUS BLD VENIPUNCTURE: CPT | Performed by: INTERNAL MEDICINE

## 2025-02-08 PROCEDURE — 85025 COMPLETE CBC W/AUTO DIFF WBC: CPT | Performed by: NURSE PRACTITIONER

## 2025-02-08 PROCEDURE — 97535 SELF CARE MNGMENT TRAINING: CPT | Mod: CO

## 2025-02-08 PROCEDURE — 21400001 HC TELEMETRY ROOM

## 2025-02-08 PROCEDURE — 85025 COMPLETE CBC W/AUTO DIFF WBC: CPT | Performed by: INTERNAL MEDICINE

## 2025-02-08 RX ORDER — HEPARIN SODIUM,PORCINE/D5W 25000/250
0-40 INTRAVENOUS SOLUTION INTRAVENOUS CONTINUOUS
Status: DISCONTINUED | OUTPATIENT
Start: 2025-02-08 | End: 2025-02-17

## 2025-02-08 RX ADMIN — GABAPENTIN 100 MG: 100 CAPSULE ORAL at 09:02

## 2025-02-08 RX ADMIN — HEPARIN SODIUM 12 UNITS/KG/HR: 10000 INJECTION, SOLUTION INTRAVENOUS at 02:02

## 2025-02-08 RX ADMIN — ISOSORBIDE MONONITRATE 30 MG: 30 TABLET, EXTENDED RELEASE ORAL at 08:02

## 2025-02-08 RX ADMIN — GABAPENTIN 100 MG: 100 CAPSULE ORAL at 08:02

## 2025-02-08 RX ADMIN — TRAMADOL HYDROCHLORIDE 50 MG: 50 TABLET, COATED ORAL at 03:02

## 2025-02-08 RX ADMIN — FLUOXETINE HYDROCHLORIDE 40 MG: 20 CAPSULE ORAL at 08:02

## 2025-02-08 RX ADMIN — TRAMADOL HYDROCHLORIDE 50 MG: 50 TABLET, COATED ORAL at 09:02

## 2025-02-08 RX ADMIN — GABAPENTIN 100 MG: 100 CAPSULE ORAL at 02:02

## 2025-02-08 RX ADMIN — HYPROMELLOSE 2910 1 DROP: 5 SOLUTION/ DROPS OPHTHALMIC at 09:02

## 2025-02-08 RX ADMIN — HYDROCODONE BITARTRATE AND ACETAMINOPHEN 1 TABLET: 7.5; 325 TABLET ORAL at 10:02

## 2025-02-08 RX ADMIN — DAPAGLIFLOZIN 10 MG: 10 TABLET, FILM COATED ORAL at 08:02

## 2025-02-08 RX ADMIN — TRAMADOL HYDROCHLORIDE 50 MG: 50 TABLET, COATED ORAL at 02:02

## 2025-02-08 RX ADMIN — TRAZODONE HYDROCHLORIDE 50 MG: 50 TABLET ORAL at 09:02

## 2025-02-08 RX ADMIN — HYPROMELLOSE 2910 1 DROP: 5 SOLUTION/ DROPS OPHTHALMIC at 04:02

## 2025-02-08 RX ADMIN — OLANZAPINE 5 MG: 5 TABLET, FILM COATED ORAL at 09:02

## 2025-02-08 RX ADMIN — ATORVASTATIN CALCIUM 40 MG: 40 TABLET, FILM COATED ORAL at 08:02

## 2025-02-08 NOTE — PROGRESS NOTES
[facility] MEDICINE ~ PROGRESS NOTE        CHIEF COMPLAINT   Hospital follow up    HOSPITAL COURSE   69 y.o. female with a history that includes PAF and mechanical AVR, on Coumadin therapy was referred to ED by her Cardiology due to INR of 8.8 on outpatient labs.  Additional complaints include headache and vision changes, though denied focal weakness or speech changes.  CT head performed and revealed cortical based hypodensities in the bilateral occipital lobes.  CTA revealed moderate atheromatous calcification of the cavernous clinoid and supra clinoid segments of the bilateral internal carotid arteries with mild-to-moderate left greater than right stenosis.  A subsequent MRI brain has noted Symmetric restricted diffusion and edema in the bilateral posterior occipital lobes, with small foci in the left parietal lobe and left cerebellum, favored to represent changes of posterior reversible encephalopathy syndrome, and additionally noted small areas of hemorrhage in B/L occipital lobes.      Neurology was consulted and neurologist felt she had bilateral occipital lobe infarcts secondary to being subtherapeutic with clot formation and then had embolism which led to the vision loss.  Recommends resuming warfarin once cleared by Cardiology.    After further review it was deemed that she was taking excessive Tylenol about 2 to 3 500 mg tablets at a time every 4 hours per the  report.  She was taking accepted Tylenol use because she could not get anyone to prescribed Norco for her.    Today  Neurosurgery recommends IR for kyphoplasty.  We will check with Cardiology to see if we can hold her anticoagulation.  If so then we can consult IR.   would like her to get kyphoplasty.        OBJECTIVE/PHYSICAL EXAM     VITAL SIGNS (MOST RECENT):  Temp: 97.9 °F (36.6 °C) (02/08/25 0752)  Pulse: 87 (02/08/25 0752)  Resp: 16 (02/08/25 0254)  BP: 137/78 (02/08/25 0752)  SpO2: (!) 90 %  (02/08/25 5183) VITAL SIGNS (24 HOUR RANGE):  Temp:  [97.5 °F (36.4 °C)-98.2 °F (36.8 °C)] 97.9 °F (36.6 °C)  Pulse:  [71-87] 87  Resp:  [16-20] 16  SpO2:  [90 %-97 %] 90 %  BP: (125-139)/(60-84) 137/78   GENERAL: In no acute distress, afebrile  HEENT:  CHEST: Clear to auscultation bilaterally  HEART: S1, S2, no appreciable murmur  ABDOMEN: Soft, nontender, BS +  MSK: Warm, no lower extremity edema, no clubbing or cyanosis  NEUROLOGIC:  Blind out of the left eye, right eye with seeing multiple of the same, extraocular muscles seems to be intact, good strength in all 4 extremities   INTEGUMENTARY:  PSYCHIATRY:        ASSESSMENT/PLAN   Bilateral posterior occipital lobe ischemic CVA  Supratherapeutic INR  Unintentional Tylenol overdose  L1 compression fracture with progressive loss of height  VHD s/p mechanical AVR, on Coumadin   PAF   Diabetes mellitus type II   Essential HTN   h/o COPD   Tobacco abuse      Neurosurgery following.  Recommends IR for kyphoplasty.  Stroke Neurology signed off.  Recommends resuming Coumadin once okay.    Cardiology following.  Coumadin management per them.  Need to see if we can hold Coumadin and bridged to heparin for kyphoplasty.  Per the  she was getting anywhere between 6 g to 9 g of Tylenol per day.  This is likely the cause of her supratherapeutic INR.    Continue pain control with tramadol, back brace per NSGY  Okay to work with PT and OT  Case management consult for SNF placement    DVT prophylaxis:  INR/Coumadin    Update  Patient started having right eye pain.  Will consult ophthalmology to eval.  Ct head as well.  Discussed with [facility] NP Judi and change of recs with goal INR to 2.5-3.5.  Will start heparin drip on her now. Keep coumadin off for now until evaluated for kyphoplasty.     Anticipated discharge and disposition:   __________________________________________________________________________    NUTRITIONAL STATUS     Patient meets ASPEN criteria for   malnutrition  of   per RD assessment as evidenced by:                       A minimum of two characteristics is recommended for diagnosis of either severe or non-severe malnutrition.     LABS/MICRO/MEDS/DIAGNOSTICS       LABS  Recent Labs     02/08/25  0357      K 5.0   CO2 28   BUN 27.5*   CREATININE 0.85   GLUCOSE 139*   CALCIUM 8.9   ALKPHOS 139   AST 21   ALT 15   ALBUMIN 3.0*     Recent Labs     02/08/25  0357   WBC 6.69   RBC 4.21   HCT 38.0   MCV 90.3          MICROBIOLOGY  Microbiology Results (last 7 days)       ** No results found for the last 168 hours. **               MEDICATIONS   atorvastatin  40 mg Oral Daily    dapagliflozin propanediol  10 mg Oral Daily    FLUoxetine  40 mg Oral Daily    gabapentin  100 mg Oral TID    isosorbide mononitrate  30 mg Oral Daily    LIDOcaine  1 patch Transdermal Q24H    OLANZapine  5 mg Oral QHS         INFUSIONS         DIAGNOSTIC TESTS  X-Ray Thoracolumbar Spine AP Lateral   Final Result      L1 compression fracture with progressive loss of height compared to 01/09/2025.         Electronically signed by: Norma Leblanc   Date:    02/06/2025   Time:    13:33      MRI Brain W WO Contrast   Final Result      1. Symmetric restricted diffusion and edema in the bilateral posterior occipital lobes, with small foci in the left parietal lobe and left cerebellum, favored to represent changes of posterior reversible encephalopathy syndrome with ischemia in the differential.  Follow-up can be obtained to ensure resolution.   2. Small areas of associated hemorrhage in the bilateral occipital lobes.   Finding of intracranial hemorrhage given to Jina HOLT at the time of dictation.         Electronically signed by: Norma Leblanc   Date:    02/05/2025   Time:    09:56      CTA Head and Neck (xpd)   Final Result      No large vessel occlusion or flow-limiting stenosis.      No significant change from the [facility] interpretation.         Electronically signed by: oNrma Leblanc    Date:    02/05/2025   Time:    08:32      X-Ray Chest AP Portable   Final Result      No acute chest disease is identified.         Electronically signed by: Bandar Vera   Date:    02/05/2025   Time:    09:08      CT Cervical Spine Without Contrast   Final Result      No acute fracture or malalignment identified.         Electronically signed by: Juanito Chopra   Date:    02/04/2025   Time:    21:25      CT Head Without Contrast   Final Result      Cortical based hypodensities in the bilateral occipital lobes, may represent changes of posterior reversible encephalopathy syndrome.  MRI brain is available for further evaluation.         Electronically signed by: Norma Leblanc   Date:    02/04/2025   Time:    18:42           Echo    Result Date: 2/5/2025    Left Ventricle: The left ventricle is normal in size. Normal wall   thickness. There is normal systolic function with a visually estimated   ejection fraction of 55 - 60%. Grade I diastolic dysfunction.    Right Ventricle: Normal right ventricular cavity size. Wall thickness   is normal. Systolic function is normal. TAPSE is 1.83 cm.    Left Atrium: Left atrium is moderately dilated. Agitated saline study   of the atrial septum is negative, suggesting absence of intracardiac shunt   at the atrial level.    Aortic Valve: There is a mechanical valve in the aortic position.   Aortic valve area by VTI is 1.6 cm². Aortic valve peak velocity is 2.9   m/s. Mean gradient is 16 mmHg. The dimensionless index is 0.53.    Mitral Valve: There is mitral annular calcification present. There is   trace regurgitation.    IVC/SVC: Normal venous pressure at 3 mmHg.    Consider KATERIN              Case related differential diagnoses have been reviewed; assessment and plan has been documented. I have personally reviewed the labs and test results that are currently available; I have reviewed the patients medication list. I have reviewed the consulting providers recommendations. I  have reviewed or attempted to review medical records based upon their availability.  All of the patient's and/or family's questions have been addressed and answered to the best of my ability.  I will continue to monitor closely and make adjustments to medical management as needed.  This document was created using coUrbanize*Modal Fluency Direct.  Transcription errors may have been made.  Please contact me if any questions may rise regarding documentation to clarify transcription.        Jorge Pham MD   Internal Medicine  Department of Hospital Medicine [facility] - Neurology

## 2025-02-08 NOTE — H&P (VIEW-ONLY)
Ophthalmology Consult Note    Reason for consult: left eye pain    HPI: 69 y.o. female admitted for ischemic stroke of occipital lobes/ PRES ophthalmology consult for eye pain. Patient describes pain as left-sided pressure, went away on its own, no changes in vision, pain is no longer there, no eye redness.    Glaucoma Interventions:    none  Retina Interventions:    none  Cornea/Lens Interventions:    none    POHx: None. Pt denies any ocular trauma or surgery. No family history of eye disease.  PMHx:  has a past medical history of A-fib, Anticoagulant long-term use, Depression, Diabetes mellitus, Hyperlipidemia, and Sleep apnea.  PSHx:  has a past surgical history that includes Aortic valve replacement; Tonsillectomy; Cholecystectomy; colon resection; Left heart catheterization (Left, 05/18/2023); and fusion.  FamHx: family history includes COPD in her mother; Heart disease in her father; Hypertension in her mother.  SocHx:  reports that she has been smoking cigarettes. She has a 50 pack-year smoking history. She has never used smokeless tobacco. She reports that she does not drink alcohol and does not use drugs.  Meds: has a current medication list which includes the following prescription(s): warfarin, albuterol-ipratropium, aspirin, atorvastatin, clonazepam, dapagliflozin propanediol, fluoxetine, furosemide, gabapentin, hydrocodone-acetaminophen, isosorbide mononitrate, lidocaine, losartan, metoprolol succinate, mounjaro, olanzapine, sotalol, spironolactone, and trazodone, and the following Facility-Administered Medications: 0.9% nacl, acetaminophen, albuterol-ipratropium, aluminum-magnesium hydroxide-simethicone, artificial tears, atorvastatin, bisacodyl, clonazepam, dapagliflozin propanediol, diazepam, diphenhydramine, fluoxetine, gabapentin, heparin (porcine), heparin (porcine), heparin (porcine) in d5w, hydralazine, hydrocodone-acetaminophen, isosorbide mononitrate, labetalol, lidocaine, olanzapine, sodium  "chloride 0.9%, tramadol, and trazodone.    Vitals: Blood pressure (!) 131/54, pulse 82, temperature 97.7 °F (36.5 °C), temperature source Oral, resp. rate 17, height 5' 5.5" (1.664 m), weight 91.3 kg (201 lb 3.2 oz), SpO2 97%.  Pt oriented x3.  Mood/affect normal.     Eye Exam:  Base Eye Exam       Visual Acuity (Snellen - Linear)         Right Left    Dist sc CF at 3' CF at 3'              Tonometry (Tonopen, 4:22 PM)         Right Left    Pressure 13 18              Pupils         Dark Light Shape React APD    Right 5 3 Round Brisk None    Left 5 3 Round Brisk Trace              Visual Fields (Counting fingers)         Right Left     Full Full              Extraocular Movement         Right Left     Full, Ortho Full, Ortho                  Slit Lamp and Fundus Exam       External Exam         Right Left    External Normal Normal              Slit Lamp Exam         Right Left    Lids/Lashes Normal Normal    Conjunctiva/Sclera White and quiet White and quiet    Cornea Clear Clear    Anterior Chamber Deep and quiet Deep and quiet    Iris Round and reactive Round and reactive    Lens Nuclear sclerosis Nuclear sclerosis    Vitreous Normal Normal                    Assessment and Plan:    Eye Pain, OS  - No staining uptake on fluorescein, no evidence of iritis, no conjunctival injection  - Could have possibly been intermittent dryness, artificial tears QID PRN for irritation  - Dilation deferred due to neuro checks given recent stroke    Occipital Lobe Ischemia  - Discussed with patient and  no ophthalmic intervention for occiptal lobe ischemia causing vision decline as issue is localized to brain and not eyes  - May benefit from low vision therapy outpatient    Recommend outpatient dilated exam upon discharge from hospital/ rehab    "

## 2025-02-08 NOTE — PT/OT/SLP PROGRESS
Physical Therapy Treatment    Patient Name:  Kayla Ruiz   MRN:  [id number]    Recommendations:     Discharge therapy intensity: Moderate Intensity Therapy   Discharge Equipment Recommendations: walker, rolling  Barriers to discharge: Impaired mobility and Ongoing medical needs    Assessment:     Kayla Ruiz is a 69 y.o. female admitted with a medical diagnosis of  Bilateral posterior occipital lobe ischemic CVA, Supratherapeutic INR, Unintentional Tylenol overdose, VHD s/p mechanical AVR, on Coumadin , PAF , Diabetes mellitus type II, Essential HTN , h/o COPD and Tobacco abuse.  She presents with the following impairments/functional limitations: weakness, impaired endurance, gait instability, decreased lower extremity function, impaired functional mobility, impaired self care skills, decreased coordination, visual deficits, impaired balance, pain, orthopedic precautions.    Rehab Prognosis: Good; patient would benefit from acute skilled PT services to address these deficits and reach maximum level of function.    Recent Surgery: * No surgery found *      Plan:     During this hospitalization, patient would benefit from acute PT services 6 x/week to address the identified rehab impairments via gait training, therapeutic activities, therapeutic exercises, neuromuscular re-education and progress toward the following goals:    Plan of Care Expires:  03/06/25    Subjective     Chief Complaint: none stated  Patient/Family Comments/goals: none stated  Pain/Comfort:         Objective:     Communicated with nursing prior to session.  Patient found up in chair with pulse ox (continuous), telemetry, TLSO, PureWick upon PT entry to room.     General Precautions: Standard, fall  Orthopedic Precautions: spinal precautions  Braces: Hinged knee brace, TLSO  Respiratory Status: Room air  Blood Pressure: NT    Functional Mobility:  Transfers:     Sit to Stand:  minimum assistance with rolling walker  Gait: 50' x3 w/RW, CGA-Wanda  - chair to follow  X1 posterior LOB, Wanda to correct    Therapeutic Activities/Exercises:      Education:  Patient and spouse were provided with verbal education and demonstrations education regarding PT role/goals/POC, post-op precautions, fall prevention, safety awareness, and proper donning/doffing TLSO and hinge knee brace .  Understanding was verbalized, however additional teaching warranted.     Patient left up in chair with all lines intact, call button in reach, nurse notified, and spouse present    GOALS:   Multidisciplinary Problems       Physical Therapy Goals          Problem: Physical Therapy    Goal Priority Disciplines Outcome Interventions   Physical Therapy Goal     PT, PT/OT Progressing    Description: Goals to be met by: 3/6/2025     Patient will increase functional independence with mobility by performin. Supine to sit with Modified [place]  2. Sit to supine with Modified [place]  3. Rolling to Left and Right with Modified [place]. (Log roll)  4. Sit to stand transfer with Stand-by Assistance  5. Bed to chair transfer with Stand-by Assistance using Rolling Walker  6. Gait  x 50 feet with Stand-by Assistance using Rolling Walker.                          Time Tracking:     PT Received On: 25  PT Start Time: 0944     PT Stop Time: 1016  PT Total Time (min): 32 min     Billable Minutes: Gait Training 32    Treatment Type: Treatment  PT/PTA: PTA     Number of PTA visits since last PT visit: 2     2025

## 2025-02-08 NOTE — PT/OT/SLP PROGRESS
Occupational Therapy   Treatment    Name: Kayla Ruiz  MRN: [id number]  Admitting Diagnosis:  <principal problem not specified>       Recommendations:     Recommended therapy intensity at discharge: High Intensity Therapy   Discharge Equipment Recommendations:  walker, rolling  Barriers to discharge:       Assessment:     Kayla Ruiz is a 69 y.o. female with a medical diagnosis of <principal problem not specified>.  She presents with good participation. Performance deficits affecting function are weakness, impaired functional mobility, impaired endurance, visual deficits, impaired self care skills, impaired balance, decreased upper extremity function, decreased lower extremity function, gait instability, decreased coordination, orthopedic precautions.     Rehab Prognosis:  Good; patient would benefit from acute skilled OT services to address these deficits and reach maximum level of function.       Plan:     Patient to be seen 6 x/week to address the above listed problems via self-care/home management, therapeutic activities, therapeutic exercises  Plan of Care Expires: 03/08/25  Plan of Care Reviewed with: patient, spouse    Subjective     Pain/Comfort:  Pain Rating 1: 0/10    Objective:     Communicated with: nurse prior to session.  Patient found HOB elevated with pulse ox (continuous), telemetry, TLSO, PureWick upon OT entry to room.    General Precautions: Standard, fall    Orthopedic Precautions:spinal precautions  Braces: TLSO, Hinged knee brace  Respiratory Status: Room air     Occupational Performance:     Bed Mobility:    Patient completed Rolling/Turning to Left with  minimum assistance initially, CGA following instruction  Patient completed Rolling/Turning to Right with minimum assistance Min A initially, progressing to CGA  Patient completed Scooting/Bridging with contact guard assistance SBA  Patient completed Supine to Sit with minimum assistance  Patient completed Sit to Supine with minimum  assistance     Activities of Daily Living:  Education provided to patient and  concerning safety with bed mobility and functional tasks    Therapeutic Positioning    OT interventions performed during the course of today's session in an effort to prevent and/or reduce acquired pressure injuries:   Therapeutic positioning was provided at the conclusion of session to offload all bony prominences for the prevention and/or reduction of pressure injuries    [facility] 6 Click ADL: 17    Patient Education:  Patient and spouse were provided with verbal education and demonstrations education regarding fall prevention and safety awareness.  Understanding was verbalized, however additional teaching warranted.      Patient left HOB elevated with all lines intact, call button in reach, and  present.    GOALS:   Multidisciplinary Problems       Occupational Therapy Goals          Problem: Occupational Therapy    Goal Priority Disciplines Outcome Interventions   Occupational Therapy Goal     OT, PT/OT Progressing    Description: Goals to be met by: 3/8/2025     Patient will increase functional independence with ADLs by performing:    UE Dressing with Stand-by Assistance.  LE Dressing with Stand-by Assistance.  Grooming while standing at sink with Stand-by Assistance.  Toileting from toilet with Stand-by Assistance for hygiene and clothing management.   Toilet transfer to toilet with Stand-by Assistance.                         Time Tracking:     OT Date of Treatment: 02/08/25  OT Start Time: 1543  OT Stop Time: 1609  OT Total Time (min): 26 min    Billable Minutes:Self Care/Home Management 11  Therapeutic Activity 15    OT/SUAD: SUAD     Number of SUAD visits since last OT visit: 1    2/8/2025

## 2025-02-08 NOTE — PROGRESS NOTES
[facility] *    Cardiology  Progress Note    Patient Name: Kayla Ruiz  MRN: [id number]  Admission Date: 2/4/2025  Hospital Length of Stay: 3 days  Code Status: Full Code   Attending Provider: Radhames Acevedo MD   Consulting Provider: Judi Macario NP  Primary Care Physician: Jennifer Chavez, NP-DERIK  Principal Problem:<principal problem not specified>    Patient information was obtained from patient, past medical records, and ER records.     Subjective:     Reason for Consult: Anticoagulation Recommendations    HPI: Ms. Ruiz is a 68 y/o female who is known to [facility], Dr. Leon. The patient presented to [facility] on 2.4.25 with c/o Headache, Double Vision and Circles in Vision. The patient reported that she fell the other day after tripping on a blanket. She denied hitting her head and LOC. She reported that she developed a frontal headache about 2 days ago which was around the fall. She then developed visual disturbances of double vision and circles in her vision. She presented to the ER and a CT of the Head revealed Cortical Based Hypodensities in the Bilateral Occipital Lobes which may represent changes of the Posterior Reversible Encephalopathy Syndrome. MRI of the Brain was recommended for further workup and evaluation. CTA Head/Neck revealed Moderate Atheromatous Calcification of the Cavernous Clinoid and Supra Clinoid segments of the Bilateral Internal Carotid Arteries with Mild-Mod L > R Stenosis. She was admitted to  and Stroke Neurology was Consulted and [facility] was consulted for Anticoagulation Recommendations.     Hospital Course:   2.6.25: NAD, VSS. She deneis SOB CP or nasuea. INR 5.7 today  2.7.25; NAD, VSS, She still reports visual disturbances, She denies SOB, CP, or nausea, INR 3.5 today.   2.8.25: NAD, VSS. She denies SOB, CP, or nausea. INR is 2.1 today. Her  is at the bedside     PMH: PAF, Mechanical AV on Warfarin, DM II, HTN, HLD, COPD  PSH: Aortic Valve  Replacement, Cholecystectomy, Colon Resection, Angiogram, Tonsillectomy  Family History: Father- COPD, Mother- COPD, Sister- Cancer, Sister- Cancer   Social History: Current Everyday Smoker, 1ppd for 40+ Years; Denies Illicit Drug and ETOH Use      Previous Diagnostics:  ECHO 10.24.24:   Left Ventricle: The left ventricle is normal in size. Moderately increased wall thickness. There is normal systolic function with a visually estimated ejection fraction of 55 - 60%. There is normal diastolic function.  Right Ventricle: Normal right ventricular cavity size. Systolic function is normal. TAPSE is 2.70 cm.  Left Atrium: Left atrium is moderately dilated.  Aortic Valve: There is a bioprosthetic valve in the aortic position. Aortic valve area by VTI is 2.8 cm². Aortic valve peak velocity is 2.6 m/s. Mean gradient is 14.0 mmHg. The dimensionless index is 0.63. There is trace aortic regurgitation.  Mitral Valve: There is mild stenosis. The mean pressure gradient across the mitral valve is 4 mmHg at a heart rate of  bpm. There is mild regurgitation.  Tricuspid Valve: There is mild regurgitation with a RVSP of 34mmHg.  IVC/SVC: Intermediate venous pressure at 8 mmHg.     LHC 5.11.23  Calcified widely patent coronary anatomy  The mechanical aortic valve demonstrated normal functioning within study limits.  Heavily calcified aortic root  There was heavy mitral annular calcification noted.  Radial approach  The estimated blood loss was none.  Resume warfarin today  Procedure:   Selective coronary angiography  Left ventriculography  Left heart catheterization  Moderate (Conscious) Sedation   Indication: Angina equivalent  Coronary findings: The aortic root was heavily calcified  Dominance: right   Left main: no obstructive disease with <10% epicardial stenosis  Left anterior descending artery:  Patent and highly tortuous Type 2 vessel that gave rise to a very large bifurcating diagonal branch.  No obstructive disease with <10%  epicardial stenosis.  Circumflex artery:  Non dominant patent vessel that gives rise to 2 tortuous moderate-sized obtuse marginal branches.  No obstructive disease with <10% epicardial stenosis  Right coronary artery:  The patent calcified dominant vessel terminates in a large PDA and a small PL segment.  No obstructive disease with <10% epicardial stenosis  Left ventriculography: Not performed due to mechanical AVR  Impression  No evidence of obstructive CAD  Plan   Resume warfarin today and continue GDMT     Holter Monitor 11.21.23  1.This is a good quality study.  2.Predominant rhythm is sinus bradycardia.  3.The minimum heart rate recorded was 48 beats / minute (11/21/2023). The maximum heart rate is 77 beats / minute (11/22/2023). The mean heart rate is 54 beats / minute.  4.Rare premature atrial contractions noted.  5.No evidence of supraventricular tachycardia is noted.  6.No evidence of atrial fibrillation noted.  7.Rare premature ventricular contractions noted.  8.No evidence of ventricular tachycardia is noted.  9.No pauses were noted.  10. IN SINUS BRADYCARDIA 85% OF THE STUDY TIME NO SYMPTOMS REPORTED     Review of patient's allergies indicates:   Allergen Reactions    Ace inhibitors     Naproxen sodium     Nifedipine     Venlafaxine      Current Facility-Administered Medications on File Prior to Encounter   Medication    0.9%  NaCl infusion    diazePAM tablet 10 mg    diphenhydrAMINE capsule 50 mg     Current Outpatient Medications on File Prior to Encounter   Medication Sig    warfarin (COUMADIN) 5 MG tablet Take 5 mg by mouth Daily.    albuterol-ipratropium (DUO-NEB) 2.5 mg-0.5 mg/3 mL nebulizer solution Take 3 mLs by nebulization every 4 (four) hours as needed for Wheezing or Shortness of Breath.    aspirin (ECOTRIN) 81 MG EC tablet Take 81 mg by mouth once daily.    atorvastatin (LIPITOR) 40 MG tablet Take 40 mg by mouth once daily.    clonazePAM (KLONOPIN) 1 MG tablet Take 0.5 mg by mouth 3 (three)  times daily as needed for Anxiety.    dapagliflozin propanediol (FARXIGA) 10 mg tablet Take 1 tablet (10 mg total) by mouth once daily.    FLUoxetine 40 MG capsule Take 40 mg by mouth once daily.    furosemide (LASIX) 40 MG tablet Take 1 tablet (40 mg total) by mouth 2 (two) times daily.    gabapentin (NEURONTIN) 100 MG capsule Take 100 mg by mouth 3 (three) times daily.    HYDROcodone-acetaminophen (NORCO) 7.5-325 mg per tablet Take 1 tablet by mouth every 6 (six) hours as needed for Pain (severe pain only).    isosorbide mononitrate (IMDUR) 30 MG 24 hr tablet Take 1 tablet (30 mg total) by mouth once daily.    LIDOcaine (LIDODERM) 5 % Place 1 patch onto the skin once daily. Remove & Discard patch within 12 hours or as directed by MD    losartan (COZAAR) 25 MG tablet Take 1 tablet (25 mg total) by mouth once daily.    metoprolol succinate (TOPROL-XL) 25 MG 24 hr tablet Take 0.5 tablets (12.5 mg total) by mouth 2 (two) times daily.    MOUNJARO 15 mg/0.5 mL PnIj Inject 15 mg into the skin every 7 days. (Patient not taking: Reported on 1/29/2025)    OLANZapine (ZYPREXA) 5 MG tablet Take 5 mg by mouth every evening.    sotaloL (BETAPACE) 120 MG Tab Take 80 mg by mouth every 12 (twelve) hours. Take 1/2 tablet BID    spironolactone (ALDACTONE) 25 MG tablet Take 1 tablet (25 mg total) by mouth once daily.    traZODone (DESYREL) 50 MG tablet Take 25-50 mg by mouth nightly as needed.     Review of Systems   Constitutional:  Positive for fatigue. Negative for chills.   Respiratory:  Negative for choking and shortness of breath.    Cardiovascular:  Negative for chest pain and palpitations.   Gastrointestinal:  Negative for abdominal pain, nausea and vomiting.   Musculoskeletal:  Negative for gait problem.   Neurological:  Negative for weakness.        + Visual disturbance   All other systems reviewed and are negative.    Objective:     Vital Signs (Most Recent):  Temp: 97.7 °F (36.5 °C) (02/08/25 1129)  Pulse: 89 (02/08/25  1248)  Resp: 16 (02/08/25 1023)  BP: 130/78 (02/08/25 1248)  SpO2: (!) 93 % (02/08/25 1129) Vital Signs (24h Range):  Temp:  [97.5 °F (36.4 °C)-98.2 °F (36.8 °C)] 97.7 °F (36.5 °C)  Pulse:  [77-89] 89  Resp:  [16] 16  SpO2:  [90 %-97 %] 93 %  BP: (130-141)/(60-81) 130/78   Weight: 91.3 kg (201 lb 3.2 oz)  Body mass index is 32.97 kg/m².  SpO2: (!) 93 %       Intake/Output Summary (Last 24 hours) at 2/8/2025 1305  Last data filed at 2/8/2025 0545  Gross per 24 hour   Intake --   Output 400 ml   Net -400 ml     Lines/Drains/Airways       Peripheral Intravenous Line  Duration                  Peripheral IV - Single Lumen 02/04/25 0000 20 G Left Antecubital 4 days                  Significant Labs:   Chemistries:   Recent Labs   Lab 02/04/25  0800 02/04/25  1917 02/05/25  0329 02/06/25  0309 02/07/25  0356 02/08/25  0357    136  --  140 135* 138   K 4.4 5.1  --  4.6 4.4 5.0    101  --  102 103 101   CO2 28 20*  --  30 22* 28   BUN 28.7* 27.1*  --  31.5* 34.2* 27.5*   CREATININE 0.87 0.98  --  1.26* 0.86 0.85   CALCIUM 9.1 9.0  --  9.0 8.7 8.9   BILITOT 0.3 0.3  --  0.4 0.3 0.3   ALKPHOS 153* 152*  --  154* 146 139   ALT 19 20  --  21 17 15   AST 28 29  --  25 20 21   GLUCOSE 107 174*  --  118* 130* 139*   MG  --   --   --  2.40 2.40 2.40   PHOS  --   --   --  4.2  --   --    TROPONINI  --  0.023 <0.010  --   --   --         CBC/Anemia Labs: Coags:    Recent Labs   Lab 02/06/25  0309 02/07/25  0356 02/08/25 0357   WBC 6.66 5.47 6.69   HGB 13.1 11.9* 11.3*   HCT 43.7 40.1 38.0    251 296   MCV 90.1 92.4 90.3   RDW 16.6 16.4 16.5    Recent Labs   Lab 02/04/25 2122 02/05/25 0329 02/06/25 0309 02/07/25 0356 02/08/25 0357   INR 7.0* 7.0* 5.7* 3.7* 2.1*   APTT 54.3* 57.9*  --   --   --         Significant Imaging:  Imaging Results              MRI Brain W WO Contrast (Final result)  Result time 02/05/25 09:56:17      Final result by Norma Leblanc MD (02/05/25 09:56:17)                    Impression:      1. Symmetric restricted diffusion and edema in the bilateral posterior occipital lobes, with small foci in the left parietal lobe and left cerebellum, favored to represent changes of posterior reversible encephalopathy syndrome with ischemia in the differential.  Follow-up can be obtained to ensure resolution.  2. Small areas of associated hemorrhage in the bilateral occipital lobes.  Finding of intracranial hemorrhage given to Jina HOLT at the time of dictation.      Electronically signed by: Norma Leblanc  Date:    02/05/2025  Time:    09:56               Narrative:    EXAMINATION:  MRI BRAIN W WO CONTRAST    CLINICAL HISTORY:  Mental status change, unknown cause;vision changes x1 week, abnormal CT;    TECHNIQUE:  Multiplanar, multisequence MR images of the brain were obtained with and without administration of intravenous contrast.    COMPARISON:  CT head dated 02/04/2025    FINDINGS:  There is symmetric restricted diffusion and edema in the bilateral posterior occipital lobes, with few small foci in the left parietal lobe and left cerebellum.  There are small areas of associated hemorrhage with likely areas of curvilinear enhancement.  Mild patchy T2/FLAIR hyperintense in the subcortical and periventricular white matter likely represent chronic microvascular ischemic changes.    There is local mass effect.  There is no midline shift or herniation.  The basal cisterns are patent.  There is mild diffuse parenchymal volume loss.  There is no hydrocephalus or abnormal extra-axial fluid collection.                                       CTA Head and Neck (xpd) (Final result)  Result time 02/05/25 08:32:51      Final result by Norma Leblanc MD (02/05/25 08:32:51)                   Impression:      No large vessel occlusion or flow-limiting stenosis.    No significant change from the [facility] interpretation.      Electronically signed by: Norma Leblanc  Date:    02/05/2025  Time:    08:32                Narrative:    EXAMINATION:  CTA HEAD AND NECK (XPD)    CLINICAL HISTORY:  Stroke/TIA, determine embolic source;    TECHNIQUE:  Axial images obtained through the cervical region and [place] before and after the administration of intravenous contrast.    Coronal, sagittal, MIP and 3D reconstructions were obtained from the axial data.    Automatic exposure control was utilized to limit radiation dose.    Radiation Dose:    Total DLP: 2870 mGy*cm    COMPARISON:  CT head dated 02/04/2025    FINDINGS:  Head CT with contrast:    No interval changes when compared to the previous CT.    No significant enhancing abnormalities.    If present, stenosis of the carotid bulbs is measured based on NASCET criteria,    i.e. area of maximal stenosis compared to the cervical ICA distal to the bulb.    Cervical CTA:    The origins of the great vessels are patent with moderate calcifications    The common carotid arteries are patent.  There are mild calcifications at the carotid bulbs without hemodynamically significant stenosis.  The internal carotid arteries are patent.    The vertebral arteries are patent    Intracranial CTA:    There are calcifications along the course the carotid siphons with mild narrowing bilaterally.  The middle cerebral arteries and anterior cerebral arteries are patent    The vertebral arteries, basilar artery and posterior cerebral arteries are patent.    The dural venous sinuses are patent.                        Preliminary result by Albert Cruz Jr., MD (02/05/25 04:10:05)                   Impression:    1. Moderate atheromatous calcification of the cavernous clinoid and supraclinoid segments of the bilateral internal carotid arteries is seen with mild to moderate left greater than right stenosis (Series 26 Images 200-216).  2. The previously noted hypodensities in the bilateral occipital lobes previously ascribed to posterior reversible encephalopathy are again seen stable  from prior without associated abnormal intracranial enhancement on post-contrast images.  3. Details and other findings as described above.               Narrative:    START OF REPORT:  Technique: CT angiogram of the intracranial and neck vessels was performed with intravenous contrast with direct axial as well as sagittal and coronal reformations.    Comparison: Comparison is with study [id number] 18:22:03.    Clinical history: Blurry vision.    Findings:  Intracranial Vascular structures:  Internal carotid arteries: Moderate atheromatous calcification of the cavernous clinoid and supraclinoid segments of the bilateral internal carotid arteries is seen with mild to moderate left greater than right stenosis (Series 26 Images 200-216).  Middle cerebral arteries: Unremarkable.  Anterior cerebral arteries: Unremarkable.  Vertebral arteries: Mild atheromatous change of the bilateral vertebral arteries is seen with no significant stenosis.  Basilar artery: Unremarkable.  Posterior cerebral arteries: Unremarkable.  Posterior communicating arteries: Unremarkable.  Neck Vascular structures: Mild atheromatous calcification with no significant stenosis is seen at the origin of the brachiocephalic left subclavian left common carotid arteries.  Carotids:  Common carotid arteries: The left common carotid artery appears unremarkable.  Right common carotid artery: Mild atheromatous calcification of the mid distal segment of the right common carotid artery is seen with no significant stenosis.  Internal carotid artery: Mild atheromatous calcification with mild stenosis at the origin of the bilateral internal carotid arteries is seen.  Vertebral arteries: Unremarkable.  Jugular Veins and venous sinuses: Unremarkable.  Hemorrhage: No acute intracranial hemorrhage is seen.  CSF spaces: The ventricles, sulci and basal cisterns all appear moderately prominent consistent with global cerebral atrophy stable from prior.  Brain  parenchyma: No acute infarct is identified. Mild stable appearing scattered microvascular change is seen in portions of the periventricular and deep white matter tracts. The previously noted hypodensities in the bilateral occipital lobes previously ascribed to posterior reversible encephalopathy are again seen stable from prior without associated abnormal intracranial enhancement on post-contrast images.  Cerebellum: Unremarkable.  Vascular: Unremarkable venous sinuses.  Sella and skull base: The sella appears to be within normal limits.  Cerebellopontine angles: Within normal limits.  Herniation: None.  Intracranial calcifications: Incidental note is made of bilateral choroid plexus calcification. Incidental note is made of some pineal region calcification. Incidental note is made of some calcification of the falx.                                         X-Ray Chest AP Portable (Final result)  Result time 02/05/25 09:08:25      Final result by Bandar Vera MD (02/05/25 09:08:25)                   Impression:      No acute chest disease is identified.      Electronically signed by: Bandar Vera  Date:    02/05/2025  Time:    09:08               Narrative:    EXAMINATION:  XR CHEST AP PORTABLE    CLINICAL HISTORY:  , Unspecified fall, initial encounter.    COMPARISON:  January 25, 2025    FINDINGS:  No alveolar consolidation, effusion, or pneumothorax is seen.   The thoracic aorta is normal  cardiac silhouette, central pulmonary vessels and mediastinum are normal in size and are grossly unremarkable.   visualized osseous structures are grossly unremarkable.                                       CT Cervical Spine Without Contrast (Final result)  Result time 02/04/25 21:25:00      Final result by Juanito Chopra MD (02/04/25 21:25:00)                   Impression:      No acute fracture or malalignment identified.      Electronically signed by: Juanito Chopra  Date:    02/04/2025  Time:    21:25                Narrative:    EXAMINATION:  CT CERVICAL SPINE WITHOUT CONTRAST    CLINICAL HISTORY:  Trauma.    TECHNIQUE:  Multidetector axial images were performed of the cervical spine without and.  Images were reconstructed.    Automated exposure control was utilized to minimize radiation dose.  .    COMPARISON:  None available.    FINDINGS:  Cervical vertebrae stature is maintained.  There is grade 1 anterolisthesis of C4 on C5 and C7 on T1.  Solid ACDF at C5, C6 and C7.  No acute fracture or malalignment identified.  There is moderate degenerative narrowing of the left neural foramen at C4-C5.  There is no prevertebral soft tissue prominence.    This study does not exclude the possibility of intrathecal soft tissue, ligamentous or vascular injury.                                       CT Head Without Contrast (Final result)  Result time 02/04/25 18:42:34      Final result by Norma Leblanc MD (02/04/25 18:42:34)                   Impression:      Cortical based hypodensities in the bilateral occipital lobes, may represent changes of posterior reversible encephalopathy syndrome.  MRI brain is available for further evaluation.      Electronically signed by: Norma Leblanc  Date:    02/04/2025  Time:    18:42               Narrative:    EXAMINATION:  CT HEAD WITHOUT CONTRAST    CLINICAL HISTORY:  Head trauma, minor (Age >= 65y);    TECHNIQUE:  Axial scans were obtained from skull base to the vertex.    Coronal and sagittal reconstructions obtained from the axial data.    Automatic exposure control was utilized to limit radiation dose.    Contrast: None    Radiation Dose:    Total DLP: 934 mGy*cm    COMPARISON:  CT head dated 01/09/2025    FINDINGS:  There is no acute intracranial hemorrhage.  There are cortical based hypodensities in the bilateral occipital lobes.  Patchy hypodensities in the subcortical and periventricular white matter likely represent chronic microvascular ischemic changes.    There is  local mass effect.  There is no midline shift or herniation.  The basal cisterns are patent.  There is diffuse parenchymal volume loss.  There is no hydrocephalus or abnormal extra-axial fluid collection.  Carotid and vertebral artery calcifications are noted.  The calvarium and skull base are intact.                                    EKG:       Telemetry: SR    Physical Exam  Constitutional:       Appearance: Normal appearance.   HENT:      Head: Normocephalic.   Cardiovascular:      Rate and Rhythm: Normal rate and regular rhythm.      Pulses: Normal pulses.      Comments: + Mechanical Click  Pulmonary:      Effort: Pulmonary effort is normal. No respiratory distress.      Breath sounds: Normal breath sounds.      Comments: RA  Abdominal:      General: There is no distension.      Palpations: Abdomen is soft.   Musculoskeletal:         General: No swelling. Normal range of motion.      Right lower leg: No edema.      Left lower leg: No edema.   Skin:     General: Skin is warm and dry.   Neurological:      Mental Status: She is alert and oriented to person, place, and time. Mental status is at baseline.      Comments: Transient Confused Conversation   Psychiatric:         Mood and Affect: Mood normal.         Behavior: Behavior normal.         Judgment: Judgment normal.       Home Medications:   Current Facility-Administered Medications on File Prior to Encounter   Medication Dose Route Frequency Provider Last Rate Last Admin    0.9%  NaCl infusion   Intravenous Once Kahlil Martin MD        diazePAM tablet 10 mg  10 mg Oral On Call Procedure Kahlil Martin MD   10 mg at 05/18/23 0911    diphenhydrAMINE capsule 50 mg  50 mg Oral On Call Procedure Kahlil Martin MD   50 mg at 05/18/23 0911     Current Outpatient Medications on File Prior to Encounter   Medication Sig Dispense Refill    warfarin (COUMADIN) 5 MG tablet Take 5 mg by mouth Daily.      albuterol-ipratropium (DUO-NEB) 2.5 mg-0.5 mg/3 mL  nebulizer solution Take 3 mLs by nebulization every 4 (four) hours as needed for Wheezing or Shortness of Breath.      aspirin (ECOTRIN) 81 MG EC tablet Take 81 mg by mouth once daily.      atorvastatin (LIPITOR) 40 MG tablet Take 40 mg by mouth once daily.      clonazePAM (KLONOPIN) 1 MG tablet Take 0.5 mg by mouth 3 (three) times daily as needed for Anxiety.      dapagliflozin propanediol (FARXIGA) 10 mg tablet Take 1 tablet (10 mg total) by mouth once daily. 30 tablet 5    FLUoxetine 40 MG capsule Take 40 mg by mouth once daily.      furosemide (LASIX) 40 MG tablet Take 1 tablet (40 mg total) by mouth 2 (two) times daily. 60 tablet 11    gabapentin (NEURONTIN) 100 MG capsule Take 100 mg by mouth 3 (three) times daily.      HYDROcodone-acetaminophen (NORCO) 7.5-325 mg per tablet Take 1 tablet by mouth every 6 (six) hours as needed for Pain (severe pain only). 12 tablet 0    isosorbide mononitrate (IMDUR) 30 MG 24 hr tablet Take 1 tablet (30 mg total) by mouth once daily. 60 tablet 0    LIDOcaine (LIDODERM) 5 % Place 1 patch onto the skin once daily. Remove & Discard patch within 12 hours or as directed by MD 15 patch 0    losartan (COZAAR) 25 MG tablet Take 1 tablet (25 mg total) by mouth once daily. 90 tablet 3    metoprolol succinate (TOPROL-XL) 25 MG 24 hr tablet Take 0.5 tablets (12.5 mg total) by mouth 2 (two) times daily. 90 tablet 3    MOUNJARO 15 mg/0.5 mL PnIj Inject 15 mg into the skin every 7 days. (Patient not taking: Reported on 1/29/2025)      OLANZapine (ZYPREXA) 5 MG tablet Take 5 mg by mouth every evening.      sotaloL (BETAPACE) 120 MG Tab Take 80 mg by mouth every 12 (twelve) hours. Take 1/2 tablet BID      spironolactone (ALDACTONE) 25 MG tablet Take 1 tablet (25 mg total) by mouth once daily. 30 tablet 11    traZODone (DESYREL) 50 MG tablet Take 25-50 mg by mouth nightly as needed.       Current Schedule Inpatient Medications:   atorvastatin  40 mg Oral Daily    dapagliflozin propanediol  10  mg Oral Daily    FLUoxetine  40 mg Oral Daily    gabapentin  100 mg Oral TID    heparin (PORCINE)  60 Units/kg Intravenous Once    isosorbide mononitrate  30 mg Oral Daily    LIDOcaine  1 patch Transdermal Q24H    OLANZapine  5 mg Oral QHS      heparin (porcine) in D5W  0-40 Units/kg/hr Intravenous Continuous         Assessment:   Diplopia  Mechanical Fall at Ground Level tripping over [place]  PAF    - CHADsVASc - 6 Points - 9.7% Stroke Risk per Year     - Warfarin as OP  Supratherapeutic INR     - INR (2.7.25) - 2.1 today     - INR on Admission (2.4.25) - 8.8  Hx of Mechanical Aortic Valve    - PT/INR Goal Range: 2.5-3.5  DM II  HTN  HLD  COPD  Tobacco Use  No Hx of GIB     Plan:   Daily PT/INR  Resume Warfarin at a reduced dose at some point when OK with IR for INR Goal 2.5-3.5 in the Setting of AF/Stroke Risk Reduction and Mechanical AVR  Will continue to hold warfarin for  kyphoplasty   Recommend bridging with heparin infusion   Will Continue to Follow  Labs and EKG in AM: CBC, CMP, PT/INR and Mg     Thank you for your consult.     Judi Macario, LORETO  Cardiology  [facility]

## 2025-02-09 LAB
ALBUMIN SERPL-MCNC: 3 G/DL (ref 3.4–4.8)
ALBUMIN/GLOB SERPL: 1.1 RATIO (ref 1.1–2)
ALP SERPL-CCNC: 122 UNIT/L (ref 40–150)
ALT SERPL-CCNC: 14 UNIT/L (ref 0–55)
ANION GAP SERPL CALC-SCNC: 6 MEQ/L
APTT PPP: 115.3 SECONDS (ref 23.2–33.7)
APTT PPP: 58 SECONDS (ref 23.2–33.7)
APTT PPP: 67.5 SECONDS (ref 23.2–33.7)
AST SERPL-CCNC: 20 UNIT/L (ref 5–34)
BASOPHILS # BLD AUTO: 0.04 X10(3)/MCL
BASOPHILS NFR BLD AUTO: 0.6 %
BILIRUB SERPL-MCNC: 0.4 MG/DL
BUN SERPL-MCNC: 19.8 MG/DL (ref 9.8–20.1)
CALCIUM SERPL-MCNC: 8.7 MG/DL (ref 8.4–10.2)
CHLORIDE SERPL-SCNC: 103 MMOL/L (ref 98–107)
CO2 SERPL-SCNC: 29 MMOL/L (ref 23–31)
CREAT SERPL-MCNC: 0.83 MG/DL (ref 0.55–1.02)
CREAT/UREA NIT SERPL: 24
EOSINOPHIL # BLD AUTO: 0.25 X10(3)/MCL (ref 0–0.9)
EOSINOPHIL NFR BLD AUTO: 3.5 %
ERYTHROCYTE [DISTWIDTH] IN BLOOD BY AUTOMATED COUNT: 16.8 % (ref 11.5–17)
GFR SERPLBLD CREATININE-BSD FMLA CKD-EPI: >60 ML/MIN/1.73/M2
GLOBULIN SER-MCNC: 2.7 GM/DL (ref 2.4–3.5)
GLUCOSE SERPL-MCNC: 120 MG/DL (ref 82–115)
HCT VFR BLD AUTO: 36.3 % (ref 37–47)
HGB BLD-MCNC: 10.7 G/DL (ref 12–16)
IMM GRANULOCYTES # BLD AUTO: 0.01 X10(3)/MCL (ref 0–0.04)
IMM GRANULOCYTES NFR BLD AUTO: 0.1 %
INR PPP: 1.4
LEFT CCA DIST SYS: 43 CM/S
LEFT CCA PROX SYS: 62.6 CM/S
LEFT ECA SYS: 103 CM/S
LEFT ICA DIST SYS: 76.8 CM/S
LEFT ICA MID SYS: 59.8 CM/S
LEFT ICA PROX SYS: 44.8 CM/S
LEFT VERTEBRAL SYS: 95 CM/S
LYMPHOCYTES # BLD AUTO: 2.19 X10(3)/MCL (ref 0.6–4.6)
LYMPHOCYTES NFR BLD AUTO: 30.7 %
MAGNESIUM SERPL-MCNC: 2.3 MG/DL (ref 1.6–2.6)
MCH RBC QN AUTO: 27 PG (ref 27–31)
MCHC RBC AUTO-ENTMCNC: 29.5 G/DL (ref 33–36)
MCV RBC AUTO: 91.7 FL (ref 80–94)
MONOCYTES # BLD AUTO: 0.52 X10(3)/MCL (ref 0.1–1.3)
MONOCYTES NFR BLD AUTO: 7.3 %
NEUTROPHILS # BLD AUTO: 4.12 X10(3)/MCL (ref 2.1–9.2)
NEUTROPHILS NFR BLD AUTO: 57.8 %
NRBC BLD AUTO-RTO: 0 %
OHS CV CAROTID ULTRASOUND LEFT ICA/CCA RATIO: 1.79
OHS CV CAROTID ULTRASOUND RIGHT ICA/CCA RATIO: 1.06
OHS CV PV CAROTID LEFT HIGHEST CCA: 63
OHS CV PV CAROTID LEFT HIGHEST ICA: 76.8
OHS CV PV CAROTID RIGHT HIGHEST CCA: 72
OHS CV PV CAROTID RIGHT HIGHEST ICA: 54.4
PLATELET # BLD AUTO: 296 X10(3)/MCL (ref 130–400)
PMV BLD AUTO: 9.9 FL (ref 7.4–10.4)
POTASSIUM SERPL-SCNC: 5 MMOL/L (ref 3.5–5.1)
PROT SERPL-MCNC: 5.7 GM/DL (ref 5.8–7.6)
PROTHROMBIN TIME: 17.6 SECONDS (ref 12.5–14.5)
RBC # BLD AUTO: 3.96 X10(6)/MCL (ref 4.2–5.4)
RIGHT CCA DIST SYS: 51.4 CM/S
RIGHT CCA PROX SYS: 71.5 CM/S
RIGHT ICA DIST SYS: 54.4 CM/S
RIGHT ICA MID SYS: 51.5 CM/S
RIGHT ICA PROX SYS: 48.5 CM/S
RIGHT VERTEBRAL SYS: 48.9 CM/S
SODIUM SERPL-SCNC: 138 MMOL/L (ref 136–145)
WBC # BLD AUTO: 7.13 X10(3)/MCL (ref 4.5–11.5)

## 2025-02-09 PROCEDURE — 25000003 PHARM REV CODE 250: Performed by: INTERNAL MEDICINE

## 2025-02-09 PROCEDURE — 85025 COMPLETE CBC W/AUTO DIFF WBC: CPT | Performed by: INTERNAL MEDICINE

## 2025-02-09 PROCEDURE — 85610 PROTHROMBIN TIME: CPT | Performed by: INTERNAL MEDICINE

## 2025-02-09 PROCEDURE — 85730 THROMBOPLASTIN TIME PARTIAL: CPT | Performed by: INTERNAL MEDICINE

## 2025-02-09 PROCEDURE — 80053 COMPREHEN METABOLIC PANEL: CPT | Performed by: NURSE PRACTITIONER

## 2025-02-09 PROCEDURE — 36415 COLL VENOUS BLD VENIPUNCTURE: CPT | Performed by: INTERNAL MEDICINE

## 2025-02-09 PROCEDURE — 63600175 PHARM REV CODE 636 W HCPCS: Performed by: INTERNAL MEDICINE

## 2025-02-09 PROCEDURE — 21400001 HC TELEMETRY ROOM

## 2025-02-09 PROCEDURE — 83735 ASSAY OF MAGNESIUM: CPT | Performed by: NURSE PRACTITIONER

## 2025-02-09 RX ADMIN — DAPAGLIFLOZIN 10 MG: 10 TABLET, FILM COATED ORAL at 08:02

## 2025-02-09 RX ADMIN — TRAMADOL HYDROCHLORIDE 50 MG: 50 TABLET, COATED ORAL at 06:02

## 2025-02-09 RX ADMIN — GABAPENTIN 100 MG: 100 CAPSULE ORAL at 08:02

## 2025-02-09 RX ADMIN — GABAPENTIN 100 MG: 100 CAPSULE ORAL at 04:02

## 2025-02-09 RX ADMIN — TRAMADOL HYDROCHLORIDE 50 MG: 50 TABLET, COATED ORAL at 11:02

## 2025-02-09 RX ADMIN — HYPROMELLOSE 2910 1 DROP: 5 SOLUTION/ DROPS OPHTHALMIC at 08:02

## 2025-02-09 RX ADMIN — HEPARIN SODIUM 11 UNITS/KG/HR: 10000 INJECTION, SOLUTION INTRAVENOUS at 11:02

## 2025-02-09 RX ADMIN — OLANZAPINE 5 MG: 5 TABLET, FILM COATED ORAL at 08:02

## 2025-02-09 RX ADMIN — HYDROCODONE BITARTRATE AND ACETAMINOPHEN 1 TABLET: 7.5; 325 TABLET ORAL at 12:02

## 2025-02-09 RX ADMIN — TRAMADOL HYDROCHLORIDE 50 MG: 50 TABLET, COATED ORAL at 04:02

## 2025-02-09 RX ADMIN — TRAZODONE HYDROCHLORIDE 50 MG: 50 TABLET ORAL at 08:02

## 2025-02-09 RX ADMIN — ATORVASTATIN CALCIUM 40 MG: 40 TABLET, FILM COATED ORAL at 08:02

## 2025-02-09 RX ADMIN — ISOSORBIDE MONONITRATE 30 MG: 30 TABLET, EXTENDED RELEASE ORAL at 08:02

## 2025-02-09 RX ADMIN — FLUOXETINE HYDROCHLORIDE 40 MG: 20 CAPSULE ORAL at 08:02

## 2025-02-09 RX ADMIN — HYPROMELLOSE 2910 1 DROP: 5 SOLUTION/ DROPS OPHTHALMIC at 04:02

## 2025-02-09 NOTE — PLAN OF CARE
Problem: Adult Inpatient Plan of Care  Goal: Plan of Care Review  Outcome: Progressing  Goal: Patient-Specific Goal (Individualized)  Outcome: Progressing  Goal: Absence of Hospital-Acquired Illness or Injury  Outcome: Progressing  Goal: Optimal Comfort and Wellbeing  Outcome: Progressing  Goal: Readiness for Transition of Care  Outcome: Progressing     Problem: Infection  Goal: Absence of Infection Signs and Symptoms  Outcome: Progressing     Problem: Stroke, Ischemic (Includes Transient Ischemic Attack)  Goal: Optimal Coping  Outcome: Progressing  Goal: Effective Bowel Elimination  Outcome: Progressing  Goal: Optimal Cerebral Tissue Perfusion  Outcome: Progressing  Goal: Optimal Cognitive Function  Outcome: Progressing  Goal: Improved Communication Skills  Outcome: Progressing  Goal: Optimal Functional Ability  Outcome: Progressing  Goal: Optimal Nutrition Intake  Outcome: Progressing  Goal: Effective Oxygenation and Ventilation  Outcome: Progressing  Goal: Improved Sensorimotor Function  Outcome: Progressing  Goal: Safe and Effective Swallow  Outcome: Progressing  Goal: Effective Urinary Elimination  Outcome: Progressing     Problem: Fall Injury Risk  Goal: Absence of Fall and Fall-Related Injury  Outcome: Progressing     Problem: Skin Injury Risk Increased  Goal: Skin Health and Integrity  Outcome: Progressing

## 2025-02-09 NOTE — PROGRESS NOTES
[facility] MEDICINE ~ PROGRESS NOTE        CHIEF COMPLAINT   Hospital follow up    HOSPITAL COURSE   69 y.o. female with a history that includes PAF and mechanical AVR, on Coumadin therapy was referred to ED by her Cardiology due to INR of 8.8 on outpatient labs.  Additional complaints include headache and vision changes, though denied focal weakness or speech changes.  CT head performed and revealed cortical based hypodensities in the bilateral occipital lobes.  CTA revealed moderate atheromatous calcification of the cavernous clinoid and supra clinoid segments of the bilateral internal carotid arteries with mild-to-moderate left greater than right stenosis.  A subsequent MRI brain has noted Symmetric restricted diffusion and edema in the bilateral posterior occipital lobes, with small foci in the left parietal lobe and left cerebellum, favored to represent changes of posterior reversible encephalopathy syndrome, and additionally noted small areas of hemorrhage in B/L occipital lobes.      Neurology was consulted and neurologist felt she had bilateral occipital lobe infarcts secondary to being subtherapeutic with clot formation and then had embolism which led to the vision loss.  Recommends resuming warfarin once cleared by Cardiology.    After further review it was deemed that she was taking excessive Tylenol about 2 to 3 500 mg tablets at a time every 4 hours per the  report.  She was taking accepted Tylenol use because she could not get anyone to prescribed Norco for her.    Today  Complained of left eye pain yesterday seen by ophto and no abd noted. Pending ir for kyphoplasty eval.         OBJECTIVE/PHYSICAL EXAM     VITAL SIGNS (MOST RECENT):  Temp: 97.7 °F (36.5 °C) (02/09/25 0721)  Pulse: 77 (02/09/25 0725)  Resp: 17 (02/09/25 0446)  BP: (!) 160/83 (02/09/25 0721)  SpO2: (!) 90 % (02/09/25 0721) VITAL SIGNS (24 HOUR RANGE):  Temp:  [97.5 °F (36.4 °C)-98.7 °F (37.1  °C)] 97.7 °F (36.5 °C)  Pulse:  [70-89] 77  Resp:  [16-17] 17  SpO2:  [88 %-97 %] 90 %  BP: (102-160)/(54-92) 160/83   GENERAL: In no acute distress, afebrile  HEENT:  CHEST: Clear to auscultation bilaterally  HEART: S1, S2, no appreciable murmur  ABDOMEN: Soft, nontender, BS +  MSK: Warm, no lower extremity edema, no clubbing or cyanosis  NEUROLOGIC:  Blind out of the left eye, right eye with seeing multiple of the same, extraocular muscles seems to be intact, good strength in all 4 extremities   INTEGUMENTARY:  PSYCHIATRY:        ASSESSMENT/PLAN   Bilateral posterior occipital lobe ischemic CVA  Supratherapeutic INR  Unintentional Tylenol overdose  L1 compression fracture with progressive loss of height  VHD s/p mechanical AVR, on Coumadin   PAF   Diabetes mellitus type II   Essential HTN   h/o COPD   Tobacco abuse      Neurosurgery following.  Recommends IR for kyphoplasty.  Stroke Neurology signed off.  Recommends resuming Coumadin once okay.    Cardiology following.  Coumadin management per them.  Continue heparin drip for now.  INR goal 2.5 dash 3.5.  Interventional radiology consulted for kyphoplasty evaluation  Per the  she was getting anywhere between 6 g to 9 g of Tylenol per day.  This is likely the cause of her supratherapeutic INR.    Continue pain control with tramadol, back brace per NSGY  Okay to work with PT and OT  Case management consult for SNF placement    DVT prophylaxis:  Heparin drip    Anticipated discharge and disposition:   __________________________________________________________________________    NUTRITIONAL STATUS     Patient meets ASPEN criteria for   malnutrition of   per RD assessment as evidenced by:                       A minimum of two characteristics is recommended for diagnosis of either severe or non-severe malnutrition.     LABS/MICRO/MEDS/DIAGNOSTICS       LABS  Recent Labs     02/09/25  0255      K 5.0   CO2 29   BUN 19.8   CREATININE 0.83   GLUCOSE 120*    CALCIUM 8.7   ALKPHOS 122   AST 20   ALT 14   ALBUMIN 3.0*     Recent Labs     02/09/25  0255   WBC 7.13   RBC 3.96*   HCT 36.3*   MCV 91.7          MICROBIOLOGY  Microbiology Results (last 7 days)       ** No results found for the last 168 hours. **               MEDICATIONS   artificial tears  1 drop Right Eye QID    atorvastatin  40 mg Oral Daily    dapagliflozin propanediol  10 mg Oral Daily    FLUoxetine  40 mg Oral Daily    gabapentin  100 mg Oral TID    isosorbide mononitrate  30 mg Oral Daily    LIDOcaine  1 patch Transdermal Q24H    OLANZapine  5 mg Oral QHS         INFUSIONS   heparin (porcine) in D5W  0-40 Units/kg/hr Intravenous Continuous 10 mL/hr at 02/09/25 0518 11 Units/kg/hr at 02/09/25 0518          DIAGNOSTIC TESTS  CT Head Without Contrast   Final Result      Areas of hypodensity in the bilateral occipital lobe corresponding to the areas of restricted diffusion seen on prior MRI.  There has been no progression.  There has been no hemorrhage.  This is suggestive of posterior reversible encephalopathy syndrome.         Electronically signed by: Ok Tillman MD   Date:    02/08/2025   Time:    15:48      X-Ray Thoracolumbar Spine AP Lateral   Final Result      L1 compression fracture with progressive loss of height compared to 01/09/2025.         Electronically signed by: Norma Leblanc   Date:    02/06/2025   Time:    13:33      MRI Brain W WO Contrast   Final Result      1. Symmetric restricted diffusion and edema in the bilateral posterior occipital lobes, with small foci in the left parietal lobe and left cerebellum, favored to represent changes of posterior reversible encephalopathy syndrome with ischemia in the differential.  Follow-up can be obtained to ensure resolution.   2. Small areas of associated hemorrhage in the bilateral occipital lobes.   Finding of intracranial hemorrhage given to Jina HOLT at the time of dictation.         Electronically signed by: Norma Leblanc    Date:    02/05/2025   Time:    09:56      CTA Head and Neck (xpd)   Final Result      No large vessel occlusion or flow-limiting stenosis.      No significant change from the [facility] interpretation.         Electronically signed by: Norma Leblanc   Date:    02/05/2025   Time:    08:32      X-Ray Chest AP Portable   Final Result      No acute chest disease is identified.         Electronically signed by: Bandar Vrea   Date:    02/05/2025   Time:    09:08      CT Cervical Spine Without Contrast   Final Result      No acute fracture or malalignment identified.         Electronically signed by: Juanito Chopra   Date:    02/04/2025   Time:    21:25      CT Head Without Contrast   Final Result      Cortical based hypodensities in the bilateral occipital lobes, may represent changes of posterior reversible encephalopathy syndrome.  MRI brain is available for further evaluation.         Electronically signed by: Norma Leblanc   Date:    02/04/2025   Time:    18:42           Echo    Result Date: 2/5/2025    Left Ventricle: The left ventricle is normal in size. Normal wall   thickness. There is normal systolic function with a visually estimated   ejection fraction of 55 - 60%. Grade I diastolic dysfunction.    Right Ventricle: Normal right ventricular cavity size. Wall thickness   is normal. Systolic function is normal. TAPSE is 1.83 cm.    Left Atrium: Left atrium is moderately dilated. Agitated saline study   of the atrial septum is negative, suggesting absence of intracardiac shunt   at the atrial level.    Aortic Valve: There is a mechanical valve in the aortic position.   Aortic valve area by VTI is 1.6 cm². Aortic valve peak velocity is 2.9   m/s. Mean gradient is 16 mmHg. The dimensionless index is 0.53.    Mitral Valve: There is mitral annular calcification present. There is   trace regurgitation.    IVC/SVC: Normal venous pressure at 3 mmHg.    Consider KATERIN              Case related differential  diagnoses have been reviewed; assessment and plan has been documented. I have personally reviewed the labs and test results that are currently available; I have reviewed the patients medication list. I have reviewed the consulting providers recommendations. I have reviewed or attempted to review medical records based upon their availability.  All of the patient's and/or family's questions have been addressed and answered to the best of my ability.  I will continue to monitor closely and make adjustments to medical management as needed.  This document was created using M*Modal Fluency Direct.  Transcription errors may have been made.  Please contact me if any questions may rise regarding documentation to clarify transcription.        Jorge Pham MD   Internal Medicine  Department of Hospital Medicine  [facility] - Neurology

## 2025-02-09 NOTE — PROGRESS NOTES
[facility] *    Cardiology  Progress Note    Patient Name: Kayla Ruiz  MRN: [id number]  Admission Date: 2/4/2025  Hospital Length of Stay: 4 days  Code Status: Full Code   Attending Provider: Radhames Acevedo MD   Consulting Provider: Judi Macario NP  Primary Care Physician: Jennifer Chavez, NP-DERIK  Principal Problem:<principal problem not specified>    Patient information was obtained from patient, past medical records, and ER records.     Subjective:     Reason for Consult: Anticoagulation Recommendations    HPI: Ms. Ruiz is a 68 y/o female who is known to [facility], Dr. Leon. The patient presented to [facility] on 2.4.25 with c/o Headache, Double Vision and Circles in Vision. The patient reported that she fell the other day after tripping on a blanket. She denied hitting her head and LOC. She reported that she developed a frontal headache about 2 days ago which was around the fall. She then developed visual disturbances of double vision and circles in her vision. She presented to the ER and a CT of the Head revealed Cortical Based Hypodensities in the Bilateral Occipital Lobes which may represent changes of the Posterior Reversible Encephalopathy Syndrome. MRI of the Brain was recommended for further workup and evaluation. CTA Head/Neck revealed Moderate Atheromatous Calcification of the Cavernous Clinoid and Supra Clinoid segments of the Bilateral Internal Carotid Arteries with Mild-Mod L > R Stenosis. She was admitted to  and Stroke Neurology was Consulted and [facility] was consulted for Anticoagulation Recommendations.     Hospital Course:   2.6.25: NAD, VSS. She deneis SOB CP or nasuea. INR 5.7 today  2.7.25; NAD, VSS, She still reports visual disturbances, She denies SOB, CP, or nausea, INR 3.5 today.   2.8.25: NAD, VSS. She denies SOB, CP, or nausea. INR is 2.1 today. Her  is at the bedside   2.9.25: VSS, NAD. She denies CP, SOB, or nausea. INR is 1.4.    PMH: PAF, Mechanical AV on  Warfarin, DM II, HTN, HLD, COPD  PSH: Aortic Valve Replacement, Cholecystectomy, Colon Resection, Angiogram, Tonsillectomy  Family History: Father- COPD, Mother- COPD, Sister- Cancer, Sister- Cancer   Social History: Current Everyday Smoker, 1ppd for 40+ Years; Denies Illicit Drug and ETOH Use      Previous Diagnostics:  ECHO 10.24.24:   Left Ventricle: The left ventricle is normal in size. Moderately increased wall thickness. There is normal systolic function with a visually estimated ejection fraction of 55 - 60%. There is normal diastolic function.  Right Ventricle: Normal right ventricular cavity size. Systolic function is normal. TAPSE is 2.70 cm.  Left Atrium: Left atrium is moderately dilated.  Aortic Valve: There is a bioprosthetic valve in the aortic position. Aortic valve area by VTI is 2.8 cm². Aortic valve peak velocity is 2.6 m/s. Mean gradient is 14.0 mmHg. The dimensionless index is 0.63. There is trace aortic regurgitation.  Mitral Valve: There is mild stenosis. The mean pressure gradient across the mitral valve is 4 mmHg at a heart rate of  bpm. There is mild regurgitation.  Tricuspid Valve: There is mild regurgitation with a RVSP of 34mmHg.  IVC/SVC: Intermediate venous pressure at 8 mmHg.     LHC 5.11.23  Calcified widely patent coronary anatomy  The mechanical aortic valve demonstrated normal functioning within study limits.  Heavily calcified aortic root  There was heavy mitral annular calcification noted.  Radial approach  The estimated blood loss was none.  Resume warfarin today  Procedure:   Selective coronary angiography  Left ventriculography  Left heart catheterization  Moderate (Conscious) Sedation   Indication: Angina equivalent  Coronary findings: The aortic root was heavily calcified  Dominance: right   Left main: no obstructive disease with <10% epicardial stenosis  Left anterior descending artery:  Patent and highly tortuous Type 2 vessel that gave rise to a very large bifurcating  diagonal branch.  No obstructive disease with <10% epicardial stenosis.  Circumflex artery:  Non dominant patent vessel that gives rise to 2 tortuous moderate-sized obtuse marginal branches.  No obstructive disease with <10% epicardial stenosis  Right coronary artery:  The patent calcified dominant vessel terminates in a large PDA and a small PL segment.  No obstructive disease with <10% epicardial stenosis  Left ventriculography: Not performed due to mechanical AVR  Impression  No evidence of obstructive CAD  Plan   Resume warfarin today and continue GDMT     Holter Monitor 11.21.23  1.This is a good quality study.  2.Predominant rhythm is sinus bradycardia.  3.The minimum heart rate recorded was 48 beats / minute (11/21/2023). The maximum heart rate is 77 beats / minute (11/22/2023). The mean heart rate is 54 beats / minute.  4.Rare premature atrial contractions noted.  5.No evidence of supraventricular tachycardia is noted.  6.No evidence of atrial fibrillation noted.  7.Rare premature ventricular contractions noted.  8.No evidence of ventricular tachycardia is noted.  9.No pauses were noted.  10. IN SINUS BRADYCARDIA 85% OF THE STUDY TIME NO SYMPTOMS REPORTED     Review of patient's allergies indicates:   Allergen Reactions    Ace inhibitors     Naproxen sodium     Nifedipine     Venlafaxine      Current Facility-Administered Medications on File Prior to Encounter   Medication    0.9%  NaCl infusion    diazePAM tablet 10 mg    diphenhydrAMINE capsule 50 mg     Current Outpatient Medications on File Prior to Encounter   Medication Sig    warfarin (COUMADIN) 5 MG tablet Take 5 mg by mouth Daily.    albuterol-ipratropium (DUO-NEB) 2.5 mg-0.5 mg/3 mL nebulizer solution Take 3 mLs by nebulization every 4 (four) hours as needed for Wheezing or Shortness of Breath.    aspirin (ECOTRIN) 81 MG EC tablet Take 81 mg by mouth once daily.    atorvastatin (LIPITOR) 40 MG tablet Take 40 mg by mouth once daily.    clonazePAM  (KLONOPIN) 1 MG tablet Take 0.5 mg by mouth 3 (three) times daily as needed for Anxiety.    dapagliflozin propanediol (FARXIGA) 10 mg tablet Take 1 tablet (10 mg total) by mouth once daily.    FLUoxetine 40 MG capsule Take 40 mg by mouth once daily.    furosemide (LASIX) 40 MG tablet Take 1 tablet (40 mg total) by mouth 2 (two) times daily.    gabapentin (NEURONTIN) 100 MG capsule Take 100 mg by mouth 3 (three) times daily.    HYDROcodone-acetaminophen (NORCO) 7.5-325 mg per tablet Take 1 tablet by mouth every 6 (six) hours as needed for Pain (severe pain only).    isosorbide mononitrate (IMDUR) 30 MG 24 hr tablet Take 1 tablet (30 mg total) by mouth once daily.    LIDOcaine (LIDODERM) 5 % Place 1 patch onto the skin once daily. Remove & Discard patch within 12 hours or as directed by MD    losartan (COZAAR) 25 MG tablet Take 1 tablet (25 mg total) by mouth once daily.    metoprolol succinate (TOPROL-XL) 25 MG 24 hr tablet Take 0.5 tablets (12.5 mg total) by mouth 2 (two) times daily.    MOUNJARO 15 mg/0.5 mL PnIj Inject 15 mg into the skin every 7 days. (Patient not taking: Reported on 1/29/2025)    OLANZapine (ZYPREXA) 5 MG tablet Take 5 mg by mouth every evening.    sotaloL (BETAPACE) 120 MG Tab Take 80 mg by mouth every 12 (twelve) hours. Take 1/2 tablet BID    spironolactone (ALDACTONE) 25 MG tablet Take 1 tablet (25 mg total) by mouth once daily.    traZODone (DESYREL) 50 MG tablet Take 25-50 mg by mouth nightly as needed.     Review of Systems   Constitutional:  Positive for fatigue. Negative for chills.   Respiratory:  Negative for choking and shortness of breath.    Cardiovascular:  Negative for chest pain and palpitations.   Gastrointestinal:  Negative for abdominal pain, nausea and vomiting.   Musculoskeletal:  Negative for gait problem.   Neurological:  Negative for weakness.        + Visual disturbance   All other systems reviewed and are negative.    Objective:     Vital Signs (Most Recent):  Temp:  97.8 °F (36.6 °C) (02/09/25 1111)  Pulse: 85 (02/09/25 1111)  Resp: 17 (02/09/25 1132)  BP: (!) 148/76 (02/09/25 1111)  SpO2: (!) 94 % (02/09/25 1111) Vital Signs (24h Range):  Temp:  [97.5 °F (36.4 °C)-98.7 °F (37.1 °C)] 97.8 °F (36.6 °C)  Pulse:  [70-85] 85  Resp:  [16-17] 17  SpO2:  [88 %-96 %] 94 %  BP: (102-160)/(67-92) 148/76   Weight: 91.3 kg (201 lb 3.2 oz)  Body mass index is 32.97 kg/m².  SpO2: (!) 94 %     No intake or output data in the 24 hours ending 02/09/25 1605    Lines/Drains/Airways       Peripheral Intravenous Line  Duration                  Peripheral IV - Single Lumen 02/08/25 2115 20 G Distal;Left;Posterior Forearm <1 day                  Significant Labs:   Chemistries:   Recent Labs   Lab 02/04/25  1917 02/04/25  1917 02/05/25  0329 02/06/25  0309 02/07/25  0356 02/08/25  0357 02/09/25  0255     --   --  140 135* 138 138   K 5.1  --   --  4.6 4.4 5.0 5.0     --   --  102 103 101 103   CO2 20*  --   --  30 22* 28 29   BUN 27.1*  --   --  31.5* 34.2* 27.5* 19.8   CREATININE 0.98  --   --  1.26* 0.86 0.85 0.83   CALCIUM 9.0  --   --  9.0 8.7 8.9 8.7   BILITOT 0.3  --   --  0.4 0.3 0.3 0.4   ALKPHOS 152*  --   --  154* 146 139 122   ALT 20  --   --  21 17 15 14   AST 29  --   --  25 20 21 20   GLUCOSE 174*  --   --  118* 130* 139* 120*   MG  --    < >  --  2.40 2.40 2.40 2.30   PHOS  --   --   --  4.2  --   --   --    TROPONINI 0.023  --  <0.010  --   --   --   --     < > = values in this interval not displayed.        CBC/Anemia Labs: Coags:    Recent Labs   Lab 02/08/25 0357 02/08/25 1318 02/09/25  0255   WBC 6.69 7.91 7.13   HGB 11.3* 11.4* 10.7*   HCT 38.0 37.3 36.3*    297 296   MCV 90.3 90.3 91.7   RDW 16.5 16.6 16.8    Recent Labs   Lab 02/04/25 2122 02/05/25  0329 02/06/25  0309 02/08/25 0357 02/08/25 1318 02/09/25  0255   INR 7.0* 7.0*   < > 2.1* 1.6* 1.4*   APTT 54.3* 57.9*  --   --  31.3  --     < > = values in this interval not displayed.        Significant  Imaging:  Imaging Results              MRI Brain W WO Contrast (Final result)  Result time 02/05/25 09:56:17      Final result by Norma Leblanc MD (02/05/25 09:56:17)                   Impression:      1. Symmetric restricted diffusion and edema in the bilateral posterior occipital lobes, with small foci in the left parietal lobe and left cerebellum, favored to represent changes of posterior reversible encephalopathy syndrome with ischemia in the differential.  Follow-up can be obtained to ensure resolution.  2. Small areas of associated hemorrhage in the bilateral occipital lobes.  Finding of intracranial hemorrhage given to Jina HOLT at the time of dictation.      Electronically signed by: Norma Leblanc  Date:    02/05/2025  Time:    09:56               Narrative:    EXAMINATION:  MRI BRAIN W WO CONTRAST    CLINICAL HISTORY:  Mental status change, unknown cause;vision changes x1 week, abnormal CT;    TECHNIQUE:  Multiplanar, multisequence MR images of the brain were obtained with and without administration of intravenous contrast.    COMPARISON:  CT head dated 02/04/2025    FINDINGS:  There is symmetric restricted diffusion and edema in the bilateral posterior occipital lobes, with few small foci in the left parietal lobe and left cerebellum.  There are small areas of associated hemorrhage with likely areas of curvilinear enhancement.  Mild patchy T2/FLAIR hyperintense in the subcortical and periventricular white matter likely represent chronic microvascular ischemic changes.    There is local mass effect.  There is no midline shift or herniation.  The basal cisterns are patent.  There is mild diffuse parenchymal volume loss.  There is no hydrocephalus or abnormal extra-axial fluid collection.                                       CTA Head and Neck (xpd) (Final result)  Result time 02/05/25 08:32:51      Final result by Norma Leblanc MD (02/05/25 08:32:51)                   Impression:      No large  vessel occlusion or flow-limiting stenosis.    No significant change from the [facility] interpretation.      Electronically signed by: Norma Leblanc  Date:    02/05/2025  Time:    08:32               Narrative:    EXAMINATION:  CTA HEAD AND NECK (XPD)    CLINICAL HISTORY:  Stroke/TIA, determine embolic source;    TECHNIQUE:  Axial images obtained through the cervical region and [place] before and after the administration of intravenous contrast.    Coronal, sagittal, MIP and 3D reconstructions were obtained from the axial data.    Automatic exposure control was utilized to limit radiation dose.    Radiation Dose:    Total DLP: 2870 mGy*cm    COMPARISON:  CT head dated 02/04/2025    FINDINGS:  Head CT with contrast:    No interval changes when compared to the previous CT.    No significant enhancing abnormalities.    If present, stenosis of the carotid bulbs is measured based on NASCET criteria,    i.e. area of maximal stenosis compared to the cervical ICA distal to the bulb.    Cervical CTA:    The origins of the great vessels are patent with moderate calcifications    The common carotid arteries are patent.  There are mild calcifications at the carotid bulbs without hemodynamically significant stenosis.  The internal carotid arteries are patent.    The vertebral arteries are patent    Intracranial CTA:    There are calcifications along the course the carotid siphons with mild narrowing bilaterally.  The middle cerebral arteries and anterior cerebral arteries are patent    The vertebral arteries, basilar artery and posterior cerebral arteries are patent.    The dural venous sinuses are patent.                        Preliminary result by Albert Cruz Jr., MD (02/05/25 04:10:05)                   Impression:    1. Moderate atheromatous calcification of the cavernous clinoid and supraclinoid segments of the bilateral internal carotid arteries is seen with mild to moderate left greater than right  stenosis (Series 26 Images 200-216).  2. The previously noted hypodensities in the bilateral occipital lobes previously ascribed to posterior reversible encephalopathy are again seen stable from prior without associated abnormal intracranial enhancement on post-contrast images.  3. Details and other findings as described above.               Narrative:    START OF REPORT:  Technique: CT angiogram of the intracranial and neck vessels was performed with intravenous contrast with direct axial as well as sagittal and coronal reformations.    Comparison: Comparison is with study [id number] 18:22:03.    Clinical history: Blurry vision.    Findings:  Intracranial Vascular structures:  Internal carotid arteries: Moderate atheromatous calcification of the cavernous clinoid and supraclinoid segments of the bilateral internal carotid arteries is seen with mild to moderate left greater than right stenosis (Series 26 Images 200-216).  Middle cerebral arteries: Unremarkable.  Anterior cerebral arteries: Unremarkable.  Vertebral arteries: Mild atheromatous change of the bilateral vertebral arteries is seen with no significant stenosis.  Basilar artery: Unremarkable.  Posterior cerebral arteries: Unremarkable.  Posterior communicating arteries: Unremarkable.  Neck Vascular structures: Mild atheromatous calcification with no significant stenosis is seen at the origin of the brachiocephalic left subclavian left common carotid arteries.  Carotids:  Common carotid arteries: The left common carotid artery appears unremarkable.  Right common carotid artery: Mild atheromatous calcification of the mid distal segment of the right common carotid artery is seen with no significant stenosis.  Internal carotid artery: Mild atheromatous calcification with mild stenosis at the origin of the bilateral internal carotid arteries is seen.  Vertebral arteries: Unremarkable.  Jugular Veins and venous sinuses: Unremarkable.  Hemorrhage: No acute  intracranial hemorrhage is seen.  CSF spaces: The ventricles, sulci and basal cisterns all appear moderately prominent consistent with global cerebral atrophy stable from prior.  Brain parenchyma: No acute infarct is identified. Mild stable appearing scattered microvascular change is seen in portions of the periventricular and deep white matter tracts. The previously noted hypodensities in the bilateral occipital lobes previously ascribed to posterior reversible encephalopathy are again seen stable from prior without associated abnormal intracranial enhancement on post-contrast images.  Cerebellum: Unremarkable.  Vascular: Unremarkable venous sinuses.  Sella and skull base: The sella appears to be within normal limits.  Cerebellopontine angles: Within normal limits.  Herniation: None.  Intracranial calcifications: Incidental note is made of bilateral choroid plexus calcification. Incidental note is made of some pineal region calcification. Incidental note is made of some calcification of the falx.                                         X-Ray Chest AP Portable (Final result)  Result time 02/05/25 09:08:25      Final result by Bandar Vera MD (02/05/25 09:08:25)                   Impression:      No acute chest disease is identified.      Electronically signed by: Bandar Vera  Date:    02/05/2025  Time:    09:08               Narrative:    EXAMINATION:  XR CHEST AP PORTABLE    CLINICAL HISTORY:  , Unspecified fall, initial encounter.    COMPARISON:  January 25, 2025    FINDINGS:  No alveolar consolidation, effusion, or pneumothorax is seen.   The thoracic aorta is normal  cardiac silhouette, central pulmonary vessels and mediastinum are normal in size and are grossly unremarkable.   visualized osseous structures are grossly unremarkable.                                       CT Cervical Spine Without Contrast (Final result)  Result time 02/04/25 21:25:00      Final result by Juanito Chopra MD (02/04/25  21:25:00)                   Impression:      No acute fracture or malalignment identified.      Electronically signed by: Juanito Chopra  Date:    02/04/2025  Time:    21:25               Narrative:    EXAMINATION:  CT CERVICAL SPINE WITHOUT CONTRAST    CLINICAL HISTORY:  Trauma.    TECHNIQUE:  Multidetector axial images were performed of the cervical spine without and.  Images were reconstructed.    Automated exposure control was utilized to minimize radiation dose.  .    COMPARISON:  None available.    FINDINGS:  Cervical vertebrae stature is maintained.  There is grade 1 anterolisthesis of C4 on C5 and C7 on T1.  Solid ACDF at C5, C6 and C7.  No acute fracture or malalignment identified.  There is moderate degenerative narrowing of the left neural foramen at C4-C5.  There is no prevertebral soft tissue prominence.    This study does not exclude the possibility of intrathecal soft tissue, ligamentous or vascular injury.                                       CT Head Without Contrast (Final result)  Result time 02/04/25 18:42:34      Final result by Norma Leblanc MD (02/04/25 18:42:34)                   Impression:      Cortical based hypodensities in the bilateral occipital lobes, may represent changes of posterior reversible encephalopathy syndrome.  MRI brain is available for further evaluation.      Electronically signed by: Norma Leblanc  Date:    02/04/2025  Time:    18:42               Narrative:    EXAMINATION:  CT HEAD WITHOUT CONTRAST    CLINICAL HISTORY:  Head trauma, minor (Age >= 65y);    TECHNIQUE:  Axial scans were obtained from skull base to the vertex.    Coronal and sagittal reconstructions obtained from the axial data.    Automatic exposure control was utilized to limit radiation dose.    Contrast: None    Radiation Dose:    Total DLP: 934 mGy*cm    COMPARISON:  CT head dated 01/09/2025    FINDINGS:  There is no acute intracranial hemorrhage.  There are cortical based hypodensities  in the bilateral occipital lobes.  Patchy hypodensities in the subcortical and periventricular white matter likely represent chronic microvascular ischemic changes.    There is local mass effect.  There is no midline shift or herniation.  The basal cisterns are patent.  There is diffuse parenchymal volume loss.  There is no hydrocephalus or abnormal extra-axial fluid collection.  Carotid and vertebral artery calcifications are noted.  The calvarium and skull base are intact.                                    EKG:       Telemetry: SR    Physical Exam  Constitutional:       Appearance: Normal appearance.   HENT:      Head: Normocephalic.   Cardiovascular:      Rate and Rhythm: Normal rate and regular rhythm.      Pulses: Normal pulses.      Comments: + Mechanical Click  Pulmonary:      Effort: Pulmonary effort is normal. No respiratory distress.      Breath sounds: Normal breath sounds.      Comments: RA  Abdominal:      General: There is no distension.      Palpations: Abdomen is soft.   Musculoskeletal:         General: No swelling. Normal range of motion.      Right lower leg: No edema.      Left lower leg: No edema.   Skin:     General: Skin is warm and dry.   Neurological:      Mental Status: She is alert and oriented to person, place, and time. Mental status is at baseline.      Comments: Transient Confused Conversation   Psychiatric:         Mood and Affect: Mood normal.         Behavior: Behavior normal.         Judgment: Judgment normal.       Home Medications:   Current Facility-Administered Medications on File Prior to Encounter   Medication Dose Route Frequency Provider Last Rate Last Admin    0.9%  NaCl infusion   Intravenous Once Kahlil Martin MD        diazePAM tablet 10 mg  10 mg Oral On Call Procedure Kahlil Martin MD   10 mg at 05/18/23 0911    diphenhydrAMINE capsule 50 mg  50 mg Oral On Call Procedure Kahlil Martin MD   50 mg at 05/18/23 0911     Current Outpatient Medications on  File Prior to Encounter   Medication Sig Dispense Refill    warfarin (COUMADIN) 5 MG tablet Take 5 mg by mouth Daily.      albuterol-ipratropium (DUO-NEB) 2.5 mg-0.5 mg/3 mL nebulizer solution Take 3 mLs by nebulization every 4 (four) hours as needed for Wheezing or Shortness of Breath.      aspirin (ECOTRIN) 81 MG EC tablet Take 81 mg by mouth once daily.      atorvastatin (LIPITOR) 40 MG tablet Take 40 mg by mouth once daily.      clonazePAM (KLONOPIN) 1 MG tablet Take 0.5 mg by mouth 3 (three) times daily as needed for Anxiety.      dapagliflozin propanediol (FARXIGA) 10 mg tablet Take 1 tablet (10 mg total) by mouth once daily. 30 tablet 5    FLUoxetine 40 MG capsule Take 40 mg by mouth once daily.      furosemide (LASIX) 40 MG tablet Take 1 tablet (40 mg total) by mouth 2 (two) times daily. 60 tablet 11    gabapentin (NEURONTIN) 100 MG capsule Take 100 mg by mouth 3 (three) times daily.      HYDROcodone-acetaminophen (NORCO) 7.5-325 mg per tablet Take 1 tablet by mouth every 6 (six) hours as needed for Pain (severe pain only). 12 tablet 0    isosorbide mononitrate (IMDUR) 30 MG 24 hr tablet Take 1 tablet (30 mg total) by mouth once daily. 60 tablet 0    LIDOcaine (LIDODERM) 5 % Place 1 patch onto the skin once daily. Remove & Discard patch within 12 hours or as directed by MD 15 patch 0    losartan (COZAAR) 25 MG tablet Take 1 tablet (25 mg total) by mouth once daily. 90 tablet 3    metoprolol succinate (TOPROL-XL) 25 MG 24 hr tablet Take 0.5 tablets (12.5 mg total) by mouth 2 (two) times daily. 90 tablet 3    MOUNJARO 15 mg/0.5 mL PnIj Inject 15 mg into the skin every 7 days. (Patient not taking: Reported on 1/29/2025)      OLANZapine (ZYPREXA) 5 MG tablet Take 5 mg by mouth every evening.      sotaloL (BETAPACE) 120 MG Tab Take 80 mg by mouth every 12 (twelve) hours. Take 1/2 tablet BID      spironolactone (ALDACTONE) 25 MG tablet Take 1 tablet (25 mg total) by mouth once daily. 30 tablet 11    traZODone  (DESYREL) 50 MG tablet Take 25-50 mg by mouth nightly as needed.       Current Schedule Inpatient Medications:   artificial tears  1 drop Right Eye QID    atorvastatin  40 mg Oral Daily    dapagliflozin propanediol  10 mg Oral Daily    FLUoxetine  40 mg Oral Daily    gabapentin  100 mg Oral TID    isosorbide mononitrate  30 mg Oral Daily    LIDOcaine  1 patch Transdermal Q24H    OLANZapine  5 mg Oral QHS      heparin (porcine) in D5W  0-40 Units/kg/hr Intravenous Continuous 10 mL/hr at 02/09/25 1150 11 Units/kg/hr at 02/09/25 1150     Assessment:   [place]  Mechanical Fall at Ground Level tripping over [place]  PAF    - CHADsVASc - 6 Points - 9.7% Stroke Risk per Year     - Warfarin as OP  Supratherapeutic INR     - INR (2.7.25) - 2.1 today     - INR on Admission (2.4.25) - 8.8  Hx of Mechanical Aortic Valve    - PT/INR Goal Range: 2.5-3.5  DM II  HTN  HLD  COPD  Tobacco Use  No Hx of GIB     Plan:   Daily PT/INR  Resume Warfarin at a reduced dose at some point when OK with IR for INR Goal 2.5-3.5 in the Setting of AF/Stroke Risk Reduction and Mechanical AVR  Will continue to hold warfarin for  kyphoplasty   Recommend bridging with heparin infusion   Will Continue to Follow  Labs and EKG in AM: CBC, CMP, PT/INR and Mg     Thank you for your consult.     Judi Macario NP  Cardiology  [facility]

## 2025-02-10 LAB
ALBUMIN SERPL-MCNC: 3.2 G/DL (ref 3.4–4.8)
ALBUMIN/GLOB SERPL: 1.1 RATIO (ref 1.1–2)
ALP SERPL-CCNC: 125 UNIT/L (ref 40–150)
ALT SERPL-CCNC: 15 UNIT/L (ref 0–55)
ANION GAP SERPL CALC-SCNC: 4 MEQ/L
APTT PPP: 62.2 SECONDS (ref 23.2–33.7)
APTT PPP: 69.3 SECONDS (ref 23.2–33.7)
APTT PPP: 73.9 SECONDS (ref 23.2–33.7)
APTT PPP: 87.2 SECONDS (ref 23.2–33.7)
AST SERPL-CCNC: 21 UNIT/L (ref 5–34)
BASOPHILS # BLD AUTO: 0.03 X10(3)/MCL
BASOPHILS NFR BLD AUTO: 0.4 %
BILIRUB SERPL-MCNC: 0.4 MG/DL
BUN SERPL-MCNC: 17.2 MG/DL (ref 9.8–20.1)
CALCIUM SERPL-MCNC: 8.9 MG/DL (ref 8.4–10.2)
CHLORIDE SERPL-SCNC: 101 MMOL/L (ref 98–107)
CO2 SERPL-SCNC: 33 MMOL/L (ref 23–31)
CREAT SERPL-MCNC: 0.79 MG/DL (ref 0.55–1.02)
CREAT/UREA NIT SERPL: 22
EOSINOPHIL # BLD AUTO: 0.22 X10(3)/MCL (ref 0–0.9)
EOSINOPHIL NFR BLD AUTO: 3.3 %
ERYTHROCYTE [DISTWIDTH] IN BLOOD BY AUTOMATED COUNT: 16.9 % (ref 11.5–17)
GFR SERPLBLD CREATININE-BSD FMLA CKD-EPI: >60 ML/MIN/1.73/M2
GLOBULIN SER-MCNC: 2.8 GM/DL (ref 2.4–3.5)
GLUCOSE SERPL-MCNC: 124 MG/DL (ref 82–115)
HCT VFR BLD AUTO: 36.8 % (ref 37–47)
HGB BLD-MCNC: 11.1 G/DL (ref 12–16)
IMM GRANULOCYTES # BLD AUTO: 0.02 X10(3)/MCL (ref 0–0.04)
IMM GRANULOCYTES NFR BLD AUTO: 0.3 %
INR PPP: 1.2
LYMPHOCYTES # BLD AUTO: 2.12 X10(3)/MCL (ref 0.6–4.6)
LYMPHOCYTES NFR BLD AUTO: 31.6 %
MAGNESIUM SERPL-MCNC: 2.3 MG/DL (ref 1.6–2.6)
MCH RBC QN AUTO: 27.5 PG (ref 27–31)
MCHC RBC AUTO-ENTMCNC: 30.2 G/DL (ref 33–36)
MCV RBC AUTO: 91.3 FL (ref 80–94)
MONOCYTES # BLD AUTO: 0.43 X10(3)/MCL (ref 0.1–1.3)
MONOCYTES NFR BLD AUTO: 6.4 %
NEUTROPHILS # BLD AUTO: 3.88 X10(3)/MCL (ref 2.1–9.2)
NEUTROPHILS NFR BLD AUTO: 58 %
NRBC BLD AUTO-RTO: 0 %
PLATELET # BLD AUTO: 317 X10(3)/MCL (ref 130–400)
PMV BLD AUTO: 9.7 FL (ref 7.4–10.4)
POTASSIUM SERPL-SCNC: 4.5 MMOL/L (ref 3.5–5.1)
PROT SERPL-MCNC: 6 GM/DL (ref 5.8–7.6)
PROTHROMBIN TIME: 15.3 SECONDS (ref 12.5–14.5)
RBC # BLD AUTO: 4.03 X10(6)/MCL (ref 4.2–5.4)
SODIUM SERPL-SCNC: 138 MMOL/L (ref 136–145)
WBC # BLD AUTO: 6.7 X10(3)/MCL (ref 4.5–11.5)

## 2025-02-10 PROCEDURE — 80053 COMPREHEN METABOLIC PANEL: CPT | Performed by: NURSE PRACTITIONER

## 2025-02-10 PROCEDURE — 85730 THROMBOPLASTIN TIME PARTIAL: CPT | Performed by: INTERNAL MEDICINE

## 2025-02-10 PROCEDURE — 25000003 PHARM REV CODE 250: Performed by: INTERNAL MEDICINE

## 2025-02-10 PROCEDURE — 85025 COMPLETE CBC W/AUTO DIFF WBC: CPT | Performed by: INTERNAL MEDICINE

## 2025-02-10 PROCEDURE — 63600175 PHARM REV CODE 636 W HCPCS: Performed by: INTERNAL MEDICINE

## 2025-02-10 PROCEDURE — 83735 ASSAY OF MAGNESIUM: CPT | Performed by: NURSE PRACTITIONER

## 2025-02-10 PROCEDURE — 97530 THERAPEUTIC ACTIVITIES: CPT | Mod: CO

## 2025-02-10 PROCEDURE — 85610 PROTHROMBIN TIME: CPT | Performed by: INTERNAL MEDICINE

## 2025-02-10 PROCEDURE — 97535 SELF CARE MNGMENT TRAINING: CPT | Mod: CO

## 2025-02-10 PROCEDURE — 97116 GAIT TRAINING THERAPY: CPT | Mod: CQ

## 2025-02-10 PROCEDURE — 36415 COLL VENOUS BLD VENIPUNCTURE: CPT | Performed by: NURSE PRACTITIONER

## 2025-02-10 PROCEDURE — 21400001 HC TELEMETRY ROOM

## 2025-02-10 PROCEDURE — 97530 THERAPEUTIC ACTIVITIES: CPT | Mod: CQ

## 2025-02-10 PROCEDURE — 36415 COLL VENOUS BLD VENIPUNCTURE: CPT | Performed by: INTERNAL MEDICINE

## 2025-02-10 RX ORDER — LORAZEPAM 0.5 MG/1
1 TABLET ORAL
Status: DISCONTINUED | OUTPATIENT
Start: 2025-02-10 | End: 2025-02-17 | Stop reason: HOSPADM

## 2025-02-10 RX ADMIN — GABAPENTIN 100 MG: 100 CAPSULE ORAL at 03:02

## 2025-02-10 RX ADMIN — TRAMADOL HYDROCHLORIDE 50 MG: 50 TABLET, COATED ORAL at 04:02

## 2025-02-10 RX ADMIN — HEPARIN SODIUM 11 UNITS/KG/HR: 10000 INJECTION, SOLUTION INTRAVENOUS at 02:02

## 2025-02-10 RX ADMIN — HYPROMELLOSE 2910 1 DROP: 5 SOLUTION/ DROPS OPHTHALMIC at 01:02

## 2025-02-10 RX ADMIN — TRAMADOL HYDROCHLORIDE 50 MG: 50 TABLET, COATED ORAL at 05:02

## 2025-02-10 RX ADMIN — TRAMADOL HYDROCHLORIDE 50 MG: 50 TABLET, COATED ORAL at 11:02

## 2025-02-10 RX ADMIN — FLUOXETINE HYDROCHLORIDE 40 MG: 20 CAPSULE ORAL at 08:02

## 2025-02-10 RX ADMIN — OLANZAPINE 5 MG: 5 TABLET, FILM COATED ORAL at 09:02

## 2025-02-10 RX ADMIN — HYPROMELLOSE 2910 1 DROP: 5 SOLUTION/ DROPS OPHTHALMIC at 05:02

## 2025-02-10 RX ADMIN — GABAPENTIN 100 MG: 100 CAPSULE ORAL at 09:02

## 2025-02-10 RX ADMIN — HYPROMELLOSE 2910 1 DROP: 5 SOLUTION/ DROPS OPHTHALMIC at 09:02

## 2025-02-10 RX ADMIN — DAPAGLIFLOZIN 10 MG: 10 TABLET, FILM COATED ORAL at 08:02

## 2025-02-10 RX ADMIN — ISOSORBIDE MONONITRATE 30 MG: 30 TABLET, EXTENDED RELEASE ORAL at 08:02

## 2025-02-10 RX ADMIN — HYPROMELLOSE 2910 1 DROP: 5 SOLUTION/ DROPS OPHTHALMIC at 08:02

## 2025-02-10 RX ADMIN — ATORVASTATIN CALCIUM 40 MG: 40 TABLET, FILM COATED ORAL at 08:02

## 2025-02-10 RX ADMIN — HEPARIN SODIUM 11 UNITS/KG/HR: 10000 INJECTION, SOLUTION INTRAVENOUS at 04:02

## 2025-02-10 RX ADMIN — GABAPENTIN 100 MG: 100 CAPSULE ORAL at 08:02

## 2025-02-10 RX ADMIN — LIDOCAINE 5% 1 PATCH: 700 PATCH TOPICAL at 11:02

## 2025-02-10 NOTE — PT/OT/SLP PROGRESS
"Physical Therapy Treatment    Patient Name:  Kayla Ruiz   MRN:  [id number]    Recommendations:     Discharge therapy intensity: Moderate Intensity Therapy   Discharge Equipment Recommendations: walker, rolling  Barriers to discharge: Impaired mobility, Ongoing medical needs, and placement.    Assessment:     Kayla Ruiz is a 69 y.o. female admitted with a medical diagnosis of Bilateral posterior occipital lobe ischemic CVA, Supratherapeutic INR, Unintentional Tylenol overdose, VHD s/p mechanical AVR, on Coumadin , PAF , Diabetes mellitus type II, Essential HTN , h/o COPD and Tobacco abuse.  She presents with the following impairments/functional limitations: weakness, impaired endurance, gait instability, decreased lower extremity function, impaired functional mobility, impaired self care skills, decreased coordination, visual deficits, impaired balance, pain, orthopedic precautions .  Pt was modA from supine to sit to maintain spinal precautions. Pt and family member educated on properly maintaining spinal precautions.    Rehab Prognosis: Good; patient would benefit from acute skilled PT services to address these deficits and reach maximum level of function.    Recent Surgery: * No surgery found *      Plan:     During this hospitalization, patient would benefit from acute PT services 6 x/week to address the identified rehab impairments via gait training, therapeutic activities, therapeutic exercises, neuromuscular re-education and progress toward the following goals:    Plan of Care Expires:  03/06/25    Subjective     Chief Complaint: "wobbly"  Patient/Family Comments/goals: none  Pain/Comfort:         Objective:     Communicated with RN prior to session.  Patient found HOB elevated with pulse ox (continuous), telemetry, TLSO, PureWick upon PT entry to room.     General Precautions: Standard, fall  Orthopedic Precautions: spinal precautions  Braces: Hinged knee brace, TLSO  Respiratory Status: Nasal cannula, " "flow 2 L/min  Blood Pressure:   Skin Integrity: Visible skin intact      Functional Mobility:  Bed Mobility:     Rolling Left:  minimum assistance  Rolling Right: minimum assistance  Supine to Sit: moderate assistance  Transfers:     Sit to Stand:  minimum assistance with rolling walker  Toilet Transfer: minimum assistance with  rolling walker  using  Step Transfer  Gait: amb 46', 20' with RW, CGA, TLSO and HKB donned; seated rest break was needed; no LOB and LLE weakness "shaky" noted.      Therapeutic Activities/Exercises:  Static standing with RW, CGA while SPTA completed jorge-care.     Education:  Patient provided with verbal education education regarding PT role/goals/POC, post-op precautions, fall prevention, and safety awareness.  Understanding was verbalized, however additional teaching warranted.     Patient left up in chair with all lines intact, call button in reach, RN notified, and cousin present    GOALS:   Multidisciplinary Problems       Physical Therapy Goals          Problem: Physical Therapy    Goal Priority Disciplines Outcome Interventions   Physical Therapy Goal     PT, PT/OT Progressing    Description: Goals to be met by: 3/6/2025     Patient will increase functional independence with mobility by performin. Supine to sit with Modified [place]  2. Sit to supine with Modified [place]  3. Rolling to Left and Right with Modified [place]. (Log roll)  4. Sit to stand transfer with Stand-by Assistance  5. Bed to chair transfer with Stand-by Assistance using Rolling Walker  6. Gait  x 50 feet with Stand-by Assistance using Rolling Walker.                          Time Tracking:     PT Received On: 02/10/25  PT Start Time: 1119     PT Stop Time: 1200  PT Total Time (min): 41 min     Billable Minutes: Gait Training 1 and Therapeutic Activity 2    Treatment Type: Treatment  PT/PTA: PTA     Number of PTA visits since last PT visit: 3     02/10/2025    "

## 2025-02-10 NOTE — PROGRESS NOTES
[facility] MEDICINE ~ PROGRESS NOTE        CHIEF COMPLAINT   Hospital follow up    HOSPITAL COURSE   69 y.o. female with a history that includes PAF and mechanical AVR, on Coumadin therapy was referred to ED by her Cardiology due to INR of 8.8 on outpatient labs.  Additional complaints include headache and vision changes, though denied focal weakness or speech changes.  CT head performed and revealed cortical based hypodensities in the bilateral occipital lobes.  CTA revealed moderate atheromatous calcification of the cavernous clinoid and supra clinoid segments of the bilateral internal carotid arteries with mild-to-moderate left greater than right stenosis.  A subsequent MRI brain has noted Symmetric restricted diffusion and edema in the bilateral posterior occipital lobes, with small foci in the left parietal lobe and left cerebellum, favored to represent changes of posterior reversible encephalopathy syndrome, and additionally noted small areas of hemorrhage in B/L occipital lobes.      Neurology was consulted and neurologist felt she had bilateral occipital lobe infarcts secondary to being subtherapeutic with clot formation and then had embolism which led to the vision loss.  Recommends resuming warfarin once cleared by Cardiology.    After further review it was deemed that she was taking excessive Tylenol about 2 to 3 500 mg tablets at a time every 4 hours per the  report.  She was taking accepted Tylenol use because she could not get anyone to prescribed Norco for her.    Today  Seen and examined this morning.  IR MD recommends getting MRI or bone scan.  MRI has been ordered.  Otherwise patient complains of back pain.        OBJECTIVE/PHYSICAL EXAM     VITAL SIGNS (MOST RECENT):  Temp: 97.8 °F (36.6 °C) (02/10/25 0407)  Pulse: 85 (02/10/25 0407)  Resp: 17 (02/10/25 0407)  BP: 124/68 (02/10/25 0407)  SpO2: (!) 93 % (02/10/25 0407) VITAL SIGNS (24 HOUR RANGE):  Temp:   [97.4 °F (36.3 °C)-97.9 °F (36.6 °C)] 97.8 °F (36.6 °C)  Pulse:  [83-87] 85  Resp:  [14-20] 17  SpO2:  [90 %-95 %] 93 %  BP: (102-148)/(59-76) 124/68   GENERAL: In no acute distress, afebrile  HEENT:  CHEST: Clear to auscultation bilaterally  HEART: S1, S2, no appreciable murmur  ABDOMEN: Soft, nontender, BS +  MSK: Warm, no lower extremity edema, no clubbing or cyanosis  NEUROLOGIC:  Blind out of the left eye, right eye with seeing multiple of the same, extraocular muscles seems to be intact, good strength in all 4 extremities   INTEGUMENTARY:  PSYCHIATRY:        ASSESSMENT/PLAN   Bilateral posterior occipital lobe ischemic CVA  Supratherapeutic INR  Unintentional Tylenol overdose  L1 compression fracture with progressive loss of height  VHD s/p mechanical AVR, on Coumadin   PAF   Diabetes mellitus type II   Essential HTN   h/o COPD   Tobacco abuse      Neurosurgery following.  Recommends IR for kyphoplasty.  Stroke Neurology signed off.  Recommends resuming Coumadin once okay.    Cardiology following.  Coumadin management per them.  Continue heparin drip for now.  INR goal 2.5 dash 3.5.  Interventional radiology consulted for kyphoplasty evaluation  Per the  she was getting anywhere between 6 g to 9 g of Tylenol per day.  This is likely the cause of her supratherapeutic INR.    Continue pain control with tramadol, back brace per NSGY  Okay to work with PT and OT  Case management consult for SNF placement  MRI lumbar spine ordered to eval L1 compression fracture for possible kyphoplasty per IR recommendations.    DVT prophylaxis:  Heparin drip    Anticipated discharge and disposition:   __________________________________________________________________________    NUTRITIONAL STATUS     Patient meets ASPEN criteria for   malnutrition of   per RD assessment as evidenced by:                       A minimum of two characteristics is recommended for diagnosis of either severe or non-severe malnutrition.      LABS/MICRO/MEDS/DIAGNOSTICS       LABS  Recent Labs     02/10/25  0252      K 4.5   CO2 33*   BUN 17.2   CREATININE 0.79   GLUCOSE 124*   CALCIUM 8.9   ALKPHOS 125   AST 21   ALT 15   ALBUMIN 3.2*     Recent Labs     02/10/25  0252   WBC 6.70   RBC 4.03*   HCT 36.8*   MCV 91.3          MICROBIOLOGY  Microbiology Results (last 7 days)       ** No results found for the last 168 hours. **               MEDICATIONS   artificial tears  1 drop Right Eye QID    atorvastatin  40 mg Oral Daily    dapagliflozin propanediol  10 mg Oral Daily    FLUoxetine  40 mg Oral Daily    gabapentin  100 mg Oral TID    isosorbide mononitrate  30 mg Oral Daily    LIDOcaine  1 patch Transdermal Q24H    OLANZapine  5 mg Oral QHS         INFUSIONS   heparin (porcine) in D5W  0-40 Units/kg/hr Intravenous Continuous 10 mL/hr at 02/10/25 0409 11 Units/kg/hr at 02/10/25 0409          DIAGNOSTIC TESTS  CT Head Without Contrast   Final Result      Areas of hypodensity in the bilateral occipital lobe corresponding to the areas of restricted diffusion seen on prior MRI.  There has been no progression.  There has been no hemorrhage.  This is suggestive of posterior reversible encephalopathy syndrome.         Electronically signed by: Ok Tillman MD   Date:    02/08/2025   Time:    15:48      X-Ray Thoracolumbar Spine AP Lateral   Final Result      L1 compression fracture with progressive loss of height compared to 01/09/2025.         Electronically signed by: Norma Leblanc   Date:    02/06/2025   Time:    13:33      MRI Brain W WO Contrast   Final Result      1. Symmetric restricted diffusion and edema in the bilateral posterior occipital lobes, with small foci in the left parietal lobe and left cerebellum, favored to represent changes of posterior reversible encephalopathy syndrome with ischemia in the differential.  Follow-up can be obtained to ensure resolution.   2. Small areas of associated hemorrhage in the bilateral occipital  lobes.   Finding of intracranial hemorrhage given to Jina HOLT at the time of dictation.         Electronically signed by: Norma Leblanc   Date:    02/05/2025   Time:    09:56      CTA Head and Neck (xpd)   Final Result      No large vessel occlusion or flow-limiting stenosis.      No significant change from the [facility] interpretation.         Electronically signed by: Norma Leblanc   Date:    02/05/2025   Time:    08:32      X-Ray Chest AP Portable   Final Result      No acute chest disease is identified.         Electronically signed by: Bandar Vera   Date:    02/05/2025   Time:    09:08      CT Cervical Spine Without Contrast   Final Result      No acute fracture or malalignment identified.         Electronically signed by: Juanito Chopra   Date:    02/04/2025   Time:    21:25      CT Head Without Contrast   Final Result      Cortical based hypodensities in the bilateral occipital lobes, may represent changes of posterior reversible encephalopathy syndrome.  MRI brain is available for further evaluation.         Electronically signed by: Norma Leblanc   Date:    02/04/2025   Time:    18:42      MRI Lumbar Spine Without Contrast    (Results Pending)        Echo    Result Date: 2/5/2025    Left Ventricle: The left ventricle is normal in size. Normal wall   thickness. There is normal systolic function with a visually estimated   ejection fraction of 55 - 60%. Grade I diastolic dysfunction.    Right Ventricle: Normal right ventricular cavity size. Wall thickness   is normal. Systolic function is normal. TAPSE is 1.83 cm.    Left Atrium: Left atrium is moderately dilated. Agitated saline study   of the atrial septum is negative, suggesting absence of intracardiac shunt   at the atrial level.    Aortic Valve: There is a mechanical valve in the aortic position.   Aortic valve area by VTI is 1.6 cm². Aortic valve peak velocity is 2.9   m/s. Mean gradient is 16 mmHg. The dimensionless index is 0.53.    Mitral  Valve: There is mitral annular calcification present. There is   trace regurgitation.    IVC/SVC: Normal venous pressure at 3 mmHg.    Consider KATERIN              Case related differential diagnoses have been reviewed; assessment and plan has been documented. I have personally reviewed the labs and test results that are currently available; I have reviewed the patients medication list. I have reviewed the consulting providers recommendations. I have reviewed or attempted to review medical records based upon their availability.  All of the patient's and/or family's questions have been addressed and answered to the best of my ability.  I will continue to monitor closely and make adjustments to medical management as needed.  This document was created using M*Modal Fluency Direct.  Transcription errors may have been made.  Please contact me if any questions may rise regarding documentation to clarify transcription.        Jorge Pham MD   Internal Medicine  Department of [facility]  [facility] - Neurology

## 2025-02-10 NOTE — PT/OT/SLP PROGRESS
Occupational Therapy      Patient Name:  Kayla Ruiz   MRN:  [id number]    Patient not seen in AM,  secondary to patient eating. Will follow-up as schedule allows.    2/10/2025

## 2025-02-10 NOTE — PLAN OF CARE
142 received. Copy of pasrr and 142 uploaded into  section of patients chart. Physical copy placed in the patient's physical chart as well.    Chart is currently under review by TCU.

## 2025-02-11 ENCOUNTER — TELEPHONE (OUTPATIENT)
Dept: NEUROSURGERY | Facility: CLINIC | Age: 70
End: 2025-02-11
Payer: MEDICARE

## 2025-02-11 PROBLEM — E44.0 MODERATE MALNUTRITION: Status: ACTIVE | Noted: 2025-02-11

## 2025-02-11 LAB
ANION GAP SERPL CALC-SCNC: 5 MEQ/L
APTT PPP: 53.4 SECONDS (ref 23.2–33.7)
APTT PPP: 64.1 SECONDS (ref 23.2–33.7)
APTT PPP: 70.7 SECONDS (ref 23.2–33.7)
BASOPHILS # BLD AUTO: 0.03 X10(3)/MCL
BASOPHILS NFR BLD AUTO: 0.4 %
BUN SERPL-MCNC: 14.9 MG/DL (ref 9.8–20.1)
CALCIUM SERPL-MCNC: 8.9 MG/DL (ref 8.4–10.2)
CHLORIDE SERPL-SCNC: 105 MMOL/L (ref 98–107)
CO2 SERPL-SCNC: 29 MMOL/L (ref 23–31)
CREAT SERPL-MCNC: 0.83 MG/DL (ref 0.55–1.02)
CREAT/UREA NIT SERPL: 18
EOSINOPHIL # BLD AUTO: 0.19 X10(3)/MCL (ref 0–0.9)
EOSINOPHIL NFR BLD AUTO: 2.8 %
ERYTHROCYTE [DISTWIDTH] IN BLOOD BY AUTOMATED COUNT: 17.1 % (ref 11.5–17)
GFR SERPLBLD CREATININE-BSD FMLA CKD-EPI: >60 ML/MIN/1.73/M2
GLUCOSE SERPL-MCNC: 121 MG/DL (ref 82–115)
HCT VFR BLD AUTO: 35.8 % (ref 37–47)
HGB BLD-MCNC: 10.8 G/DL (ref 12–16)
IMM GRANULOCYTES # BLD AUTO: 0.02 X10(3)/MCL (ref 0–0.04)
IMM GRANULOCYTES NFR BLD AUTO: 0.3 %
INR PPP: 1.1
LYMPHOCYTES # BLD AUTO: 1.85 X10(3)/MCL (ref 0.6–4.6)
LYMPHOCYTES NFR BLD AUTO: 27.4 %
MCH RBC QN AUTO: 27.4 PG (ref 27–31)
MCHC RBC AUTO-ENTMCNC: 30.2 G/DL (ref 33–36)
MCV RBC AUTO: 90.9 FL (ref 80–94)
MONOCYTES # BLD AUTO: 0.47 X10(3)/MCL (ref 0.1–1.3)
MONOCYTES NFR BLD AUTO: 7 %
NEUTROPHILS # BLD AUTO: 4.19 X10(3)/MCL (ref 2.1–9.2)
NEUTROPHILS NFR BLD AUTO: 62.1 %
NRBC BLD AUTO-RTO: 0 %
PLATELET # BLD AUTO: 283 X10(3)/MCL (ref 130–400)
PMV BLD AUTO: 9.7 FL (ref 7.4–10.4)
POTASSIUM SERPL-SCNC: 4.4 MMOL/L (ref 3.5–5.1)
PROTHROMBIN TIME: 14.2 SECONDS (ref 12.5–14.5)
RBC # BLD AUTO: 3.94 X10(6)/MCL (ref 4.2–5.4)
SODIUM SERPL-SCNC: 139 MMOL/L (ref 136–145)
WBC # BLD AUTO: 6.75 X10(3)/MCL (ref 4.5–11.5)

## 2025-02-11 PROCEDURE — 36415 COLL VENOUS BLD VENIPUNCTURE: CPT | Performed by: INTERNAL MEDICINE

## 2025-02-11 PROCEDURE — 97530 THERAPEUTIC ACTIVITIES: CPT | Mod: CO

## 2025-02-11 PROCEDURE — 25000003 PHARM REV CODE 250: Performed by: INTERNAL MEDICINE

## 2025-02-11 PROCEDURE — 80048 BASIC METABOLIC PNL TOTAL CA: CPT | Performed by: INTERNAL MEDICINE

## 2025-02-11 PROCEDURE — 21400001 HC TELEMETRY ROOM

## 2025-02-11 PROCEDURE — 97530 THERAPEUTIC ACTIVITIES: CPT | Mod: CQ

## 2025-02-11 PROCEDURE — 85610 PROTHROMBIN TIME: CPT | Performed by: INTERNAL MEDICINE

## 2025-02-11 PROCEDURE — 97116 GAIT TRAINING THERAPY: CPT | Mod: CQ

## 2025-02-11 PROCEDURE — 85025 COMPLETE CBC W/AUTO DIFF WBC: CPT | Performed by: INTERNAL MEDICINE

## 2025-02-11 PROCEDURE — 85730 THROMBOPLASTIN TIME PARTIAL: CPT | Performed by: INTERNAL MEDICINE

## 2025-02-11 PROCEDURE — 63600175 PHARM REV CODE 636 W HCPCS: Performed by: INTERNAL MEDICINE

## 2025-02-11 RX ADMIN — ATORVASTATIN CALCIUM 40 MG: 40 TABLET, FILM COATED ORAL at 09:02

## 2025-02-11 RX ADMIN — HEPARIN SODIUM 11 UNITS/KG/HR: 10000 INJECTION, SOLUTION INTRAVENOUS at 04:02

## 2025-02-11 RX ADMIN — HYPROMELLOSE 2910 1 DROP: 5 SOLUTION/ DROPS OPHTHALMIC at 08:02

## 2025-02-11 RX ADMIN — TRAZODONE HYDROCHLORIDE 50 MG: 50 TABLET ORAL at 08:02

## 2025-02-11 RX ADMIN — TRAMADOL HYDROCHLORIDE 50 MG: 50 TABLET, COATED ORAL at 11:02

## 2025-02-11 RX ADMIN — DAPAGLIFLOZIN 10 MG: 10 TABLET, FILM COATED ORAL at 09:02

## 2025-02-11 RX ADMIN — GABAPENTIN 100 MG: 100 CAPSULE ORAL at 08:02

## 2025-02-11 RX ADMIN — HYPROMELLOSE 2910 1 DROP: 5 SOLUTION/ DROPS OPHTHALMIC at 05:02

## 2025-02-11 RX ADMIN — HYPROMELLOSE 2910 1 DROP: 5 SOLUTION/ DROPS OPHTHALMIC at 01:02

## 2025-02-11 RX ADMIN — HYPROMELLOSE 2910 1 DROP: 5 SOLUTION/ DROPS OPHTHALMIC at 09:02

## 2025-02-11 RX ADMIN — LORAZEPAM 1 MG: 0.5 TABLET ORAL at 12:02

## 2025-02-11 RX ADMIN — FLUOXETINE HYDROCHLORIDE 40 MG: 20 CAPSULE ORAL at 09:02

## 2025-02-11 RX ADMIN — OLANZAPINE 5 MG: 5 TABLET, FILM COATED ORAL at 08:02

## 2025-02-11 RX ADMIN — TRAMADOL HYDROCHLORIDE 50 MG: 50 TABLET, COATED ORAL at 06:02

## 2025-02-11 RX ADMIN — GABAPENTIN 100 MG: 100 CAPSULE ORAL at 09:02

## 2025-02-11 RX ADMIN — ISOSORBIDE MONONITRATE 30 MG: 30 TABLET, EXTENDED RELEASE ORAL at 09:02

## 2025-02-11 RX ADMIN — LIDOCAINE 5% 1 PATCH: 700 PATCH TOPICAL at 09:02

## 2025-02-11 NOTE — TELEPHONE ENCOUNTER
Currently inpatient; lidia nguyen.; spoke with caretaker and we agreed once she is dc'd she will CB to get back on the schedule.

## 2025-02-11 NOTE — PROGRESS NOTES
Inpatient Nutrition Assessment    Admit Date: 2/4/2025   Total duration of encounter: 7 days   Patient Age: 69 y.o.    Nutrition Recommendation/Prescription     Continue liberalized diet to encourage intake.   Monitor labs for need to add diabetic diet restriction.     Communication of Recommendations: reviewed with patient and reviewed with family    Nutrition Assessment     Malnutrition Assessment/Nutrition-Focused Physical Exam    Malnutrition Context: acute illness or injury (02/11/25 1254)  Malnutrition Level: moderate (02/11/25 1254)  Energy Intake (Malnutrition): less than 75% for greater than or equal to 1 month (02/11/25 1254)  Weight Loss (Malnutrition): greater than 5% in 1 month (02/11/25 1254)                 [place] (Muscle Loss): well nourished                       Fluid Accumulation (Malnutrition): other (see comments) (Not present) (02/11/25 1254)     Hand  Strength, Right (Malnutrition): Unable to assess (02/11/25 1254)  A minimum of two characteristics is recommended for diagnosis of either severe or non-severe malnutrition.    Chart Review    Reason Seen: length of stay    Malnutrition Screening Tool Results   Have you recently lost weight without trying?: No  Have you been eating poorly because of a decreased appetite?: No   MST Score: 0   Diagnosis:  Bilateral posterior occipital lobe ischemic CVA  Supratherapeutic INR  Unintentional Tylenol overdose  L1 compression fracture with progressive loss of height  VHD s/p mechanical AVR, on Coumadin   PAF   Diabetes mellitus type II   Essential HTN   h/o COPD   Tobacco abuse    Relevant Medical History:   Past Medical History:   Diagnosis Date    A-fib     Anticoagulant long-term use     Depression     Diabetes mellitus     Hyperlipidemia     Sleep apnea       Scheduled Medications:  artificial tears, 1 drop, QID  atorvastatin, 40 mg, Daily  dapagliflozin propanediol, 10 mg, Daily  FLUoxetine, 40 mg, Daily  gabapentin, 100 mg,  TID  isosorbide mononitrate, 30 mg, Daily  LIDOcaine, 1 patch, Q24H  OLANZapine, 5 mg, QHS    Continuous Infusions:  heparin (porcine) in D5W, Last Rate: 13 Units/kg/hr (02/11/25 0608)    PRN Medications:  acetaminophen, 1,000 mg, Q6H PRN  albuterol-ipratropium, 3 mL, Q4H PRN  aluminum-magnesium hydroxide-simethicone, 30 mL, Q6H PRN  bisacodyL, 10 mg, Daily PRN  clonazePAM, 0.5 mg, TID PRN  heparin (PORCINE), 60 Units/kg, PRN  heparin (PORCINE), 30 Units/kg, PRN  hydrALAZINE, 10 mg, Q4H PRN  labetalol, 10 mg, Q4H PRN  LORazepam, 1 mg, On Call Procedure  sodium chloride 0.9%, 10 mL, PRN  traMADoL, 50 mg, Q6H PRN  traZODone, 50 mg, Nightly PRN    Calorie Containing IV Medications: no significant kcals from medications at this time    Recent Labs   Lab 02/04/25  1917 02/05/25  0329 02/06/25  0309 02/07/25  0356 02/08/25  0357 02/08/25  1318 02/09/25  0255 02/10/25  0252 02/11/25  0415     --  140 135* 138  --  138 138 139   K 5.1  --  4.6 4.4 5.0  --  5.0 4.5 4.4   CALCIUM 9.0  --  9.0 8.7 8.9  --  8.7 8.9 8.9   PHOS  --   --  4.2  --   --   --   --   --   --    MG  --   --  2.40 2.40 2.40  --  2.30 2.30  --      --  102 103 101  --  103 101 105   CO2 20*  --  30 22* 28  --  29 33* 29   BUN 27.1*  --  31.5* 34.2* 27.5*  --  19.8 17.2 14.9   CREATININE 0.98  --  1.26* 0.86 0.85  --  0.83 0.79 0.83   EGFRNORACEVR >60  --  46 >60 >60  --  >60 >60 >60   GLUCOSE 174*  --  118* 130* 139*  --  120* 124* 121*   BILITOT 0.3  --  0.4 0.3 0.3  --  0.4 0.4  --    ALKPHOS 152*  --  154* 146 139  --  122 125  --    ALT 20  --  21 17 15  --  14 15  --    AST 29  --  25 20 21  --  20 21  --    ALBUMIN 3.9  --  3.3* 2.9* 3.0*  --  3.0* 3.2*  --    HGBA1C  --  6.9  --   --   --   --   --   --   --    WBC 6.90  --  6.66 5.47 6.69 7.91 7.13 6.70 6.75   HGB 14.1  --  13.1 11.9* 11.3* 11.4* 10.7* 11.1* 10.8*   HCT 46.6  --  43.7 40.1 38.0 37.3 36.3* 36.8* 35.8*     Nutrition Orders:  Diet Adult Regular      Appetite/Oral Intake:  "good/50-75% of meals  Factors Affecting Nutritional Intake: none identified  Social Needs Impacting Access to Food: unable to assess at this time; will attempt on follow-up  Food/Latter-day/Cultural Preferences: none reported  Food Allergies: none reported  Last Bowel Movement: 25  Wound(s):      Comments    25 Pt and family at bedside reports good appetite lately. Appetite and oral has fluctuated over the last 2 months with ~10lbs unintentional wt loss. UBW 210lbs, indicating significant wt loss. Politely decline oral supplement. Feels her appetite is good at this time. Denies any GI complaints.     Anthropometrics    Height: 5' 5.5" (166.4 cm), Height Method: Stated  Last Weight: 91.3 kg (201 lb 3.2 oz) (25 0509),    BMI (Calculated): 33  BMI Classification: obese grade I (BMI 30-34.9)     Ideal Body Weight (IBW), Female: 127.5 lb     % Ideal Body Weight, Female (lb): 157.8 %                    Usual Body Weight (UBW), k.45 kg  % Usual Body Weight: 95.81     Usual Weight Provided By: patient and family/caregiver    Wt Readings from Last 5 Encounters:   25 91.3 kg (201 lb 3.2 oz)   25 97.5 kg (214 lb 15.2 oz)   25 97.5 kg (215 lb)   25 97.5 kg (215 lb)   24 97.7 kg (215 lb 6.2 oz)     Weight Change(s) Since Admission:   Wt Readings from Last 1 Encounters:   25 0509 91.3 kg (201 lb 3.2 oz)   25 1800 95.3 kg (210 lb)   Admit Weight: 95.3 kg (210 lb) (25 1800),      Estimated Needs    Weight Used For Calorie Calculations: 91.3 kg (201 lb 4.5 oz)  Energy Calorie Requirements (kcal): 1723-1867kcals/d (MSJ x 1.2-1.3 SF)  Energy Need Method: [place] Jeor  Weight Used For Protein Calculations: 91.3 kg (201 lb 4.5 oz)  Protein Requirements: 109g/d (1.2g/kg)  Fluid Requirements (mL): 2283mL fl/d (25mL/kg)  CHO Requirement: 203 g CHO daily (45% 1800kcals/d)     Enteral Nutrition     Patient not receiving enteral nutrition at this time.    Parenteral " Nutrition     Patient not receiving parenteral nutrition support at this time.    Evaluation of Received Nutrient Intake    Calories: not meeting estimated needs  Protein: not meeting estimated needs    Patient Education     Not applicable.    Nutrition Diagnosis     PES: Unintended weight loss related to inability to consume sufficient nutrients as evidenced by <75% EER x1-2 months. (new)     PES: Moderate acute disease or injury related malnutrition   As Evidenced by:  - weight loss: > 5% in 1 month - energy intake: < 75% for 1 months (meets criteria for < 75% for >= 1 month (moderate - chronic)) new    Nutrition Interventions     Intervention(s): Oral diet/nutrient modifications    Goal: Meet greater than 80% of nutritional needs by follow-up. (new)    Nutrition Goals & Monitoring     Dietitian will monitor: energy intake, weight change, electrolyte/renal panel, glucose/endocrine profile, and gastrointestinal profile  Discharge planning: resume home regimen  Nutrition Risk/Follow-Up: moderate (follow-up in 3-5 days)   Please consult if re-assessment needed sooner.

## 2025-02-11 NOTE — PT/OT/SLP PROGRESS
Occupational Therapy   Treatment    Name: Kayla Ruiz  MRN: [id number]  Admitting Diagnosis:  <principal problem not specified>       Recommendations:     Recommended therapy intensity at discharge: Moderate Intensity Therapy   Discharge Equipment Recommendations:  walker, rolling  Barriers to discharge:       Assessment:     Kayla Ruiz is a 69 y.o. female with a medical diagnosis of <principal problem not specified>.  She presents with good participation, however fatigues easily. Performance deficits affecting function are weakness, impaired functional mobility, impaired endurance, visual deficits, impaired self care skills, impaired balance, decreased upper extremity function, decreased lower extremity function, gait instability, decreased coordination, orthopedic precautions.     Rehab Prognosis:  Good; patient would benefit from acute skilled OT services to address these deficits and reach maximum level of function.       Plan:     Patient to be seen 6 x/week to address the above listed problems via self-care/home management, therapeutic activities, therapeutic exercises  Plan of Care Expires: 03/08/25  Plan of Care Reviewed with: patient    Subjective     Pain/Comfort:  Pain Rating 1: 0/10    Objective:     Communicated with: nurse prior to and following session.  Patient found up in chair with pulse ox (continuous), telemetry, TLSO, PureWick upon OT entry to room.    General Precautions: Standard, fall    Orthopedic Precautions:spinal precautions  Braces: Hinged knee brace, TLSO  Respiratory Status: Room air     Occupational Performance:     Bed Mobility:    Patient completed Rolling/Turning to Left with  minimum assistance  Patient completed Rolling/Turning to Right with minimum assistance  Patient completed Sit to Supine with moderate assistance     Functional Mobility/Transfers:  Patient completed Sit <> Stand Transfer with minimum assistance  with  rolling walker, slight posterior lean noted  initially, however able to correct with verbal cues   Patient completed Bed <> Chair Transfer using Step Transfer technique with minimum assistance with rolling walker  Functional Mobility: ambulate approximately 10 feet in room with RW    Activities of Daily Living:  Upper Body Dressing: maximal assistance to doff TLSO and Total A to doff knee brace    Therapeutic Positioning    OT interventions performed during the course of today's session in an effort to prevent and/or reduce acquired pressure injuries:   Therapeutic positioning was provided at the conclusion of session to offload all bony prominences for the prevention and/or reduction of pressure injuries    [facility] 6 Click ADL: 16    Patient Education:  Patient and family were provided with verbal education and demonstrations education regarding fall prevention and safety awareness.  Understanding was verbalized, however additional teaching warranted.      Patient left HOB elevated with all lines intact and call button in reach.    GOALS:   Multidisciplinary Problems       Occupational Therapy Goals          Problem: Occupational Therapy    Goal Priority Disciplines Outcome Interventions   Occupational Therapy Goal     OT, PT/OT Progressing    Description: Goals to be met by: 3/8/2025     Patient will increase functional independence with ADLs by performing:    UE Dressing with Stand-by Assistance.  LE Dressing with Stand-by Assistance.  Grooming while standing at sink with Stand-by Assistance.  Toileting from toilet with Stand-by Assistance for hygiene and clothing management.   Toilet transfer to toilet with Stand-by Assistance.                         Time Tracking:     OT Date of Treatment: 02/11/25  OT Start Time: 1305  OT Stop Time: 1329  OT Total Time (min): 24 min    Billable Minutes:Therapeutic Activity 24    OT/SUAD: SUAD     Number of SUAD visits since last OT visit: 3    2/11/2025

## 2025-02-11 NOTE — ASSESSMENT & PLAN NOTE
Nutrition consulted. Most recent weight and BMI monitored-     Measurements:  Wt Readings from Last 1 Encounters:   02/05/25 91.3 kg (201 lb 3.2 oz)   Body mass index is 32.97 kg/m².    Patient has been screened and assessed by RD.    Malnutrition Type:  Context: acute illness or injury  Level: moderate    Malnutrition Characteristic Summary:  Weight Loss (Malnutrition): greater than 5% in 1 month  Energy Intake (Malnutrition): less than 75% for greater than or equal to 1 month  Fluid Accumulation (Malnutrition): other (see comments) (Not present)  Hand  Strength, Right (Malnutrition): Unable to assess    Interventions/Recommendations (treatment strategy):  Oral diet/nutrient modifications

## 2025-02-11 NOTE — PT/OT/SLP PROGRESS
"Physical Therapy Treatment    Patient Name:  Kayla Ruiz   MRN:  [id number]    Recommendations:     Discharge therapy intensity: Moderate Intensity Therapy   Discharge Equipment Recommendations: walker, rolling  Barriers to discharge: Decreased caregiver support, Impaired mobility, Ongoing medical needs, and placement.    Assessment:     Kayla Ruiz is a 69 y.o. female admitted with a medical diagnosis of Bilateral posterior occipital lobe ischemic CVA, Supratherapeutic INR, Unintentional Tylenol overdose, VHD s/p mechanical AVR, on Coumadin , PAF , Diabetes mellitus type II, Essential HTN , h/o COPD and Tobacco abuse.  She presents with the following impairments/functional limitations: weakness, impaired endurance, gait instability, decreased lower extremity function, impaired functional mobility, impaired self care skills, decreased coordination, visual deficits, impaired balance, pain, orthopedic precautions.  Pt is modA for majority of activities; Pt LLE fatigues during amb 2/2 compensation.    Rehab Prognosis: Good; patient would benefit from acute skilled PT services to address these deficits and reach maximum level of function.    Recent Surgery: * No surgery found *      Plan:     During this hospitalization, patient would benefit from acute PT services 6 x/week to address the identified rehab impairments via gait training, therapeutic activities, therapeutic exercises, neuromuscular re-education and progress toward the following goals:    Plan of Care Expires:  03/06/25    Subjective     Chief Complaint: "wobbly"  Patient/Family Comments/goals: none  Pain/Comfort:         Objective:     Communicated with RN prior to session.  Patient found HOB elevated with pulse ox (continuous), telemetry, TLSO, PureWick upon PT entry to room.     General Precautions: Standard, fall  Orthopedic Precautions: spinal precautions  Braces: Hinged knee brace, TLSO  Respiratory Status: Nasal cannula, flow 2.5 L/min  Skin " Integrity: Visible skin intact      Functional Mobility:  Bed Mobility:     Rolling Left:  moderate assistance  Rolling Right: moderate assistance  Scooting: contact guard assistance  Supine to Sit: moderate assistance  Transfers:     Sit to Stand:  moderate assistance with rolling walker  Bed to Chair: minimum assistance with  rolling walker  using  Step Transfer  Gait: amb 44', 56' with RW, TLSO, and HKB, ModA;seated rest break needed; vcs for upward gaze; posterior lean, NBOS noted; antalgic gait present.    Therapeutic Activities/Exercises:  Pt stood in RW while WS to R side lifting LLE x5 with RW, ModA    Education:  Patient provided with verbal education education regarding PT role/goals/POC, post-op precautions, fall prevention, and safety awareness.  Understanding was verbalized, however additional teaching warranted.     Patient left up in chair with all lines intact, call button in reach, RN notified, and cousin present    GOALS:   Multidisciplinary Problems       Physical Therapy Goals          Problem: Physical Therapy    Goal Priority Disciplines Outcome Interventions   Physical Therapy Goal     PT, PT/OT Progressing    Description: Goals to be met by: 3/6/2025     Patient will increase functional independence with mobility by performin. Supine to sit with Modified [place]  2. Sit to supine with Modified [place]  3. Rolling to Left and Right with Modified [place]. (Log roll)  4. Sit to stand transfer with Stand-by Assistance  5. Bed to chair transfer with Stand-by Assistance using Rolling Walker  6. Gait  x 50 feet with Stand-by Assistance using Rolling Walker.                          Time Tracking:     PT Received On: 25  PT Start Time: 1027     PT Stop Time: 1106  PT Total Time (min): 39 min     Billable Minutes: Gait Training 2 and Therapeutic Activity 1    Treatment Type: Treatment  PT/PTA: PTA     Number of PTA visits since last PT visit: 4     2025

## 2025-02-11 NOTE — PROGRESS NOTES
[facility] - Neurology Hospital Medicine  Progress Note    Patient Name: Kayla Ruiz  MRN: [id number]  Patient Class: IP- Inpatient   Admission Date: 2/4/2025  Length of Stay: 6 days  Attending Physician: Radhames Acevedo MD  Primary Care Provider: Jennifer Chavez NP-C        Subjective     Principal Problem:<principal problem not specified>        HPI:  69 y.o. female with a history that includes PAF and mechanical AVR, on Coumadin therapy was referred to ED by her Cardiology due to INR of 8.8 on outpatient labs.  Additional complaints include headache and vision changes, though denied focal weakness or speech changes.  CT head performed and revealed cortical based hypodensities in the bilateral occipital lobes.  CTA revealed moderate atheromatous calcification of the cavernous clinoid and supra clinoid segments of the bilateral internal carotid arteries with mild-to-moderate left greater than right stenosis.  A subsequent MRI brain has noted Symmetric restricted diffusion and edema in the bilateral posterior occipital lobes, with small foci in the left parietal lobe and left cerebellum, favored to represent changes of posterior reversible encephalopathy syndrome, and additionally noted small areas of hemorrhage in B/L occipital lobes.       Neurology was consulted and neurologist felt she had bilateral occipital lobe infarcts secondary to being subtherapeutic with clot formation and then had embolism which led to the vision loss.  Recommends resuming warfarin once cleared by Cardiology.     After further review it was deemed that she was taking excessive Tylenol about 2 to 3 500 mg tablets at a time every 4 hours per the  report.  She was taking accepted Tylenol use because she could not get anyone to prescribed [place] for her.    Overview/Hospital Course:  2/11/25-Patient is sitting up in the bed.  Waiting for IR to see patient for possible kyphoplasty.      Interval History:      Review of Systems   Constitutional:  Positive for activity change and fatigue.   Eyes: Negative.    Respiratory: Negative.     Cardiovascular: Negative.    Gastrointestinal: Negative.    Endocrine: Negative.    Genitourinary: Negative.    Musculoskeletal:  Positive for arthralgias, back pain, gait problem and myalgias.   Neurological:  Positive for weakness.   Hematological: Negative.    Psychiatric/Behavioral:  Positive for confusion.      Objective:     Vital Signs (Most Recent):  Temp: 97.9 °F (36.6 °C) (02/11/25 1123)  Pulse: 92 (02/11/25 1123)  Resp: 18 (02/11/25 1153)  BP: 124/75 (02/11/25 1123)  SpO2: 95 % (02/11/25 1123) Vital Signs (24h Range):  Temp:  [97.4 °F (36.3 °C)-99 °F (37.2 °C)] 97.9 °F (36.6 °C)  Pulse:  [78-92] 92  Resp:  [18] 18  SpO2:  [93 %-98 %] 95 %  BP: (121-169)/(57-75) 124/75     Weight: 91.3 kg (201 lb 3.2 oz)  Body mass index is 32.97 kg/m².    Intake/Output Summary (Last 24 hours) at 2/11/2025 1552  Last data filed at 2/11/2025 0543  Gross per 24 hour   Intake --   Output 300 ml   Net -300 ml         Physical Exam  Constitutional:       Appearance: Normal appearance. She is obese.   HENT:      Head: Normocephalic and atraumatic.      Nose: Nose normal.      Mouth/Throat:      Mouth: Mucous membranes are moist.      Pharynx: Oropharynx is clear.   Eyes:      Extraocular Movements: Extraocular movements intact.      Conjunctiva/sclera: Conjunctivae normal.      Pupils: Pupils are equal, round, and reactive to light.   Cardiovascular:      Rate and Rhythm: Normal rate and regular rhythm.      Pulses: Normal pulses.      Heart sounds: Normal heart sounds.   Pulmonary:      Effort: Pulmonary effort is normal.      Breath sounds: Normal breath sounds.   Abdominal:      General: Bowel sounds are normal.      Palpations: Abdomen is soft.   Musculoskeletal:         General: Tenderness present. Normal range of motion.      Cervical back: Normal range of motion and neck supple.   Skin:      General: Skin is dry.      Capillary Refill: Capillary refill takes 2 to 3 seconds.   Neurological:      Mental Status: She is alert. Mental status is at baseline. She is disoriented.      Motor: Weakness present.   Psychiatric:         Cognition and Memory: Cognition is impaired. Memory is impaired.             Significant Labs: All pertinent labs within the past 24 hours have been reviewed.  BMP:   Recent Labs   Lab 02/10/25  0252 02/11/25  0415    139   K 4.5 4.4    105   CO2 33* 29   BUN 17.2 14.9   CREATININE 0.79 0.83   CALCIUM 8.9 8.9   MG 2.30  --      CBC:   Recent Labs   Lab 02/10/25  0252 02/11/25  0415   WBC 6.70 6.75   HGB 11.1* 10.8*   HCT 36.8* 35.8*    283     CMP:   Recent Labs   Lab 02/10/25  0252 02/11/25  0415    139   K 4.5 4.4    105   CO2 33* 29   BUN 17.2 14.9   CREATININE 0.79 0.83   CALCIUM 8.9 8.9   ALBUMIN 3.2*  --    BILITOT 0.4  --    ALKPHOS 125  --    AST 21  --    ALT 15  --      Magnesium:   Recent Labs   Lab 02/10/25  0252   MG 2.30       Significant Imaging: I have reviewed all pertinent imaging results/findings within the past 24 hours.    Assessment and Plan     Moderate malnutrition  Nutrition consulted. Most recent weight and BMI monitored-     Measurements:  Wt Readings from Last 1 Encounters:   02/05/25 91.3 kg (201 lb 3.2 oz)   Body mass index is 32.97 kg/m².    Patient has been screened and assessed by RD.    Malnutrition Type:  Context: acute illness or injury  Level: moderate    Malnutrition Characteristic Summary:  Weight Loss (Malnutrition): greater than 5% in 1 month  Energy Intake (Malnutrition): less than 75% for greater than or equal to 1 month  Fluid Accumulation (Malnutrition): other (see comments) (Not present)  Hand  Strength, Right (Malnutrition): Unable to assess    Interventions/Recommendations (treatment strategy):  Oral diet/nutrient modifications      Stroke  .  Antithrombotics for secondary stroke prevention:  Anticoagulants: Heparin protocol without bolus    Statins for secondary stroke prevention and hyperlipidemia, if present:   Statins: Atorvastatin- 40 mg daily    Aggressive risk factor modification: HTN, A-Fib     Rehab efforts: The patient has been evaluated by a stroke team provider and the therapy needs have been fully considered based off the presenting complaints and exam findings. The following therapy evaluations are needed: PT evaluate and treat, OT evaluate and treat, SLP evaluate and treat, PM&R evaluate for appropriate placement    Diagnostics ordered/pending: CT scan of head without contrast to asses brain parenchyma, CTA Neck/Arch to assess vasculature, MRI head without contrast to assess brain parenchyma    VTE prophylaxis:  heparin drip    BP parameters: Infarct: No intervention, SBP <220        Closed fracture of lumbar vertebra  IR for possible kyphoplasty      Tobacco dependency  Dangers of cigarette smoking were reviewed with patient in detail. Patient was Counseled for 3-10 minutes. Nicotine replacement options were discussed. Nicotine replacement was discussed- not prescribed per patient's request      VTE Risk Mitigation (From admission, onward)           Ordered     heparin 25,000 units in dextrose 5% 250 mL (100 units/mL) infusion LOW INTENSITY nomogram - [facility]  Continuous        Question:  Begin at (units/kg/hr)  Answer:  12    02/08/25 1238     heparin 25,000 units in dextrose 5% (100 units/ml) IV bolus from bag LOW INTENSITY nomogram - [facility]  As needed (PRN)        Question:  Heparin Infusion Adjustment (DO NOT MODIFY ANSWER)  Answer:  \\[email or web address]\epic\Images\Pharmacy\HeparinInfusions\heparin LOW INTENSITY nomogram for [facility] [id number].pdf    02/08/25 1238     heparin 25,000 units in dextrose 5% (100 units/ml) IV bolus from bag LOW INTENSITY nomogram - [facility]  As needed (PRN)        Question:  Heparin Infusion Adjustment (DO NOT MODIFY ANSWER)  Answer:   \\[email or web address]\epic\Images\Pharmacy\HeparinInfusions\heparin LOW INTENSITY nomogram for [facility] [id number].pdf    02/08/25 1238     IP VTE HIGH RISK PATIENT  Once         02/05/25 0314     Place sequential compression device  Until discontinued         02/05/25 0314                  Heparin drip  Follow labs  Therapy  IR for kyphoplasty  Warfarin on hold  Discharge Planning   PETR:      Code Status: Full Code   Medical Readiness for Discharge Date:                    Please place Justification for DME        Radhames Oviedo MD  Department of Hospital Medicine   [facility] - Neurology

## 2025-02-11 NOTE — ASSESSMENT & PLAN NOTE
.  Antithrombotics for secondary stroke prevention: Anticoagulants: Heparin protocol without bolus    Statins for secondary stroke prevention and hyperlipidemia, if present:   Statins: Atorvastatin- 40 mg daily    Aggressive risk factor modification: HTN, A-Fib     Rehab efforts: The patient has been evaluated by a stroke team provider and the therapy needs have been fully considered based off the presenting complaints and exam findings. The following therapy evaluations are needed: PT evaluate and treat, OT evaluate and treat, SLP evaluate and treat, PM&R evaluate for appropriate placement    Diagnostics ordered/pending: CT scan of head without contrast to asses brain parenchyma, CTA Neck/Arch to assess vasculature, MRI head without contrast to assess brain parenchyma    VTE prophylaxis:  heparin drip    BP parameters: Infarct: No intervention, SBP <220

## 2025-02-11 NOTE — HPI
69 y.o. female with a history that includes PAF and mechanical AVR, on Coumadin therapy was referred to ED by her Cardiology due to INR of 8.8 on outpatient labs.  Additional complaints include headache and vision changes, though denied focal weakness or speech changes.  CT head performed and revealed cortical based hypodensities in the bilateral occipital lobes.  CTA revealed moderate atheromatous calcification of the cavernous clinoid and supra clinoid segments of the bilateral internal carotid arteries with mild-to-moderate left greater than right stenosis.  A subsequent MRI brain has noted Symmetric restricted diffusion and edema in the bilateral posterior occipital lobes, with small foci in the left parietal lobe and left cerebellum, favored to represent changes of posterior reversible encephalopathy syndrome, and additionally noted small areas of hemorrhage in B/L occipital lobes.       Neurology was consulted and neurologist felt she had bilateral occipital lobe infarcts secondary to being subtherapeutic with clot formation and then had embolism which led to the vision loss.  Recommends resuming warfarin once cleared by Cardiology.     After further review it was deemed that she was taking excessive Tylenol about 2 to 3 500 mg tablets at a time every 4 hours per the  report.  She was taking accepted Tylenol use because she could not get anyone to prescribed [place] for her.

## 2025-02-11 NOTE — HOSPITAL COURSE
2/11/25-Patient is sitting up in the bed.  Waiting for IR to see patient for possible kyphoplasty.      2/12/25-Patient is s/p kyphoplasty.  No changes at this time.  Will need placement.    2/13/25-Patient is doing well.  Will restart her coumadin today.  Still on heparin drip.  Will continue with therapy.

## 2025-02-11 NOTE — SUBJECTIVE & OBJECTIVE
Interval History:     Review of Systems   Constitutional:  Positive for activity change and fatigue.   Eyes: Negative.    Respiratory: Negative.     Cardiovascular: Negative.    Gastrointestinal: Negative.    Endocrine: Negative.    Genitourinary: Negative.    Musculoskeletal:  Positive for arthralgias, back pain, gait problem and myalgias.   Neurological:  Positive for weakness.   Hematological: Negative.    Psychiatric/Behavioral:  Positive for confusion.      Objective:     Vital Signs (Most Recent):  Temp: 97.9 °F (36.6 °C) (02/11/25 1123)  Pulse: 92 (02/11/25 1123)  Resp: 18 (02/11/25 1153)  BP: 124/75 (02/11/25 1123)  SpO2: 95 % (02/11/25 1123) Vital Signs (24h Range):  Temp:  [97.4 °F (36.3 °C)-99 °F (37.2 °C)] 97.9 °F (36.6 °C)  Pulse:  [78-92] 92  Resp:  [18] 18  SpO2:  [93 %-98 %] 95 %  BP: (121-169)/(57-75) 124/75     Weight: 91.3 kg (201 lb 3.2 oz)  Body mass index is 32.97 kg/m².    Intake/Output Summary (Last 24 hours) at 2/11/2025 1552  Last data filed at 2/11/2025 0543  Gross per 24 hour   Intake --   Output 300 ml   Net -300 ml         Physical Exam  Constitutional:       Appearance: Normal appearance. She is obese.   HENT:      Head: Normocephalic and atraumatic.      Nose: Nose normal.      Mouth/Throat:      Mouth: Mucous membranes are moist.      Pharynx: Oropharynx is clear.   Eyes:      Extraocular Movements: Extraocular movements intact.      Conjunctiva/sclera: Conjunctivae normal.      Pupils: Pupils are equal, round, and reactive to light.   Cardiovascular:      Rate and Rhythm: Normal rate and regular rhythm.      Pulses: Normal pulses.      Heart sounds: Normal heart sounds.   Pulmonary:      Effort: Pulmonary effort is normal.      Breath sounds: Normal breath sounds.   Abdominal:      General: Bowel sounds are normal.      Palpations: Abdomen is soft.   Musculoskeletal:         General: Tenderness present. Normal range of motion.      Cervical back: Normal range of motion and neck  supple.   Skin:     General: Skin is dry.      Capillary Refill: Capillary refill takes 2 to 3 seconds.   Neurological:      Mental Status: She is alert. Mental status is at baseline. She is disoriented.      Motor: Weakness present.   Psychiatric:         Cognition and Memory: Cognition is impaired. Memory is impaired.             Significant Labs: All pertinent labs within the past 24 hours have been reviewed.  BMP:   Recent Labs   Lab 02/10/25  0252 02/11/25  0415    139   K 4.5 4.4    105   CO2 33* 29   BUN 17.2 14.9   CREATININE 0.79 0.83   CALCIUM 8.9 8.9   MG 2.30  --      CBC:   Recent Labs   Lab 02/10/25  0252 02/11/25 0415   WBC 6.70 6.75   HGB 11.1* 10.8*   HCT 36.8* 35.8*    283     CMP:   Recent Labs   Lab 02/10/25  0252 02/11/25  0415    139   K 4.5 4.4    105   CO2 33* 29   BUN 17.2 14.9   CREATININE 0.79 0.83   CALCIUM 8.9 8.9   ALBUMIN 3.2*  --    BILITOT 0.4  --    ALKPHOS 125  --    AST 21  --    ALT 15  --      Magnesium:   Recent Labs   Lab 02/10/25  0252   MG 2.30       Significant Imaging: I have reviewed all pertinent imaging results/findings within the past 24 hours.

## 2025-02-12 ENCOUNTER — ANESTHESIA EVENT (OUTPATIENT)
Dept: INTERVENTIONAL RADIOLOGY/VASCULAR | Facility: HOSPITAL | Age: 70
End: 2025-02-12
Payer: MEDICARE

## 2025-02-12 PROBLEM — S32.010A CLOSED COMPRESSION FRACTURE OF FIRST LUMBAR VERTEBRA: Status: ACTIVE | Noted: 2025-02-12

## 2025-02-12 LAB
ALBUMIN SERPL-MCNC: 3.1 G/DL (ref 3.4–4.8)
ALBUMIN/GLOB SERPL: 1.1 RATIO (ref 1.1–2)
ALP SERPL-CCNC: 145 UNIT/L (ref 40–150)
ALT SERPL-CCNC: 22 UNIT/L (ref 0–55)
ANION GAP SERPL CALC-SCNC: 6 MEQ/L
APTT PPP: 27.5 SECONDS (ref 23.2–33.7)
APTT PPP: 69.9 SECONDS (ref 23.2–33.7)
AST SERPL-CCNC: 27 UNIT/L (ref 5–34)
BASOPHILS # BLD AUTO: 0.03 X10(3)/MCL
BASOPHILS NFR BLD AUTO: 0.4 %
BILIRUB SERPL-MCNC: 0.3 MG/DL
BUN SERPL-MCNC: 15.8 MG/DL (ref 9.8–20.1)
CALCIUM SERPL-MCNC: 8.8 MG/DL (ref 8.4–10.2)
CHLORIDE SERPL-SCNC: 99 MMOL/L (ref 98–107)
CO2 SERPL-SCNC: 29 MMOL/L (ref 23–31)
CREAT SERPL-MCNC: 0.79 MG/DL (ref 0.55–1.02)
CREAT/UREA NIT SERPL: 20
EOSINOPHIL # BLD AUTO: 0.18 X10(3)/MCL (ref 0–0.9)
EOSINOPHIL NFR BLD AUTO: 2.4 %
ERYTHROCYTE [DISTWIDTH] IN BLOOD BY AUTOMATED COUNT: 17.1 % (ref 11.5–17)
GFR SERPLBLD CREATININE-BSD FMLA CKD-EPI: >60 ML/MIN/1.73/M2
GLOBULIN SER-MCNC: 2.9 GM/DL (ref 2.4–3.5)
GLUCOSE SERPL-MCNC: 121 MG/DL (ref 82–115)
HCT VFR BLD AUTO: 35.9 % (ref 37–47)
HGB BLD-MCNC: 10.9 G/DL (ref 12–16)
IMM GRANULOCYTES # BLD AUTO: 0.03 X10(3)/MCL (ref 0–0.04)
IMM GRANULOCYTES NFR BLD AUTO: 0.4 %
INR PPP: 1
LYMPHOCYTES # BLD AUTO: 1.7 X10(3)/MCL (ref 0.6–4.6)
LYMPHOCYTES NFR BLD AUTO: 22.4 %
MAGNESIUM SERPL-MCNC: 2.1 MG/DL (ref 1.6–2.6)
MCH RBC QN AUTO: 27.5 PG (ref 27–31)
MCHC RBC AUTO-ENTMCNC: 30.4 G/DL (ref 33–36)
MCV RBC AUTO: 90.7 FL (ref 80–94)
MONOCYTES # BLD AUTO: 0.45 X10(3)/MCL (ref 0.1–1.3)
MONOCYTES NFR BLD AUTO: 5.9 %
NEUTROPHILS # BLD AUTO: 5.19 X10(3)/MCL (ref 2.1–9.2)
NEUTROPHILS NFR BLD AUTO: 68.5 %
NRBC BLD AUTO-RTO: 0 %
PLATELET # BLD AUTO: 278 X10(3)/MCL (ref 130–400)
PMV BLD AUTO: 9.5 FL (ref 7.4–10.4)
POTASSIUM SERPL-SCNC: 4 MMOL/L (ref 3.5–5.1)
PROT SERPL-MCNC: 6 GM/DL (ref 5.8–7.6)
PROTHROMBIN TIME: 13.6 SECONDS (ref 12.5–14.5)
RBC # BLD AUTO: 3.96 X10(6)/MCL (ref 4.2–5.4)
SODIUM SERPL-SCNC: 134 MMOL/L (ref 136–145)
WBC # BLD AUTO: 7.58 X10(3)/MCL (ref 4.5–11.5)

## 2025-02-12 PROCEDURE — 88342 IMHCHEM/IMCYTCHM 1ST ANTB: CPT

## 2025-02-12 PROCEDURE — 85025 COMPLETE CBC W/AUTO DIFF WBC: CPT | Performed by: INTERNAL MEDICINE

## 2025-02-12 PROCEDURE — 63600175 PHARM REV CODE 636 W HCPCS: Performed by: NURSE ANESTHETIST, CERTIFIED REGISTERED

## 2025-02-12 PROCEDURE — 85610 PROTHROMBIN TIME: CPT | Performed by: NURSE PRACTITIONER

## 2025-02-12 PROCEDURE — 85730 THROMBOPLASTIN TIME PARTIAL: CPT | Performed by: INTERNAL MEDICINE

## 2025-02-12 PROCEDURE — 36415 COLL VENOUS BLD VENIPUNCTURE: CPT | Performed by: INTERNAL MEDICINE

## 2025-02-12 PROCEDURE — XNU0356 SUPPLEMENT LUMBAR VERTEBRA WITH MECHANICALLY EXPANDABLE (PAIRED) SYNTHETIC SUBSTITUTE, PERCUTANEOUS APPROACH, NEW TECHNOLOGY GROUP 6: ICD-10-PCS | Performed by: RADIOLOGY

## 2025-02-12 PROCEDURE — 63600175 PHARM REV CODE 636 W HCPCS: Performed by: INTERNAL MEDICINE

## 2025-02-12 PROCEDURE — 21400001 HC TELEMETRY ROOM

## 2025-02-12 PROCEDURE — 63600175 PHARM REV CODE 636 W HCPCS: Performed by: ANESTHESIOLOGY

## 2025-02-12 PROCEDURE — 88307 TISSUE EXAM BY PATHOLOGIST: CPT | Performed by: INTERNAL MEDICINE

## 2025-02-12 PROCEDURE — 25000003 PHARM REV CODE 250: Performed by: NURSE ANESTHETIST, CERTIFIED REGISTERED

## 2025-02-12 PROCEDURE — 25000003 PHARM REV CODE 250: Performed by: INTERNAL MEDICINE

## 2025-02-12 PROCEDURE — 36415 COLL VENOUS BLD VENIPUNCTURE: CPT | Performed by: NURSE PRACTITIONER

## 2025-02-12 PROCEDURE — 80053 COMPREHEN METABOLIC PANEL: CPT | Performed by: INTERNAL MEDICINE

## 2025-02-12 PROCEDURE — 88341 IMHCHEM/IMCYTCHM EA ADD ANTB: CPT

## 2025-02-12 PROCEDURE — 83735 ASSAY OF MAGNESIUM: CPT | Performed by: INTERNAL MEDICINE

## 2025-02-12 PROCEDURE — 63600175 PHARM REV CODE 636 W HCPCS: Performed by: RADIOLOGY

## 2025-02-12 RX ORDER — PHENYLEPHRINE HYDROCHLORIDE 10 MG/ML
INJECTION INTRAVENOUS
Status: DISCONTINUED | OUTPATIENT
Start: 2025-02-12 | End: 2025-02-12

## 2025-02-12 RX ORDER — GLUCAGON 1 MG
1 KIT INJECTION
Status: DISCONTINUED | OUTPATIENT
Start: 2025-02-12 | End: 2025-02-17 | Stop reason: HOSPADM

## 2025-02-12 RX ORDER — OXYCODONE HYDROCHLORIDE 5 MG/1
5 TABLET ORAL
Status: DISCONTINUED | OUTPATIENT
Start: 2025-02-12 | End: 2025-02-17 | Stop reason: HOSPADM

## 2025-02-12 RX ORDER — BUPIVACAINE HYDROCHLORIDE 2.5 MG/ML
INJECTION, SOLUTION EPIDURAL; INFILTRATION; INTRACAUDAL
Status: COMPLETED | OUTPATIENT
Start: 2025-02-12 | End: 2025-02-12

## 2025-02-12 RX ORDER — ROCURONIUM BROMIDE 10 MG/ML
INJECTION, SOLUTION INTRAVENOUS
Status: DISCONTINUED | OUTPATIENT
Start: 2025-02-12 | End: 2025-02-12

## 2025-02-12 RX ORDER — LIDOCAINE HYDROCHLORIDE 20 MG/ML
INJECTION, SOLUTION EPIDURAL; INFILTRATION; INTRACAUDAL; PERINEURAL
Status: DISCONTINUED | OUTPATIENT
Start: 2025-02-12 | End: 2025-02-12

## 2025-02-12 RX ORDER — SODIUM CHLORIDE 9 MG/ML
INJECTION, SOLUTION INTRAVENOUS CONTINUOUS PRN
Status: DISCONTINUED | OUTPATIENT
Start: 2025-02-12 | End: 2025-02-12

## 2025-02-12 RX ORDER — PROPOFOL 10 MG/ML
VIAL (ML) INTRAVENOUS
Status: DISCONTINUED | OUTPATIENT
Start: 2025-02-12 | End: 2025-02-12

## 2025-02-12 RX ORDER — HALOPERIDOL 5 MG/ML
0.5 INJECTION INTRAMUSCULAR EVERY 10 MIN PRN
Status: DISCONTINUED | OUTPATIENT
Start: 2025-02-12 | End: 2025-02-17 | Stop reason: HOSPADM

## 2025-02-12 RX ORDER — CEFAZOLIN SODIUM 1 G/3ML
INJECTION, POWDER, FOR SOLUTION INTRAMUSCULAR; INTRAVENOUS
Status: DISCONTINUED | OUTPATIENT
Start: 2025-02-12 | End: 2025-02-12

## 2025-02-12 RX ORDER — ONDANSETRON HYDROCHLORIDE 2 MG/ML
4 INJECTION, SOLUTION INTRAVENOUS DAILY PRN
Status: DISCONTINUED | OUTPATIENT
Start: 2025-02-12 | End: 2025-02-17 | Stop reason: HOSPADM

## 2025-02-12 RX ORDER — HYDROMORPHONE HYDROCHLORIDE 2 MG/ML
0.2 INJECTION, SOLUTION INTRAMUSCULAR; INTRAVENOUS; SUBCUTANEOUS EVERY 5 MIN PRN
Status: DISCONTINUED | OUTPATIENT
Start: 2025-02-12 | End: 2025-02-17 | Stop reason: HOSPADM

## 2025-02-12 RX ORDER — FENTANYL CITRATE 50 UG/ML
INJECTION, SOLUTION INTRAMUSCULAR; INTRAVENOUS
Status: DISCONTINUED | OUTPATIENT
Start: 2025-02-12 | End: 2025-02-12

## 2025-02-12 RX ORDER — ONDANSETRON HYDROCHLORIDE 2 MG/ML
INJECTION, SOLUTION INTRAVENOUS
Status: DISCONTINUED | OUTPATIENT
Start: 2025-02-12 | End: 2025-02-12

## 2025-02-12 RX ORDER — DIPHENHYDRAMINE HYDROCHLORIDE 50 MG/ML
25 INJECTION INTRAMUSCULAR; INTRAVENOUS EVERY 6 HOURS PRN
Status: DISCONTINUED | OUTPATIENT
Start: 2025-02-12 | End: 2025-02-17 | Stop reason: HOSPADM

## 2025-02-12 RX ORDER — KETOROLAC TROMETHAMINE 30 MG/ML
INJECTION, SOLUTION INTRAMUSCULAR; INTRAVENOUS
Status: DISCONTINUED | OUTPATIENT
Start: 2025-02-12 | End: 2025-02-12

## 2025-02-12 RX ORDER — DEXAMETHASONE SODIUM PHOSPHATE 4 MG/ML
INJECTION, SOLUTION INTRA-ARTICULAR; INTRALESIONAL; INTRAMUSCULAR; INTRAVENOUS; SOFT TISSUE
Status: DISCONTINUED | OUTPATIENT
Start: 2025-02-12 | End: 2025-02-12

## 2025-02-12 RX ADMIN — ONDANSETRON 4 MG: 2 INJECTION INTRAMUSCULAR; INTRAVENOUS at 05:02

## 2025-02-12 RX ADMIN — FLUOXETINE HYDROCHLORIDE 40 MG: 20 CAPSULE ORAL at 01:02

## 2025-02-12 RX ADMIN — DAPAGLIFLOZIN 10 MG: 10 TABLET, FILM COATED ORAL at 01:02

## 2025-02-12 RX ADMIN — BUPIVACAINE HYDROCHLORIDE 10 ML: 2.5 INJECTION, SOLUTION EPIDURAL; INFILTRATION; INTRACAUDAL; PERINEURAL at 12:02

## 2025-02-12 RX ADMIN — DEXAMETHASONE SODIUM PHOSPHATE 8 MG: 4 INJECTION, SOLUTION INTRA-ARTICULAR; INTRALESIONAL; INTRAMUSCULAR; INTRAVENOUS; SOFT TISSUE at 11:02

## 2025-02-12 RX ADMIN — LABETALOL HYDROCHLORIDE 10 MG: 5 INJECTION, SOLUTION INTRAVENOUS at 12:02

## 2025-02-12 RX ADMIN — ACETAMINOPHEN 1000 MG: 500 TABLET ORAL at 04:02

## 2025-02-12 RX ADMIN — BUPIVACAINE HYDROCHLORIDE 10 ML: 2.5 INJECTION, SOLUTION EPIDURAL; INFILTRATION; INTRACAUDAL; PERINEURAL at 11:02

## 2025-02-12 RX ADMIN — HYPROMELLOSE 2910 1 DROP: 5 SOLUTION/ DROPS OPHTHALMIC at 04:02

## 2025-02-12 RX ADMIN — OLANZAPINE 5 MG: 5 TABLET, FILM COATED ORAL at 08:02

## 2025-02-12 RX ADMIN — KETOROLAC TROMETHAMINE 30 MG: 30 INJECTION, SOLUTION INTRAMUSCULAR; INTRAVENOUS at 12:02

## 2025-02-12 RX ADMIN — CEFAZOLIN 2 G: 330 INJECTION, POWDER, FOR SOLUTION INTRAMUSCULAR; INTRAVENOUS at 11:02

## 2025-02-12 RX ADMIN — ATORVASTATIN CALCIUM 40 MG: 40 TABLET, FILM COATED ORAL at 01:02

## 2025-02-12 RX ADMIN — SODIUM CHLORIDE: 9 INJECTION, SOLUTION INTRAVENOUS at 10:02

## 2025-02-12 RX ADMIN — ISOSORBIDE MONONITRATE 30 MG: 30 TABLET, EXTENDED RELEASE ORAL at 01:02

## 2025-02-12 RX ADMIN — LABETALOL HYDROCHLORIDE 10 MG: 5 INJECTION, SOLUTION INTRAVENOUS at 03:02

## 2025-02-12 RX ADMIN — GABAPENTIN 100 MG: 100 CAPSULE ORAL at 01:02

## 2025-02-12 RX ADMIN — TRAMADOL HYDROCHLORIDE 50 MG: 50 TABLET, COATED ORAL at 01:02

## 2025-02-12 RX ADMIN — FENTANYL CITRATE 50 MCG: 50 INJECTION, SOLUTION INTRAMUSCULAR; INTRAVENOUS at 11:02

## 2025-02-12 RX ADMIN — ROCURONIUM BROMIDE 60 MG: 10 SOLUTION INTRAVENOUS at 11:02

## 2025-02-12 RX ADMIN — ONDANSETRON 4 MG: 2 INJECTION INTRAMUSCULAR; INTRAVENOUS at 12:02

## 2025-02-12 RX ADMIN — GABAPENTIN 100 MG: 100 CAPSULE ORAL at 08:02

## 2025-02-12 RX ADMIN — PROPOFOL 140 MG: 10 INJECTION, EMULSION INTRAVENOUS at 11:02

## 2025-02-12 RX ADMIN — TRAZODONE HYDROCHLORIDE 50 MG: 50 TABLET ORAL at 08:02

## 2025-02-12 RX ADMIN — TRAMADOL HYDROCHLORIDE 50 MG: 50 TABLET, COATED ORAL at 05:02

## 2025-02-12 RX ADMIN — HEPARIN SODIUM 12 UNITS/KG/HR: 10000 INJECTION, SOLUTION INTRAVENOUS at 11:02

## 2025-02-12 RX ADMIN — LIDOCAINE HYDROCHLORIDE 4 ML: 20 INJECTION, SOLUTION INTRAVENOUS at 11:02

## 2025-02-12 RX ADMIN — PHENYLEPHRINE HYDROCHLORIDE 100 MCG: 10 INJECTION INTRAVENOUS at 11:02

## 2025-02-12 RX ADMIN — SUGAMMADEX 200 MG: 100 INJECTION, SOLUTION INTRAVENOUS at 12:02

## 2025-02-12 NOTE — PROGRESS NOTES
[facility] Medicine  Progress Note    Patient Name: Kayla Ruiz  MRN: [id number]  Patient Class: IP- Inpatient   Admission Date: 2/4/2025  Length of Stay: 7 days  Attending Physician: Radhames Acevedo MD  Primary Care Provider: Jennifer Chavez NP-C        Subjective     Principal Problem:Stroke        HPI:  69 y.o. female with a history that includes PAF and mechanical AVR, on Coumadin therapy was referred to ED by her Cardiology due to INR of 8.8 on outpatient labs.  Additional complaints include headache and vision changes, though denied focal weakness or speech changes.  CT head performed and revealed cortical based hypodensities in the bilateral occipital lobes.  CTA revealed moderate atheromatous calcification of the cavernous clinoid and supra clinoid segments of the bilateral internal carotid arteries with mild-to-moderate left greater than right stenosis.  A subsequent MRI brain has noted Symmetric restricted diffusion and edema in the bilateral posterior occipital lobes, with small foci in the left parietal lobe and left cerebellum, favored to represent changes of posterior reversible encephalopathy syndrome, and additionally noted small areas of hemorrhage in B/L occipital lobes.       Neurology was consulted and neurologist felt she had bilateral occipital lobe infarcts secondary to being subtherapeutic with clot formation and then had embolism which led to the vision loss.  Recommends resuming warfarin once cleared by Cardiology.     After further review it was deemed that she was taking excessive Tylenol about 2 to 3 500 mg tablets at a time every 4 hours per the  report.  She was taking accepted Tylenol use because she could not get anyone to prescribed [place] for her.    Overview/Hospital Course:  2/11/25-Patient is sitting up in the bed.  Waiting for IR to see patient for possible kyphoplasty.      2/12/25-Patient is s/p kyphoplasty.  No  changes at this time.  Will need placement.    Interval History:     Review of Systems   Unable to perform ROS: Acuity of condition     Objective:     Vital Signs (Most Recent):  Temp: 97.8 °F (36.6 °C) (02/12/25 1250)  Pulse: 65 (02/12/25 1355)  Resp: 19 (02/12/25 1355)  BP: 121/68 (02/12/25 1355)  SpO2: 99 % (02/12/25 1355) Vital Signs (24h Range):  Temp:  [97.3 °F (36.3 °C)-98.1 °F (36.7 °C)] 97.8 °F (36.6 °C)  Pulse:  [65-86] 65  Resp:  [13-20] 19  SpO2:  [94 %-100 %] 99 %  BP: (104-190)/(56-83) 121/68     Weight: 91.3 kg (201 lb 3.2 oz)  Body mass index is 32.97 kg/m².    Intake/Output Summary (Last 24 hours) at 2/12/2025 1452  Last data filed at 2/12/2025 1208  Gross per 24 hour   Intake 500 ml   Output 750 ml   Net -250 ml         Physical Exam  Constitutional:       General: She is awake.      Appearance: Normal appearance. She is normal weight.   HENT:      Head: Normocephalic and atraumatic.      Nose: Nose normal.      Mouth/Throat:      Mouth: Mucous membranes are moist.      Pharynx: Oropharynx is clear.   Eyes:      Conjunctiva/sclera: Conjunctivae normal.      Pupils: Pupils are equal, round, and reactive to light.   Cardiovascular:      Rate and Rhythm: Normal rate and regular rhythm.      Pulses: Normal pulses.      Heart sounds: Murmur heard.   Pulmonary:      Effort: Pulmonary effort is normal.      Breath sounds: Normal breath sounds.   Abdominal:      General: Bowel sounds are normal.      Palpations: Abdomen is soft.   Musculoskeletal:         General: Normal range of motion.      Cervical back: Normal range of motion and neck supple.   Skin:     General: Skin is dry.      Capillary Refill: Capillary refill takes 2 to 3 seconds.   Neurological:      Mental Status: Mental status is at baseline. She is disoriented.   Psychiatric:         Cognition and Memory: Cognition is impaired. Memory is impaired.             Significant Labs: All pertinent labs within the past 24 hours have been  reviewed.  BMP:   Recent Labs   Lab 02/12/25  0250   *   K 4.0   CL 99   CO2 29   BUN 15.8   CREATININE 0.79   CALCIUM 8.8   MG 2.10     CBC:   Recent Labs   Lab 02/11/25  0415 02/12/25  0250   WBC 6.75 7.58   HGB 10.8* 10.9*   HCT 35.8* 35.9*    278     CMP:   Recent Labs   Lab 02/11/25  0415 02/12/25  0250    134*   K 4.4 4.0    99   CO2 29 29   BUN 14.9 15.8   CREATININE 0.83 0.79   CALCIUM 8.9 8.8   ALBUMIN  --  3.1*   BILITOT  --  0.3   ALKPHOS  --  145   AST  --  27   ALT  --  22     Magnesium:   Recent Labs   Lab 02/12/25  0250   MG 2.10       Significant Imaging: I have reviewed all pertinent imaging results/findings within the past 24 hours.    Assessment and Plan     * Stroke  .  Antithrombotics for secondary stroke prevention: Anticoagulants: Heparin protocol without bolus    Statins for secondary stroke prevention and hyperlipidemia, if present:   Statins: Atorvastatin- 40 mg daily    Aggressive risk factor modification: HTN, A-Fib     Rehab efforts: The patient has been evaluated by a stroke team provider and the therapy needs have been fully considered based off the presenting complaints and exam findings. The following therapy evaluations are needed: PT evaluate and treat, OT evaluate and treat, SLP evaluate and treat, PM&R evaluate for appropriate placement    Diagnostics ordered/pending: CT scan of head without contrast to asses brain parenchyma, CTA Neck/Arch to assess vasculature, MRI head without contrast to assess brain parenchyma    VTE prophylaxis:  heparin drip    BP parameters: Infarct: No intervention, SBP <220        Moderate malnutrition  Nutrition consulted. Most recent weight and BMI monitored-     Measurements:  Wt Readings from Last 1 Encounters:   02/05/25 91.3 kg (201 lb 3.2 oz)   Body mass index is 32.97 kg/m².    Patient has been screened and assessed by RD.    Malnutrition Type:  Context: acute illness or injury  Level: moderate    Malnutrition  Characteristic Summary:  Weight Loss (Malnutrition): greater than 5% in 1 month  Energy Intake (Malnutrition): less than 75% for greater than or equal to 1 month  Fluid Accumulation (Malnutrition): other (see comments) (Not present)  Hand  Strength, Right (Malnutrition): Unable to assess    Interventions/Recommendations (treatment strategy):  Oral diet/nutrient modifications      Closed fracture of lumbar vertebra  IR for possible kyphoplasty      Tobacco dependency  Dangers of cigarette smoking were reviewed with patient in detail. Patient was Counseled for 3-10 minutes. Nicotine replacement options were discussed. Nicotine replacement was discussed- not prescribed per patient's request      VTE Risk Mitigation (From admission, onward)           Ordered     heparin 25,000 units in dextrose 5% 250 mL (100 units/mL) infusion LOW INTENSITY nomogram - [facility]  Continuous        Question:  Begin at (units/kg/hr)  Answer:  12    02/08/25 1238     heparin 25,000 units in dextrose 5% (100 units/ml) IV bolus from bag LOW INTENSITY nomogram - [facility]  As needed (PRN)        Question:  Heparin Infusion Adjustment (DO NOT MODIFY ANSWER)  Answer:  \[employer]\epic\Images\Pharmacy\HeparinInfusions\heparin LOW INTENSITY nomogram for [facility] [id number].pdf    02/08/25 1238     heparin 25,000 units in dextrose 5% (100 units/ml) IV bolus from bag LOW INTENSITY nomogram - [facility]  As needed (PRN)        Question:  Heparin Infusion Adjustment (DO NOT MODIFY ANSWER)  Answer:  \[employer]\epic\Images\Pharmacy\HeparinInfusions\heparin LOW INTENSITY nomogram for [facility] [id number].pdf    02/08/25 1238     IP VTE HIGH RISK PATIENT  Once         02/05/25 0314     Place sequential compression device  Until discontinued         02/05/25 0314                  DVT prophylaxis  Therapy  OOB  ?placement  Discharge Planning   PETR:      Code Status: Full Code   Medical Readiness for Discharge Date:                    Please place Justification for  TACOS Oviedo MD  Department of [facility] Medicine   [facility] - Neurology

## 2025-02-12 NOTE — ANESTHESIA POSTPROCEDURE EVALUATION
Anesthesia Post Evaluation    Patient: Kayla Ruiz    Procedure(s) Performed: * No procedures listed *    Final Anesthesia Type: general      Patient location during evaluation: floor  Patient participation: Yes- Able to Participate  Level of consciousness: awake  Post-procedure vital signs: reviewed and stable  Pain management: adequate  Airway patency: patent  LESLI mitigation strategies: Multimodal analgesia  PONV status at discharge: No PONV  Anesthetic complications: no      Cardiovascular status: hemodynamically stable  Respiratory status: spontaneous ventilation  Hydration status: euvolemic  Follow-up not needed.              Vitals Value Taken Time   /66 02/12/25 1226   Temp 36.3 °C (97.4 °F) 02/12/25 1225   Pulse 73 02/12/25 1236   Resp 16 02/12/25 1236   SpO2 91 % 02/12/25 1236   Vitals shown include unfiled device data.      No case tracking events are documented in the log.      Pain/Estela Score: Pain Rating Prior to Med Admin: 8 (2/12/2025  4:20 AM)  Pain Rating Post Med Admin: 4 (2/12/2025  2:29 AM)  Estela Score: 8 (2/12/2025 12:25 PM)

## 2025-02-12 NOTE — OP NOTE
Radiology Post-Procedure Note    Pre Op Diagnosis: L1 Fracture    Post Op Diagnosis: Same    Secondary Diagnoses:   Problem List Items Addressed This Visit       Stroke     - presented with headache, double vision and seeing circles in vision 1.5 - 2 weeks.   - Stroke RF: HTN,HLD, Afib and artifical aortic valve replacement on Coumadin  - Intervention: None, out of window. Low NIH. No LVO.   - Etiology: TBD    Stroke workup:  -CTh: Impression:  Cortical based hypodensities in the bilateral occipital lobes, may represent changes of posterior reversible encephalopathy syndrome.   -CTA h/n: Preliminary: Impression:  1. Moderate atheromatous calcification of the cavernous clinoid and supraclinoid segments of the bilateral internal carotid arteries is seen with mild to moderate left greater than right stenosis (Series 26 Images 200-216).  2. The previously noted hypodensities in the bilateral occipital lobes previously ascribed to posterior reversible encephalopathy are again seen stable from prior without associated abnormal intracranial enhancement on post-contrast images.   -MRI brain: Impression:  1. Symmetric restricted diffusion and edema in the bilateral posterior occipital lobes, with small foci in the left parietal lobe and left cerebellum, favored to represent changes of posterior reversible encephalopathy syndrome with ischemia in the differential.  Follow-up can be obtained to ensure resolution.  2. Small areas of associated hemorrhage in the bilateral occipital lobes.   -ECHO: EF 55-60%. Agitated saline study of the atrial septum is negative, suggesting absence of intracardiac shunt at the atrial level.    -CUS: The right internal carotid artery is patent with less than 50% stenosis.  The left internal carotid artery is patent with less than 50% stenosis.  Bilateral vertebral arteries are patent with antegrade flow.   -LDL: 54   -A1c: 6.9  -TSH: 0.589   -home medications include: coumadin   NIH Stroke  Scale      1a  Level of consciousness: 0=alert; keenly responsive   1b. LOC questions:  0=Answers both tasks correctly   1c. LOC commands: 0=Answers both tasks correctly   2.  Best Gaze: 0=normal   3.  Visual: 1=Partial hemianopia   4. Facial Palsy: 0=Normal symmetric movement   5a.  Motor left arm: 0=No drift, limb holds 90 (or 45) degrees for full 10 seconds   5b.  Motor right arm: 0=No drift, limb holds 90 (or 45) degrees for full 10 seconds   6a. motor left le=No drift, limb holds 90 (or 45) degrees for full 10 seconds   6b  Motor right le=No drift, limb holds 90 (or 45) degrees for full 10 seconds   7. Limb Ataxia: 0=Absent   8.  Sensory: 0=Normal; no sensory loss   9. Best Language:  0=No aphasia, normal   10. Dysarthria: 0=Normal   11. Extinction and Inattention: 0=No abnormality   12. Distal motor function: 0=Normal    Total:   1         Plan:  -MRI brain symmetric restricted diffusion and edema in the bilateral posterior occipital lobes, with small foci in the left parietal lobe and left cerebellum. Small areas of associated hemorrhage in the bilateral occipital lobes.  -from stroke neurology stand point when INR is acceptable, resume anticoagulant.   -continue Atorvastatin 40mg daily  -PT/OT/ST to evaluate  -SCD for DVT prophylaxis   - ok to normalize blood pressure  - Neuro checks q4hr ... stat CTh if any neuro change   - Continuous telemetry monitoring  - NPO until passes [place] or cleared by SLP  - No bedrest, ok for patient to ambulate, RN to observe first ambulation for safety  -continue care with primary team and cardiology  -stroke neurology signing off   -Other recommendations may follow from MD              Other Visit Diagnoses       Dizziness        Relevant Orders    EKG 12-lead (Completed)    Fall        Relevant Orders    X-Ray Chest AP Portable (Completed)    Vision changes        CVA (cerebral vascular accident)        Relevant Orders    CV Ultrasound Bilateral Doppler Carotid  (Completed)    Echo (Completed)    CAD (coronary artery disease)        Relevant Orders    EKG 12-lead (Completed)             Procedure: L1 Spine Rubén Kyphoplasty    Procedure performed by: Shravan Marroquin MD    Assistant: Nasir    Written Informed Consent Obtained: Yes    Specimen Removed: L1 Bone Core    Estimated Blood Loss: 12 cc    Condition: Stable    Outcome: The patient tolerated the procedure well and was without complications.    For further details please see the imaging report associated.    Disposition: Transfer back to inpatient unit.

## 2025-02-12 NOTE — ANESTHESIA PREPROCEDURE EVALUATION
02/12/2025  Kayla Ruiz is a 69 y.o., female.    HPI:  69 y.o. female with a history that includes PAF and mechanical AVR, on Coumadin therapy was referred to ED by her Cardiology due to INR of 8.8 on outpatient labs.  Additional complaints include headache and vision changes, though denied focal weakness or speech changes.  CT head performed and revealed cortical based hypodensities in the bilateral occipital lobes.  CTA revealed moderate atheromatous calcification of the cavernous clinoid and supra clinoid segments of the bilateral internal carotid arteries with mild-to-moderate left greater than right stenosis.  A subsequent MRI brain has noted Symmetric restricted diffusion and edema in the bilateral posterior occipital lobes, with small foci in the left parietal lobe and left cerebellum, favored to represent changes of posterior reversible encephalopathy syndrome, and additionally noted small areas of hemorrhage in B/L occipital lobes.       Neurology was consulted and neurologist felt she had bilateral occipital lobe infarcts secondary to being subtherapeutic with clot formation and then had embolism which led to the vision loss.  Recommends resuming warfarin once cleared by Cardiology.     After further review it was deemed that she was taking excessive Tylenol about 2 to 3 500 mg tablets at a time every 4 hours per the  report.  She was taking accepted Tylenol use because she could not get anyone to prescribed [place] for her.     Overview/Hospital Course:  2/11/25-Patient is sitting up in the bed.  Waiting for IR to see patient for possible kyphoplasty.      Past Medical History:   Diagnosis Date    A-fib     Anticoagulant long-term use     Depression     Diabetes mellitus     Hyperlipidemia     Sleep apnea      Past Surgical History:   Procedure Laterality Date    AORTIC VALVE REPLACEMENT       CHOLECYSTECTOMY      COLON RESECTION      fusion      LEFT HEART CATHETERIZATION Left 05/18/2023    Procedure: Left heart cath;  Surgeon: Kahlil Martin MD;  Location: [facility] CATH LAB;  Service: Cardiology;  Laterality: Left;  LHC +/- PCI    TONSILLECTOMY       Current Facility-Administered Medications on File Prior to Encounter   Medication Dose Route Frequency Provider Last Rate Last Admin    0.9%  NaCl infusion   Intravenous Once Kahlil Martin MD        diphenhydrAMINE capsule 50 mg  50 mg Oral On Call Procedure Kahlil Martin MD   50 mg at 05/18/23 0911     Current Outpatient Medications on File Prior to Encounter   Medication Sig Dispense Refill    warfarin (COUMADIN) 5 MG tablet Take 5 mg by mouth Daily.      albuterol-ipratropium (DUO-NEB) 2.5 mg-0.5 mg/3 mL nebulizer solution Take 3 mLs by nebulization every 4 (four) hours as needed for Wheezing or Shortness of Breath.      aspirin (ECOTRIN) 81 MG EC tablet Take 81 mg by mouth once daily.      atorvastatin (LIPITOR) 40 MG tablet Take 40 mg by mouth once daily.      clonazePAM (KLONOPIN) 1 MG tablet Take 0.5 mg by mouth 3 (three) times daily as needed for Anxiety.      dapagliflozin propanediol (FARXIGA) 10 mg tablet Take 1 tablet (10 mg total) by mouth once daily. 30 tablet 5    FLUoxetine 40 MG capsule Take 40 mg by mouth once daily.      furosemide (LASIX) 40 MG tablet Take 1 tablet (40 mg total) by mouth 2 (two) times daily. 60 tablet 11    gabapentin (NEURONTIN) 100 MG capsule Take 100 mg by mouth 3 (three) times daily.      HYDROcodone-acetaminophen (NORCO) 7.5-325 mg per tablet Take 1 tablet by mouth every 6 (six) hours as needed for Pain (severe pain only). 12 tablet 0    isosorbide mononitrate (IMDUR) 30 MG 24 hr tablet Take 1 tablet (30 mg total) by mouth once daily. 60 tablet 0    LIDOcaine (LIDODERM) 5 % Place 1 patch onto the skin once daily. Remove & Discard patch within 12 hours or as directed by MD 15 patch 0    losartan  (COZAAR) 25 MG tablet Take 1 tablet (25 mg total) by mouth once daily. 90 tablet 3    metoprolol succinate (TOPROL-XL) 25 MG 24 hr tablet Take 0.5 tablets (12.5 mg total) by mouth 2 (two) times daily. 90 tablet 3    MOUNJARO 15 mg/0.5 mL PnIj Inject 15 mg into the skin every 7 days. (Patient not taking: Reported on 1/29/2025)      OLANZapine (ZYPREXA) 5 MG tablet Take 5 mg by mouth every evening.      sotaloL (BETAPACE) 120 MG Tab Take 80 mg by mouth every 12 (twelve) hours. Take 1/2 tablet BID      spironolactone (ALDACTONE) 25 MG tablet Take 1 tablet (25 mg total) by mouth once daily. 30 tablet 11    traZODone (DESYREL) 50 MG tablet Take 25-50 mg by mouth nightly as needed.       2/5/25 TTE:    Left Ventricle: The left ventricle is normal in size. Normal wall thickness. There is normal systolic function with a visually estimated ejection fraction of 55 - 60%. Grade I diastolic dysfunction.    Right Ventricle: Normal right ventricular cavity size. Wall thickness is normal. Systolic function is normal. TAPSE is 1.83 cm.    Left Atrium: Left atrium is moderately dilated. Agitated saline study of the atrial septum is negative, suggesting absence of intracardiac shunt at the atrial level.    Aortic Valve: There is a mechanical valve in the aortic position. Aortic valve area by VTI is 1.6 cm². Aortic valve peak velocity is 2.9 m/s. Mean gradient is 16 mmHg. The dimensionless index is 0.53.    Mitral Valve: There is mitral annular calcification present. There is trace regurgitation.    IVC/SVC: Normal venous pressure at 3 mmHg.    Consider KATERIN                    Pre-op Assessment    I have reviewed the Patient Summary Reports.     I have reviewed the Nursing Notes. I have reviewed the NPO Status.   I have reviewed the Medications.     Review of Systems  Anesthesia Hx:  No problems with previous Anesthesia                Social:  Smoker       Cardiovascular:  Exercise tolerance: poor    Valvular problems/Murmurs    Dysrhythmias   CHF    hyperlipidemia   ECG has been reviewed.                            Pulmonary:   COPD   Shortness of breath  Sleep Apnea                Neurological:   CVA                                    Endocrine:  Diabetes           Psych:    depression                Physical Exam  General: Cooperative, Oriented and Flat Affect    Airway:  Mallampati: III   Mouth Opening: Small, but > 3cm  TM Distance: Normal  Tongue: Normal  Neck ROM: Extension Decreased    Dental:  Dentures        Anesthesia Plan  Type of Anesthesia, risks & benefits discussed:    Anesthesia Type: Gen ETT  Intra-op Monitoring Plan: Standard ASA Monitors  Post Op Pain Control Plan: multimodal analgesia  Induction:  IV  Airway Plan: Video  Informed Consent: Informed consent signed with the Patient and all parties understand the risks and agree with anesthesia plan.  All questions answered. Patient consented to blood products? Yes  ASA Score: 3  Day of Surgery Review of History & Physical: H&P Update referred to the surgeon/provider.I have interviewed and examined the patient. I have reviewed the patient's H&P dated: There are no significant changes.     Ready For Surgery From Anesthesia Perspective.     .

## 2025-02-12 NOTE — PROGRESS NOTES
[facility] *    Cardiology  Progress Note    Patient Name: Kayla Ruiz  MRN: [id number]  Admission Date: 2/4/2025  Hospital Length of Stay: 7 days  Code Status: Full Code   Attending Provider: Radhames Acevedo MD   Consulting Provider: Judi Macario NP  Primary Care Physician: Jennifer Chavez, NP-DERIK  Principal Problem:<principal problem not specified>    Patient information was obtained from patient, past medical records, and ER records.     Subjective:     Reason for Consult: Anticoagulation Recommendations    HPI: Ms. Ruiz is a 70 y/o female who is known to [facility], Dr. Leon. The patient presented to [facility] on 2.4.25 with c/o Headache, Double Vision and Circles in Vision. The patient reported that she fell the other day after tripping on a blanket. She denied hitting her head and LOC. She reported that she developed a frontal headache about 2 days ago which was around the fall. She then developed visual disturbances of double vision and circles in her vision. She presented to the ER and a CT of the Head revealed Cortical Based Hypodensities in the Bilateral Occipital Lobes which may represent changes of the Posterior Reversible Encephalopathy Syndrome. MRI of the Brain was recommended for further workup and evaluation. CTA Head/Neck revealed Moderate Atheromatous Calcification of the Cavernous Clinoid and Supra Clinoid segments of the Bilateral Internal Carotid Arteries with Mild-Mod L > R Stenosis. She was admitted to  and Stroke Neurology was Consulted and [facility] was consulted for Anticoagulation Recommendations.     Hospital Course:   2.6.25: NAD, VSS. She deneis SOB CP or nasuea. INR 5.7 today  2.7.25; NAD, VSS, She still reports visual disturbances, She denies SOB, CP, or nausea, INR 3.5 today.   2.8.25: NAD, VSS. She denies SOB, CP, or nausea. INR is 2.1 today. Her  is at the bedside   2.9.25: VSS, NAD. She denies CP, SOB, or nausea. INR is 1.4.  2.12.25: Patient in procedure  during time of round kyphoplasty. INR 1.0 today being bridged with heparin     PMH: PAF, Mechanical AV on Warfarin, DM II, HTN, HLD, COPD  PSH: Aortic Valve Replacement, Cholecystectomy, Colon Resection, Angiogram, Tonsillectomy  Family History: Father- COPD, Mother- COPD, Sister- Cancer, Sister- Cancer   Social History: Current Everyday Smoker, 1ppd for 40+ Years; Denies Illicit Drug and ETOH Use      Previous Diagnostics:  ECHO 10.24.24:   Left Ventricle: The left ventricle is normal in size. Moderately increased wall thickness. There is normal systolic function with a visually estimated ejection fraction of 55 - 60%. There is normal diastolic function.  Right Ventricle: Normal right ventricular cavity size. Systolic function is normal. TAPSE is 2.70 cm.  Left Atrium: Left atrium is moderately dilated.  Aortic Valve: There is a bioprosthetic valve in the aortic position. Aortic valve area by VTI is 2.8 cm². Aortic valve peak velocity is 2.6 m/s. Mean gradient is 14.0 mmHg. The dimensionless index is 0.63. There is trace aortic regurgitation.  Mitral Valve: There is mild stenosis. The mean pressure gradient across the mitral valve is 4 mmHg at a heart rate of  bpm. There is mild regurgitation.  Tricuspid Valve: There is mild regurgitation with a RVSP of 34mmHg.  IVC/SVC: Intermediate venous pressure at 8 mmHg.     LHC 5.11.23  Calcified widely patent coronary anatomy  The mechanical aortic valve demonstrated normal functioning within study limits.  Heavily calcified aortic root  There was heavy mitral annular calcification noted.  Radial approach  The estimated blood loss was none.  Resume warfarin today  Procedure:   Selective coronary angiography  Left ventriculography  Left heart catheterization  Moderate (Conscious) Sedation   Indication: Angina equivalent  Coronary findings: The aortic root was heavily calcified  Dominance: right   Left main: no obstructive disease with <10% epicardial stenosis  Left anterior  descending artery:  Patent and highly tortuous Type 2 vessel that gave rise to a very large bifurcating diagonal branch.  No obstructive disease with <10% epicardial stenosis.  Circumflex artery:  Non dominant patent vessel that gives rise to 2 tortuous moderate-sized obtuse marginal branches.  No obstructive disease with <10% epicardial stenosis  Right coronary artery:  The patent calcified dominant vessel terminates in a large PDA and a small PL segment.  No obstructive disease with <10% epicardial stenosis  Left ventriculography: Not performed due to mechanical AVR  Impression  No evidence of obstructive CAD  Plan   Resume warfarin today and continue GDMT     Holter Monitor 11.21.23  1.This is a good quality study.  2.Predominant rhythm is sinus bradycardia.  3.The minimum heart rate recorded was 48 beats / minute (11/21/2023). The maximum heart rate is 77 beats / minute (11/22/2023). The mean heart rate is 54 beats / minute.  4.Rare premature atrial contractions noted.  5.No evidence of supraventricular tachycardia is noted.  6.No evidence of atrial fibrillation noted.  7.Rare premature ventricular contractions noted.  8.No evidence of ventricular tachycardia is noted.  9.No pauses were noted.  10. IN SINUS BRADYCARDIA 85% OF THE STUDY TIME NO SYMPTOMS REPORTED     Review of patient's allergies indicates:   Allergen Reactions    Ace inhibitors     Naproxen sodium     Nifedipine     Venlafaxine      Current Facility-Administered Medications on File Prior to Encounter   Medication    0.9%  NaCl infusion    diphenhydrAMINE capsule 50 mg     Current Outpatient Medications on File Prior to Encounter   Medication Sig    warfarin (COUMADIN) 5 MG tablet Take 5 mg by mouth Daily.    albuterol-ipratropium (DUO-NEB) 2.5 mg-0.5 mg/3 mL nebulizer solution Take 3 mLs by nebulization every 4 (four) hours as needed for Wheezing or Shortness of Breath.    aspirin (ECOTRIN) 81 MG EC tablet Take 81 mg by mouth once daily.     atorvastatin (LIPITOR) 40 MG tablet Take 40 mg by mouth once daily.    clonazePAM (KLONOPIN) 1 MG tablet Take 0.5 mg by mouth 3 (three) times daily as needed for Anxiety.    dapagliflozin propanediol (FARXIGA) 10 mg tablet Take 1 tablet (10 mg total) by mouth once daily.    FLUoxetine 40 MG capsule Take 40 mg by mouth once daily.    furosemide (LASIX) 40 MG tablet Take 1 tablet (40 mg total) by mouth 2 (two) times daily.    gabapentin (NEURONTIN) 100 MG capsule Take 100 mg by mouth 3 (three) times daily.    HYDROcodone-acetaminophen (NORCO) 7.5-325 mg per tablet Take 1 tablet by mouth every 6 (six) hours as needed for Pain (severe pain only).    isosorbide mononitrate (IMDUR) 30 MG 24 hr tablet Take 1 tablet (30 mg total) by mouth once daily.    LIDOcaine (LIDODERM) 5 % Place 1 patch onto the skin once daily. Remove & Discard patch within 12 hours or as directed by MD    losartan (COZAAR) 25 MG tablet Take 1 tablet (25 mg total) by mouth once daily.    metoprolol succinate (TOPROL-XL) 25 MG 24 hr tablet Take 0.5 tablets (12.5 mg total) by mouth 2 (two) times daily.    MOUNJARO 15 mg/0.5 mL PnIj Inject 15 mg into the skin every 7 days. (Patient not taking: Reported on 1/29/2025)    OLANZapine (ZYPREXA) 5 MG tablet Take 5 mg by mouth every evening.    sotaloL (BETAPACE) 120 MG Tab Take 80 mg by mouth every 12 (twelve) hours. Take 1/2 tablet BID    spironolactone (ALDACTONE) 25 MG tablet Take 1 tablet (25 mg total) by mouth once daily.    traZODone (DESYREL) 50 MG tablet Take 25-50 mg by mouth nightly as needed.     Review of Systems   Neurological:         + Visual disturbance     Objective:     Vital Signs (Most Recent):  Temp: 97.4 °F (36.3 °C) (02/12/25 0722)  Pulse: 68 (02/12/25 0722)  Resp: 20 (02/12/25 0129)  BP: 104/68 (02/12/25 0722)  SpO2: 97 % (02/12/25 0722) Vital Signs (24h Range):  Temp:  [97.3 °F (36.3 °C)-98.1 °F (36.7 °C)] 97.4 °F (36.3 °C)  Pulse:  [68-92] 68  Resp:  [18-20] 20  SpO2:  [95 %-100 %] 97  %  BP: (104-164)/(56-83) 104/68   Weight: 91.3 kg (201 lb 3.2 oz)  Body mass index is 32.97 kg/m².  SpO2: 97 %       Intake/Output Summary (Last 24 hours) at 2/12/2025 0928  Last data filed at 2/12/2025 0359  Gross per 24 hour   Intake --   Output 750 ml   Net -750 ml       Lines/Drains/Airways       Peripheral Intravenous Line  Duration                  Peripheral IV - Single Lumen 02/08/25 2115 20 G Distal;Left;Posterior Forearm 3 days                  Significant Labs:   Chemistries:   Recent Labs   Lab 02/06/25  0309 02/07/25  0356 02/08/25  0357 02/09/25  0255 02/10/25  0252 02/11/25 0415 02/12/25 0250      < > 138 138 138 139 134*   K 4.6   < > 5.0 5.0 4.5 4.4 4.0      < > 101 103 101 105 99   CO2 30   < > 28 29 33* 29 29   BUN 31.5*   < > 27.5* 19.8 17.2 14.9 15.8   CREATININE 1.26*   < > 0.85 0.83 0.79 0.83 0.79   CALCIUM 9.0   < > 8.9 8.7 8.9 8.9 8.8   BILITOT 0.4   < > 0.3 0.4 0.4  --  0.3   ALKPHOS 154*   < > 139 122 125  --  145   ALT 21   < > 15 14 15  --  22   AST 25   < > 21 20 21  --  27   GLUCOSE 118*   < > 139* 120* 124* 121* 121*   MG 2.40   < > 2.40 2.30 2.30  --  2.10   PHOS 4.2  --   --   --   --   --   --     < > = values in this interval not displayed.        CBC/Anemia Labs: Coags:    Recent Labs   Lab 02/10/25  0252 02/11/25 0415 02/12/25 0250   WBC 6.70 6.75 7.58   HGB 11.1* 10.8* 10.9*   HCT 36.8* 35.8* 35.9*    283 278   MCV 91.3 90.9 90.7   RDW 16.9 17.1* 17.1*    Recent Labs   Lab 02/09/25  0255 02/10/25  0252 02/10/25  1017 02/11/25  0415   INR 1.4* 1.2  --  1.1   APTT  --  62.2* 87.2* 53.4*        Significant Imaging:  Imaging Results              MRI Brain W WO Contrast (Final result)  Result time 02/05/25 09:56:17      Final result by Norma Leblanc MD (02/05/25 09:56:17)                   Impression:      1. Symmetric restricted diffusion and edema in the bilateral posterior occipital lobes, with small foci in the left parietal lobe and left  cerebellum, favored to represent changes of posterior reversible encephalopathy syndrome with ischemia in the differential.  Follow-up can be obtained to ensure resolution.  2. Small areas of associated hemorrhage in the bilateral occipital lobes.  Finding of intracranial hemorrhage given to Jina HOLT at the time of dictation.      Electronically signed by: Norma Leblanc  Date:    02/05/2025  Time:    09:56               Narrative:    EXAMINATION:  MRI BRAIN W WO CONTRAST    CLINICAL HISTORY:  Mental status change, unknown cause;vision changes x1 week, abnormal CT;    TECHNIQUE:  Multiplanar, multisequence MR images of the brain were obtained with and without administration of intravenous contrast.    COMPARISON:  CT head dated 02/04/2025    FINDINGS:  There is symmetric restricted diffusion and edema in the bilateral posterior occipital lobes, with few small foci in the left parietal lobe and left cerebellum.  There are small areas of associated hemorrhage with likely areas of curvilinear enhancement.  Mild patchy T2/FLAIR hyperintense in the subcortical and periventricular white matter likely represent chronic microvascular ischemic changes.    There is local mass effect.  There is no midline shift or herniation.  The basal cisterns are patent.  There is mild diffuse parenchymal volume loss.  There is no hydrocephalus or abnormal extra-axial fluid collection.                                       CTA Head and Neck (xpd) (Final result)  Result time 02/05/25 08:32:51      Final result by Nroma Leblanc MD (02/05/25 08:32:51)                   Impression:      No large vessel occlusion or flow-limiting stenosis.    No significant change from the [facility] interpretation.      Electronically signed by: Norma Leblanc  Date:    02/05/2025  Time:    08:32               Narrative:    EXAMINATION:  CTA HEAD AND NECK (XPD)    CLINICAL HISTORY:  Stroke/TIA, determine embolic source;    TECHNIQUE:  Axial images  obtained through the cervical region and [place] before and after the administration of intravenous contrast.    Coronal, sagittal, MIP and 3D reconstructions were obtained from the axial data.    Automatic exposure control was utilized to limit radiation dose.    Radiation Dose:    Total DLP: 2870 mGy*cm    COMPARISON:  CT head dated 02/04/2025    FINDINGS:  Head CT with contrast:    No interval changes when compared to the previous CT.    No significant enhancing abnormalities.    If present, stenosis of the carotid bulbs is measured based on NASCET criteria,    i.e. area of maximal stenosis compared to the cervical ICA distal to the bulb.    Cervical CTA:    The origins of the great vessels are patent with moderate calcifications    The common carotid arteries are patent.  There are mild calcifications at the carotid bulbs without hemodynamically significant stenosis.  The internal carotid arteries are patent.    The vertebral arteries are patent    Intracranial CTA:    There are calcifications along the course the carotid siphons with mild narrowing bilaterally.  The middle cerebral arteries and anterior cerebral arteries are patent    The vertebral arteries, basilar artery and posterior cerebral arteries are patent.    The dural venous sinuses are patent.                        Preliminary result by Albert Cruz Jr., MD (02/05/25 04:10:05)                   Impression:    1. Moderate atheromatous calcification of the cavernous clinoid and supraclinoid segments of the bilateral internal carotid arteries is seen with mild to moderate left greater than right stenosis (Series 26 Images 200-216).  2. The previously noted hypodensities in the bilateral occipital lobes previously ascribed to posterior reversible encephalopathy are again seen stable from prior without associated abnormal intracranial enhancement on post-contrast images.  3. Details and other findings as described above.                Narrative:    START OF REPORT:  Technique: CT angiogram of the intracranial and neck vessels was performed with intravenous contrast with direct axial as well as sagittal and coronal reformations.    Comparison: Comparison is with study [id number] 18:22:03.    Clinical history: Blurry vision.    Findings:  Intracranial Vascular structures:  Internal carotid arteries: Moderate atheromatous calcification of the cavernous clinoid and supraclinoid segments of the bilateral internal carotid arteries is seen with mild to moderate left greater than right stenosis (Series 26 Images 200-216).  Middle cerebral arteries: Unremarkable.  Anterior cerebral arteries: Unremarkable.  Vertebral arteries: Mild atheromatous change of the bilateral vertebral arteries is seen with no significant stenosis.  Basilar artery: Unremarkable.  Posterior cerebral arteries: Unremarkable.  Posterior communicating arteries: Unremarkable.  Neck Vascular structures: Mild atheromatous calcification with no significant stenosis is seen at the origin of the brachiocephalic left subclavian left common carotid arteries.  Carotids:  Common carotid arteries: The left common carotid artery appears unremarkable.  Right common carotid artery: Mild atheromatous calcification of the mid distal segment of the right common carotid artery is seen with no significant stenosis.  Internal carotid artery: Mild atheromatous calcification with mild stenosis at the origin of the bilateral internal carotid arteries is seen.  Vertebral arteries: Unremarkable.  Jugular Veins and venous sinuses: Unremarkable.  Hemorrhage: No acute intracranial hemorrhage is seen.  CSF spaces: The ventricles, sulci and basal cisterns all appear moderately prominent consistent with global cerebral atrophy stable from prior.  Brain parenchyma: No acute infarct is identified. Mild stable appearing scattered microvascular change is seen in portions of the periventricular and deep white matter  tracts. The previously noted hypodensities in the bilateral occipital lobes previously ascribed to posterior reversible encephalopathy are again seen stable from prior without associated abnormal intracranial enhancement on post-contrast images.  Cerebellum: Unremarkable.  Vascular: Unremarkable venous sinuses.  Sella and skull base: The sella appears to be within normal limits.  Cerebellopontine angles: Within normal limits.  Herniation: None.  Intracranial calcifications: Incidental note is made of bilateral choroid plexus calcification. Incidental note is made of some pineal region calcification. Incidental note is made of some calcification of the falx.                                         X-Ray Chest AP Portable (Final result)  Result time 02/05/25 09:08:25      Final result by Bandar Vera MD (02/05/25 09:08:25)                   Impression:      No acute chest disease is identified.      Electronically signed by: Bandar Vera  Date:    02/05/2025  Time:    09:08               Narrative:    EXAMINATION:  XR CHEST AP PORTABLE    CLINICAL HISTORY:  , Unspecified fall, initial encounter.    COMPARISON:  January 25, 2025    FINDINGS:  No alveolar consolidation, effusion, or pneumothorax is seen.   The thoracic aorta is normal  cardiac silhouette, central pulmonary vessels and mediastinum are normal in size and are grossly unremarkable.   visualized osseous structures are grossly unremarkable.                                       CT Cervical Spine Without Contrast (Final result)  Result time 02/04/25 21:25:00      Final result by Juanito Chopra MD (02/04/25 21:25:00)                   Impression:      No acute fracture or malalignment identified.      Electronically signed by: Juanito Chopra  Date:    02/04/2025  Time:    21:25               Narrative:    EXAMINATION:  CT CERVICAL SPINE WITHOUT CONTRAST    CLINICAL HISTORY:  Trauma.    TECHNIQUE:  Multidetector axial images were performed of the  cervical spine without and.  Images were reconstructed.    Automated exposure control was utilized to minimize radiation dose.  .    COMPARISON:  None available.    FINDINGS:  Cervical vertebrae stature is maintained.  There is grade 1 anterolisthesis of C4 on C5 and C7 on T1.  Solid ACDF at C5, C6 and C7.  No acute fracture or malalignment identified.  There is moderate degenerative narrowing of the left neural foramen at C4-C5.  There is no prevertebral soft tissue prominence.    This study does not exclude the possibility of intrathecal soft tissue, ligamentous or vascular injury.                                       CT Head Without Contrast (Final result)  Result time 02/04/25 18:42:34      Final result by Norma Leblanc MD (02/04/25 18:42:34)                   Impression:      Cortical based hypodensities in the bilateral occipital lobes, may represent changes of posterior reversible encephalopathy syndrome.  MRI brain is available for further evaluation.      Electronically signed by: Norma Leblanc  Date:    02/04/2025  Time:    18:42               Narrative:    EXAMINATION:  CT HEAD WITHOUT CONTRAST    CLINICAL HISTORY:  Head trauma, minor (Age >= 65y);    TECHNIQUE:  Axial scans were obtained from skull base to the vertex.    Coronal and sagittal reconstructions obtained from the axial data.    Automatic exposure control was utilized to limit radiation dose.    Contrast: None    Radiation Dose:    Total DLP: 934 mGy*cm    COMPARISON:  CT head dated 01/09/2025    FINDINGS:  There is no acute intracranial hemorrhage.  There are cortical based hypodensities in the bilateral occipital lobes.  Patchy hypodensities in the subcortical and periventricular white matter likely represent chronic microvascular ischemic changes.    There is local mass effect.  There is no midline shift or herniation.  The basal cisterns are patent.  There is diffuse parenchymal volume loss.  There is no hydrocephalus or  abnormal extra-axial fluid collection.  Carotid and vertebral artery calcifications are noted.  The calvarium and skull base are intact.                                    EKG:       Telemetry: SR    Physical Exam  Cardiovascular:      Comments: + Mechanical Click  Pulmonary:      Comments: RA      Home Medications:   Current Facility-Administered Medications on File Prior to Encounter   Medication Dose Route Frequency Provider Last Rate Last Admin    0.9%  NaCl infusion   Intravenous Once Kahlil Martin MD        diphenhydrAMINE capsule 50 mg  50 mg Oral On Call Procedure Kahlil Martin MD   50 mg at 05/18/23 0911     Current Outpatient Medications on File Prior to Encounter   Medication Sig Dispense Refill    warfarin (COUMADIN) 5 MG tablet Take 5 mg by mouth Daily.      albuterol-ipratropium (DUO-NEB) 2.5 mg-0.5 mg/3 mL nebulizer solution Take 3 mLs by nebulization every 4 (four) hours as needed for Wheezing or Shortness of Breath.      aspirin (ECOTRIN) 81 MG EC tablet Take 81 mg by mouth once daily.      atorvastatin (LIPITOR) 40 MG tablet Take 40 mg by mouth once daily.      clonazePAM (KLONOPIN) 1 MG tablet Take 0.5 mg by mouth 3 (three) times daily as needed for Anxiety.      dapagliflozin propanediol (FARXIGA) 10 mg tablet Take 1 tablet (10 mg total) by mouth once daily. 30 tablet 5    FLUoxetine 40 MG capsule Take 40 mg by mouth once daily.      furosemide (LASIX) 40 MG tablet Take 1 tablet (40 mg total) by mouth 2 (two) times daily. 60 tablet 11    gabapentin (NEURONTIN) 100 MG capsule Take 100 mg by mouth 3 (three) times daily.      HYDROcodone-acetaminophen (NORCO) 7.5-325 mg per tablet Take 1 tablet by mouth every 6 (six) hours as needed for Pain (severe pain only). 12 tablet 0    isosorbide mononitrate (IMDUR) 30 MG 24 hr tablet Take 1 tablet (30 mg total) by mouth once daily. 60 tablet 0    LIDOcaine (LIDODERM) 5 % Place 1 patch onto the skin once daily. Remove & Discard patch within 12  hours or as directed by MD 15 patch 0    losartan (COZAAR) 25 MG tablet Take 1 tablet (25 mg total) by mouth once daily. 90 tablet 3    metoprolol succinate (TOPROL-XL) 25 MG 24 hr tablet Take 0.5 tablets (12.5 mg total) by mouth 2 (two) times daily. 90 tablet 3    MOUNJARO 15 mg/0.5 mL PnIj Inject 15 mg into the skin every 7 days. (Patient not taking: Reported on 1/29/2025)      OLANZapine (ZYPREXA) 5 MG tablet Take 5 mg by mouth every evening.      sotaloL (BETAPACE) 120 MG Tab Take 80 mg by mouth every 12 (twelve) hours. Take 1/2 tablet BID      spironolactone (ALDACTONE) 25 MG tablet Take 1 tablet (25 mg total) by mouth once daily. 30 tablet 11    traZODone (DESYREL) 50 MG tablet Take 25-50 mg by mouth nightly as needed.       Current Schedule Inpatient Medications:   artificial tears  1 drop Right Eye QID    atorvastatin  40 mg Oral Daily    dapagliflozin propanediol  10 mg Oral Daily    FLUoxetine  40 mg Oral Daily    gabapentin  100 mg Oral TID    isosorbide mononitrate  30 mg Oral Daily    LIDOcaine  1 patch Transdermal Q24H    OLANZapine  5 mg Oral QHS      heparin (porcine) in D5W  0-40 Units/kg/hr Intravenous Continuous   Paused at 02/12/25 0733     Assessment:   Diplopia  Mechanical Fall at Ground Level tripping over [place]  PAF    - CHADsVASc - 6 Points - 9.7% Stroke Risk per Year     - Warfarin as OP  Supratherapeutic INR     - INR 1.0 (2.12.25)    - INR on Admission (2.4.25) - 8.8  Hx of Mechanical Aortic Valve    - PT/INR Goal Range: 2.5-3.5  DM II  HTN  HLD  COPD  Tobacco Use  No Hx of GIB     Plan:   Daily PT/INR  Okay to resume Warfarin at a reduced dose at some point when OK with IR for INR Goal 2.5-3.5 in the Setting of AF/Stroke Risk Reduction and Mechanical AVR  Will continue to hold warfarin for  kyphoplasty   Recommend bridging with heparin infusion   Will Continue to Follow  Labs and EKG in AM: CBC, CMP, PT/INR    Thank you for your consult.     Judi Macario, LORETO  Cardiology  [facility]  [facility]

## 2025-02-12 NOTE — PT/OT/SLP PROGRESS
Physical Therapy      Patient Name:  Kayla Ruiz   MRN:  [id number]    Patient not seen today 2/2 having procedure. Will follow-up 2/13/25.

## 2025-02-12 NOTE — SUBJECTIVE & OBJECTIVE
Interval History:     Review of Systems   Unable to perform ROS: Acuity of condition     Objective:     Vital Signs (Most Recent):  Temp: 97.8 °F (36.6 °C) (02/12/25 1250)  Pulse: 65 (02/12/25 1355)  Resp: 19 (02/12/25 1355)  BP: 121/68 (02/12/25 1355)  SpO2: 99 % (02/12/25 1355) Vital Signs (24h Range):  Temp:  [97.3 °F (36.3 °C)-98.1 °F (36.7 °C)] 97.8 °F (36.6 °C)  Pulse:  [65-86] 65  Resp:  [13-20] 19  SpO2:  [94 %-100 %] 99 %  BP: (104-190)/(56-83) 121/68     Weight: 91.3 kg (201 lb 3.2 oz)  Body mass index is 32.97 kg/m².    Intake/Output Summary (Last 24 hours) at 2/12/2025 1452  Last data filed at 2/12/2025 1208  Gross per 24 hour   Intake 500 ml   Output 750 ml   Net -250 ml         Physical Exam  Constitutional:       General: She is awake.      Appearance: Normal appearance. She is normal weight.   HENT:      Head: Normocephalic and atraumatic.      Nose: Nose normal.      Mouth/Throat:      Mouth: Mucous membranes are moist.      Pharynx: Oropharynx is clear.   Eyes:      Conjunctiva/sclera: Conjunctivae normal.      Pupils: Pupils are equal, round, and reactive to light.   Cardiovascular:      Rate and Rhythm: Normal rate and regular rhythm.      Pulses: Normal pulses.      Heart sounds: Murmur heard.   Pulmonary:      Effort: Pulmonary effort is normal.      Breath sounds: Normal breath sounds.   Abdominal:      General: Bowel sounds are normal.      Palpations: Abdomen is soft.   Musculoskeletal:         General: Normal range of motion.      Cervical back: Normal range of motion and neck supple.   Skin:     General: Skin is dry.      Capillary Refill: Capillary refill takes 2 to 3 seconds.   Neurological:      Mental Status: Mental status is at baseline. She is disoriented.   Psychiatric:         Cognition and Memory: Cognition is impaired. Memory is impaired.             Significant Labs: All pertinent labs within the past 24 hours have been reviewed.  BMP:   Recent Labs   Lab 02/12/25  0250   NA  134*   K 4.0   CL 99   CO2 29   BUN 15.8   CREATININE 0.79   CALCIUM 8.8   MG 2.10     CBC:   Recent Labs   Lab 02/11/25  0415 02/12/25  0250   WBC 6.75 7.58   HGB 10.8* 10.9*   HCT 35.8* 35.9*    278     CMP:   Recent Labs   Lab 02/11/25  0415 02/12/25  0250    134*   K 4.4 4.0    99   CO2 29 29   BUN 14.9 15.8   CREATININE 0.83 0.79   CALCIUM 8.9 8.8   ALBUMIN  --  3.1*   BILITOT  --  0.3   ALKPHOS  --  145   AST  --  27   ALT  --  22     Magnesium:   Recent Labs   Lab 02/12/25  0250   MG 2.10       Significant Imaging: I have reviewed all pertinent imaging results/findings within the past 24 hours.

## 2025-02-12 NOTE — PT/OT/SLP PROGRESS
Occupational Therapy      Patient Name:  Kayla Ruiz   MRN:  [id number]    Patient not seen today secondary to surgery. Will follow-up as appropriate.    2/12/2025

## 2025-02-12 NOTE — INTERVAL H&P NOTE
The patient has been examined and the H&P has been reviewed:    I concur with the findings and no changes have occurred since H&P was written. Kayla Ruiz is a 69 y.o. female with L1 compression fracture present on admit & uncontrolled pain who presents for L1 Kyphoplasty.           Active Hospital Problems    Diagnosis  POA    Moderate malnutrition [E44.0]  Unknown    Stroke [I63.9]  Yes    Closed fracture of lumbar vertebra [S32.009A]  Yes    Tobacco dependency [F17.200]  Yes      Resolved Hospital Problems   No resolved problems to display.

## 2025-02-12 NOTE — ANESTHESIA PROCEDURE NOTES
Intubation    Date/Time: 2/12/2025 11:11 AM    Performed by: Bashir Junior CRNA  Authorized by: Brandyn Oakes MD    Intubation:     Induction:  Intravenous    Mask Ventilation:  Easy mask    Attempts:  1    Attempted By:  CRNA    Method of Intubation:  Direct    Blade:  [place] 2    Laryngeal View Grade: Grade I - full view of cords      Difficult Airway Encountered?: No      Complications:  None    Airway Device:  Oral endotracheal tube    Airway Device Size:  7.0    Style/Cuff Inflation:  Cuffed    Tube secured:  21    Secured at:  The lips    Placement Verified By:  Capnometry    Complicating Factors:  None    Findings Post-Intubation:  BS equal bilateral

## 2025-02-12 NOTE — TRANSFER OF CARE
"Anesthesia Transfer of Care Note    Patient: Kayla Ruiz    Procedure(s) Performed: * No procedures listed *    Patient location: PACU    Anesthesia Type: general    Transport from OR: Transported from OR on room air with adequate spontaneous ventilation    Post pain: adequate analgesia    Post assessment: no apparent anesthetic complications    Post vital signs: stable    Level of consciousness: awake and sedated    Nausea/Vomiting: no nausea/vomiting    Complications: none    Transfer of care protocol was followed      Last vitals: Visit Vitals  /68   Pulse 68   Temp 36.3 °C (97.4 °F) (Oral)   Resp 20   Ht 5' 5.5" (1.664 m)   Wt 91.3 kg (201 lb 3.2 oz)   SpO2 97%   BMI 32.97 kg/m²     " Giacomo

## 2025-02-13 LAB
ALBUMIN SERPL-MCNC: 3 G/DL (ref 3.4–4.8)
ALBUMIN/GLOB SERPL: 0.9 RATIO (ref 1.1–2)
ALP SERPL-CCNC: 124 UNIT/L (ref 40–150)
ALT SERPL-CCNC: 25 UNIT/L (ref 0–55)
ANION GAP SERPL CALC-SCNC: 7 MEQ/L
APTT PPP: 85.1 SECONDS (ref 23.2–33.7)
APTT PPP: 93.8 SECONDS (ref 23.2–33.7)
AST SERPL-CCNC: 28 UNIT/L (ref 5–34)
BASOPHILS # BLD AUTO: 0.01 X10(3)/MCL
BASOPHILS NFR BLD AUTO: 0.1 %
BILIRUB SERPL-MCNC: 0.3 MG/DL
BUN SERPL-MCNC: 22.1 MG/DL (ref 9.8–20.1)
CALCIUM SERPL-MCNC: 9.2 MG/DL (ref 8.4–10.2)
CHLORIDE SERPL-SCNC: 104 MMOL/L (ref 98–107)
CO2 SERPL-SCNC: 27 MMOL/L (ref 23–31)
CREAT SERPL-MCNC: 1.09 MG/DL (ref 0.55–1.02)
CREAT/UREA NIT SERPL: 20
EOSINOPHIL # BLD AUTO: 0 X10(3)/MCL (ref 0–0.9)
EOSINOPHIL NFR BLD AUTO: 0 %
ERYTHROCYTE [DISTWIDTH] IN BLOOD BY AUTOMATED COUNT: 17.3 % (ref 11.5–17)
GFR SERPLBLD CREATININE-BSD FMLA CKD-EPI: 54 ML/MIN/1.73/M2
GLOBULIN SER-MCNC: 3.5 GM/DL (ref 2.4–3.5)
GLUCOSE SERPL-MCNC: 249 MG/DL (ref 82–115)
HCT VFR BLD AUTO: 35.7 % (ref 37–47)
HGB BLD-MCNC: 10.7 G/DL (ref 12–16)
IMM GRANULOCYTES # BLD AUTO: 0.03 X10(3)/MCL (ref 0–0.04)
IMM GRANULOCYTES NFR BLD AUTO: 0.3 %
INR PPP: 1.1
LYMPHOCYTES # BLD AUTO: 0.71 X10(3)/MCL (ref 0.6–4.6)
LYMPHOCYTES NFR BLD AUTO: 7.2 %
MAGNESIUM SERPL-MCNC: 2.3 MG/DL (ref 1.6–2.6)
MCH RBC QN AUTO: 27.4 PG (ref 27–31)
MCHC RBC AUTO-ENTMCNC: 30 G/DL (ref 33–36)
MCV RBC AUTO: 91.5 FL (ref 80–94)
MONOCYTES # BLD AUTO: 0.1 X10(3)/MCL (ref 0.1–1.3)
MONOCYTES NFR BLD AUTO: 1 %
NEUTROPHILS # BLD AUTO: 8.97 X10(3)/MCL (ref 2.1–9.2)
NEUTROPHILS NFR BLD AUTO: 91.4 %
NRBC BLD AUTO-RTO: 0 %
PLATELET # BLD AUTO: 267 X10(3)/MCL (ref 130–400)
PMV BLD AUTO: 9.7 FL (ref 7.4–10.4)
POTASSIUM SERPL-SCNC: 5.7 MMOL/L (ref 3.5–5.1)
PROT SERPL-MCNC: 6.5 GM/DL (ref 5.8–7.6)
PROTHROMBIN TIME: 14.6 SECONDS (ref 12.5–14.5)
RBC # BLD AUTO: 3.9 X10(6)/MCL (ref 4.2–5.4)
SODIUM SERPL-SCNC: 138 MMOL/L (ref 136–145)
WBC # BLD AUTO: 9.82 X10(3)/MCL (ref 4.5–11.5)

## 2025-02-13 PROCEDURE — 21400001 HC TELEMETRY ROOM

## 2025-02-13 PROCEDURE — 25000003 PHARM REV CODE 250: Performed by: INTERNAL MEDICINE

## 2025-02-13 PROCEDURE — 97116 GAIT TRAINING THERAPY: CPT

## 2025-02-13 PROCEDURE — 83735 ASSAY OF MAGNESIUM: CPT | Performed by: NURSE PRACTITIONER

## 2025-02-13 PROCEDURE — 85730 THROMBOPLASTIN TIME PARTIAL: CPT | Performed by: INTERNAL MEDICINE

## 2025-02-13 PROCEDURE — 97535 SELF CARE MNGMENT TRAINING: CPT

## 2025-02-13 PROCEDURE — 36415 COLL VENOUS BLD VENIPUNCTURE: CPT | Performed by: INTERNAL MEDICINE

## 2025-02-13 PROCEDURE — 97168 OT RE-EVAL EST PLAN CARE: CPT

## 2025-02-13 PROCEDURE — 97164 PT RE-EVAL EST PLAN CARE: CPT

## 2025-02-13 PROCEDURE — 63600175 PHARM REV CODE 636 W HCPCS: Performed by: INTERNAL MEDICINE

## 2025-02-13 PROCEDURE — 85610 PROTHROMBIN TIME: CPT | Performed by: NURSE PRACTITIONER

## 2025-02-13 PROCEDURE — 85025 COMPLETE CBC W/AUTO DIFF WBC: CPT | Performed by: INTERNAL MEDICINE

## 2025-02-13 PROCEDURE — 25000003 PHARM REV CODE 250: Performed by: ANESTHESIOLOGY

## 2025-02-13 PROCEDURE — 36415 COLL VENOUS BLD VENIPUNCTURE: CPT | Performed by: NURSE PRACTITIONER

## 2025-02-13 PROCEDURE — 80053 COMPREHEN METABOLIC PANEL: CPT | Performed by: NURSE PRACTITIONER

## 2025-02-13 RX ORDER — WARFARIN SODIUM 5 MG/1
5 TABLET ORAL DAILY
Status: DISCONTINUED | OUTPATIENT
Start: 2025-02-13 | End: 2025-02-15

## 2025-02-13 RX ADMIN — GABAPENTIN 100 MG: 100 CAPSULE ORAL at 03:02

## 2025-02-13 RX ADMIN — ISOSORBIDE MONONITRATE 30 MG: 30 TABLET, EXTENDED RELEASE ORAL at 09:02

## 2025-02-13 RX ADMIN — HYPROMELLOSE 2910 1 DROP: 5 SOLUTION/ DROPS OPHTHALMIC at 08:02

## 2025-02-13 RX ADMIN — DAPAGLIFLOZIN 10 MG: 10 TABLET, FILM COATED ORAL at 09:02

## 2025-02-13 RX ADMIN — HYPROMELLOSE 2910 1 DROP: 5 SOLUTION/ DROPS OPHTHALMIC at 03:02

## 2025-02-13 RX ADMIN — HYPROMELLOSE 2910 1 DROP: 5 SOLUTION/ DROPS OPHTHALMIC at 09:02

## 2025-02-13 RX ADMIN — ATORVASTATIN CALCIUM 40 MG: 40 TABLET, FILM COATED ORAL at 09:02

## 2025-02-13 RX ADMIN — FLUOXETINE HYDROCHLORIDE 40 MG: 20 CAPSULE ORAL at 09:02

## 2025-02-13 RX ADMIN — HEPARIN SODIUM 12 UNITS/KG/HR: 10000 INJECTION, SOLUTION INTRAVENOUS at 10:02

## 2025-02-13 RX ADMIN — LIDOCAINE 5% 1 PATCH: 700 PATCH TOPICAL at 12:02

## 2025-02-13 RX ADMIN — WARFARIN SODIUM 5 MG: 5 TABLET ORAL at 03:02

## 2025-02-13 RX ADMIN — GABAPENTIN 100 MG: 100 CAPSULE ORAL at 08:02

## 2025-02-13 RX ADMIN — HYPROMELLOSE 2910 1 DROP: 5 SOLUTION/ DROPS OPHTHALMIC at 12:02

## 2025-02-13 RX ADMIN — GABAPENTIN 100 MG: 100 CAPSULE ORAL at 09:02

## 2025-02-13 RX ADMIN — TRAZODONE HYDROCHLORIDE 50 MG: 50 TABLET ORAL at 08:02

## 2025-02-13 RX ADMIN — OLANZAPINE 5 MG: 5 TABLET, FILM COATED ORAL at 08:02

## 2025-02-13 RX ADMIN — OXYCODONE HYDROCHLORIDE 5 MG: 5 TABLET ORAL at 08:02

## 2025-02-13 NOTE — PROGRESS NOTES
[facility] - Neurology Hospital Medicine  Progress Note    Patient Name: Kayla Ruiz  MRN: [id number]  Patient Class: IP- Inpatient   Admission Date: 2/4/2025  Length of Stay: 8 days  Attending Physician: Radhames Acevedo MD  Primary Care Provider: Jennifer Chavez NP-C        Subjective     Principal Problem:Stroke        HPI:  69 y.o. female with a history that includes PAF and mechanical AVR, on Coumadin therapy was referred to ED by her Cardiology due to INR of 8.8 on outpatient labs.  Additional complaints include headache and vision changes, though denied focal weakness or speech changes.  CT head performed and revealed cortical based hypodensities in the bilateral occipital lobes.  CTA revealed moderate atheromatous calcification of the cavernous clinoid and supra clinoid segments of the bilateral internal carotid arteries with mild-to-moderate left greater than right stenosis.  A subsequent MRI brain has noted Symmetric restricted diffusion and edema in the bilateral posterior occipital lobes, with small foci in the left parietal lobe and left cerebellum, favored to represent changes of posterior reversible encephalopathy syndrome, and additionally noted small areas of hemorrhage in B/L occipital lobes.       Neurology was consulted and neurologist felt she had bilateral occipital lobe infarcts secondary to being subtherapeutic with clot formation and then had embolism which led to the vision loss.  Recommends resuming warfarin once cleared by Cardiology.     After further review it was deemed that she was taking excessive Tylenol about 2 to 3 500 mg tablets at a time every 4 hours per the  report.  She was taking accepted Tylenol use because she could not get anyone to prescribed [place] for her.    Overview/Hospital Course:  2/11/25-Patient is sitting up in the bed.  Waiting for IR to see patient for possible kyphoplasty.      2/12/25-Patient is s/p kyphoplasty.  No changes  at this time.  Will need placement.    2/13/25-Patient is doing well.  Will restart her coumadin today.  Still on heparin drip.  Will continue with therapy.    Interval History:     Review of Systems   Constitutional:  Positive for activity change.   HENT: Negative.     Eyes: Negative.    Respiratory: Negative.     Cardiovascular: Negative.    Gastrointestinal: Negative.    Endocrine: Negative.    Genitourinary: Negative.    Musculoskeletal:  Positive for gait problem.   Skin: Negative.    Allergic/Immunologic: Negative.    Neurological:  Positive for weakness.   Hematological: Negative.    Psychiatric/Behavioral:  Positive for confusion.      Objective:     Vital Signs (Most Recent):  Temp: 98.2 °F (36.8 °C) (02/13/25 1212)  Pulse: 75 (02/13/25 1212)  Resp: 19 (02/13/25 0722)  BP: (!) 156/76 (02/13/25 1212)  SpO2: (!) 92 % (02/13/25 1212) Vital Signs (24h Range):  Temp:  [97.4 °F (36.3 °C)-98.2 °F (36.8 °C)] 98.2 °F (36.8 °C)  Pulse:  [75-85] 75  Resp:  [19] 19  SpO2:  [92 %-95 %] 92 %  BP: (116-182)/(60-89) 156/76     Weight: 91.3 kg (201 lb 3.2 oz)  Body mass index is 32.97 kg/m².    Intake/Output Summary (Last 24 hours) at 2/13/2025 1421  Last data filed at 2/13/2025 0558  Gross per 24 hour   Intake 420 ml   Output 1100 ml   Net -680 ml         Physical Exam  Constitutional:       Appearance: Normal appearance. She is normal weight.   HENT:      Head: Normocephalic and atraumatic.      Nose: Nose normal.      Mouth/Throat:      Mouth: Mucous membranes are moist.      Pharynx: Oropharynx is clear.   Eyes:      Extraocular Movements: Extraocular movements intact.      Conjunctiva/sclera: Conjunctivae normal.      Pupils: Pupils are equal, round, and reactive to light.   Cardiovascular:      Rate and Rhythm: Normal rate and regular rhythm.      Pulses: Normal pulses.      Heart sounds: Normal heart sounds.   Pulmonary:      Effort: Pulmonary effort is normal.      Breath sounds: Normal breath sounds.   Abdominal:       General: Bowel sounds are normal.      Palpations: Abdomen is soft.   Musculoskeletal:         General: Normal range of motion.      Cervical back: Normal range of motion and neck supple.   Skin:     General: Skin is warm and dry.      Capillary Refill: Capillary refill takes 2 to 3 seconds.   Neurological:      Mental Status: She is alert. Mental status is at baseline.      Motor: Weakness present.   Psychiatric:         Mood and Affect: Mood normal.         Behavior: Behavior normal.             Significant Labs: All pertinent labs within the past 24 hours have been reviewed.  BMP:   Recent Labs   Lab 02/13/25 0233      K 5.7*      CO2 27   BUN 22.1*   CREATININE 1.09*   CALCIUM 9.2   MG 2.30     CBC:   Recent Labs   Lab 02/12/25 0250 02/13/25 0233   WBC 7.58 9.82   HGB 10.9* 10.7*   HCT 35.9* 35.7*    267     CMP:   Recent Labs   Lab 02/12/25 0250 02/13/25 0233   * 138   K 4.0 5.7*   CL 99 104   CO2 29 27   BUN 15.8 22.1*   CREATININE 0.79 1.09*   CALCIUM 8.8 9.2   ALBUMIN 3.1* 3.0*   BILITOT 0.3 0.3   ALKPHOS 145 124   AST 27 28   ALT 22 25     Magnesium:   Recent Labs   Lab 02/12/25 0250 02/13/25  0233   MG 2.10 2.30       Significant Imaging: I have reviewed all pertinent imaging results/findings within the past 24 hours.    Assessment and Plan     * Stroke  .  Antithrombotics for secondary stroke prevention: Anticoagulants: Heparin protocol without bolus    Statins for secondary stroke prevention and hyperlipidemia, if present:   Statins: Atorvastatin- 40 mg daily    Aggressive risk factor modification: HTN, A-Fib     Rehab efforts: The patient has been evaluated by a stroke team provider and the therapy needs have been fully considered based off the presenting complaints and exam findings. The following therapy evaluations are needed: PT evaluate and treat, OT evaluate and treat, SLP evaluate and treat, PM&R evaluate for appropriate placement    Diagnostics  ordered/pending: CT scan of head without contrast to asses brain parenchyma, CTA Neck/Arch to assess vasculature, MRI head without contrast to assess brain parenchyma    VTE prophylaxis:  heparin drip    BP parameters: Infarct: No intervention, SBP <220        Moderate malnutrition  Nutrition consulted. Most recent weight and BMI monitored-     Measurements:  Wt Readings from Last 1 Encounters:   02/05/25 91.3 kg (201 lb 3.2 oz)   Body mass index is 32.97 kg/m².    Patient has been screened and assessed by RD.    Malnutrition Type:  Context: acute illness or injury  Level: moderate    Malnutrition Characteristic Summary:  Weight Loss (Malnutrition): greater than 5% in 1 month  Energy Intake (Malnutrition): less than 75% for greater than or equal to 1 month  Fluid Accumulation (Malnutrition): other (see comments) (Not present)  Hand  Strength, Right (Malnutrition): Unable to assess    Interventions/Recommendations (treatment strategy):  Oral diet/nutrient modifications      Closed fracture of lumbar vertebra  IR for possible kyphoplasty      Tobacco dependency  Dangers of cigarette smoking were reviewed with patient in detail. Patient was Counseled for 3-10 minutes. Nicotine replacement options were discussed. Nicotine replacement was discussed- not prescribed per patient's request      VTE Risk Mitigation (From admission, onward)           Ordered     warfarin (COUMADIN) tablet 5 mg  Daily         02/13/25 1420     heparin 25,000 units in dextrose 5% 250 mL (100 units/mL) infusion LOW INTENSITY nomogram - [facility]  Continuous        Question:  Begin at (units/kg/hr)  Answer:  12    02/08/25 1238     heparin 25,000 units in dextrose 5% (100 units/ml) IV bolus from bag LOW INTENSITY nomogram - [facility]  As needed (PRN)        Question:  Heparin Infusion Adjustment (DO NOT MODIFY ANSWER)  Answer:  [email or web address]\epic\Images\Pharmacy\HeparinInfusions\heparin LOW INTENSITY nomogram for [facility] [id number].pdf    02/08/25 1237      heparin 25,000 units in dextrose 5% (100 units/ml) IV bolus from bag LOW INTENSITY nomogram - [facility]  As needed (PRN)        Question:  Heparin Infusion Adjustment (DO NOT MODIFY ANSWER)  Answer:  \\[email or web address]\epic\Images\Pharmacy\HeparinInfusions\heparin LOW INTENSITY nomogram for [facility] [id number].pdf    02/08/25 1238     IP VTE HIGH RISK PATIENT  Once         02/05/25 0314     Place sequential compression device  Until discontinued         02/05/25 0314                Follow labs  Restart coumadin  Can switch to full dose lovenox instead of heparin drip if it would be easier for rehab placement  Therapy  OOB    Discharge Planning   PETR:      Code Status: Full Code   Medical Readiness for Discharge Date:                    Please place Justification for DME        Radhames Oviedo MD  Department of Hospital Medicine   [facility] - Neurology

## 2025-02-13 NOTE — PT/OT/SLP RE-EVAL
Occupational Therapy   Re-evaluation    Name: Kayla Ruiz  MRN: [id number]  Recent Surgery: * No surgery found *      Recommendations:     Discharge therapy intensity: Moderate Intensity Therapy   Discharge Equipment Recommendations:  to be determined by next level of care  Barriers to discharge:  Decreased caregiver support, Other (Comment) (ongoing medical needs; impaired mobility; impaired ability to complete ADLs compared to baseline)    Assessment:     Kayla Ruiz is a 69 y.o. female with a medical diagnosis of Bilateral posterior occipital lobe ischemic CVA w/ supra therapeutic INR. Pt known from previous admit 1/9/25-1/13/25 for R patella fx (non-op), Lumbar 1 compression fx (per family ortho cleared pt for 60* knee flexion in [facility]), w/ history of VHD s/p mechanical AVR on Coumadin, PAF, Diabetes mellitus type II, Essential HTN, and h/o COPD. Pt now s/p kyphoplasty on 2/12.  She presents with the following performance deficits affecting function: weakness, impaired endurance, impaired self care skills, impaired functional mobility, gait instability, impaired balance, decreased safety awareness, decreased lower extremity function.      Pt tolerated re-eval well. Required MIN A to perform sit to stand and ambulate with RW. Unsteadiness noted, mild ataxia, scissoring at times. Requiring Max A to perform LB dressing with AE. Min A to t/f to toilet. Pt still appropriate for moderate-intensity therapy upon dc.     Rehab Prognosis: Good; patient would benefit from acute skilled OT services to address these deficits and reach maximum level of function.       Plan:     Patient to be seen 6 x/week to address the above listed problems via self-care/home management, therapeutic activities, therapeutic exercises, neuromuscular re-education  Plan of Care Expires: 03/13/25  Plan of Care Reviewed with: patient    Subjective     Chief Complaint: pain at incision site  Patient/Family Comments/goals: go  home    Pain/Comfort:  Pain Rating 1: 0/10    Patients cultural, spiritual, Mu-ism conflicts given the current situation: no    Objective:     OT communicated with nursing prior to session.      Patient was found HOB elevated with peripheral IV, pulse ox (continuous), telemetry upon OT entry to room.    General Precautions: Standard, fall  Orthopedic Precautions: spinal precautions  Braces: Hinged knee brace    Vital Signs: Respiratory Status: on room air    Bed Mobility:    Patient completed Supine to Sit with contact guard assistance    Functional Mobility/Transfers:  Patient completed Sit <> Stand Transfer with minimum assistance  with  rolling walker   Patient completed Toilet Transfer Step Transfer technique with minimum assistance with  rolling walker  Functional Mobility: Ambulated with RW and MIN A overall. Demo mild ataxia,     Activities of Daily Living:  Lower Body Dressing: maximal assistance      AMPAC 6 Click ADL:  AMPAC Total Score: 18    Functional Cognition:  Orientation: oriented to Person and Place  Safety Awareness: Impaired.    Insight into Deficits: Impaired.      Upper Extremity Function:  Right Upper Extremity:   Strength and ROM WFL  Slight incoordination    Left Upper Extremity:  Strength and ROM WFL  Slight incoordination    Balance:   CGA static standing    Therapeutic Positioning  Risk for acquired pressure injuries is decreased due to ability to get to BSC/toilet with assist.    OT interventions performed during the course of today's session in an effort to prevent and/or reduce acquired pressure injuries:   Education was provided on benefits of and recommendations for therapeutic positioning  Therapeutic positioning was provided at the conclusion of session to offload all bony prominences for the prevention and/or reduction of pressure injuries    Skin assessment: all bony prominences were assessed    Findings: new area of altered skin integrity discovered at incision site    OT  recommendations for therapeutic positioning throughout hospitalization:   Follow [facility] Pressure Injury Prevention Protocol    Additional Treatment:  ADL Training: as documented above    Patient Education:  Patient provided with verbal education education regarding OT role/goals/POC, fall prevention, safety awareness, and Discharge/DME recommendations.  Understanding was verbalized.     Patient left up in chair with call button in reach    GOALS:   Multidisciplinary Problems       Occupational Therapy Goals          Problem: Occupational Therapy    Goal Priority Disciplines Outcome Interventions   Occupational Therapy Goal     OT, PT/OT Progressing    Description: Goals to be met by: 3/13/2025     Patient will increase functional independence with ADLs by performing:    UE Dressing with Stand-by Assistance.  LE Dressing with Stand-by Assistance.  Grooming while standing at sink with Stand-by Assistance.  Toileting from toilet with Stand-by Assistance for hygiene and clothing management.   Toilet transfer to toilet with Stand-by Assistance.                         History:     Past Medical History:   Diagnosis Date    A-fib     Anticoagulant long-term use     Depression     Diabetes mellitus     Hyperlipidemia     Sleep apnea          Past Surgical History:   Procedure Laterality Date    AORTIC VALVE REPLACEMENT      CHOLECYSTECTOMY      COLON RESECTION      fusion      LEFT HEART CATHETERIZATION Left 05/18/2023    Procedure: Left heart cath;  Surgeon: Kahlil Martin MD;  Location: [facility] CATH LAB;  Service: Cardiology;  Laterality: Left;  LHC +/- PCI    TONSILLECTOMY         Time Tracking:     OT Date of Treatment: 02/13/25  OT Start Time: 1410  OT Stop Time: 1444  OT Total Time (min): 34 min    Billable Minutes:Re-eval 1 unit  Self Care/Home Management 1 unit    2/13/2025

## 2025-02-13 NOTE — PLAN OF CARE
Problem: Physical Therapy  Goal: Physical Therapy Goal  Description: Goals to be met by: 3/6/2025     Patient will increase functional independence with mobility by performin. Supine to sit with Modified [place]  2. Sit to supine with Modified [place]  3. Rolling to Left and Right with Modified [place].   4. Sit to stand transfer with Stand-by Assistance  5. Bed to chair transfer with Stand-by Assistance using Rolling Walker  6. Gait  x 50 feet with Stand-by Assistance using Rolling Walker.     2025 1634 by Bev Monique, PT  Outcome: Progressing  2025 1633 by Bev Monique, PT  Outcome: Progressing

## 2025-02-13 NOTE — SUBJECTIVE & OBJECTIVE
Interval History:     Review of Systems   Constitutional:  Positive for activity change.   HENT: Negative.     Eyes: Negative.    Respiratory: Negative.     Cardiovascular: Negative.    Gastrointestinal: Negative.    Endocrine: Negative.    Genitourinary: Negative.    Musculoskeletal:  Positive for gait problem.   Skin: Negative.    Allergic/Immunologic: Negative.    Neurological:  Positive for weakness.   Hematological: Negative.    Psychiatric/Behavioral:  Positive for confusion.      Objective:     Vital Signs (Most Recent):  Temp: 98.2 °F (36.8 °C) (02/13/25 1212)  Pulse: 75 (02/13/25 1212)  Resp: 19 (02/13/25 0722)  BP: (!) 156/76 (02/13/25 1212)  SpO2: (!) 92 % (02/13/25 1212) Vital Signs (24h Range):  Temp:  [97.4 °F (36.3 °C)-98.2 °F (36.8 °C)] 98.2 °F (36.8 °C)  Pulse:  [75-85] 75  Resp:  [19] 19  SpO2:  [92 %-95 %] 92 %  BP: (116-182)/(60-89) 156/76     Weight: 91.3 kg (201 lb 3.2 oz)  Body mass index is 32.97 kg/m².    Intake/Output Summary (Last 24 hours) at 2/13/2025 1421  Last data filed at 2/13/2025 0558  Gross per 24 hour   Intake 420 ml   Output 1100 ml   Net -680 ml         Physical Exam  Constitutional:       Appearance: Normal appearance. She is normal weight.   HENT:      Head: Normocephalic and atraumatic.      Nose: Nose normal.      Mouth/Throat:      Mouth: Mucous membranes are moist.      Pharynx: Oropharynx is clear.   Eyes:      Extraocular Movements: Extraocular movements intact.      Conjunctiva/sclera: Conjunctivae normal.      Pupils: Pupils are equal, round, and reactive to light.   Cardiovascular:      Rate and Rhythm: Normal rate and regular rhythm.      Pulses: Normal pulses.      Heart sounds: Normal heart sounds.   Pulmonary:      Effort: Pulmonary effort is normal.      Breath sounds: Normal breath sounds.   Abdominal:      General: Bowel sounds are normal.      Palpations: Abdomen is soft.   Musculoskeletal:         General: Normal range of motion.      Cervical back: Normal  range of motion and neck supple.   Skin:     General: Skin is warm and dry.      Capillary Refill: Capillary refill takes 2 to 3 seconds.   Neurological:      Mental Status: She is alert. Mental status is at baseline.      Motor: Weakness present.   Psychiatric:         Mood and Affect: Mood normal.         Behavior: Behavior normal.             Significant Labs: All pertinent labs within the past 24 hours have been reviewed.  BMP:   Recent Labs   Lab 02/13/25 0233      K 5.7*      CO2 27   BUN 22.1*   CREATININE 1.09*   CALCIUM 9.2   MG 2.30     CBC:   Recent Labs   Lab 02/12/25 0250 02/13/25 0233   WBC 7.58 9.82   HGB 10.9* 10.7*   HCT 35.9* 35.7*    267     CMP:   Recent Labs   Lab 02/12/25 0250 02/13/25 0233   * 138   K 4.0 5.7*   CL 99 104   CO2 29 27   BUN 15.8 22.1*   CREATININE 0.79 1.09*   CALCIUM 8.8 9.2   ALBUMIN 3.1* 3.0*   BILITOT 0.3 0.3   ALKPHOS 145 124   AST 27 28   ALT 22 25     Magnesium:   Recent Labs   Lab 02/12/25 0250 02/13/25 0233   MG 2.10 2.30       Significant Imaging: I have reviewed all pertinent imaging results/findings within the past 24 hours.

## 2025-02-13 NOTE — PLAN OF CARE
Problem: Adult Inpatient Plan of Care  Goal: Plan of Care Review  Outcome: Progressing  Goal: Patient-Specific Goal (Individualized)  Outcome: Progressing  Goal: Absence of Hospital-Acquired Illness or Injury  Outcome: Progressing  Goal: Optimal Comfort and Wellbeing  Outcome: Progressing  Goal: Readiness for Transition of Care  Outcome: Progressing     Problem: Infection  Goal: Absence of Infection Signs and Symptoms  Outcome: Progressing     Problem: Stroke, Ischemic (Includes Transient Ischemic Attack)  Goal: Optimal Coping  Outcome: Progressing  Goal: Effective Bowel Elimination  Outcome: Progressing  Goal: Optimal Cerebral Tissue Perfusion  Outcome: Progressing  Goal: Optimal Cognitive Function  Outcome: Progressing  Goal: Improved Communication Skills  Outcome: Progressing  Goal: Optimal Functional Ability  Outcome: Progressing  Goal: Optimal Nutrition Intake  Outcome: Progressing  Goal: Effective Oxygenation and Ventilation  Outcome: Progressing  Goal: Improved Sensorimotor Function  Outcome: Progressing  Goal: Safe and Effective Swallow  Outcome: Progressing  Goal: Effective Urinary Elimination  Outcome: Progressing     Problem: Skin Injury Risk Increased  Goal: Skin Health and Integrity  Outcome: Progressing     Problem: Fall Injury Risk  Goal: Absence of Fall and Fall-Related Injury  Outcome: Progressing     Problem: Wound  Goal: Optimal Coping  Outcome: Progressing  Goal: Optimal Functional Ability  Outcome: Progressing  Goal: Absence of Infection Signs and Symptoms  Outcome: Progressing  Goal: Improved Oral Intake  Outcome: Progressing  Goal: Optimal Pain Control and Function  Outcome: Progressing  Goal: Skin Health and Integrity  Outcome: Progressing  Goal: Optimal Wound Healing  Outcome: Progressing

## 2025-02-13 NOTE — PROGRESS NOTES
[facility] *    Cardiology  Progress Note    Patient Name: Kayla Ruiz  MRN: [id number]  Admission Date: 2/4/2025  Hospital Length of Stay: 8 days  Code Status: Full Code   Attending Provider: Radhames Acevedo MD   Consulting Provider: DONAVON Latham  Primary Care Physician: Jennifer Chavez, NP-C  Principal Problem:Stroke    Patient information was obtained from patient, past medical records, and ER records.     Subjective:     Reason for Consult: Anticoagulation Recommendations    HPI: Ms. Ruiz is a 68 y/o female who is known to [facility], Dr. Leon. The patient presented to [facility] on 2.4.25 with c/o Headache, Double Vision and Circles in Vision. The patient reported that she fell the other day after tripping on a blanket. She denied hitting her head and LOC. She reported that she developed a frontal headache about 2 days ago which was around the fall. She then developed visual disturbances of double vision and circles in her vision. She presented to the ER and a CT of the Head revealed Cortical Based Hypodensities in the Bilateral Occipital Lobes which may represent changes of the Posterior Reversible Encephalopathy Syndrome. MRI of the Brain was recommended for further workup and evaluation. CTA Head/Neck revealed Moderate Atheromatous Calcification of the Cavernous Clinoid and Supra Clinoid segments of the Bilateral Internal Carotid Arteries with Mild-Mod L > R Stenosis. She was admitted to  and Stroke Neurology was Consulted and [facility] was consulted for Anticoagulation Recommendations.     Hospital Course:   2.6.25: NAD, VSS. She deneis SOB CP or nasuea. INR 5.7 today  2.7.25; NAD, VSS, She still reports visual disturbances, She denies SOB, CP, or nausea, INR 3.5 today.   2.8.25: NAD, VSS. She denies SOB, CP, or nausea. INR is 2.1 today. Her  is at the bedside   2.9.25: VSS, NAD. She denies CP, SOB, or nausea. INR is 1.4.  2.12.25: Patient in procedure during time of round  kyphoplasty. INR 1.0 today being bridged with heparin   2.13.25: NAD. Sitting up in chair. INR 1.1. Denies CP, SOB, or palps. Heparin gtt infusing. H&H stable. K 5.7.     PMH: PAF, Mechanical AV on Warfarin, DM II, HTN, HLD, COPD  PSH: Aortic Valve Replacement, Cholecystectomy, Colon Resection, Angiogram, Tonsillectomy  Family History: Father- COPD, Mother- COPD, Sister- Cancer, Sister- Cancer   Social History: Current Everyday Smoker, 1ppd for 40+ Years; Denies Illicit Drug and ETOH Use      Previous Diagnostics:  ECHO 10.24.24:   Left Ventricle: The left ventricle is normal in size. Moderately increased wall thickness. There is normal systolic function with a visually estimated ejection fraction of 55 - 60%. There is normal diastolic function.  Right Ventricle: Normal right ventricular cavity size. Systolic function is normal. TAPSE is 2.70 cm.  Left Atrium: Left atrium is moderately dilated.  Aortic Valve: There is a bioprosthetic valve in the aortic position. Aortic valve area by VTI is 2.8 cm². Aortic valve peak velocity is 2.6 m/s. Mean gradient is 14.0 mmHg. The dimensionless index is 0.63. There is trace aortic regurgitation.  Mitral Valve: There is mild stenosis. The mean pressure gradient across the mitral valve is 4 mmHg at a heart rate of  bpm. There is mild regurgitation.  Tricuspid Valve: There is mild regurgitation with a RVSP of 34mmHg.  IVC/SVC: Intermediate venous pressure at 8 mmHg.     LHC 5.11.23  Calcified widely patent coronary anatomy  The mechanical aortic valve demonstrated normal functioning within study limits.  Heavily calcified aortic root  There was heavy mitral annular calcification noted.  Radial approach  The estimated blood loss was none.  Resume warfarin today  Procedure:   Selective coronary angiography  Left ventriculography  Left heart catheterization  Moderate (Conscious) Sedation   Indication: Angina equivalent  Coronary findings: The aortic root was heavily  calcified  Dominance: right   Left main: no obstructive disease with <10% epicardial stenosis  Left anterior descending artery:  Patent and highly tortuous Type 2 vessel that gave rise to a very large bifurcating diagonal branch.  No obstructive disease with <10% epicardial stenosis.  Circumflex artery:  Non dominant patent vessel that gives rise to 2 tortuous moderate-sized obtuse marginal branches.  No obstructive disease with <10% epicardial stenosis  Right coronary artery:  The patent calcified dominant vessel terminates in a large PDA and a small PL segment.  No obstructive disease with <10% epicardial stenosis  Left ventriculography: Not performed due to mechanical AVR  Impression  No evidence of obstructive CAD  Plan   [facility]     Holter Monitor 11.21.23  1.This is a good quality study.  2.Predominant rhythm is sinus bradycardia.  3.The minimum heart rate recorded was 48 beats / minute (11/21/2023). The maximum heart rate is 77 beats / minute (11/22/2023). The mean heart rate is 54 beats / minute.  4.Rare premature atrial contractions noted.  5.No evidence of supraventricular tachycardia is noted.  6.No evidence of atrial fibrillation noted.  7.Rare premature ventricular contractions noted.  8.No evidence of ventricular tachycardia is noted.  9.No pauses were noted.  10. IN SINUS BRADYCARDIA 85% OF THE STUDY TIME NO SYMPTOMS REPORTED     Review of patient's allergies indicates:   Allergen Reactions    Ace inhibitors     Naproxen sodium     Nifedipine     Venlafaxine      Current Facility-Administered Medications on File Prior to Encounter   Medication    0.9%  NaCl infusion    diphenhydrAMINE capsule 50 mg     Current Outpatient Medications on File Prior to Encounter   Medication Sig    warfarin (COUMADIN) 5 MG tablet Take 5 mg by mouth Daily.    albuterol-ipratropium (DUO-NEB) 2.5 mg-0.5 mg/3 mL nebulizer solution Take 3 mLs by nebulization every 4 (four) hours as needed for  Wheezing or Shortness of Breath.    aspirin (ECOTRIN) 81 MG EC tablet Take 81 mg by mouth once daily.    atorvastatin (LIPITOR) 40 MG tablet Take 40 mg by mouth once daily.    clonazePAM (KLONOPIN) 1 MG tablet Take 0.5 mg by mouth 3 (three) times daily as needed for Anxiety.    dapagliflozin propanediol (FARXIGA) 10 mg tablet Take 1 tablet (10 mg total) by mouth once daily.    FLUoxetine 40 MG capsule Take 40 mg by mouth once daily.    furosemide (LASIX) 40 MG tablet Take 1 tablet (40 mg total) by mouth 2 (two) times daily.    gabapentin (NEURONTIN) 100 MG capsule Take 100 mg by mouth 3 (three) times daily.    HYDROcodone-acetaminophen (NORCO) 7.5-325 mg per tablet Take 1 tablet by mouth every 6 (six) hours as needed for Pain (severe pain only).    isosorbide mononitrate (IMDUR) 30 MG 24 hr tablet Take 1 tablet (30 mg total) by mouth once daily.    LIDOcaine (LIDODERM) 5 % Place 1 patch onto the skin once daily. Remove & Discard patch within 12 hours or as directed by MD    losartan (COZAAR) 25 MG tablet Take 1 tablet (25 mg total) by mouth once daily.    metoprolol succinate (TOPROL-XL) 25 MG 24 hr tablet Take 0.5 tablets (12.5 mg total) by mouth 2 (two) times daily.    MOUNJARO 15 mg/0.5 mL PnIj Inject 15 mg into the skin every 7 days. (Patient not taking: Reported on 1/29/2025)    OLANZapine (ZYPREXA) 5 MG tablet Take 5 mg by mouth every evening.    sotaloL (BETAPACE) 120 MG Tab Take 80 mg by mouth every 12 (twelve) hours. Take 1/2 tablet BID    spironolactone (ALDACTONE) 25 MG tablet Take 1 tablet (25 mg total) by mouth once daily.    traZODone (DESYREL) 50 MG tablet Take 25-50 mg by mouth nightly as needed.     Review of Systems   Respiratory:  Negative for shortness of breath.    Cardiovascular:  Negative for chest pain and palpitations.   All other systems reviewed and are negative.    Objective:     Vital Signs (Most Recent):  Temp: 98.2 °F (36.8 °C) (02/13/25 1212)  Pulse: 75 (02/13/25 1212)  Resp: 19  (02/13/25 0722)  BP: (!) 156/76 (02/13/25 1212)  SpO2: (!) 92 % (02/13/25 1212) Vital Signs (24h Range):  Temp:  [97.4 °F (36.3 °C)-98.2 °F (36.8 °C)] 98.2 °F (36.8 °C)  Pulse:  [75-85] 75  Resp:  [19] 19  SpO2:  [92 %-95 %] 92 %  BP: (116-182)/(60-89) 156/76   Weight: 91.3 kg (201 lb 3.2 oz)  Body mass index is 32.97 kg/m².  SpO2: (!) 92 %       Intake/Output Summary (Last 24 hours) at 2/13/2025 1426  Last data filed at 2/13/2025 0558  Gross per 24 hour   Intake 420 ml   Output 1100 ml   Net -680 ml       Lines/Drains/Airways       Peripheral Intravenous Line  Duration                  Peripheral IV - Single Lumen 02/08/25 2115 20 G Distal;Left;Posterior Forearm 4 days                  Significant Labs:   Chemistries:   Recent Labs   Lab 02/09/25  0255 02/10/25  0252 02/11/25  0415 02/12/25  0250 02/13/25  0233    138 139 134* 138   K 5.0 4.5 4.4 4.0 5.7*    101 105 99 104   CO2 29 33* 29 29 27   BUN 19.8 17.2 14.9 15.8 22.1*   CREATININE 0.83 0.79 0.83 0.79 1.09*   CALCIUM 8.7 8.9 8.9 8.8 9.2   BILITOT 0.4 0.4  --  0.3 0.3   ALKPHOS 122 125  --  145 124   ALT 14 15  --  22 25   AST 20 21  --  27 28   GLUCOSE 120* 124* 121* 121* 249*   MG 2.30 2.30  --  2.10 2.30        CBC/Anemia Labs: Coags:    Recent Labs   Lab 02/11/25 0415 02/12/25  0250 02/13/25  0233   WBC 6.75 7.58 9.82   HGB 10.8* 10.9* 10.7*   HCT 35.8* 35.9* 35.7*    278 267   MCV 90.9 90.7 91.5   RDW 17.1* 17.1* 17.3*    Recent Labs   Lab 02/11/25  0415 02/12/25  0940 02/12/25  1651 02/13/25  0011 02/13/25  0233   INR 1.1 1.0  --  1.1  --    APTT 53.4*  --  27.5  --  93.8*        Significant Imaging:  Imaging Results              MRI Brain W WO Contrast (Final result)  Result time 02/05/25 09:56:17      Final result by Norma Leblanc MD (02/05/25 09:56:17)                   Impression:      1. Symmetric restricted diffusion and edema in the bilateral posterior occipital lobes, with small foci in the left parietal lobe and left  cerebellum, favored to represent changes of posterior reversible encephalopathy syndrome with ischemia in the differential.  Follow-up can be obtained to ensure resolution.  2. Small areas of associated hemorrhage in the bilateral occipital lobes.  Finding of intracranial hemorrhage given to Jina HOLT at the time of dictation.      Electronically signed by: Norma Leblanc  Date:    02/05/2025  Time:    09:56               Narrative:    EXAMINATION:  MRI BRAIN W WO CONTRAST    CLINICAL HISTORY:  Mental status change, unknown cause;vision changes x1 week, abnormal CT;    TECHNIQUE:  Multiplanar, multisequence MR images of the brain were obtained with and without administration of intravenous contrast.    COMPARISON:  CT head dated 02/04/2025    FINDINGS:  There is symmetric restricted diffusion and edema in the bilateral posterior occipital lobes, with few small foci in the left parietal lobe and left cerebellum.  There are small areas of associated hemorrhage with likely areas of curvilinear enhancement.  Mild patchy T2/FLAIR hyperintense in the subcortical and periventricular white matter likely represent chronic microvascular ischemic changes.    There is local mass effect.  There is no midline shift or herniation.  The basal cisterns are patent.  There is mild diffuse parenchymal volume loss.  There is no hydrocephalus or abnormal extra-axial fluid collection.                                       CTA Head and Neck (xpd) (Final result)  Result time 02/05/25 08:32:51      Final result by Norma Leblanc MD (02/05/25 08:32:51)                   Impression:      No large vessel occlusion or flow-limiting stenosis.    No significant change from the [facility] interpretation.      Electronically signed by: Norma Leblanc  Date:    02/05/2025  Time:    08:32               Narrative:    EXAMINATION:  CTA HEAD AND NECK (XPD)    CLINICAL HISTORY:  Stroke/TIA, determine embolic source;    TECHNIQUE:  Axial images  obtained through the cervical region and [place] before and after the administration of intravenous contrast.    Coronal, sagittal, MIP and 3D reconstructions were obtained from the axial data.    Automatic exposure control was utilized to limit radiation dose.    Radiation Dose:    Total DLP: 2870 mGy*cm    COMPARISON:  CT head dated 02/04/2025    FINDINGS:  Head CT with contrast:    No interval changes when compared to the previous CT.    No significant enhancing abnormalities.    If present, stenosis of the carotid bulbs is measured based on NASCET criteria,    i.e. area of maximal stenosis compared to the cervical ICA distal to the bulb.    Cervical CTA:    The origins of the great vessels are patent with moderate calcifications    The common carotid arteries are patent.  There are mild calcifications at the carotid bulbs without hemodynamically significant stenosis.  The internal carotid arteries are patent.    The vertebral arteries are patent    Intracranial CTA:    There are calcifications along the course the carotid siphons with mild narrowing bilaterally.  The middle cerebral arteries and anterior cerebral arteries are patent    The vertebral arteries, basilar artery and posterior cerebral arteries are patent.    The dural venous sinuses are patent.                        Preliminary result by Albert Cruz Jr., MD (02/05/25 04:10:05)                   Impression:    1. Moderate atheromatous calcification of the cavernous clinoid and supraclinoid segments of the bilateral internal carotid arteries is seen with mild to moderate left greater than right stenosis (Series 26 Images 200-216).  2. The previously noted hypodensities in the bilateral occipital lobes previously ascribed to posterior reversible encephalopathy are again seen stable from prior without associated abnormal intracranial enhancement on post-contrast images.  3. Details and other findings as described above.                Narrative:    START OF REPORT:  Technique: CT angiogram of the intracranial and neck vessels was performed with intravenous contrast with direct axial as well as sagittal and coronal reformations.    Comparison: Comparison is with study [id number] 18:22:03.    Clinical history: Blurry vision.    Findings:  Intracranial Vascular structures:  Internal carotid arteries: Moderate atheromatous calcification of the cavernous clinoid and supraclinoid segments of the bilateral internal carotid arteries is seen with mild to moderate left greater than right stenosis (Series 26 Images 200-216).  Middle cerebral arteries: Unremarkable.  Anterior cerebral arteries: Unremarkable.  Vertebral arteries: Mild atheromatous change of the bilateral vertebral arteries is seen with no significant stenosis.  Basilar artery: Unremarkable.  Posterior cerebral arteries: Unremarkable.  Posterior communicating arteries: Unremarkable.  Neck Vascular structures: Mild atheromatous calcification with no significant stenosis is seen at the origin of the brachiocephalic left subclavian left common carotid arteries.  Carotids:  Common carotid arteries: The left common carotid artery appears unremarkable.  Right common carotid artery: Mild atheromatous calcification of the mid distal segment of the right common carotid artery is seen with no significant stenosis.  Internal carotid artery: Mild atheromatous calcification with mild stenosis at the origin of the bilateral internal carotid arteries is seen.  Vertebral arteries: Unremarkable.  Jugular Veins and venous sinuses: Unremarkable.  Hemorrhage: No acute intracranial hemorrhage is seen.  CSF spaces: The ventricles, sulci and basal cisterns all appear moderately prominent consistent with global cerebral atrophy stable from prior.  Brain parenchyma: No acute infarct is identified. Mild stable appearing scattered microvascular change is seen in portions of the periventricular and deep white matter  tracts. The previously noted hypodensities in the bilateral occipital lobes previously ascribed to posterior reversible encephalopathy are again seen stable from prior without associated abnormal intracranial enhancement on post-contrast images.  Cerebellum: Unremarkable.  Vascular: Unremarkable venous sinuses.  Sella and skull base: The sella appears to be within normal limits.  Cerebellopontine angles: Within normal limits.  Herniation: None.  Intracranial calcifications: Incidental note is made of bilateral choroid plexus calcification. Incidental note is made of some pineal region calcification. Incidental note is made of some calcification of the falx.                                         X-Ray Chest AP Portable (Final result)  Result time 02/05/25 09:08:25      Final result by Bandar Vera MD (02/05/25 09:08:25)                   Impression:      No acute chest disease is identified.      Electronically signed by: Bandar Vera  Date:    02/05/2025  Time:    09:08               Narrative:    EXAMINATION:  XR CHEST AP PORTABLE    CLINICAL HISTORY:  , Unspecified fall, initial encounter.    COMPARISON:  January 25, 2025    FINDINGS:  No alveolar consolidation, effusion, or pneumothorax is seen.   The thoracic aorta is normal  cardiac silhouette, central pulmonary vessels and mediastinum are normal in size and are grossly unremarkable.   visualized osseous structures are grossly unremarkable.                                       CT Cervical Spine Without Contrast (Final result)  Result time 02/04/25 21:25:00      Final result by Juanito Chopra MD (02/04/25 21:25:00)                   Impression:      No acute fracture or malalignment identified.      Electronically signed by: Jaunito Chopra  Date:    02/04/2025  Time:    21:25               Narrative:    EXAMINATION:  CT CERVICAL SPINE WITHOUT CONTRAST    CLINICAL HISTORY:  Trauma.    TECHNIQUE:  Multidetector axial images were performed of the  cervical spine without and.  Images were reconstructed.    Automated exposure control was utilized to minimize radiation dose.  .    COMPARISON:  None available.    FINDINGS:  Cervical vertebrae stature is maintained.  There is grade 1 anterolisthesis of C4 on C5 and C7 on T1.  Solid ACDF at C5, C6 and C7.  No acute fracture or malalignment identified.  There is moderate degenerative narrowing of the left neural foramen at C4-C5.  There is no prevertebral soft tissue prominence.    This study does not exclude the possibility of intrathecal soft tissue, ligamentous or vascular injury.                                       CT Head Without Contrast (Final result)  Result time 02/04/25 18:42:34      Final result by Norma Leblanc MD (02/04/25 18:42:34)                   Impression:      Cortical based hypodensities in the bilateral occipital lobes, may represent changes of posterior reversible encephalopathy syndrome.  MRI brain is available for further evaluation.      Electronically signed by: Norma Leblanc  Date:    02/04/2025  Time:    18:42               Narrative:    EXAMINATION:  CT HEAD WITHOUT CONTRAST    CLINICAL HISTORY:  Head trauma, minor (Age >= 65y);    TECHNIQUE:  Axial scans were obtained from skull base to the vertex.    Coronal and sagittal reconstructions obtained from the axial data.    Automatic exposure control was utilized to limit radiation dose.    Contrast: None    Radiation Dose:    Total DLP: 934 mGy*cm    COMPARISON:  CT head dated 01/09/2025    FINDINGS:  There is no acute intracranial hemorrhage.  There are cortical based hypodensities in the bilateral occipital lobes.  Patchy hypodensities in the subcortical and periventricular white matter likely represent chronic microvascular ischemic changes.    There is local mass effect.  There is no midline shift or herniation.  The basal cisterns are patent.  There is diffuse parenchymal volume loss.  There is no hydrocephalus or  abnormal extra-axial fluid collection.  Carotid and vertebral artery calcifications are noted.  The calvarium and skull base are intact.                                    EKG:       Telemetry: SR    Physical Exam  HENT:      Head: Normocephalic.      Nose: Nose normal.      Mouth/Throat:      Mouth: Mucous membranes are dry.   Eyes:      Extraocular Movements: Extraocular movements intact.   Cardiovascular:      Rate and Rhythm: Normal rate and regular rhythm.      Pulses: Normal pulses.      Comments: + Mechanical Click  Pulmonary:      Effort: Pulmonary effort is normal.      Breath sounds: Normal breath sounds.      Comments: RA  Abdominal:      Palpations: Abdomen is soft.   Skin:     General: Skin is warm and dry.   Neurological:      Mental Status: She is alert and oriented to person, place, and time.   Psychiatric:         Behavior: Behavior normal.       Home Medications:   Current Facility-Administered Medications on File Prior to Encounter   Medication Dose Route Frequency Provider Last Rate Last Admin    0.9%  NaCl infusion   Intravenous Once Kahlil Martin MD        diphenhydrAMINE capsule 50 mg  50 mg Oral On Call Procedure Kahlil Martin MD   50 mg at 05/18/23 0911     Current Outpatient Medications on File Prior to Encounter   Medication Sig Dispense Refill    warfarin (COUMADIN) 5 MG tablet Take 5 mg by mouth Daily.      albuterol-ipratropium (DUO-NEB) 2.5 mg-0.5 mg/3 mL nebulizer solution Take 3 mLs by nebulization every 4 (four) hours as needed for Wheezing or Shortness of Breath.      aspirin (ECOTRIN) 81 MG EC tablet Take 81 mg by mouth once daily.      atorvastatin (LIPITOR) 40 MG tablet Take 40 mg by mouth once daily.      clonazePAM (KLONOPIN) 1 MG tablet Take 0.5 mg by mouth 3 (three) times daily as needed for Anxiety.      dapagliflozin propanediol (FARXIGA) 10 mg tablet Take 1 tablet (10 mg total) by mouth once daily. 30 tablet 5    FLUoxetine 40 MG capsule Take 40 mg by mouth  once daily.      furosemide (LASIX) 40 MG tablet Take 1 tablet (40 mg total) by mouth 2 (two) times daily. 60 tablet 11    gabapentin (NEURONTIN) 100 MG capsule Take 100 mg by mouth 3 (three) times daily.      HYDROcodone-acetaminophen (NORCO) 7.5-325 mg per tablet Take 1 tablet by mouth every 6 (six) hours as needed for Pain (severe pain only). 12 tablet 0    isosorbide mononitrate (IMDUR) 30 MG 24 hr tablet Take 1 tablet (30 mg total) by mouth once daily. 60 tablet 0    LIDOcaine (LIDODERM) 5 % Place 1 patch onto the skin once daily. Remove & Discard patch within 12 hours or as directed by MD 15 patch 0    losartan (COZAAR) 25 MG tablet Take 1 tablet (25 mg total) by mouth once daily. 90 tablet 3    metoprolol succinate (TOPROL-XL) 25 MG 24 hr tablet Take 0.5 tablets (12.5 mg total) by mouth 2 (two) times daily. 90 tablet 3    MOUNJARO 15 mg/0.5 mL PnIj Inject 15 mg into the skin every 7 days. (Patient not taking: Reported on 1/29/2025)      OLANZapine (ZYPREXA) 5 MG tablet Take 5 mg by mouth every evening.      sotaloL (BETAPACE) 120 MG Tab Take 80 mg by mouth every 12 (twelve) hours. Take 1/2 tablet BID      spironolactone (ALDACTONE) 25 MG tablet Take 1 tablet (25 mg total) by mouth once daily. 30 tablet 11    traZODone (DESYREL) 50 MG tablet Take 25-50 mg by mouth nightly as needed.       Current Schedule Inpatient Medications:   artificial tears  1 drop Right Eye QID    atorvastatin  40 mg Oral Daily    dapagliflozin propanediol  10 mg Oral Daily    FLUoxetine  40 mg Oral Daily    gabapentin  100 mg Oral TID    isosorbide mononitrate  30 mg Oral Daily    LIDOcaine  1 patch Transdermal Q24H    OLANZapine  5 mg Oral QHS    warfarin  5 mg Oral Daily      heparin (porcine) in D5W  0-40 Units/kg/hr Intravenous Continuous 11 mL/hr at 02/13/25 1107 12 Units/kg/hr at 02/13/25 1107     Assessment:   Acute CVA  Hx of Mechanical Aortic Valve    - PT/INR Goal Range: 2.5-3.0  PAF - Now SR    - CHADsVASc - 6 Points - 9.7%  Stroke Risk per Year     - Warfarin as OP  Supratherapeutic INR (Resolved)     - INR 1.1 (2.13.25)    - INR 1.0 (2.12.25)    - INR on Admission (2.4.25) - 8.8  Mechanical Fall at Ground Level tripping over [place]  Closed Fracture of Lumber Vertebra    - s/p Kyphoplasty (2.12.25)   DM II  HTN  HLD  COPD  Tobacco Use  No Hx of GIB     Plan:   Daily PT/INR  Warfarin 5 mg resumed today for INR Goal 2.5-3.0 in the Setting of AF/Stroke Risk Reduction and Mechanical AVR  Continue to bridge with heparin. Can transition to FD lovenox for bridging if needed.   No further recs from cardiac standpoint.   F/u with Dr. Leon at discharge.     Will be available as needed.     Barb Wright, DULCEP  Cardiology  [facility]

## 2025-02-13 NOTE — PT/OT/SLP RE-EVAL
"Physical Therapy Re-Evaluation    Patient Name:  Kayla Ruiz   MRN:  [id number]    Recommendations:     Discharge therapy intensity: Moderate Intensity Therapy   Discharge Equipment Recommendations: walker, rolling   Barriers to discharge: Impaired mobility and Ongoing medical needs    Assessment:     Kayla Ruiz is a 69 y.o. female admitted with a medical diagnosis of Bilateral posterior occipital lobe ischemic CVA, Supratherapeutic INR, Unintentional Tylenol overdose, VHD s/p mechanical AVR, on Coumadin , PAF , Diabetes mellitus type II, Essential HTN , h/o COPD and Tobacco abuse. Pt was admitted to this hospital last month due to a fall sustaining R patellar fx (non-op), L1 compression fx. Pt  instructed to wear TLSO and a HKB in extension, cleared to WB with HKB (on last admission). Pt now s/p kyphoplasty on 2/12 (no longer has to wear TSLO per nsg). She presents with the following impairments/functional limitations: weakness, impaired endurance, gait instability, decreased lower extremity function, impaired functional mobility, impaired self care skills, decreased coordination, visual deficits, impaired balance, pain, orthopedic precautions.    Pt with no complaints of back pain upon re-eval; (c/o "stiffness"). Today she req CGA for supine to sit, min A for transfers, and Min A for ambulation. Pt amb 125ft total using RW and min A demo bouts of scissoring and mild unsteadiness at times. Pt with 1 posterior LOB req Min A for recovery. Pt continues to be appropriate for moderate intensity therapy upon discharge.        Rehab Prognosis: Good; patient would benefit from acute skilled PT services to address these deficits and reach maximum level of function.    Recent Surgery: * No surgery found *      Plan:     During this hospitalization, patient would benefit from acute PT services 6 x/week to address the identified rehab impairments via gait training, therapeutic activities, therapeutic exercises, " "neuromuscular re-education and progress toward the following goals:    Plan of Care Expires:  25    PT/PTA conference to discuss PT POC and patient's progression towards goals held with Joleen Au PTA.     Subjective     Chief Complaint: "stiffness in back"  Patient/Family Comments/goals: go home  Pain/Comfort:  Pain Rating 1: 0/10    Patients cultural, spiritual, Oriental orthodox conflicts given the current situation: no    Objective:     Communicated with nsg prior to session.  Patient found HOB elevated with pulse ox (continuous), telemetry, peripheral IV  upon PT entry to room.    General Precautions: Standard, fall  Orthopedic Precautions:spinal precautions   Braces: Hinged knee brace  Respiratory Status: Room air  Blood Pressure: NT        Functional Mobility:  Bed Mobility:     Supine to Sit: contact guard assistance  Transfers:     Sit to Stand:  minimum assistance with rolling walker  Gait: Pt amb 20ft+50ft+50ft Min A using RW demo intermittent bout of ataxia/scissoring and unsteadiness. Pt with 1 bout of posterior LOB req Min A for recovery. Hinged knee brace worn during ambulation locked in extension (brace had to be adjusted due to sliding down).      AM-PAC 6 CLICK MOBILITY  Total Score:18       Treatment & Education:      Patient provided with verbal education education regarding PT role/goals/POC, fall prevention, safety awareness, and discharge/DME recommendations.  Understanding was verbalized, however additional teaching warranted.     Patient left up in chair with all lines intact, call button in reach, and nsg notified.    GOALS:   Multidisciplinary Problems       Physical Therapy Goals          Problem: Physical Therapy    Goal Priority Disciplines Outcome Interventions   Physical Therapy Goal     PT, PT/OT Progressing    Description: Goals to be met by: 3/6/2025     Patient will increase functional independence with mobility by performin. Supine to sit with Modified [place]  2. " Sit to supine with Modified [place]  3. Rolling to Left and Right with Modified [place].   4. Sit to stand transfer with Stand-by Assistance  5. Bed to chair transfer with Stand-by Assistance using Rolling Walker  6. Gait  x 50 feet with Stand-by Assistance using Rolling Walker.                          History:     Past Medical History:   Diagnosis Date    A-fib     Anticoagulant long-term use     Depression     Diabetes mellitus     Hyperlipidemia     Sleep apnea        Past Surgical History:   Procedure Laterality Date    AORTIC VALVE REPLACEMENT      CHOLECYSTECTOMY      COLON RESECTION      fusion      LEFT HEART CATHETERIZATION Left 05/18/2023    Procedure: Left heart cath;  Surgeon: Kahlil Martin MD;  Location: [facility] CATH LAB;  Service: Cardiology;  Laterality: Left;  LHC +/- PCI    TONSILLECTOMY         Time Tracking:     PT Received On: 02/13/25  PT Start Time: 1410     PT Stop Time: 1445  PT Total Time (min): 35 min     Billable Minutes: Re-eval 15 and Gait Training 15      02/13/2025

## 2025-02-13 NOTE — PLAN OF CARE
Problem: Occupational Therapy  Goal: Occupational Therapy Goal  Description: Goals to be met by: 3/13/2025     Patient will increase functional independence with ADLs by performing:    UE Dressing with Stand-by Assistance.  LE Dressing with Stand-by Assistance.  Grooming while standing at sink with Stand-by Assistance.  Toileting from toilet with Stand-by Assistance for hygiene and clothing management.   Toilet transfer to toilet with Stand-by Assistance.    Outcome: Progressing

## 2025-02-14 LAB
ALBUMIN SERPL-MCNC: 2.9 G/DL (ref 3.4–4.8)
ALBUMIN/GLOB SERPL: 1 RATIO (ref 1.1–2)
ALP SERPL-CCNC: 126 UNIT/L (ref 40–150)
ALT SERPL-CCNC: 21 UNIT/L (ref 0–55)
ANION GAP SERPL CALC-SCNC: 0 MEQ/L
APTT PPP: 69 SECONDS (ref 23.2–33.7)
AST SERPL-CCNC: 21 UNIT/L (ref 5–34)
BASOPHILS # BLD AUTO: 0.02 X10(3)/MCL
BASOPHILS NFR BLD AUTO: 0.2 %
BILIRUB SERPL-MCNC: 0.2 MG/DL
BUN SERPL-MCNC: 28.7 MG/DL (ref 9.8–20.1)
CALCIUM SERPL-MCNC: 9 MG/DL (ref 8.4–10.2)
CHLORIDE SERPL-SCNC: 102 MMOL/L (ref 98–107)
CO2 SERPL-SCNC: 35 MMOL/L (ref 23–31)
CREAT SERPL-MCNC: 0.91 MG/DL (ref 0.55–1.02)
CREAT/UREA NIT SERPL: 32
EOSINOPHIL # BLD AUTO: 0 X10(3)/MCL (ref 0–0.9)
EOSINOPHIL NFR BLD AUTO: 0 %
ERYTHROCYTE [DISTWIDTH] IN BLOOD BY AUTOMATED COUNT: 17.7 % (ref 11.5–17)
GFR SERPLBLD CREATININE-BSD FMLA CKD-EPI: >60 ML/MIN/1.73/M2
GLOBULIN SER-MCNC: 2.8 GM/DL (ref 2.4–3.5)
GLUCOSE SERPL-MCNC: 177 MG/DL (ref 82–115)
HCT VFR BLD AUTO: 31.4 % (ref 37–47)
HGB BLD-MCNC: 9.5 G/DL (ref 12–16)
IMM GRANULOCYTES # BLD AUTO: 0.07 X10(3)/MCL (ref 0–0.04)
IMM GRANULOCYTES NFR BLD AUTO: 0.5 %
LYMPHOCYTES # BLD AUTO: 1.1 X10(3)/MCL (ref 0.6–4.6)
LYMPHOCYTES NFR BLD AUTO: 8.3 %
MAGNESIUM SERPL-MCNC: 2.4 MG/DL (ref 1.6–2.6)
MCH RBC QN AUTO: 27.5 PG (ref 27–31)
MCHC RBC AUTO-ENTMCNC: 30.3 G/DL (ref 33–36)
MCV RBC AUTO: 90.8 FL (ref 80–94)
MONOCYTES # BLD AUTO: 0.62 X10(3)/MCL (ref 0.1–1.3)
MONOCYTES NFR BLD AUTO: 4.7 %
NEUTROPHILS # BLD AUTO: 11.37 X10(3)/MCL (ref 2.1–9.2)
NEUTROPHILS NFR BLD AUTO: 86.3 %
NRBC BLD AUTO-RTO: 0 %
PLATELET # BLD AUTO: 290 X10(3)/MCL (ref 130–400)
PMV BLD AUTO: 9.7 FL (ref 7.4–10.4)
POTASSIUM SERPL-SCNC: 5.2 MMOL/L (ref 3.5–5.1)
PROT SERPL-MCNC: 5.7 GM/DL (ref 5.8–7.6)
PSYCHE PATHOLOGY RESULT: NORMAL
RBC # BLD AUTO: 3.46 X10(6)/MCL (ref 4.2–5.4)
SODIUM SERPL-SCNC: 137 MMOL/L (ref 136–145)
WBC # BLD AUTO: 13.18 X10(3)/MCL (ref 4.5–11.5)

## 2025-02-14 PROCEDURE — 63600175 PHARM REV CODE 636 W HCPCS: Performed by: INTERNAL MEDICINE

## 2025-02-14 PROCEDURE — 25000003 PHARM REV CODE 250: Performed by: INTERNAL MEDICINE

## 2025-02-14 PROCEDURE — 85025 COMPLETE CBC W/AUTO DIFF WBC: CPT | Performed by: INTERNAL MEDICINE

## 2025-02-14 PROCEDURE — 80053 COMPREHEN METABOLIC PANEL: CPT | Performed by: INTERNAL MEDICINE

## 2025-02-14 PROCEDURE — 36415 COLL VENOUS BLD VENIPUNCTURE: CPT | Performed by: INTERNAL MEDICINE

## 2025-02-14 PROCEDURE — 25000003 PHARM REV CODE 250: Performed by: ANESTHESIOLOGY

## 2025-02-14 PROCEDURE — 97535 SELF CARE MNGMENT TRAINING: CPT

## 2025-02-14 PROCEDURE — 85730 THROMBOPLASTIN TIME PARTIAL: CPT | Performed by: INTERNAL MEDICINE

## 2025-02-14 PROCEDURE — 97116 GAIT TRAINING THERAPY: CPT

## 2025-02-14 PROCEDURE — 94760 N-INVAS EAR/PLS OXIMETRY 1: CPT

## 2025-02-14 PROCEDURE — 21400001 HC TELEMETRY ROOM

## 2025-02-14 PROCEDURE — 27000221 HC OXYGEN, UP TO 24 HOURS

## 2025-02-14 PROCEDURE — 97530 THERAPEUTIC ACTIVITIES: CPT

## 2025-02-14 PROCEDURE — 83735 ASSAY OF MAGNESIUM: CPT | Performed by: INTERNAL MEDICINE

## 2025-02-14 RX ADMIN — ATORVASTATIN CALCIUM 40 MG: 40 TABLET, FILM COATED ORAL at 08:02

## 2025-02-14 RX ADMIN — GABAPENTIN 100 MG: 100 CAPSULE ORAL at 02:02

## 2025-02-14 RX ADMIN — DAPAGLIFLOZIN 10 MG: 10 TABLET, FILM COATED ORAL at 08:02

## 2025-02-14 RX ADMIN — HYPROMELLOSE 2910 1 DROP: 5 SOLUTION/ DROPS OPHTHALMIC at 05:02

## 2025-02-14 RX ADMIN — HYPROMELLOSE 2910 1 DROP: 5 SOLUTION/ DROPS OPHTHALMIC at 08:02

## 2025-02-14 RX ADMIN — GABAPENTIN 100 MG: 100 CAPSULE ORAL at 08:02

## 2025-02-14 RX ADMIN — OLANZAPINE 5 MG: 5 TABLET, FILM COATED ORAL at 08:02

## 2025-02-14 RX ADMIN — LIDOCAINE 5% 1 PATCH: 700 PATCH TOPICAL at 12:02

## 2025-02-14 RX ADMIN — WARFARIN SODIUM 5 MG: 5 TABLET ORAL at 05:02

## 2025-02-14 RX ADMIN — HYPROMELLOSE 2910 1 DROP: 5 SOLUTION/ DROPS OPHTHALMIC at 12:02

## 2025-02-14 RX ADMIN — OXYCODONE HYDROCHLORIDE 5 MG: 5 TABLET ORAL at 03:02

## 2025-02-14 RX ADMIN — HEPARIN SODIUM 12 UNITS/KG/HR: 10000 INJECTION, SOLUTION INTRAVENOUS at 09:02

## 2025-02-14 RX ADMIN — OXYCODONE HYDROCHLORIDE 5 MG: 5 TABLET ORAL at 12:02

## 2025-02-14 RX ADMIN — FLUOXETINE HYDROCHLORIDE 40 MG: 20 CAPSULE ORAL at 08:02

## 2025-02-14 RX ADMIN — ISOSORBIDE MONONITRATE 30 MG: 30 TABLET, EXTENDED RELEASE ORAL at 08:02

## 2025-02-14 NOTE — CARE UPDATE
[id number] Spoke with pt's nurse who will ask hospitalist to see if the heparin drip can bridge to lovenox. Cardiology was OK with this.  Trying to get pt to TCU    0958 Dr Wilburn responded reporting she will change pt to lovenox once accepted to a facility. Will reach out to Kassi with TCU    1000 Left a voice message for Kassi and informed her that Dr Wilburn will bridge pt from heparin drip to lovenox once pt is accepted to a facility

## 2025-02-14 NOTE — PROGRESS NOTES
Inpatient Nutrition Assessment    Admit Date: 2/4/2025   Total duration of encounter: 10 days   Patient Age: 69 y.o.    Nutrition Recommendation/Prescription     Add diabetic diet restriction to heart healthy diet.   Medical management of hyperkalemia.     Communication of Recommendations: reviewed with patient    Nutrition Assessment     Malnutrition Assessment/Nutrition-Focused Physical Exam    Malnutrition Context: acute illness or injury (02/11/25 1254)  Malnutrition Level: moderate (02/11/25 1254)  Energy Intake (Malnutrition): less than 75% for greater than or equal to 1 month (02/11/25 1254)  Weight Loss (Malnutrition): greater than 5% in 1 month (02/11/25 1254)                 [place] (Muscle Loss): well nourished                       Fluid Accumulation (Malnutrition): other (see comments) (Not present) (02/11/25 1254)     Hand  Strength, Right (Malnutrition): Unable to assess (02/11/25 1254)  A minimum of two characteristics is recommended for diagnosis of either severe or non-severe malnutrition.    Chart Review    Reason Seen: length of stay and follow-up    Malnutrition Screening Tool Results   Have you recently lost weight without trying?: No  Have you been eating poorly because of a decreased appetite?: No   MST Score: 0   Diagnosis:  Bilateral posterior occipital lobe ischemic CVA  Supratherapeutic INR  Unintentional Tylenol overdose  L1 compression fracture with progressive loss of height  VHD s/p mechanical AVR, on Coumadin   PAF   Diabetes mellitus type II   Essential HTN   h/o COPD   Tobacco abuse    Relevant Medical History:   Past Medical History:   Diagnosis Date    A-fib     Anticoagulant long-term use     Depression     Diabetes mellitus     Hyperlipidemia     Sleep apnea       Scheduled Medications:  artificial tears, 1 drop, QID  atorvastatin, 40 mg, Daily  dapagliflozin propanediol, 10 mg, Daily  FLUoxetine, 40 mg, Daily  gabapentin, 100 mg, TID  isosorbide mononitrate, 30 mg,  Daily  LIDOcaine, 1 patch, Q24H  OLANZapine, 5 mg, QHS  sodium zirconium cyclosilicate, 10 g, Once  warfarin, 5 mg, Daily    Continuous Infusions:  heparin (porcine) in D5W, Last Rate: 12 Units/kg/hr (02/13/25 0203)    PRN Medications:  acetaminophen, 1,000 mg, Q6H PRN  albuterol-ipratropium, 3 mL, Q4H PRN  aluminum-magnesium hydroxide-simethicone, 30 mL, Q6H PRN  bisacodyL, 10 mg, Daily PRN  clonazePAM, 0.5 mg, TID PRN  dextrose 50%, 12.5 g, PRN  diphenhydrAMINE, 25 mg, Q6H PRN  glucagon (human recombinant), 1 mg, PRN  haloperidol lactate, 0.5 mg, Q10 Min PRN  heparin (PORCINE), 60 Units/kg, PRN  heparin (PORCINE), 30 Units/kg, PRN  hydrALAZINE, 10 mg, Q4H PRN  HYDROmorphone, 0.2 mg, Q5 Min PRN  labetalol, 10 mg, Q4H PRN  LORazepam, 1 mg, On Call Procedure  ondansetron, 4 mg, Daily PRN  oxyCODONE, 5 mg, Q3H PRN  sodium chloride 0.9%, 10 mL, PRN  traMADoL, 50 mg, Q6H PRN  traZODone, 50 mg, Nightly PRN    Calorie Containing IV Medications: no significant kcals from medications at this time    Recent Labs   Lab 02/08/25  0357 02/08/25  1318 02/09/25  0255 02/10/25  0252 02/11/25  0415 02/12/25  0250 02/13/25  0233 02/14/25  0348     --  138 138 139 134* 138 137   K 5.0  --  5.0 4.5 4.4 4.0 5.7* 5.2*   CALCIUM 8.9  --  8.7 8.9 8.9 8.8 9.2 9.0   MG 2.40  --  2.30 2.30  --  2.10 2.30 2.40     --  103 101 105 99 104 102   CO2 28  --  29 33* 29 29 27 35*   BUN 27.5*  --  19.8 17.2 14.9 15.8 22.1* 28.7*   CREATININE 0.85  --  0.83 0.79 0.83 0.79 1.09* 0.91   EGFRNORACEVR >60  --  >60 >60 >60 >60 54 >60   GLUCOSE 139*  --  120* 124* 121* 121* 249* 177*   BILITOT 0.3  --  0.4 0.4  --  0.3 0.3 0.2   ALKPHOS 139  --  122 125  --  145 124 126   ALT 15  --  14 15  --  22 25 21   AST 21  --  20 21  --  27 28 21   ALBUMIN 3.0*  --  3.0* 3.2*  --  3.1* 3.0* 2.9*   WBC 6.69 7.91 7.13 6.70 6.75 7.58 9.82 13.18*   HGB 11.3* 11.4* 10.7* 11.1* 10.8* 10.9* 10.7* 9.5*   HCT 38.0 37.3 36.3* 36.8* 35.8* 35.9* 35.7* 31.4*  "    Nutrition Orders:  Diet Heart Healthy Diabetic; 2000 Calories (up to 75 gm per meal); Standard Tray      Appetite/Oral Intake: good/% of meals  Factors Affecting Nutritional Intake: none identified  Social Needs Impacting Access to Food: none identified  Food/Quaker/Cultural Preferences: none reported  Food Allergies: none reported  Last Bowel Movement: 25  Wound(s):      Comments    25 Pt and family at bedside reports good appetite lately. Appetite and oral has fluctuated over the last 2 months with ~10lbs unintentional wt loss. UBW 210lbs, indicating significant wt loss. Politely decline oral supplement. Feels her appetite is good at this time. Denies any GI complaints.     25 Good appetite and intake of meals. Hyperglycemia noted, added diabetic diet restriction, pt verbalized understanding. No GI complaints.     Anthropometrics    Height: 5' 5.5" (166.4 cm), Height Method: Stated  Last Weight: 91.3 kg (201 lb 3.2 oz) (25 0509),    BMI (Calculated): 33  BMI Classification: obese grade I (BMI 30-34.9)     Ideal Body Weight (IBW), Female: 127.5 lb     % Ideal Body Weight, Female (lb): 157.8 %                    Usual Body Weight (UBW), k.45 kg  % Usual Body Weight: 95.81     Usual Weight Provided By: patient and family/caregiver    Wt Readings from Last 5 Encounters:   25 91.3 kg (201 lb 3.2 oz)   25 97.5 kg (214 lb 15.2 oz)   25 97.5 kg (215 lb)   25 97.5 kg (215 lb)   24 97.7 kg (215 lb 6.2 oz)     Weight Change(s) Since Admission:   Wt Readings from Last 1 Encounters:   25 0509 91.3 kg (201 lb 3.2 oz)   25 1800 95.3 kg (210 lb)   Admit Weight: 95.3 kg (210 lb) (25 1800),      Estimated Needs    Weight Used For Calorie Calculations: 91.3 kg (201 lb 4.5 oz)  Energy Calorie Requirements (kcal): 1723-1867kcals/d (MSJ x 1.2-1.3 SF)  Energy Need Method: [facility]  Weight Used For Protein Calculations: 91.3 kg (201 lb 4.5 " oz)  Protein Requirements: 109g/d (1.2g/kg)  Fluid Requirements (mL): 2283mL fl/d (25mL/kg)  CHO Requirement: 203 g CHO daily (45% 1800kcals/d)     Enteral Nutrition     Patient not receiving enteral nutrition at this time.    Parenteral Nutrition     Patient not receiving parenteral nutrition support at this time.    Evaluation of Received Nutrient Intake    Calories: not meeting estimated needs  Protein: not meeting estimated needs    Patient Education     Not applicable.    Nutrition Diagnosis     PES: Unintended weight loss related to inability to consume sufficient nutrients as evidenced by <75% EER x1-2 months. (active)     PES: Moderate acute disease or injury related malnutrition   As Evidenced by:  - weight loss: > 5% in 1 month - energy intake: < 75% for 1 months (meets criteria for < 75% for >= 1 month (moderate - chronic)) new    Nutrition Interventions     Intervention(s): Oral diet/nutrient modifications    Goal: Meet greater than 80% of nutritional needs by follow-up. (goal met)    Nutrition Goals & Monitoring     Dietitian will monitor: energy intake, weight change, electrolyte/renal panel, glucose/endocrine profile, and gastrointestinal profile  Discharge planning: continue heart healthy + diabetic  diet  Nutrition Risk/Follow-Up: moderate (follow-up in 3-5 days)   Please consult if re-assessment needed sooner.

## 2025-02-14 NOTE — PT/OT/SLP PROGRESS
Occupational Therapy   Treatment    Name: Kayla Ruiz  MRN: [id number]  Admitting Diagnosis:  Stroke       Recommendations:     Recommended therapy intensity at discharge: Moderate Intensity Therapy   Discharge Equipment Recommendations:  to be determined by next level of care  Barriers to discharge:  Other (Comment) (ongoing medical needs)    Assessment:     Kayla Ruiz is a 69 y.o. female with a medical diagnosis of BL occipital lobe ischemic CVA, R patella fx, and L1 compression fx s/p kyphoplasty 2/12.  She presents with the following performance deficits affecting function: weakness, impaired endurance, impaired self care skills, visual deficits, impaired balance, gait instability, impaired functional mobility, decreased coordination, decreased lower extremity function, decreased safety awareness, orthopedic precautions.     Pt performed well this date. Max A was required to don pants and HKB while pt in bed. Min A provided for bed mobility. Practice and further education provided on LB dressing AE; pt demonstrated understanding. Pt able to stand w/ CGA and RW and ambulate the hallway with CGA and RW without O2 support (sat monitored throughout)  ~150 ft. Breaks were required due to fatigue and short bouts of dizziness that quickly resolved w/ sitting breaks. Sit to stand from chair required min A. HKB kept slipped down the pt's leg, which limited her movement progression. Pt progressing well and should benefit from mod int therapy post d/c.     Rehab Prognosis:  Good; patient would benefit from acute skilled OT services to address these deficits and reach maximum level of function.       Plan:     Patient to be seen 6 x/week to address the above listed problems via self-care/home management, therapeutic activities, therapeutic exercises, neuromuscular re-education  Plan of Care Expires: 03/13/25  Plan of Care Reviewed with: patient, spouse    Subjective     Pain/Comfort:  Pain Rating 1: 7/10  Location 1:  back  Pain Addressed 1: Distraction, Reposition, Cessation of Activity    Objective:     Communicated with: rn prior to session.  Patient found HOB elevated with pulse ox (continuous), telemetry, peripheral IV, PureWick upon OT entry to room.    General Precautions: Standard, fall    Orthopedic Precautions:spinal precautions  Braces: Hinged knee brace  Respiratory Status: Room air     Occupational Performance:     Bed Mobility:    Patient completed Rolling/Turning to Left with  minimum assistance  Patient completed Rolling/Turning to Right with minimum assistance  Patient completed Supine to Sit with minimum assistance     Functional Mobility/Transfers:  Patient completed Sit <> Stand Transfer with stand by assistance  with  rolling walker from bed, from chair required min A   Functional Mobility: CGA w/ RW to ambulate in the hallway ~ 150 ft with incorporated standing rest breaks due to fatigue and one seated rest break to allow dizziness to subside.     Activities of Daily Living:  Lower Body Dressing: maximal assistance for donning of pants and HKB/ min A for use of reacher to doff socks / SBA for donning socks w/ sock aide     Therapeutic Positioning    OT interventions performed during the course of today's session in an effort to prevent and/or reduce acquired pressure injuries:   Education was provided on benefits of and recommendations for therapeutic positioning    Skin assessment: full body skin assessment was performed    Findings:  visible skin intact     [facility] 6 Click ADL: 18    Patient Education:  Patient and spouse were provided with verbal education education regarding OT role/goals/POC, post op precautions, fall prevention, safety awareness, Discharge/DME recommendations, and pressure ulcer prevention.  Understanding was verbalized.      Patient left up in chair with all lines intact, call button in reach, RN notified, and spouse present.    GOALS:   Multidisciplinary Problems       Occupational  Therapy Goals          Problem: Occupational Therapy    Goal Priority Disciplines Outcome Interventions   Occupational Therapy Goal     OT, PT/OT Progressing    Description: Goals to be met by: 3/13/2025     Patient will increase functional independence with ADLs by performing:    UE Dressing with Stand-by Assistance.  LE Dressing with Stand-by Assistance.  Grooming while standing at sink with Stand-by Assistance.  Toileting from toilet with Stand-by Assistance for hygiene and clothing management.   Toilet transfer to toilet with Stand-by Assistance.                         Time Tracking:     OT Date of Treatment: 02/14/25  OT Start Time: 1150  OT Stop Time: 1219  OT Total Time (min): 29 min    Billable Minutes:Self Care/Home Management 29 min     OT/SUAD: OT     Number of SUAD visits since last OT visit: 1 2/14/2025

## 2025-02-14 NOTE — PT/OT/SLP PROGRESS
"Physical Therapy Treatment    Patient Name:  Kayla Ruiz   MRN:  [id number]    Recommendations:     Discharge therapy intensity: Moderate Intensity Therapy   Discharge Equipment Recommendations: walker, rolling  Barriers to discharge: Impaired mobility and Ongoing medical needs    Assessment:     Kayla Ruiz is a 69 y.o. female admitted with a medical diagnosis of Bilateral posterior occipital lobe ischemic CVA, Supratherapeutic INR, Unintentional Tylenol overdose, VHD s/p mechanical AVR, on Coumadin , PAF , Diabetes mellitus type II, Essential HTN , h/o COPD and Tobacco abuse. Pt was admitted to this hospital last month due to a fall sustaining R patellar fx (non-op), L1 compression fx. Pt  instructed to wear TLSO and a HKB in extension, cleared to WB with HKB (on last admission). Pt now s/p kyphoplasty on 2/12 (no longer has to wear TSLO per nsg). She presents with the following impairments/functional limitations: weakness, impaired endurance, gait instability, decreased lower extremity function, impaired functional mobility, impaired self care skills, decreased coordination, visual deficits, impaired balance, pain, orthopedic precautions. Pt amb 220ft Min A-CGA using RW. Pt with improved stability today; no LOB or bouts of scissoring. Will continue to progress as able.       Rehab Prognosis: Good; patient would benefit from acute skilled PT services to address these deficits and reach maximum level of function.    Recent Surgery: * No surgery found *      Plan:     During this hospitalization, patient would benefit from acute PT services 6 x/week to address the identified rehab impairments via gait training, therapeutic activities, therapeutic exercises, neuromuscular re-education and progress toward the following goals:    Plan of Care Expires:  03/13/25    Subjective     Chief Complaint: "some pain behind my L eye"  Patient/Family Comments/goals: get better  Pain/Comfort:   4/10 eye pain (Nsg administered " medication prior to session)      Objective:     Communicated with nsg prior to session.  Patient found up in chair with pulse ox (continuous), telemetry, peripheral IV upon PT entry to room.     General Precautions: Standard, fall  Orthopedic Precautions: spinal precautions, R knee brace locked in extension for ambulation  Braces: Hinged knee brace worn for ambulation.   Respiratory Status: Nasal cannula, flow 2 L/min  Blood Pressure: NT  Skin Integrity: Visible skin intact      Functional Mobility:  Bed Mobility:     Sit to Supine: moderate assistance  Transfers:     Sit to Stand:   contact guard assistance with rolling walker from bed   Min A-Mod A with RW from chair   3 reps total   VC for hand placement   Increased assistance req to stand up from the chair due to inability to flex R knee   Gait: Pt amb 55ftx4 Min A-CGA using RW demo decreased pace and step to pattern (to maintain R knee in extension). Pt with no bouts of LOB or ataxia today.   (Re-adjusted brace due to brace sliding down).       Therapeutic Activities/Exercises:      Education:  Patient provided with verbal education education regarding PT role/goals/POC, fall prevention, safety awareness, and discharge/DME recommendations.  Understanding was verbalized, however additional teaching warranted.     Patient left HOB elevated with all lines intact, call button in reach, nsg notified, and spouse present    GOALS:   Multidisciplinary Problems       Physical Therapy Goals          Problem: Physical Therapy    Goal Priority Disciplines Outcome Interventions   Physical Therapy Goal     PT, PT/OT Progressing    Description: Goals to be met by: 3/6/2025     Patient will increase functional independence with mobility by performin. Supine to sit with Modified [place]  2. Sit to supine with Modified [place]  3. Rolling to Left and Right with Modified [place].   4. Sit to stand transfer with Stand-by Assistance  5. Bed to chair transfer  with Stand-by Assistance using Rolling Walker  6. Gait  x 50 feet with Stand-by Assistance using Rolling Walker.                          Time Tracking:     PT Received On: 02/14/25  PT Start Time: 1424     PT Stop Time: 1515  PT Total Time (min): 51 min     Billable Minutes: Gait Training 30 and Therapeutic Activity 10    Treatment Type: Treatment  PT/PTA: PT     Number of PTA visits since last PT visit: 1 02/14/2025

## 2025-02-14 NOTE — PROGRESS NOTES
[facility] Hospital Medicine Progress Note        Chief Complaint: Inpatient Follow-up for acute stroke    HPI per admitting team: 69 y.o. female with a history that includes PAF and mechanical AVR, on Coumadin therapy was referred to ED by her Cardiology due to INR of 8.8 on outpatient labs.  Additional complaints include headache and vision changes, though denied focal weakness or speech changes.  CT head performed and revealed cortical based hypodensities in the bilateral occipital lobes.  CTA revealed moderate atheromatous calcification of the cavernous clinoid and supra clinoid segments of the bilateral internal carotid arteries with mild-to-moderate left greater than right stenosis.  A subsequent MRI brain has noted Symmetric restricted diffusion and edema in the bilateral posterior occipital lobes, with small foci in the left parietal lobe and left cerebellum, favored to represent changes of posterior reversible encephalopathy syndrome, and additionally noted small areas of hemorrhage in B/L occipital lobes.    Neurology was consulted and neurologist felt she had bilateral occipital lobe infarcts secondary to being subtherapeutic with clot formation and then had embolism which led to the vision loss.  Recommends resuming warfarin once cleared by Cardiology.  After further review it was deemed that she was taking excessive Tylenol about 2 to 3 500 mg tablets at a time every 4 hours per the  report.  She was taking accepted Tylenol use because she could not get anyone to prescribed Norco for her    Interval Hx:   Patient is laying in bed,  at bedside.  Reports some back pain.  Informed she is waiting for therapy to come in.  Informed that they have recommended SNF.  Patient and has been board in agreement  I answered all their questions to the best of my knowledge in their satisfaction  Case was discussed with patient's nurse and  on the  floor.    Objective/physical exam:  General: In no acute distress, afebrile, elderly female, pleasant  Chest: Clear to auscultation bilaterally anteriorly, on supplemental oxygen via nasal cannula at 2 liters/minute  Heart: RRR, +S1, S2, no appreciable murmur  Abdomen: Soft, nontender, BS +  MSK: Warm, no lower extremity edema, no clubbing or cyanosis  Neurologic: Alert and oriented x4, Cranial nerve II-XII intact, able to move all 4 extremities without any difficulty    VITAL SIGNS: 24 HRS MIN & MAX LAST   Temp  Min: 97.4 °F (36.3 °C)  Max: 98.5 °F (36.9 °C) 97.4 °F (36.3 °C)   BP  Min: 115/63  Max: 138/74 130/70   Pulse  Min: 63  Max: 106  66   Resp  Min: 18  Max: 20 20   SpO2  Min: 89 %  Max: 100 % 100 %     I reviewed the labs below:  Recent Labs   Lab 02/12/25 0250 02/13/25 0233 02/14/25 0348   WBC 7.58 9.82 13.18*   RBC 3.96* 3.90* 3.46*   HGB 10.9* 10.7* 9.5*   HCT 35.9* 35.7* 31.4*   MCV 90.7 91.5 90.8   MCH 27.5 27.4 27.5   MCHC 30.4* 30.0* 30.3*   RDW 17.1* 17.3* 17.7*    267 290   MPV 9.5 9.7 9.7     Recent Labs   Lab 02/12/25 0250 02/13/25 0233 02/14/25  0348   * 138 137   K 4.0 5.7* 5.2*   CL 99 104 102   CO2 29 27 35*   BUN 15.8 22.1* 28.7*   CREATININE 0.79 1.09* 0.91   CALCIUM 8.8 9.2 9.0   MG 2.10 2.30 2.40   ALBUMIN 3.1* 3.0* 2.9*   ALKPHOS 145 124 126   ALT 22 25 21   AST 27 28 21   BILITOT 0.3 0.3 0.2     Microbiology Results (last 7 days)       ** No results found for the last 168 hours. **           See below for Radiology    Intake and Output:  Intake/Output Summary (Last 24 hours) at 2/14/2025 1930  Last data filed at 2/14/2025 1500  Gross per 24 hour   Intake --   Output 950 ml   Net -950 ml       Assessment/Plan:  Bilateral posterior occipital lobe ischemic CVA  Supratherapeutic INR on admission  Unintentional Tylenol overdose  L1 compression fracture with progressive loss of height- s/p kyphoplasty 2/12  VHD s/p mechanical AVR, restarted on Coumadin   PAF - on  Coumadin  Diabetes mellitus type II   Essential HTN   Known COPD   Tobacco abuse  Hyperkalemia  Moderate malnutrition        Neurosurgery has now signed off  2/12- underwent kyphoplasty with IR, appreciate their assistance  Continue pain control with tramadol, back brace per NSGY  Stroke Neurology signed off.  No Neurology follow up recommended  Cardiology following.   Continue heparin drip and started warfarin 5 mg daily on 2/13 for prosthetic heart valve.  INR goal 2.5- 3.5, recommended if needed heparin can be switched to Lovenox upon discharge  Daily INR  Potassium 5.2, ordered Lokelma 10 g p.o. x1  PT and OT- moderate intensity  Case management on board for SNF placement, informed  that while she is in-house, will continue heparin to bridge her Coumadin and once she is accepted to SNF will discharge her on therapeutic dose Lovenox for bridging  Morning CBC, BMP, daily INR ordered    VTE prophylaxis: Coumadin bridged with heparin drip    Patient condition:  Fair    Anticipated discharge and Disposition:   SNF once accepted     Critical care note:  Critical care diagnosis:  Warfarin bridge with heparin drip for subtherapeutic INR  Critical care interventions: Hands-on evaluation, review of labs/radiographs/records and discussion with patient and family if present  Critical care time spent: 35 minutes        All diagnosis and differential diagnosis have been reviewed; assessment and plan has been documented; I have personally reviewed the labs and test results that are presently available; I have reviewed the patients medication list; I have reviewed the consulting providers response and recommendations. I have reviewed or attempted to review medical records based upon their availability    All of the patient's questions have been  addressed and answered. Patient's is agreeable to the above stated plan. I will continue to monitor closely and make adjustments to medical management as needed.    Portions  of this note dictated using EMR integrated voice recognition software, and may be subject to voice recognition errors not corrected at proofreading. Please contact writer for clarification if needed.   _____________________________________________________________________    Nutrition Status:  Nutrition consulted. Most recent weight and BMI monitored-     Measurements:  Wt Readings from Last 1 Encounters:   02/05/25 91.3 kg (201 lb 3.2 oz)   Body mass index is 32.97 kg/m².    Patient has been screened and assessed by RD.    Malnutrition Type:  Context: acute illness or injury  Level: moderate    Malnutrition Characteristic Summary:  Weight Loss (Malnutrition): greater than 5% in 1 month  Energy Intake (Malnutrition): less than 75% for greater than or equal to 1 month  Fluid Accumulation (Malnutrition): other (see comments) (Not present)  Hand  Strength, Right (Malnutrition): Unable to assess    Interventions/Recommendations (treatment strategy):  Oral diet/nutrient modifications      A minimum of two characteristics is recommended for diagnosis of either severe or non-severe malnutrition.     Current Med:   artificial tears  1 drop Right Eye QID    atorvastatin  40 mg Oral Daily    dapagliflozin propanediol  10 mg Oral Daily    FLUoxetine  40 mg Oral Daily    gabapentin  100 mg Oral TID    isosorbide mononitrate  30 mg Oral Daily    LIDOcaine  1 patch Transdermal Q24H    OLANZapine  5 mg Oral QHS    sodium zirconium cyclosilicate  10 g Oral Once    warfarin  5 mg Oral Daily      PRN meds:  Current Facility-Administered Medications:     acetaminophen, 1,000 mg, Oral, Q6H PRN    albuterol-ipratropium, 3 mL, Nebulization, Q4H PRN    aluminum-magnesium hydroxide-simethicone, 30 mL, Oral, Q6H PRN    bisacodyL, 10 mg, Rectal, Daily PRN    clonazePAM, 0.5 mg, Oral, TID PRN    dextrose 50%, 12.5 g, Intravenous, PRN    diphenhydrAMINE, 25 mg, Intravenous, Q6H PRN    glucagon (human recombinant), 1 mg, Intramuscular, PRN     haloperidol lactate, 0.5 mg, Intravenous, Q10 Min PRN    heparin (PORCINE), 60 Units/kg, Intravenous, PRN    heparin (PORCINE), 30 Units/kg, Intravenous, PRN    hydrALAZINE, 10 mg, Intravenous, Q4H PRN    HYDROmorphone, 0.2 mg, Intravenous, Q5 Min PRN    labetalol, 10 mg, Intravenous, Q4H PRN    LORazepam, 1 mg, Oral, On Call Procedure    ondansetron, 4 mg, Intravenous, Daily PRN    oxyCODONE, 5 mg, Oral, Q3H PRN    sodium chloride 0.9%, 10 mL, Intravenous, PRN    traMADoL, 50 mg, Oral, Q6H PRN    traZODone, 50 mg, Oral, Nightly PRN    Continuous infusion:   heparin (porcine) in D5W  0-40 Units/kg/hr Intravenous Continuous 11 mL/hr at 02/13/25 2249 12 Units/kg/hr at 02/13/25 2249        Radiology:   I have personally reviewed the images and agree with radiologist report  CT Head Without Contrast  Narrative: EXAMINATION:  CT HEAD WITHOUT CONTRAST    CLINICAL HISTORY:  Headache, chronic, new features or increased frequency;    TECHNIQUE:  Multiple axial images were obtained from the base of the brain to the vertex without contrast administration.  Sagittal and coronal reconstructions were performed..Automatic exposure control is utilized to reduce patient radiation exposure.    COMPARISON:  02/08/2025    FINDINGS:  There is no intracranial mass or lesion seen.  No hemorrhage is seen.  There is subacute infarct seen in the occipital lobes bilaterally.  No hemorrhagic transformation is seen.  Similar changes were seen on the prior examination.  There is some mild abdomen necrosis developing..  There is some cerebral atrophy seen.  There is some compensatory ventricular dilatation and periventricular white matter changes consistent with the patient's age.  Calvarium is intact.  The posterior fossa is unremarkable..  The visualized portions of the paranasal sinuses show no acute abnormality.  Impression: Evolving areas of subacute infarct some regions bilaterally    Electronically signed by: Mauricio  Sachasinh  Date:    02/12/2025  Time:    18:58  IR Kyphoplasty Lumbar First Vertebral  Narrative: PROCEDURE:  Fluoroscopy Guided Spine Rubén Kyphoplasty    CLINICAL HISTORY:  69-year-old female with L1 fracture and uncontrollable pain    ANESTHESIA:  Level of sedation: General Anesthesia    Antibiotic prophylaxis: Ancef 2 g IV    PHYSICIANS:  Shravan Marroquin MD    RADIATION DOSE:  Fluoroscopy time: 8.4 minutes    Radiation exposure dose: 356 mGy    Exposure images: 6    CONTRAST:  None    PROCEDURAL SUMMARY:  After informed consent was obtained, the patient was placed prone on the angiography table. The skin overlying the L1 vertebral body was then prepped and draped in the usual sterile fashion.    Under AP and lateral fluoroscopic guidance, a 25g spinal needle was advanced to each pedicle sequentially. 10 cc of bupivacaine was then injected into each pedicle and the soft tissues overlying the pedicle.  A dermatotomy was then made in this location. Under fluoroscopic guidance, two separate 11g trocar needles were advanced into the vertebral body via a bipedicular approach. Bone tissue was obtained for pathological evaluation.  The stylet was removed and a guiding K-wire was then advanced through the introducer needle cannula.  The cannula was removed over the guidewire and a Reamer with working channel was then advanced over the wire to the posterior vertebral body wall. The K-wire was then removed.  The Reamer was advanced to the anterior vertebral body. The Reamer was then removed from the working channel and the template was advanced through the cannula to confirm positioning.  The template was then removed and a bone plug was advanced through the working cannula. The process was then repeated on the contralateral side.    Once access was obtained on both sides, the 5.8 mm spine jacks were advanced through each working channel and positioned within the vertebral body. Alternating sides, the spine jacks were slowly  deployed under fluoroscopic guidance. Once the jacks were fully deployed, the deployment mechanism was removed.  Subsequently, radiopaque poly methylmethacrylate was injected slowly into the vertebral body through each cannula. Bilateral vertebral body opacification was achieved from superior to inferior endplate and lateral wall to wall.  After the injection, the introducer cannulas were removed.  The spinal needle was readvanced to each pedicle and 10 cc of bupivacaine was injected into each pedicle and the overlying soft tissues.  A final fluoroscopic image was obtained demonstrating no evidence of concerning cement extravasation.  A sterile dressing was then placed over the wounds.    The patient tolerated the procedure well and experienced no immediate complications. The patient was then brought to the recovery room in good condition.  Impression: Technically successful Spine Rubén Kyphoplasty.    Electronically signed by: Shravan Marroquin  Date:    02/12/2025  Time:    12:47        Kaiser Wilburn MD  Department of Hospital Medicine   [facility]   02/14/2025

## 2025-02-15 LAB
ANION GAP SERPL CALC-SCNC: 8 MEQ/L
APTT PPP: 78.9 SECONDS (ref 23.2–33.7)
BASOPHILS # BLD AUTO: 0.02 X10(3)/MCL
BASOPHILS NFR BLD AUTO: 0.2 %
BUN SERPL-MCNC: 25.6 MG/DL (ref 9.8–20.1)
CALCIUM SERPL-MCNC: 8.4 MG/DL (ref 8.4–10.2)
CHLORIDE SERPL-SCNC: 105 MMOL/L (ref 98–107)
CO2 SERPL-SCNC: 28 MMOL/L (ref 23–31)
CREAT SERPL-MCNC: 0.8 MG/DL (ref 0.55–1.02)
CREAT/UREA NIT SERPL: 32
EOSINOPHIL # BLD AUTO: 0.09 X10(3)/MCL (ref 0–0.9)
EOSINOPHIL NFR BLD AUTO: 1.1 %
ERYTHROCYTE [DISTWIDTH] IN BLOOD BY AUTOMATED COUNT: 17.5 % (ref 11.5–17)
GFR SERPLBLD CREATININE-BSD FMLA CKD-EPI: >60 ML/MIN/1.73/M2
GLUCOSE SERPL-MCNC: 136 MG/DL (ref 82–115)
HCT VFR BLD AUTO: 32.3 % (ref 37–47)
HGB BLD-MCNC: 9.7 G/DL (ref 12–16)
IMM GRANULOCYTES # BLD AUTO: 0.06 X10(3)/MCL (ref 0–0.04)
IMM GRANULOCYTES NFR BLD AUTO: 0.7 %
INR PPP: 1.1
LYMPHOCYTES # BLD AUTO: 1.96 X10(3)/MCL (ref 0.6–4.6)
LYMPHOCYTES NFR BLD AUTO: 24.3 %
MCH RBC QN AUTO: 27.7 PG (ref 27–31)
MCHC RBC AUTO-ENTMCNC: 30 G/DL (ref 33–36)
MCV RBC AUTO: 92.3 FL (ref 80–94)
MONOCYTES # BLD AUTO: 0.62 X10(3)/MCL (ref 0.1–1.3)
MONOCYTES NFR BLD AUTO: 7.7 %
NEUTROPHILS # BLD AUTO: 5.31 X10(3)/MCL (ref 2.1–9.2)
NEUTROPHILS NFR BLD AUTO: 66 %
NRBC BLD AUTO-RTO: 0 %
PLATELET # BLD AUTO: 268 X10(3)/MCL (ref 130–400)
PMV BLD AUTO: 9.6 FL (ref 7.4–10.4)
POTASSIUM SERPL-SCNC: 5 MMOL/L (ref 3.5–5.1)
PROTHROMBIN TIME: 13.6 SECONDS (ref 12.5–14.5)
RBC # BLD AUTO: 3.5 X10(6)/MCL (ref 4.2–5.4)
SODIUM SERPL-SCNC: 141 MMOL/L (ref 136–145)
WBC # BLD AUTO: 8.06 X10(3)/MCL (ref 4.5–11.5)

## 2025-02-15 PROCEDURE — 85025 COMPLETE CBC W/AUTO DIFF WBC: CPT | Performed by: INTERNAL MEDICINE

## 2025-02-15 PROCEDURE — 36415 COLL VENOUS BLD VENIPUNCTURE: CPT | Performed by: INTERNAL MEDICINE

## 2025-02-15 PROCEDURE — 85730 THROMBOPLASTIN TIME PARTIAL: CPT | Performed by: INTERNAL MEDICINE

## 2025-02-15 PROCEDURE — 25000003 PHARM REV CODE 250: Performed by: INTERNAL MEDICINE

## 2025-02-15 PROCEDURE — 27000221 HC OXYGEN, UP TO 24 HOURS

## 2025-02-15 PROCEDURE — 85610 PROTHROMBIN TIME: CPT | Performed by: INTERNAL MEDICINE

## 2025-02-15 PROCEDURE — 97116 GAIT TRAINING THERAPY: CPT | Mod: CQ

## 2025-02-15 PROCEDURE — 97530 THERAPEUTIC ACTIVITIES: CPT | Mod: CQ

## 2025-02-15 PROCEDURE — 80048 BASIC METABOLIC PNL TOTAL CA: CPT | Performed by: INTERNAL MEDICINE

## 2025-02-15 PROCEDURE — 25000003 PHARM REV CODE 250: Performed by: ANESTHESIOLOGY

## 2025-02-15 PROCEDURE — 21400001 HC TELEMETRY ROOM

## 2025-02-15 RX ORDER — WARFARIN SODIUM 5 MG/1
5 TABLET ORAL DAILY
Status: DISCONTINUED | OUTPATIENT
Start: 2025-02-16 | End: 2025-02-16

## 2025-02-15 RX ADMIN — LIDOCAINE 5% 1 PATCH: 700 PATCH TOPICAL at 10:02

## 2025-02-15 RX ADMIN — ACETAMINOPHEN 1000 MG: 500 TABLET ORAL at 09:02

## 2025-02-15 RX ADMIN — TRAZODONE HYDROCHLORIDE 50 MG: 50 TABLET ORAL at 09:02

## 2025-02-15 RX ADMIN — TRAMADOL HYDROCHLORIDE 50 MG: 50 TABLET, COATED ORAL at 10:02

## 2025-02-15 RX ADMIN — GABAPENTIN 100 MG: 100 CAPSULE ORAL at 09:02

## 2025-02-15 RX ADMIN — OXYCODONE HYDROCHLORIDE 5 MG: 5 TABLET ORAL at 10:02

## 2025-02-15 RX ADMIN — SODIUM ZIRCONIUM CYCLOSILICATE 10 G: 10 POWDER, FOR SUSPENSION ORAL at 03:02

## 2025-02-15 RX ADMIN — GABAPENTIN 100 MG: 100 CAPSULE ORAL at 02:02

## 2025-02-15 RX ADMIN — HYPROMELLOSE 2910 1 DROP: 5 SOLUTION/ DROPS OPHTHALMIC at 09:02

## 2025-02-15 RX ADMIN — ISOSORBIDE MONONITRATE 30 MG: 30 TABLET, EXTENDED RELEASE ORAL at 09:02

## 2025-02-15 RX ADMIN — OLANZAPINE 5 MG: 5 TABLET, FILM COATED ORAL at 09:02

## 2025-02-15 RX ADMIN — DAPAGLIFLOZIN 10 MG: 10 TABLET, FILM COATED ORAL at 09:02

## 2025-02-15 RX ADMIN — ATORVASTATIN CALCIUM 40 MG: 40 TABLET, FILM COATED ORAL at 09:02

## 2025-02-15 RX ADMIN — OXYCODONE HYDROCHLORIDE 5 MG: 5 TABLET ORAL at 02:02

## 2025-02-15 RX ADMIN — WARFARIN SODIUM 7.5 MG: 5 TABLET ORAL at 03:02

## 2025-02-15 RX ADMIN — FLUOXETINE HYDROCHLORIDE 40 MG: 20 CAPSULE ORAL at 09:02

## 2025-02-15 RX ADMIN — HYPROMELLOSE 2910 1 DROP: 5 SOLUTION/ DROPS OPHTHALMIC at 02:02

## 2025-02-15 NOTE — PROGRESS NOTES
[facility] Hospital Medicine Progress Note        Chief Complaint: Inpatient Follow-up for acute stroke    HPI per admitting team: 69 y.o. female with a history that includes PAF and mechanical AVR, on Coumadin therapy was referred to ED by her Cardiology due to INR of 8.8 on outpatient labs.  Additional complaints include headache and vision changes, though denied focal weakness or speech changes.  CT head performed and revealed cortical based hypodensities in the bilateral occipital lobes.  CTA revealed moderate atheromatous calcification of the cavernous clinoid and supra clinoid segments of the bilateral internal carotid arteries with mild-to-moderate left greater than right stenosis.  A subsequent MRI brain has noted Symmetric restricted diffusion and edema in the bilateral posterior occipital lobes, with small foci in the left parietal lobe and left cerebellum, favored to represent changes of posterior reversible encephalopathy syndrome, and additionally noted small areas of hemorrhage in B/L occipital lobes.    Neurology was consulted and neurologist felt she had bilateral occipital lobe infarcts secondary to being subtherapeutic with clot formation and then had embolism which led to the vision loss.  Recommends resuming warfarin once cleared by Cardiology.  After further review it was deemed that she was taking excessive Tylenol about 2 to 3 500 mg tablets at a time every 4 hours per the  report.  She was taking accepted Tylenol use because she could not get anyone to prescribed Norco for her    Interval Hx:   Patient is walking with her  in the room to the restroom.  No complaints.  Reports she feels much better today.    Case was discussed with patient's nurse on the floor.    Objective/physical exam:  General: In no acute distress, afebrile, elderly female, pleasant  Chest:  Unlabored breathing, on supplemental oxygen via nasal cannula at 2  liters/minute  Heart: RRR  Abdomen: Soft  MSK: Warm, no lower extremity edema, no clubbing or cyanosis  Neurologic: Alert and oriented x4, Cranial nerve II-XII intact, able to move all 4 extremities without any difficulty    VITAL SIGNS: 24 HRS MIN & MAX LAST   Temp  Min: 97.3 °F (36.3 °C)  Max: 97.7 °F (36.5 °C) 97.7 °F (36.5 °C)   BP  Min: 113/61  Max: 138/74 (!) 117/58   Pulse  Min: 63  Max: 84  65   Resp  Min: 18  Max: 20 18   SpO2  Min: 89 %  Max: 100 % (!) 93 %     I reviewed the labs below:  Recent Labs   Lab 02/13/25  0233 02/14/25  0348 02/15/25  0303   WBC 9.82 13.18* 8.06   RBC 3.90* 3.46* 3.50*   HGB 10.7* 9.5* 9.7*   HCT 35.7* 31.4* 32.3*   MCV 91.5 90.8 92.3   MCH 27.4 27.5 27.7   MCHC 30.0* 30.3* 30.0*   RDW 17.3* 17.7* 17.5*    290 268   MPV 9.7 9.7 9.6     Recent Labs   Lab 02/12/25  0250 02/13/25  0233 02/14/25  0348 02/15/25  0303   * 138 137 141   K 4.0 5.7* 5.2* 5.0   CL 99 104 102 105   CO2 29 27 35* 28   BUN 15.8 22.1* 28.7* 25.6*   CREATININE 0.79 1.09* 0.91 0.80   CALCIUM 8.8 9.2 9.0 8.4   MG 2.10 2.30 2.40  --    ALBUMIN 3.1* 3.0* 2.9*  --    ALKPHOS 145 124 126  --    ALT 22 25 21  --    AST 27 28 21  --    BILITOT 0.3 0.3 0.2  --      Microbiology Results (last 7 days)       ** No results found for the last 168 hours. **           See below for Radiology    Intake and Output:  Intake/Output Summary (Last 24 hours) at 2/15/2025 0714  Last data filed at 2/15/2025 0605  Gross per 24 hour   Intake 240 ml   Output 950 ml   Net -710 ml       Assessment/Plan:  Bilateral posterior occipital lobe ischemic CVA  Supratherapeutic INR on admission  Unintentional Tylenol overdose  L1 compression fracture with progressive loss of height- s/p kyphoplasty 2/12  VHD s/p mechanical AVR, restarted on Coumadin   PAF - on Coumadin  Diabetes mellitus type II   Essential HTN   Known COPD   Tobacco abuse  Hyperkalemia- improving  Moderate malnutrition        Neurosurgery has now signed off  2/12-  underwent kyphoplasty with IR, appreciate their assistance  Continue pain control with tramadol, back brace per NSGY  Stroke Neurology signed off.  No Neurology follow up recommended  Cardiology following.   Continue heparin drip and started warfarin 5 mg daily on 2/13 for prosthetic heart valve.  INR goal 2.5- 3.5, recommended if needed heparin can be switched to Lovenox upon discharge  Daily INR  INR 1.1, will give warfarin 7.5 mg today and then 5 mg daily from tomorrow onward  Potassium 5.0, noted patient refused Lokelma yesterday  PT recommended low intensity, OT recs pending  Case management on board for initial recommendation of SNF placement, informed  that while she is in-house, will continue heparin to bridge her Coumadin and once she is accepted to SNF will discharge her on therapeutic dose Lovenox for bridging  Morning CBC, BMP, daily INR ordered    VTE prophylaxis: Coumadin bridged with heparin drip    Patient condition:  Fair    Anticipated discharge and Disposition:   SNF vs Home with      Critical care note:  Critical care diagnosis:  Warfarin bridge with heparin drip for subtherapeutic INR and warfarin dose adjustment   Critical care interventions: Hands-on evaluation, review of labs/radiographs/records and discussion with patient and family if present  Critical care time spent: 35 minutes        All diagnosis and differential diagnosis have been reviewed; assessment and plan has been documented; I have personally reviewed the labs and test results that are presently available; I have reviewed the patients medication list; I have reviewed the consulting providers response and recommendations. I have reviewed or attempted to review medical records based upon their availability    All of the patient's questions have been  addressed and answered. Patient's is agreeable to the above stated plan. I will continue to monitor closely and make adjustments to medical management as  needed.    Portions of this note dictated using EMR integrated voice recognition software, and may be subject to voice recognition errors not corrected at proofreading. Please contact writer for clarification if needed.   _____________________________________________________________________    Nutrition Status:  Nutrition consulted. Most recent weight and BMI monitored-     Measurements:  Wt Readings from Last 1 Encounters:   02/05/25 91.3 kg (201 lb 3.2 oz)   Body mass index is 32.97 kg/m².    Patient has been screened and assessed by RD.    Malnutrition Type:  Context: acute illness or injury  Level: moderate    Malnutrition Characteristic Summary:  Weight Loss (Malnutrition): greater than 5% in 1 month  Energy Intake (Malnutrition): less than 75% for greater than or equal to 1 month  Fluid Accumulation (Malnutrition): other (see comments) (Not present)  Hand  Strength, Right (Malnutrition): Unable to assess    Interventions/Recommendations (treatment strategy):  Oral diet/nutrient modifications      A minimum of two characteristics is recommended for diagnosis of either severe or non-severe malnutrition.     Current Med:   artificial tears  1 drop Right Eye QID    atorvastatin  40 mg Oral Daily    dapagliflozin propanediol  10 mg Oral Daily    FLUoxetine  40 mg Oral Daily    gabapentin  100 mg Oral TID    isosorbide mononitrate  30 mg Oral Daily    LIDOcaine  1 patch Transdermal Q24H    OLANZapine  5 mg Oral QHS    sodium zirconium cyclosilicate  10 g Oral Once    warfarin  5 mg Oral Daily      PRN meds:  Current Facility-Administered Medications:     acetaminophen, 1,000 mg, Oral, Q6H PRN    albuterol-ipratropium, 3 mL, Nebulization, Q4H PRN    aluminum-magnesium hydroxide-simethicone, 30 mL, Oral, Q6H PRN    bisacodyL, 10 mg, Rectal, Daily PRN    clonazePAM, 0.5 mg, Oral, TID PRN    dextrose 50%, 12.5 g, Intravenous, PRN    diphenhydrAMINE, 25 mg, Intravenous, Q6H PRN    glucagon (human recombinant), 1 mg,  Intramuscular, PRN    haloperidol lactate, 0.5 mg, Intravenous, Q10 Min PRN    heparin (PORCINE), 60 Units/kg, Intravenous, PRN    heparin (PORCINE), 30 Units/kg, Intravenous, PRN    hydrALAZINE, 10 mg, Intravenous, Q4H PRN    HYDROmorphone, 0.2 mg, Intravenous, Q5 Min PRN    labetalol, 10 mg, Intravenous, Q4H PRN    LORazepam, 1 mg, Oral, On Call Procedure    ondansetron, 4 mg, Intravenous, Daily PRN    oxyCODONE, 5 mg, Oral, Q3H PRN    sodium chloride 0.9%, 10 mL, Intravenous, PRN    traMADoL, 50 mg, Oral, Q6H PRN    traZODone, 50 mg, Oral, Nightly PRN    Continuous infusion:   heparin (porcine) in D5W  0-40 Units/kg/hr Intravenous Continuous 11 mL/hr at 02/14/25 2108 12 Units/kg/hr at 02/14/25 2108        Radiology:   I have personally reviewed the images and agree with radiologist report  CT Head Without Contrast  Narrative: EXAMINATION:  CT HEAD WITHOUT CONTRAST    CLINICAL HISTORY:  Headache, chronic, new features or increased frequency;    TECHNIQUE:  Multiple axial images were obtained from the base of the brain to the vertex without contrast administration.  Sagittal and coronal reconstructions were performed..Automatic exposure control is utilized to reduce patient radiation exposure.    COMPARISON:  02/08/2025    FINDINGS:  There is no intracranial mass or lesion seen.  No hemorrhage is seen.  There is subacute infarct seen in the occipital lobes bilaterally.  No hemorrhagic transformation is seen.  Similar changes were seen on the prior examination.  There is some mild abdomen necrosis developing..  There is some cerebral atrophy seen.  There is some compensatory ventricular dilatation and periventricular white matter changes consistent with the patient's age.  Calvarium is intact.  The posterior fossa is unremarkable..  The visualized portions of the paranasal sinuses show no acute abnormality.  Impression: Evolving areas of subacute infarct some regions bilaterally    Electronically signed by: Mauricio  Sachasinh  Date:    02/12/2025  Time:    18:58  IR Kyphoplasty Lumbar First Vertebral  Narrative: PROCEDURE:  Fluoroscopy Guided Spine Rubén Kyphoplasty    CLINICAL HISTORY:  69-year-old female with L1 fracture and uncontrollable pain    ANESTHESIA:  Level of sedation: General Anesthesia    Antibiotic prophylaxis: Ancef 2 g IV    PHYSICIANS:  Shraavn Marroquin MD    RADIATION DOSE:  Fluoroscopy time: 8.4 minutes    Radiation exposure dose: 356 mGy    Exposure images: 6    CONTRAST:  None    PROCEDURAL SUMMARY:  After informed consent was obtained, the patient was placed prone on the angiography table. The skin overlying the L1 vertebral body was then prepped and draped in the usual sterile fashion.    Under AP and lateral fluoroscopic guidance, a 25g spinal needle was advanced to each pedicle sequentially. 10 cc of bupivacaine was then injected into each pedicle and the soft tissues overlying the pedicle.  A dermatotomy was then made in this location. Under fluoroscopic guidance, two separate 11g trocar needles were advanced into the vertebral body via a bipedicular approach. Bone tissue was obtained for pathological evaluation.  The stylet was removed and a guiding K-wire was then advanced through the introducer needle cannula.  The cannula was removed over the guidewire and a Reamer with working channel was then advanced over the wire to the posterior vertebral body wall. The K-wire was then removed.  The Reamer was advanced to the anterior vertebral body. The Reamer was then removed from the working channel and the template was advanced through the cannula to confirm positioning.  The template was then removed and a bone plug was advanced through the working cannula. The process was then repeated on the contralateral side.    Once access was obtained on both sides, the 5.8 mm spine jacks were advanced through each working channel and positioned within the vertebral body. Alternating sides, the spine jacks were slowly  deployed under fluoroscopic guidance. Once the jacks were fully deployed, the deployment mechanism was removed.  Subsequently, radiopaque poly methylmethacrylate was injected slowly into the vertebral body through each cannula. Bilateral vertebral body opacification was achieved from superior to inferior endplate and lateral wall to wall.  After the injection, the introducer cannulas were removed.  The spinal needle was readvanced to each pedicle and 10 cc of bupivacaine was injected into each pedicle and the overlying soft tissues.  A final fluoroscopic image was obtained demonstrating no evidence of concerning cement extravasation.  A sterile dressing was then placed over the wounds.    The patient tolerated the procedure well and experienced no immediate complications. The patient was then brought to the recovery room in good condition.  Impression: Technically successful Spine Rubén Kyphoplasty.    Electronically signed by: Shravan Marroquin  Date:    02/12/2025  Time:    12:47        Kaiser Wilburn MD  Department of Hospital Medicine   [facility]   02/15/2025

## 2025-02-15 NOTE — PT/OT/SLP PROGRESS
Physical Therapy Treatment    Patient Name:  Kayla Ruiz   MRN:  [id number]    Recommendations:     Discharge therapy intensity: Low Intensity Therapy   Discharge Equipment Recommendations: walker, rolling  Barriers to discharge: None    Assessment:     Kayla Ruiz is a 69 y.o. female.  She presents with the following impairments/functional limitations: weakness, impaired endurance, gait instability, decreased lower extremity function, impaired functional mobility, impaired self care skills, decreased coordination, visual deficits, impaired balance, pain, orthopedic precautions.  Communicated with NSG prior to session.      Rehab Prognosis: Good; patient would benefit from acute skilled PT services to address these deficits and reach maximum level of function.    Recent Surgery: * No surgery found *    General Precautions: Standard, fall  Orthopedic Precautions: spinal precautions  Braces: Hinged knee brace  Respiratory Status: Room air  Skin Integrity: Visible skin intact    Plan:     During this hospitalization, patient would benefit from acute PT services 6 x/week to address the identified rehab impairments via gait training, therapeutic activities, therapeutic exercises, neuromuscular re-education and progress toward the following goals:    Plan of Care Expires:  03/13/25    Subjective     Patient found supine upon PT entry to room. Greeted pt and explained the treatment planned for today's session. Pt agreed to session.    Objective:     Functional Mobility:    Bed Mobility: Supine to EOB min A    Transfers: Sit to Stands min A.   Pt performed stand step transfer with min a.    Gait Training: Pt ambulated 215 ft with RW min a and No LOB noted.    Balance: SBA to maintain EOB sitting and min A during standing.        Education:  Role and goals of PT, transfer training, bed mobility, gait training, balance training, safety awareness, assistive device, strengthening exercises, and importance of participating  in PT to return to PLOF     Patient left supine with all lines intact, call button in reach, and  present    GOALS:   Multidisciplinary Problems       Physical Therapy Goals          Problem: Physical Therapy    Goal Priority Disciplines Outcome Interventions   Physical Therapy Goal     PT, PT/OT Progressing    Description: Goals to be met by: 3/6/2025     Patient will increase functional independence with mobility by performin. Supine to sit with Modified [place]  2. Sit to supine with Modified [place]  3. Rolling to Left and Right with Modified [place].   4. Sit to stand transfer with Stand-by Assistance  5. Bed to chair transfer with Stand-by Assistance using Rolling Walker  6. Gait  x 50 feet with Stand-by Assistance using Rolling Walker.                          Time Tracking:     Billable Minutes: Gait Training 12 and Therapeutic Activity 12    Treatment Type: Treatment  PT/PTA: PTA     Number of PTA visits since last PT visit: 2     02/15/2025

## 2025-02-16 LAB
ANION GAP SERPL CALC-SCNC: 10 MEQ/L
BASOPHILS # BLD AUTO: 0.03 X10(3)/MCL
BASOPHILS NFR BLD AUTO: 0.4 %
BUN SERPL-MCNC: 21.6 MG/DL (ref 9.8–20.1)
CALCIUM SERPL-MCNC: 8.4 MG/DL (ref 8.4–10.2)
CHLORIDE SERPL-SCNC: 103 MMOL/L (ref 98–107)
CO2 SERPL-SCNC: 28 MMOL/L (ref 23–31)
CREAT SERPL-MCNC: 0.85 MG/DL (ref 0.55–1.02)
CREAT/UREA NIT SERPL: 25
EOSINOPHIL # BLD AUTO: 0.19 X10(3)/MCL (ref 0–0.9)
EOSINOPHIL NFR BLD AUTO: 2.6 %
ERYTHROCYTE [DISTWIDTH] IN BLOOD BY AUTOMATED COUNT: 17.5 % (ref 11.5–17)
GFR SERPLBLD CREATININE-BSD FMLA CKD-EPI: >60 ML/MIN/1.73/M2
GLUCOSE SERPL-MCNC: 198 MG/DL (ref 82–115)
HCT VFR BLD AUTO: 32 % (ref 37–47)
HGB BLD-MCNC: 9.8 G/DL (ref 12–16)
IMM GRANULOCYTES # BLD AUTO: 0.04 X10(3)/MCL (ref 0–0.04)
IMM GRANULOCYTES NFR BLD AUTO: 0.6 %
INR PPP: 1.1
LYMPHOCYTES # BLD AUTO: 2.06 X10(3)/MCL (ref 0.6–4.6)
LYMPHOCYTES NFR BLD AUTO: 28.7 %
MAGNESIUM SERPL-MCNC: 2.2 MG/DL (ref 1.6–2.6)
MCH RBC QN AUTO: 27.8 PG (ref 27–31)
MCHC RBC AUTO-ENTMCNC: 30.6 G/DL (ref 33–36)
MCV RBC AUTO: 90.7 FL (ref 80–94)
MONOCYTES # BLD AUTO: 0.63 X10(3)/MCL (ref 0.1–1.3)
MONOCYTES NFR BLD AUTO: 8.8 %
NEUTROPHILS # BLD AUTO: 4.24 X10(3)/MCL (ref 2.1–9.2)
NEUTROPHILS NFR BLD AUTO: 58.9 %
NRBC BLD AUTO-RTO: 0 %
PLATELET # BLD AUTO: 224 X10(3)/MCL (ref 130–400)
PMV BLD AUTO: 9.9 FL (ref 7.4–10.4)
POTASSIUM SERPL-SCNC: 4.1 MMOL/L (ref 3.5–5.1)
PROTHROMBIN TIME: 14.4 SECONDS (ref 12.5–14.5)
RBC # BLD AUTO: 3.53 X10(6)/MCL (ref 4.2–5.4)
SODIUM SERPL-SCNC: 141 MMOL/L (ref 136–145)
WBC # BLD AUTO: 7.19 X10(3)/MCL (ref 4.5–11.5)

## 2025-02-16 PROCEDURE — 83735 ASSAY OF MAGNESIUM: CPT | Performed by: INTERNAL MEDICINE

## 2025-02-16 PROCEDURE — 85025 COMPLETE CBC W/AUTO DIFF WBC: CPT | Performed by: INTERNAL MEDICINE

## 2025-02-16 PROCEDURE — 25000003 PHARM REV CODE 250: Performed by: INTERNAL MEDICINE

## 2025-02-16 PROCEDURE — 63600175 PHARM REV CODE 636 W HCPCS: Performed by: INTERNAL MEDICINE

## 2025-02-16 PROCEDURE — 27000221 HC OXYGEN, UP TO 24 HOURS

## 2025-02-16 PROCEDURE — 21400001 HC TELEMETRY ROOM

## 2025-02-16 PROCEDURE — 36415 COLL VENOUS BLD VENIPUNCTURE: CPT | Performed by: INTERNAL MEDICINE

## 2025-02-16 PROCEDURE — 85610 PROTHROMBIN TIME: CPT | Performed by: INTERNAL MEDICINE

## 2025-02-16 PROCEDURE — 80048 BASIC METABOLIC PNL TOTAL CA: CPT | Performed by: INTERNAL MEDICINE

## 2025-02-16 RX ORDER — WARFARIN SODIUM 5 MG/1
5 TABLET ORAL DAILY
Status: DISCONTINUED | OUTPATIENT
Start: 2025-02-17 | End: 2025-02-17 | Stop reason: HOSPADM

## 2025-02-16 RX ORDER — POLYETHYLENE GLYCOL 3350 17 G/17G
17 POWDER, FOR SOLUTION ORAL DAILY
Status: DISCONTINUED | OUTPATIENT
Start: 2025-02-16 | End: 2025-02-17 | Stop reason: HOSPADM

## 2025-02-16 RX ORDER — WARFARIN SODIUM 5 MG/1
10 TABLET ORAL ONCE
Status: COMPLETED | OUTPATIENT
Start: 2025-02-16 | End: 2025-02-16

## 2025-02-16 RX ORDER — LIDOCAINE 50 MG/G
1 PATCH TOPICAL
Status: DISCONTINUED | OUTPATIENT
Start: 2025-02-16 | End: 2025-02-17 | Stop reason: HOSPADM

## 2025-02-16 RX ADMIN — POLYETHYLENE GLYCOL 3350 17 G: 17 POWDER, FOR SOLUTION ORAL at 04:02

## 2025-02-16 RX ADMIN — ISOSORBIDE MONONITRATE 30 MG: 30 TABLET, EXTENDED RELEASE ORAL at 08:02

## 2025-02-16 RX ADMIN — LABETALOL HYDROCHLORIDE 10 MG: 5 INJECTION, SOLUTION INTRAVENOUS at 08:02

## 2025-02-16 RX ADMIN — ACETAMINOPHEN 1000 MG: 500 TABLET ORAL at 08:02

## 2025-02-16 RX ADMIN — DAPAGLIFLOZIN 10 MG: 10 TABLET, FILM COATED ORAL at 08:02

## 2025-02-16 RX ADMIN — HYPROMELLOSE 2910 1 DROP: 5 SOLUTION/ DROPS OPHTHALMIC at 11:02

## 2025-02-16 RX ADMIN — GABAPENTIN 100 MG: 100 CAPSULE ORAL at 02:02

## 2025-02-16 RX ADMIN — OLANZAPINE 5 MG: 5 TABLET, FILM COATED ORAL at 08:02

## 2025-02-16 RX ADMIN — GABAPENTIN 100 MG: 100 CAPSULE ORAL at 08:02

## 2025-02-16 RX ADMIN — HYPROMELLOSE 2910 1 DROP: 5 SOLUTION/ DROPS OPHTHALMIC at 08:02

## 2025-02-16 RX ADMIN — TRAMADOL HYDROCHLORIDE 50 MG: 50 TABLET, COATED ORAL at 09:02

## 2025-02-16 RX ADMIN — LIDOCAINE 1 PATCH: 50 PATCH CUTANEOUS at 08:02

## 2025-02-16 RX ADMIN — HYPROMELLOSE 2910 1 DROP: 5 SOLUTION/ DROPS OPHTHALMIC at 04:02

## 2025-02-16 RX ADMIN — WARFARIN SODIUM 10 MG: 5 TABLET ORAL at 04:02

## 2025-02-16 RX ADMIN — HEPARIN SODIUM 12 UNITS/KG/HR: 10000 INJECTION, SOLUTION INTRAVENOUS at 02:02

## 2025-02-16 RX ADMIN — ATORVASTATIN CALCIUM 40 MG: 40 TABLET, FILM COATED ORAL at 08:02

## 2025-02-16 RX ADMIN — TRAZODONE HYDROCHLORIDE 50 MG: 50 TABLET ORAL at 08:02

## 2025-02-16 RX ADMIN — FLUOXETINE HYDROCHLORIDE 40 MG: 20 CAPSULE ORAL at 08:02

## 2025-02-16 RX ADMIN — TRAMADOL HYDROCHLORIDE 50 MG: 50 TABLET, COATED ORAL at 11:02

## 2025-02-16 NOTE — PROGRESS NOTES
[facility] Hospital Medicine Progress Note        Chief Complaint: Inpatient Follow-up for acute stroke    HPI per admitting team: 69 y.o. female with a history that includes PAF and mechanical AVR, on Coumadin therapy was referred to ED by her Cardiology due to INR of 8.8 on outpatient labs.  Additional complaints include headache and vision changes, though denied focal weakness or speech changes.  CT head performed and revealed cortical based hypodensities in the bilateral occipital lobes.  CTA revealed moderate atheromatous calcification of the cavernous clinoid and supra clinoid segments of the bilateral internal carotid arteries with mild-to-moderate left greater than right stenosis.  A subsequent MRI brain has noted Symmetric restricted diffusion and edema in the bilateral posterior occipital lobes, with small foci in the left parietal lobe and left cerebellum, favored to represent changes of posterior reversible encephalopathy syndrome, and additionally noted small areas of hemorrhage in B/L occipital lobes.    Neurology was consulted and neurologist felt she had bilateral occipital lobe infarcts secondary to being subtherapeutic with clot formation and then had embolism which led to the vision loss.  Recommends resuming warfarin once cleared by Cardiology.  After further review it was deemed that she was taking excessive Tylenol about 2 to 3 500 mg tablets at a time every 4 hours per the  report.  She was taking accepted Tylenol use because she could not get anyone to prescribed Norco for her    Interval Hx:   Patient sitting in bed, reports she feels much better.   at bedside reported patient walk the whole halls yesterday with therapy.  Informed that they have recommended she can go home with home health but I am waiting for occupational therapy to recommended the same.  Informed that plan is to discharge tomorrow given for Lovenox she can get a better deal at  our pharmacy then outside pharmacy.  Verbalized understanding.   reported she was previously on Lovenox in they had a lot of run around to get the medicine at that time too    Advised to follow up with her Coumadin clinic in 3 days post discharge   Case was discussed with patient's nurse on the floor.    Objective/physical exam:  General: In no acute distress, afebrile, elderly female, pleasant  Chest:  Unlabored breathing, on supplemental oxygen via nasal cannula at 2 liters/minute  Heart: RRR  Abdomen: Soft  MSK: Warm, no lower extremity edema, no clubbing or cyanosis  Neurologic: Alert and oriented x4, Cranial nerve II-XII intact, able to move all 4 extremities without any difficulty    VITAL SIGNS: 24 HRS MIN & MAX LAST   Temp  Min: 97.8 °F (36.6 °C)  Max: 99.3 °F (37.4 °C) 97.8 °F (36.6 °C)   BP  Min: 110/65  Max: 170/76 (!) 140/76   Pulse  Min: 65  Max: 82  78   Resp  Min: 18  Max: 20 18   SpO2  Min: 88 %  Max: 95 % (!) 93 %     I reviewed the labs below:  Recent Labs   Lab 02/14/25  0348 02/15/25  0303 02/16/25  0249   WBC 13.18* 8.06 7.19   RBC 3.46* 3.50* 3.53*   HGB 9.5* 9.7* 9.8*   HCT 31.4* 32.3* 32.0*   MCV 90.8 92.3 90.7   MCH 27.5 27.7 27.8   MCHC 30.3* 30.0* 30.6*   RDW 17.7* 17.5* 17.5*    268 224   MPV 9.7 9.6 9.9     Recent Labs   Lab 02/12/25  0250 02/13/25  0233 02/14/25  0348 02/15/25  0303 02/16/25  0249   * 138 137 141 141   K 4.0 5.7* 5.2* 5.0 4.1   CL 99 104 102 105 103   CO2 29 27 35* 28 28   BUN 15.8 22.1* 28.7* 25.6* 21.6*   CREATININE 0.79 1.09* 0.91 0.80 0.85   CALCIUM 8.8 9.2 9.0 8.4 8.4   MG 2.10 2.30 2.40  --  2.20   ALBUMIN 3.1* 3.0* 2.9*  --   --    ALKPHOS 145 124 126  --   --    ALT 22 25 21  --   --    AST 27 28 21  --   --    BILITOT 0.3 0.3 0.2  --   --      Microbiology Results (last 7 days)       ** No results found for the last 168 hours. **           See below for Radiology    Intake and Output:  Intake/Output Summary (Last 24 hours) at 2/16/2025  1448  Last data filed at 2/16/2025 0245  Gross per 24 hour   Intake --   Output 450 ml   Net -450 ml       Assessment/Plan:  Bilateral posterior occipital lobe ischemic CVA  Supratherapeutic INR on admission, now subtherapeutic  Unintentional Tylenol overdose  L1 compression fracture with progressive loss of height- s/p kyphoplasty 2/12  VHD s/p mechanical AVR, restarted on Coumadin   PAF - on Coumadin  Diabetes mellitus type II   Essential HTN   Known COPD   Tobacco abuse  Hyperkalemia- improving  Moderate malnutrition        Neurosurgery has now signed off  2/12- underwent kyphoplasty with IR, appreciate their assistance  Continue pain control with tramadol, back brace per NSGY  Stroke Neurology signed off.  No Neurology follow up recommended  Cardiology has now signed off.  Continue heparin drip while in-house and continue warfarin 5 mg daily (since 2/13) for prosthetic heart valve.  INR goal 2.5- 3.5, recommended if needed heparin can be switched to Lovenox upon discharge  Daily INR  INR 1.1 despite receiving warfarin 7.5 mg yesterday, will do warfarin 10 mg tonight and then continue warfarin 5 mg daily from tomorrow onward  Potassium 4.1 post Lokelma   PT recommended low intensity, OT recs pending  Case management on board for initial recommendation of SNF placement, informed  that while she is in-house, will continue heparin to bridge her Coumadin and once she is accepted to SNF/ or going home with , will discharge her on therapeutic dose Lovenox for bridging  Morning lab stable, continue daily INR checks while in-house    VTE prophylaxis: Coumadin bridged with heparin drip    Patient condition:  Fair    Anticipated discharge and Disposition:   SNF vs Home with      Critical care note:  Critical care diagnosis:  Warfarin bridge with heparin drip for subtherapeutic INR and warfarin dose adjustment   Critical care interventions: Hands-on evaluation, review of labs/radiographs/records and  discussion with patient and family if present  Critical care time spent: 35 minutes        All diagnosis and differential diagnosis have been reviewed; assessment and plan has been documented; I have personally reviewed the labs and test results that are presently available; I have reviewed the patients medication list; I have reviewed the consulting providers response and recommendations. I have reviewed or attempted to review medical records based upon their availability    All of the patient's questions have been  addressed and answered. Patient's is agreeable to the above stated plan. I will continue to monitor closely and make adjustments to medical management as needed.    Portions of this note dictated using EMR integrated voice recognition software, and may be subject to voice recognition errors not corrected at proofreading. Please contact writer for clarification if needed.   _____________________________________________________________________    Nutrition Status:  Nutrition consulted. Most recent weight and BMI monitored-     Measurements:  Wt Readings from Last 1 Encounters:   02/05/25 91.3 kg (201 lb 3.2 oz)   Body mass index is 32.97 kg/m².    Patient has been screened and assessed by RD.    Malnutrition Type:  Context: acute illness or injury  Level: moderate    Malnutrition Characteristic Summary:  Weight Loss (Malnutrition): greater than 5% in 1 month  Energy Intake (Malnutrition): less than 75% for greater than or equal to 1 month  Fluid Accumulation (Malnutrition): other (see comments) (Not present)  Hand  Strength, Right (Malnutrition): Unable to assess    Interventions/Recommendations (treatment strategy):  Oral diet/nutrient modifications      A minimum of two characteristics is recommended for diagnosis of either severe or non-severe malnutrition.     Current Med:   artificial tears  1 drop Right Eye QID    atorvastatin  40 mg Oral Daily    dapagliflozin propanediol  10 mg Oral Daily     FLUoxetine  40 mg Oral Daily    gabapentin  100 mg Oral TID    isosorbide mononitrate  30 mg Oral Daily    LIDOcaine  1 patch Transdermal Q24H    OLANZapine  5 mg Oral QHS    warfarin  10 mg Oral Once    [START ON 2/17/2025] warfarin  5 mg Oral Daily      PRN meds:  Current Facility-Administered Medications:     acetaminophen, 1,000 mg, Oral, Q6H PRN    albuterol-ipratropium, 3 mL, Nebulization, Q4H PRN    aluminum-magnesium hydroxide-simethicone, 30 mL, Oral, Q6H PRN    bisacodyL, 10 mg, Rectal, Daily PRN    clonazePAM, 0.5 mg, Oral, TID PRN    dextrose 50%, 12.5 g, Intravenous, PRN    diphenhydrAMINE, 25 mg, Intravenous, Q6H PRN    glucagon (human recombinant), 1 mg, Intramuscular, PRN    haloperidol lactate, 0.5 mg, Intravenous, Q10 Min PRN    heparin (PORCINE), 60 Units/kg, Intravenous, PRN    heparin (PORCINE), 30 Units/kg, Intravenous, PRN    hydrALAZINE, 10 mg, Intravenous, Q4H PRN    HYDROmorphone, 0.2 mg, Intravenous, Q5 Min PRN    labetalol, 10 mg, Intravenous, Q4H PRN    LORazepam, 1 mg, Oral, On Call Procedure    ondansetron, 4 mg, Intravenous, Daily PRN    oxyCODONE, 5 mg, Oral, Q3H PRN    sodium chloride 0.9%, 10 mL, Intravenous, PRN    traMADoL, 50 mg, Oral, Q6H PRN    traZODone, 50 mg, Oral, Nightly PRN    Continuous infusion:   heparin (porcine) in D5W  0-40 Units/kg/hr Intravenous Continuous 11 mL/hr at 02/16/25 0232 12 Units/kg/hr at 02/16/25 0232        Radiology:   I have personally reviewed the images and agree with radiologist report  CT Head Without Contrast  Narrative: EXAMINATION:  CT HEAD WITHOUT CONTRAST    CLINICAL HISTORY:  Headache, chronic, new features or increased frequency;    TECHNIQUE:  Multiple axial images were obtained from the base of the brain to the vertex without contrast administration.  Sagittal and coronal reconstructions were performed..Automatic exposure control is utilized to reduce patient radiation exposure.    COMPARISON:  02/08/2025    FINDINGS:  There is no  intracranial mass or lesion seen.  No hemorrhage is seen.  There is subacute infarct seen in the occipital lobes bilaterally.  No hemorrhagic transformation is seen.  Similar changes were seen on the prior examination.  There is some mild abdomen necrosis developing..  There is some cerebral atrophy seen.  There is some compensatory ventricular dilatation and periventricular white matter changes consistent with the patient's age.  Calvarium is intact.  The posterior fossa is unremarkable..  The visualized portions of the paranasal sinuses show no acute abnormality.  Impression: Evolving areas of subacute infarct some regions bilaterally    Electronically signed by: Mauricio Mccabe  Date:    02/12/2025  Time:    18:58  IR Kyphoplasty Lumbar First Vertebral  Narrative: PROCEDURE:  Fluoroscopy Guided Spine Rubén Kyphoplasty    CLINICAL HISTORY:  69-year-old female with L1 fracture and uncontrollable pain    ANESTHESIA:  Level of sedation: General Anesthesia    Antibiotic prophylaxis: Ancef 2 g IV    PHYSICIANS:  Shravan Marroquin MD    RADIATION DOSE:  Fluoroscopy time: 8.4 minutes    Radiation exposure dose: 356 mGy    Exposure images: 6    CONTRAST:  None    PROCEDURAL SUMMARY:  After informed consent was obtained, the patient was placed prone on the angiography table. The skin overlying the L1 vertebral body was then prepped and draped in the usual sterile fashion.    Under AP and lateral fluoroscopic guidance, a 25g spinal needle was advanced to each pedicle sequentially. 10 cc of bupivacaine was then injected into each pedicle and the soft tissues overlying the pedicle.  A dermatotomy was then made in this location. Under fluoroscopic guidance, two separate 11g trocar needles were advanced into the vertebral body via a bipedicular approach. Bone tissue was obtained for pathological evaluation.  The stylet was removed and a guiding K-wire was then advanced through the introducer needle cannula.  The cannula was removed  over the guidewire and a Reamer with working channel was then advanced over the wire to the posterior vertebral body wall. The K-wire was then removed.  The Reamer was advanced to the anterior vertebral body. The Reamer was then removed from the working channel and the template was advanced through the cannula to confirm positioning.  The template was then removed and a bone plug was advanced through the working cannula. The process was then repeated on the contralateral side.    Once access was obtained on both sides, the 5.8 mm spine jacks were advanced through each working channel and positioned within the vertebral body. Alternating sides, the spine jacks were slowly deployed under fluoroscopic guidance. Once the jacks were fully deployed, the deployment mechanism was removed.  Subsequently, radiopaque poly methylmethacrylate was injected slowly into the vertebral body through each cannula. Bilateral vertebral body opacification was achieved from superior to inferior endplate and lateral wall to wall.  After the injection, the introducer cannulas were removed.  The spinal needle was readvanced to each pedicle and 10 cc of bupivacaine was injected into each pedicle and the overlying soft tissues.  A final fluoroscopic image was obtained demonstrating no evidence of concerning cement extravasation.  A sterile dressing was then placed over the wounds.    The patient tolerated the procedure well and experienced no immediate complications. The patient was then brought to the recovery room in good condition.  Impression: Technically successful Spine Rubén Kyphoplasty.    Electronically signed by: Shravan Marroquin  Date:    02/12/2025  Time:    12:47        Kaiser Wilburn MD  Department of Hospital Medicine   [facility]   02/16/2025

## 2025-02-17 VITALS
HEIGHT: 66 IN | TEMPERATURE: 98 F | HEART RATE: 74 BPM | SYSTOLIC BLOOD PRESSURE: 146 MMHG | RESPIRATION RATE: 18 BRPM | OXYGEN SATURATION: 95 % | WEIGHT: 201.19 LBS | BODY MASS INDEX: 32.33 KG/M2 | DIASTOLIC BLOOD PRESSURE: 66 MMHG

## 2025-02-17 PROBLEM — Z95.2 H/O HEART VALVE REPLACEMENT WITH MECHANICAL VALVE: Status: ACTIVE | Noted: 2025-02-17

## 2025-02-17 LAB
APTT PPP: 51.6 SECONDS (ref 23.2–33.7)
BASOPHILS # BLD AUTO: 0.03 X10(3)/MCL
BASOPHILS NFR BLD AUTO: 0.4 %
EOSINOPHIL # BLD AUTO: 0.24 X10(3)/MCL (ref 0–0.9)
EOSINOPHIL NFR BLD AUTO: 3.4 %
ERYTHROCYTE [DISTWIDTH] IN BLOOD BY AUTOMATED COUNT: 17.4 % (ref 11.5–17)
HCT VFR BLD AUTO: 32.9 % (ref 37–47)
HGB BLD-MCNC: 10 G/DL (ref 12–16)
IMM GRANULOCYTES # BLD AUTO: 0.03 X10(3)/MCL (ref 0–0.04)
IMM GRANULOCYTES NFR BLD AUTO: 0.4 %
INR PPP: 1.5
LYMPHOCYTES # BLD AUTO: 1.82 X10(3)/MCL (ref 0.6–4.6)
LYMPHOCYTES NFR BLD AUTO: 25.5 %
MCH RBC QN AUTO: 27.2 PG (ref 27–31)
MCHC RBC AUTO-ENTMCNC: 30.4 G/DL (ref 33–36)
MCV RBC AUTO: 89.4 FL (ref 80–94)
MONOCYTES # BLD AUTO: 0.53 X10(3)/MCL (ref 0.1–1.3)
MONOCYTES NFR BLD AUTO: 7.4 %
NEUTROPHILS # BLD AUTO: 4.5 X10(3)/MCL (ref 2.1–9.2)
NEUTROPHILS NFR BLD AUTO: 62.9 %
NRBC BLD AUTO-RTO: 0 %
PLATELET # BLD AUTO: 265 X10(3)/MCL (ref 130–400)
PMV BLD AUTO: 9.9 FL (ref 7.4–10.4)
PROTHROMBIN TIME: 18.2 SECONDS (ref 12.5–14.5)
RBC # BLD AUTO: 3.68 X10(6)/MCL (ref 4.2–5.4)
WBC # BLD AUTO: 7.15 X10(3)/MCL (ref 4.5–11.5)

## 2025-02-17 PROCEDURE — 97116 GAIT TRAINING THERAPY: CPT | Mod: CQ

## 2025-02-17 PROCEDURE — 63600175 PHARM REV CODE 636 W HCPCS: Performed by: INTERNAL MEDICINE

## 2025-02-17 PROCEDURE — 97535 SELF CARE MNGMENT TRAINING: CPT | Mod: CO

## 2025-02-17 PROCEDURE — 25000003 PHARM REV CODE 250: Performed by: INTERNAL MEDICINE

## 2025-02-17 PROCEDURE — 97530 THERAPEUTIC ACTIVITIES: CPT | Mod: CQ

## 2025-02-17 PROCEDURE — 85610 PROTHROMBIN TIME: CPT | Performed by: INTERNAL MEDICINE

## 2025-02-17 PROCEDURE — 85025 COMPLETE CBC W/AUTO DIFF WBC: CPT | Performed by: INTERNAL MEDICINE

## 2025-02-17 PROCEDURE — 85730 THROMBOPLASTIN TIME PARTIAL: CPT | Performed by: INTERNAL MEDICINE

## 2025-02-17 PROCEDURE — 36415 COLL VENOUS BLD VENIPUNCTURE: CPT | Performed by: INTERNAL MEDICINE

## 2025-02-17 PROCEDURE — 97530 THERAPEUTIC ACTIVITIES: CPT | Mod: CO

## 2025-02-17 PROCEDURE — 25000003 PHARM REV CODE 250: Performed by: ANESTHESIOLOGY

## 2025-02-17 RX ORDER — TRAMADOL HYDROCHLORIDE 50 MG/1
50 TABLET ORAL EVERY 8 HOURS PRN
Qty: 12 TABLET | Refills: 0 | Status: SHIPPED | OUTPATIENT
Start: 2025-02-17

## 2025-02-17 RX ORDER — POLYETHYLENE GLYCOL 3350 17 G/17G
17 POWDER, FOR SOLUTION ORAL DAILY
COMMUNITY
Start: 2025-02-18

## 2025-02-17 RX ORDER — IBUPROFEN 200 MG
1 TABLET ORAL DAILY
Qty: 30 PATCH | Refills: 0 | Status: SHIPPED | OUTPATIENT
Start: 2025-02-17

## 2025-02-17 RX ORDER — ENOXAPARIN SODIUM 100 MG/ML
1 INJECTION SUBCUTANEOUS EVERY 12 HOURS
Qty: 6 EACH | Refills: 3 | Status: SHIPPED | OUTPATIENT
Start: 2025-02-17

## 2025-02-17 RX ORDER — ENOXAPARIN SODIUM 100 MG/ML
1 INJECTION SUBCUTANEOUS EVERY 12 HOURS
Status: DISCONTINUED | OUTPATIENT
Start: 2025-02-17 | End: 2025-02-17 | Stop reason: HOSPADM

## 2025-02-17 RX ORDER — OXYCODONE HYDROCHLORIDE 5 MG/1
5 TABLET ORAL EVERY 8 HOURS PRN
Qty: 12 TABLET | Refills: 0 | Status: CANCELLED | OUTPATIENT
Start: 2025-02-17

## 2025-02-17 RX ADMIN — GABAPENTIN 100 MG: 100 CAPSULE ORAL at 01:02

## 2025-02-17 RX ADMIN — HYPROMELLOSE 2910 1 DROP: 5 SOLUTION/ DROPS OPHTHALMIC at 08:02

## 2025-02-17 RX ADMIN — ISOSORBIDE MONONITRATE 30 MG: 30 TABLET, EXTENDED RELEASE ORAL at 08:02

## 2025-02-17 RX ADMIN — OXYCODONE HYDROCHLORIDE 5 MG: 5 TABLET ORAL at 03:02

## 2025-02-17 RX ADMIN — POLYETHYLENE GLYCOL 3350 17 G: 17 POWDER, FOR SOLUTION ORAL at 08:02

## 2025-02-17 RX ADMIN — FLUOXETINE HYDROCHLORIDE 40 MG: 20 CAPSULE ORAL at 08:02

## 2025-02-17 RX ADMIN — DAPAGLIFLOZIN 10 MG: 10 TABLET, FILM COATED ORAL at 08:02

## 2025-02-17 RX ADMIN — OXYCODONE HYDROCHLORIDE 5 MG: 5 TABLET ORAL at 08:02

## 2025-02-17 RX ADMIN — HEPARIN SODIUM 12 UNITS/KG/HR: 10000 INJECTION, SOLUTION INTRAVENOUS at 12:02

## 2025-02-17 RX ADMIN — ENOXAPARIN SODIUM 90 MG: 100 INJECTION SUBCUTANEOUS at 10:02

## 2025-02-17 RX ADMIN — GABAPENTIN 100 MG: 100 CAPSULE ORAL at 08:02

## 2025-02-17 RX ADMIN — LIDOCAINE 1 PATCH: 50 PATCH CUTANEOUS at 08:02

## 2025-02-17 RX ADMIN — ATORVASTATIN CALCIUM 40 MG: 40 TABLET, FILM COATED ORAL at 08:02

## 2025-02-17 NOTE — PLAN OF CARE
02/17/25 1428   Final Note   Assessment Type Final Discharge Note   Anticipated Discharge Disposition Home-Health   Post-Acute Status   Post-Acute Authorization Home Health   Home Health Status Set-up Complete/Auth obtained  ([facility])   Discharge Delays None known at this time

## 2025-02-17 NOTE — PROGRESS NOTES
Discharge instructions reviewed with patient and family member. Walker delivered to room. Patient stable on room air upon discharge. All discharge questions answered. Education on subq injections provided. Walker adjusted to height. Patient placed in transportation services to be wheeled down to vehicle. IV removed.

## 2025-02-17 NOTE — PT/OT/SLP PROGRESS
"Physical Therapy Treatment    Patient Name:  Kayla Ruiz   MRN:  [id number]    Recommendations:     Discharge therapy intensity: Low Intensity Therapy   Discharge Equipment Recommendations: walker, rolling  Barriers to discharge: Ongoing medical needs and placement.    Assessment:     Kayla Ruiz is a 69 y.o. female admitted with a medical diagnosis of Bilateral posterior occipital lobe ischemic CVA, Supratherapeutic INR, Unintentional Tylenol overdose, VHD s/p mechanical AVR, on Coumadin , PAF , Diabetes mellitus type II, Essential HTN , h/o COPD and Tobacco abuse. Pt was admitted to this hospital last month due to a fall sustaining R patellar fx (non-op), L1 compression fx. Pt  instructed to wear TLSO and a HKB in extension, cleared to WB with HKB (on last admission). Pt now s/p kyphoplasty on 2/12 (no longer has to wear TSLO per nsg).  She presents with the following impairments/functional limitations: weakness, impaired endurance, gait instability, decreased lower extremity function, impaired functional mobility, impaired self care skills, decreased coordination, visual deficits, impaired balance, pain, orthopedic precautions.  Pt session limited 2/2 high BP and pt feeling "woosy".     Rehab Prognosis: Good; patient would benefit from acute skilled PT services to address these deficits and reach maximum level of function.    Recent Surgery: * No surgery found *      Plan:     During this hospitalization, patient would benefit from acute PT services 6 x/week to address the identified rehab impairments via gait training, therapeutic activities, therapeutic exercises, neuromuscular re-education and progress toward the following goals:    Plan of Care Expires:  03/13/25    Subjective     Chief Complaint: "woozy"  Patient/Family Comments/goals:   Pain/Comfort:         Objective:     Communicated with RN prior to session.  Patient found HOB elevated with pulse ox (continuous), telemetry, peripheral IV upon PT " entry to room.     General Precautions: Standard, fall  Orthopedic Precautions: spinal precautions  Braces: Hinged knee brace  Respiratory Status: Room air  Blood Pressure: 173/95 after amb  Skin Integrity: Visible skin intact      Functional Mobility:  Bed Mobility:     Supine to Sit: stand by assistance  Transfers:     Sit to Stand:  contact guard assistance with rolling walker  Gait: amb 58' with RW and HKB, SBA; step to gait pattern and no LOB noted; vcs for forward gaze.    Therapeutic Activities/Exercises:   Pt practiced donning HKB; unable to accomplish 2/2 impaired vision.    Education:  Patient provided with verbal education education regarding PT role/goals/POC, post-op precautions, fall prevention, and safety awareness.  Understanding was verbalized, however additional teaching warranted.     Patient left up in chair with all lines intact, call button in reach, and RN notified    GOALS:   Multidisciplinary Problems       Physical Therapy Goals          Problem: Physical Therapy    Goal Priority Disciplines Outcome Interventions   Physical Therapy Goal     PT, PT/OT Progressing    Description: Goals to be met by: 3/6/2025     Patient will increase functional independence with mobility by performin. Supine to sit with Modified [place]  2. Sit to supine with Modified [place]  3. Rolling to Left and Right with Modified [place].   4. Sit to stand transfer with Stand-by Assistance  5. Bed to chair transfer with Stand-by Assistance using Rolling Walker  6. Gait  x 50 feet with Stand-by Assistance using Rolling Walker.                          Time Tracking:     PT Received On: 25  PT Start Time: 905     PT Stop Time: 928  PT Total Time (min): 23 min     Billable Minutes: Gait Training 1 and Therapeutic Activity 1    Treatment Type: Treatment  PT/PTA: PTA     Number of PTA visits since last PT visit: 3     2025

## 2025-02-17 NOTE — PLAN OF CARE
Patient discharged today, Therapy recommending low intensity post care. CM printed choice list for Home Health services. CM placed order fo Home Health and rolling walker for home use. Patient is active with [facility]  CM sent HH order /DC order/summary/AVS via Epic to [facility]. CM faxed order and referral for DME rolling walker to [facility] .CM received ticket for rolling walker and obtained rolling walker from [facility]  brought roling walker to patient. Ticket for [employer] walker emailed to [facility].

## 2025-02-17 NOTE — PT/OT/SLP PROGRESS
Occupational Therapy   Treatment    Name: Kayla Ruiz  MRN: [id number]  Admitting Diagnosis:  Closed compression fracture of first lumbar vertebra       Recommendations:     Recommended therapy intensity at discharge: Low Intensity Therapy   Discharge Equipment Recommendations:  walker, rolling  Barriers to discharge:       Assessment:     Kayla Ruiz is a 69 y.o. female with a medical diagnosis of Closed compression fracture of first lumbar vertebra.  She presents with good motivation and participation. Performance deficits affecting function are weakness, impaired endurance, impaired self care skills, visual deficits, impaired balance, gait instability, impaired functional mobility, decreased coordination, decreased lower extremity function, decreased safety awareness, orthopedic precautions.     Rehab Prognosis:  Good; patient would benefit from acute skilled OT services to address these deficits and reach maximum level of function.       Plan:     Patient to be seen 6 x/week to address the above listed problems via self-care/home management, therapeutic activities, therapeutic exercises, neuromuscular re-education  Plan of Care Expires: 03/13/25  Plan of Care Reviewed with: patient    Subjective     Pain/Comfort:  Patint complains of minimal pain when going from sit to supine and supine to sit, patient with good adherence to spinal precautions     Objective:     Communicated with: nurse and physician prior to session.  Patient found HOB elevated with telemetry, peripheral IV upon OT entry to room.    General Precautions: Standard, fall    Orthopedic Precautions:spinal precautions  Braces: Hinged knee brace  Respiratory Status: Room air     Occupational Performance:     Bed Mobility:    Patient completed Rolling/Turning to Left with  supervision  Patient completed Scooting/Bridging with supervision  Patient completed Supine to Sit with supervision     Functional Mobility/Transfers:  Patient completed Sit <>  Stand Transfer with supervision  with  rolling walker   Patient completed Toilet Transfer Step Transfer technique with supervision with  rolling walker  Functional Mobility: ambulate to and from bathroom with supervision with RW    Activities of Daily Living:  Grooming: supervision wash face and hands and brush hair in standing at sink  Toileting: modified independence seated and standing with RW    [facility] 6 Click ADL: 21    Patient Education:  Patient provided with verbal education and demonstrations education regarding fall prevention and safety awareness.  Patient was able to return demonstration.      Patient left supine with  PTA present.    GOALS:   Multidisciplinary Problems       Occupational Therapy Goals          Problem: Occupational Therapy    Goal Priority Disciplines Outcome Interventions   Occupational Therapy Goal     OT, PT/OT Progressing    Description: Goals to be met by: 3/13/2025     Patient will increase functional independence with ADLs by performing:    UE Dressing with Stand-by Assistance.  LE Dressing with Stand-by Assistance.  Grooming while standing at sink with Stand-by Assistance.  Toileting from toilet with Stand-by Assistance for hygiene and clothing management.   Toilet transfer to toilet with Stand-by Assistance.                         Time Tracking:     OT Date of Treatment: 02/17/25  OT Start Time: 0842  OT Stop Time: 0907  OT Total Time (min): 25 min    Billable Minutes:Self Care/Home Management 15  Therapeutic Activity 10    OT/SUAD: SUAD     Number of SUAD visits since last OT visit: 2    2/17/2025

## 2025-02-17 NOTE — PLAN OF CARE
Problem: Adult Inpatient Plan of Care  Goal: Plan of Care Review  2/17/2025 0851 by Lety Payan RN  Outcome: Met  2/17/2025 0716 by Lety Payan RN  Outcome: Progressing  Goal: Patient-Specific Goal (Individualized)  2/17/2025 0851 by Lety Payan RN  Outcome: Met  2/17/2025 0716 by Lety Payan RN  Outcome: Progressing  Goal: Absence of Hospital-Acquired Illness or Injury  2/17/2025 0851 by Lety Payan RN  Outcome: Met  2/17/2025 0716 by Lety Payan RN  Outcome: Progressing  Goal: Optimal Comfort and Wellbeing  2/17/2025 0851 by Lety Payan RN  Outcome: Met  2/17/2025 0716 by Lety Payan RN  Outcome: Progressing  Goal: Readiness for Transition of Care  2/17/2025 0851 by Lety Payan RN  Outcome: Met  2/17/2025 0716 by Lety Payan RN  Outcome: Progressing     Problem: Infection  Goal: Absence of Infection Signs and Symptoms  2/17/2025 0851 by Lety Payan RN  Outcome: Met  2/17/2025 0716 by Lety Payan RN  Outcome: Progressing     Problem: Stroke, Ischemic (Includes Transient Ischemic Attack)  Goal: Optimal Coping  2/17/2025 0851 by Lety Payan RN  Outcome: Met  2/17/2025 0716 by Lety Payan RN  Outcome: Progressing  Goal: Effective Bowel Elimination  2/17/2025 0851 by Lety Payan RN  Outcome: Met  2/17/2025 0716 by Lety Payan RN  Outcome: Progressing  Goal: Optimal Cerebral Tissue Perfusion  2/17/2025 0851 by Lety Payan RN  Outcome: Met  2/17/2025 0716 by Lety Payan RN  Outcome: Progressing  Goal: Optimal Cognitive Function  2/17/2025 0851 by Lety Payan RN  Outcome: Met  2/17/2025 0716 by Lety Payan RN  Outcome: Progressing  Goal: Improved Communication Skills  2/17/2025 0851 by Lety Payan RN  Outcome: Met  2/17/2025 0716 by Lety Payan RN  Outcome: Progressing  Goal: Optimal Functional Ability  2/17/2025 0851 by Lety Payan RN  Outcome: Met  2/17/2025 0716 by Ora,  JONATHON Davidson  Outcome: Progressing  Goal: Optimal Nutrition Intake  2/17/2025 0851 by Lety Payan RN  Outcome: Met  2/17/2025 0716 by Lety Payan RN  Outcome: Progressing  Goal: Effective Oxygenation and Ventilation  2/17/2025 0851 by Lety Payan RN  Outcome: Met  2/17/2025 0716 by Lety Payan RN  Outcome: Progressing  Goal: Improved Sensorimotor Function  2/17/2025 0851 by Lety Payan RN  Outcome: Met  2/17/2025 0716 by Lety Payan RN  Outcome: Progressing  Goal: Safe and Effective Swallow  2/17/2025 0851 by Lety Payan RN  Outcome: Met  2/17/2025 0716 by Lety Payan RN  Outcome: Progressing  Goal: Effective Urinary Elimination  2/17/2025 0851 by Lety Payan RN  Outcome: Met  2/17/2025 0716 by Lety Payan RN  Outcome: Progressing

## 2025-02-20 ENCOUNTER — PATIENT OUTREACH (OUTPATIENT)
Dept: ADMINISTRATIVE | Facility: CLINIC | Age: 70
End: 2025-02-20
Payer: MEDICARE

## 2025-02-20 NOTE — PROGRESS NOTES
C3 nurse spoke with Telma, patient's cousin and patient in back ground for a TCC post hospital discharge follow up call. Requested to call patient's daughter Mita with patient's consent. The patient has a scheduled [facility] appointment with Dr. Tony Leon on 02/25/2025 @ 330 pm.  Appointment with Jennifer Chavez NP-C on 03/03/2025 @ 915 am.  Appointment with Guerita Fox NP (Neurology) on 03/27/2025 @ 3 pm.

## 2025-02-20 NOTE — PROGRESS NOTES
C3 nurse attempted to contact patient's daughter, Mita for a TCC post hospital discharge follow up call. No answer. Left voicemail with callback information.

## 2025-02-21 NOTE — PROGRESS NOTES
C3 nurse attempted to contact patient's daughter, Mita for a TCC post hospital discharge follow up call. No answer. Left voicemail with callback information. The patient has a scheduled hospitals appointment with Dr. Tony Leon on 02/25/2025 @ 330 pm.    Appointment with Jennifer Chavez NP-C on 03/03/2025 @ 915 am.  Appointment with Guerita Fox NP (Neurology) on 03/27/2025 @ 3 pm.

## 2025-02-24 ENCOUNTER — TELEPHONE (OUTPATIENT)
Dept: NEUROSURGERY | Facility: CLINIC | Age: 70
End: 2025-02-24
Payer: MEDICARE

## 2025-02-24 ENCOUNTER — LAB REQUISITION (OUTPATIENT)
Dept: LAB | Facility: HOSPITAL | Age: 70
End: 2025-02-24
Payer: MEDICARE

## 2025-02-24 DIAGNOSIS — I48.0 PAROXYSMAL ATRIAL FIBRILLATION: ICD-10-CM

## 2025-02-24 DIAGNOSIS — I11.0 HYPERTENSIVE HEART DISEASE WITH HEART FAILURE: ICD-10-CM

## 2025-02-24 DIAGNOSIS — I50.33 ACUTE ON CHRONIC DIASTOLIC (CONGESTIVE) HEART FAILURE: ICD-10-CM

## 2025-02-24 DIAGNOSIS — S32.008D OTHER CLOSED FRACTURE OF LUMBAR VERTEBRA WITH ROUTINE HEALING, UNSPECIFIED LUMBAR VERTEBRAL LEVEL, SUBSEQUENT ENCOUNTER: Primary | ICD-10-CM

## 2025-02-24 LAB
INR PPP: 8.7
PROTHROMBIN TIME: 77.9 SECONDS (ref 12.5–14.5)

## 2025-02-24 PROCEDURE — 85610 PROTHROMBIN TIME: CPT | Performed by: INTERNAL MEDICINE

## 2025-02-24 NOTE — TELEPHONE ENCOUNTER
Please have the patient come in to see either Darcy or myself this week with updated x-rays of the thoracolumbar spine prior to this visit.  I have entered the orders.  Thank you

## 2025-02-24 NOTE — TELEPHONE ENCOUNTER
I called patient and spoke to patient's caregiver Mandi to schedule patient appointment. I scheduled patient with Guerita ARDEN on 2/27 at 10:00 with XR T Spine at 9:00 at [facility]. Patient's caregiver Mandi verbalized understanding of appointment date and time.

## 2025-02-25 NOTE — PROGRESS NOTES
[facility]  History & Physical  Neurosurgery      Kayla Ruiz   [id number]   1955       SUBJECTIVE:     CHIEF COMPLAINT:  Back pain    HPI:  Kayla Ruiz is a 69 y.o. female who presents for a hospital follow-up.  The patient presented to the emergency department on 1/9/2025 following a fall.  The patient was diagnosed with an L1 vertebral body compression fracture therefore Dr. Jordan was consulted.  Conservative management of symptoms with a TLSO brace was recommended at that time. She was also diagnosed with a right patellar fracture at that time.  She is able to be discharged home on 1/13/2025.   The patient returned to the emergency department on 1/25/2025 secondary to lower back pain.  She was provided a refill of hydrocodone/APAP 5/325mg.  The patient again returned to the emergency department on 2/4/2025 at the recommendation of cardiologist following the discovery of an INR of 8.8.  Upon further investigation the patient's  stated she was taking 1500 mg of Tylenol  4 times a day secondary to her pain.  She was ultimately referred on for kyphoplasty with Dr. Cardoza.  On 2/4/2025 the patient's caregiver, Mandi contacted our office with a report of severe lower back pain.  The patient apparently had improvement of symptoms for approximately 6 days following her L1 kyphoplasty with Spine Rubén on 2/12/25 with Dr. Marroquin, at which time her symptoms significantly worsened.    The patient presents today describing axial lower back pain occasionally radiating into the right lower back.  She states she saw temporary relief following a recent kyphoplasty although her symptoms have returned.  She was having some relief with utilization of Norco although has when out of this medication.  She has attempted Tylenol with no significant improvement.  She states she is constantly in pain at this time.  Her only comfortable position at times is lying down.  She is denying numbness weakness  or radicular symptoms at this time.  She does present in a wheelchair secondary to pain.  She has found some relief with utilization of gabapentin although this does make her gait somewhat unsteady therefore she has discontinued this medication.  Her daughter who has accompanied her today states she did have an episode of sneezing around the time her pain returned.  The patient rates the pain 8 on the pain scale.  She is denying any changes in bowel or bladder function at this time.    Past Medical History:   Diagnosis Date    A-fib     Anticoagulant long-term use     Depression     Diabetes mellitus     Hyperlipidemia     Sleep apnea        Past Surgical History:   Procedure Laterality Date    AORTIC VALVE REPLACEMENT      CHOLECYSTECTOMY      COLON RESECTION      fusion      LEFT HEART CATHETERIZATION Left 05/18/2023    Procedure: Left heart cath;  Surgeon: Kahlil Martin MD;  Location: [facility] CATH LAB;  Service: Cardiology;  Laterality: Left;  LHC +/- PCI    TONSILLECTOMY         Family History   Problem Relation Name Age of Onset    COPD Mother      Hypertension Mother      Heart disease Father         Social History     Socioeconomic History    Marital status:    Tobacco Use    Smoking status: Every Day     Current packs/day: 1.00     Average packs/day: 1 pack/day for 50.0 years (50.0 ttl pk-yrs)     Types: Cigarettes    Smokeless tobacco: Never   Substance and Sexual Activity    Alcohol use: Never    Drug use: Never     Social Drivers of Health     Financial Resource Strain: Low Risk  (2/9/2025)    Overall Financial Resource Strain (CARDIA)     Difficulty of Paying Living Expenses: Not hard at all   Food Insecurity: No Food Insecurity (2/9/2025)    Hunger Vital Sign     Worried About Running Out of Food in the Last Year: Never true     Ran Out of Food in the Last Year: Never true   Transportation Needs: No Transportation Needs (2/9/2025)    TRANSPORTATION NEEDS     Transportation : No   Physical  Activity: Insufficiently Active (2/19/2023)    Exercise Vital Sign     Days of Exercise per Week: 3 days     Minutes of Exercise per Session: 20 min   Stress: No Stress Concern Present (2/9/2025)    Citizen of [place] [employer] - Occupational Stress Questionnaire     Feeling of Stress : Not at all   Recent Concern: Stress - Stress Concern Present (12/24/2024)    Citizen of [place] [employer] - Occupational Stress Questionnaire     Feeling of Stress : To some extent   Housing Stability: Unknown (2/9/2025)    Housing Stability Vital Sign     Unable to Pay for Housing in the Last Year: No     Homeless in the Last Year: No        Review of patient's allergies indicates:   Allergen Reactions    Ace inhibitors     Naproxen sodium     Nifedipine     Venlafaxine         Current Outpatient Medications   Medication Instructions    albuterol-ipratropium (DUO-NEB) 2.5 mg-0.5 mg/3 mL nebulizer solution 3 mLs, Every 4 hours PRN    calcium carbonate-vitamin D3 600 mg-10 mcg (400 unit) Cap 1,250 mcg    cholecalciferol (vitamin D3) 50,000 Units, Every 7 days    clonazePAM (KLONOPIN) 0.5 mg, 3 times daily PRN    ergocalciferol (ERGOCALCIFEROL) 50,000 unit Cap 1 capsule, Every 7 days    FLUoxetine 40 mg, Daily    furosemide (LASIX) 40 mg, Oral, 2 times daily    gabapentin (NEURONTIN) 100 mg, 3 times daily    HYDROcodone-acetaminophen (NORCO) 7.5-325 mg per tablet 1 tablet, Oral, Every 8 hours PRN    LIDOcaine (LIDODERM) 5 % 1 patch, Transdermal, Daily, Remove & Discard patch within 12 hours or as directed by MD    nicotine (NICODERM CQ) 21 mg/24 hr 1 patch, Transdermal, Daily    OLANZapine (ZYPREXA) 5 mg, Nightly    ondansetron (ZOFRAN-ODT) 4 mg, Every 6 hours PRN    [Paused] sotaloL (BETAPACE) 80 mg, Every 12 hours    spironolactone (ALDACTONE) 25 mg, Oral, Daily    traZODone (DESYREL) 25-50 mg, Nightly PRN    TRUE METRIX GLUCOSE METER Misc USE 1 ITEM AS DIRECTED THREE TIMES A DAY TO CHECK BLOOD GLUCOSE    TRUE  "METRIX GLUCOSE TEST STRIP Strp 3 times daily    TRUEPLUS LANCETS 33 gauge Misc 3 times daily    warfarin (COUMADIN) 5 mg, Daily          Review of Systems   Constitutional:  Negative for chills, fever and weight loss.   HENT:  Negative for congestion, hearing loss, nosebleeds and tinnitus.    Eyes:  Negative for blurred vision, double vision and photophobia.   Respiratory:  Negative for cough, shortness of breath and wheezing.    Cardiovascular:  Negative for chest pain, palpitations and leg swelling.   Gastrointestinal:  Negative for constipation, diarrhea, nausea and vomiting.   Genitourinary:  Negative for dysuria, frequency and urgency.   Musculoskeletal:  Positive for back pain. Negative for falls and neck pain.   Skin:  Negative for itching and rash.   Neurological:  Negative for dizziness, tingling, tremors, sensory change, speech change, seizures, loss of consciousness and headaches.   Psychiatric/Behavioral:  Negative for depression, hallucinations and memory loss. The patient is not nervous/anxious.        OBJECTIVE:     Visit Vitals  BP (!) 102/59 (BP Location: Right arm, Patient Position: Sitting)   Pulse (!) 57   Resp 16   Ht 5' 5" (1.651 m)   Wt 90.7 kg (200 lb)   BMI 33.28 kg/m²        Physical Exam    General:  Pleasant, Obese, Well-groomed.    Cardiovascular:  Neck is supple.    Lungs:  Breathing is quiet, non-lablored    Abdomen:  Soft, non-tender, non-distended.    Neurological:  Muscle strength against resistance:   Right Left   Deltoid (C5) 5/5 5/5   Biceps (C5/6) 5/5 5/5   Wrist Flexors (C5/6) 5/5 5/5   Triceps (C7) 5/5 5/5   Wrist extension (C7) 5/5 5/5   Finger abduction (C8) 5/5 5/5    5/5 5/5        Hip abduction 5/5 5/5   Hip adduction 5/5 5/5   Hip flexion (L2) 5/5 5/5   Knee extension (L3) 5/5 5/5   Knee flexion (L4) 5/5 5/5   Dorsiflexion (L5) 5/5 5/5   EHL (L5) 5/5 5/5   Plantar flexion (S1) 5/5 5/5   Sensation is intact to primary modalities in bilateral upper and lower " extremities.    Reflexes:   Right Left   Triceps (C7) 2+ 2+   Biceps (C5) 2+ 2+   Brachioradialis (C6) 2+ 2+   Patellar (L4) 2+ 2+   Achilles (S1) 2+ 2+   Negative Clonus, Colmenares bilaterally.  Gait is limping and antalgic  Coordination is normal.  No tremor noted.    Imaging:  All pertinent neuroimaging independently reviewed. Discussed these findings in detail with the patient.    X-rays of the thoracolumbar spine dated 2/27/2025 reveal acute compression fracture at T12 that was not apparent on recent MRI dated 02/11/2025.  Kyphoplasty with Spine Jack at L1 is noted in the interim.    MRI of the lumbar spine dated 2/11/2025 reveals subacute compression fracture of L1 superior endplate with a proximally 43% maximal vertebral body height loss with mild osseous retropulsion.  There is associated edema within the L1 vertebral body.  No other acute findings are noted.    ASSESSMENT:       ICD-10-CM ICD-9-CM   1. T12 compression fracture, initial encounter  S22.080A 805.2   2. Osteoporosis with current pathological fracture, unspecified osteoporosis type, initial encounter  M80.00XA 733.00     733.10     PLAN:     1. T12 compression fracture, initial encounter (Primary)  - Ambulatory referral/consult to Pain Clinic; Future  - HYDROcodone-acetaminophen (NORCO) 7.5-325 mg per tablet; Take 1 tablet by mouth every 8 (eight) hours as needed for Pain (1/2 tablet every 8 hours PRN fro severe pain).  Dispense: 21 tablet; Refill: 0  - DXA Bone Density Appendicular Skeleton; Future    2. Osteoporosis with current pathological fracture, unspecified osteoporosis type, initial encounter  - DXA Bone Density Appendicular Skeleton; Future    Kayla Ruiz presents today describing severe pain into the lower back.  She is accompanied by her 2 daughters today.  I did take the time to review her previous CT scan as well as updated x-rays in clinic with them which does reveal a an acute T12 compression fracture.  We will refer the patient  on for consultation with Dr. Mendoza regarding kyphoplasty.  The patient was advised to return to her TLSO brace in the interim.  We did discuss activity restrictions including no lifting more than 10lbs and avoiding any type of bending, stooping or twisting activities.  I did provide the patient a 1 time refill of hydrocodone which she has been cutting in half.  She was advised to utilize this medication sparingly as I will not be able to provide her a refill.  She was advised to alternate this medication with Tylenol and not to exceed more than 4000 mg in a 24 hour period.  The patient and her daughters verbalized understanding at this time.  We will plan to see the patient back in clinic on an as-needed basis going forward.  They were advised to notify us with any concerns or questions in the future.  I have also entered in order for a bone density scan as the patient states she has previously been diagnosed with osteoporosis although her most recent bone density scan was more than 2 years ago.  Her daughter states she has been prescribed what sounds like Fosamax although has not initiated this medication as the pharmacy has not gotten back with her since it was prescribed.  She was advised to reach out to her primary care provider regarding this issue.    Follow up if symptoms worsen or fail to improve.      E/M Level Based On Time:   15 minutes spent on reviewing chart, which includes interpreting lab results and diagnostic tests.   30 minutes spent in the room with the patient performing a history and physical exam, counseling or educating the patient/caregiver, prescribing medications, ordering labwork/diagnostic tests, or placing referrals.   0 minutes spent collaborating plan of care with physician.   5 minutes spent documenting all relevant clinical informationin the electronic health record.     Total Time Spent: 50 minutes       DONAVON Rooney    Disclaimer:  This note is prepared using voice  recognition software and as such is likely to have errors despite attempts at proofreading. Please contact me for questions.

## 2025-02-26 ENCOUNTER — LAB REQUISITION (OUTPATIENT)
Dept: LAB | Facility: HOSPITAL | Age: 70
End: 2025-02-26
Payer: MEDICARE

## 2025-02-26 DIAGNOSIS — I11.0 HYPERTENSIVE HEART DISEASE WITH HEART FAILURE: ICD-10-CM

## 2025-02-26 DIAGNOSIS — I69.398 OTHER SEQUELAE OF CEREBRAL INFARCTION: ICD-10-CM

## 2025-02-26 DIAGNOSIS — I25.10 ATHEROSCLEROTIC HEART DISEASE OF NATIVE CORONARY ARTERY WITHOUT ANGINA PECTORIS: ICD-10-CM

## 2025-02-26 LAB
INR PPP: 5.5
PROTHROMBIN TIME: 50.3 SECONDS (ref 12.5–14.5)

## 2025-02-26 PROCEDURE — 85610 PROTHROMBIN TIME: CPT

## 2025-02-27 ENCOUNTER — OFFICE VISIT (OUTPATIENT)
Dept: NEUROSURGERY | Facility: CLINIC | Age: 70
End: 2025-02-27
Payer: MEDICARE

## 2025-02-27 ENCOUNTER — HOSPITAL ENCOUNTER (OUTPATIENT)
Dept: RADIOLOGY | Facility: HOSPITAL | Age: 70
Discharge: HOME | End: 2025-02-27
Attending: NURSE PRACTITIONER
Payer: MEDICARE

## 2025-02-27 VITALS
HEART RATE: 57 BPM | WEIGHT: 200 LBS | HEIGHT: 65 IN | BODY MASS INDEX: 33.32 KG/M2 | DIASTOLIC BLOOD PRESSURE: 59 MMHG | SYSTOLIC BLOOD PRESSURE: 102 MMHG | RESPIRATION RATE: 16 BRPM

## 2025-02-27 DIAGNOSIS — S22.080A T12 COMPRESSION FRACTURE, INITIAL ENCOUNTER: Primary | ICD-10-CM

## 2025-02-27 DIAGNOSIS — S32.008D OTHER CLOSED FRACTURE OF LUMBAR VERTEBRA WITH ROUTINE HEALING, UNSPECIFIED LUMBAR VERTEBRAL LEVEL, SUBSEQUENT ENCOUNTER: ICD-10-CM

## 2025-02-27 DIAGNOSIS — M80.00XA OSTEOPOROSIS WITH CURRENT PATHOLOGICAL FRACTURE, UNSPECIFIED OSTEOPOROSIS TYPE, INITIAL ENCOUNTER: ICD-10-CM

## 2025-02-27 PROCEDURE — 3008F BODY MASS INDEX DOCD: CPT | Mod: CPTII,,, | Performed by: NURSE PRACTITIONER

## 2025-02-27 PROCEDURE — 3078F DIAST BP <80 MM HG: CPT | Mod: CPTII,,, | Performed by: NURSE PRACTITIONER

## 2025-02-27 PROCEDURE — 99215 OFFICE O/P EST HI 40 MIN: CPT | Mod: ,,, | Performed by: NURSE PRACTITIONER

## 2025-02-27 PROCEDURE — 3288F FALL RISK ASSESSMENT DOCD: CPT | Mod: CPTII,,, | Performed by: NURSE PRACTITIONER

## 2025-02-27 PROCEDURE — 1100F PTFALLS ASSESS-DOCD GE2>/YR: CPT | Mod: CPTII,,, | Performed by: NURSE PRACTITIONER

## 2025-02-27 PROCEDURE — 1159F MED LIST DOCD IN RCRD: CPT | Mod: CPTII,,, | Performed by: NURSE PRACTITIONER

## 2025-02-27 PROCEDURE — 1125F AMNT PAIN NOTED PAIN PRSNT: CPT | Mod: CPTII,,, | Performed by: NURSE PRACTITIONER

## 2025-02-27 PROCEDURE — 3074F SYST BP LT 130 MM HG: CPT | Mod: CPTII,,, | Performed by: NURSE PRACTITIONER

## 2025-02-27 PROCEDURE — 3044F HG A1C LEVEL LT 7.0%: CPT | Mod: CPTII,,, | Performed by: NURSE PRACTITIONER

## 2025-02-27 PROCEDURE — 72080 X-RAY EXAM THORACOLMB 2/> VW: CPT | Mod: TC

## 2025-02-27 PROCEDURE — 1160F RVW MEDS BY RX/DR IN RCRD: CPT | Mod: CPTII,,, | Performed by: NURSE PRACTITIONER

## 2025-02-27 RX ORDER — LEVOFLOXACIN 500 MG/1
1 TABLET, FILM COATED ORAL DAILY
COMMUNITY
End: 2025-02-27

## 2025-02-27 RX ORDER — AMOXICILLIN 500 MG/1
1 CAPSULE ORAL 3 TIMES DAILY
COMMUNITY
End: 2025-02-27

## 2025-02-27 RX ORDER — OLMESARTAN MEDOXOMIL 40 MG/1
1 TABLET ORAL DAILY
COMMUNITY
End: 2025-02-27

## 2025-02-27 RX ORDER — HYDROCODONE BITARTRATE AND ACETAMINOPHEN 7.5; 325 MG/1; MG/1
1 TABLET ORAL EVERY 8 HOURS PRN
Qty: 21 TABLET | Refills: 0 | Status: SHIPPED | OUTPATIENT
Start: 2025-02-27

## 2025-02-27 RX ORDER — PIOGLITAZONEHYDROCHLORIDE 15 MG/1
1 TABLET ORAL DAILY
COMMUNITY
End: 2025-02-27

## 2025-02-27 RX ORDER — CHROMIUM PICOLINATE 200 MCG
1250 TABLET ORAL
COMMUNITY

## 2025-02-27 RX ORDER — LANCETS 33 GAUGE
EACH MISCELLANEOUS 3 TIMES DAILY
COMMUNITY
Start: 2025-01-03

## 2025-02-27 RX ORDER — ASPIRIN 325 MG
50000 TABLET, DELAYED RELEASE (ENTERIC COATED) ORAL
COMMUNITY

## 2025-02-27 RX ORDER — ERGOCALCIFEROL 1.25 MG/1
1 CAPSULE ORAL
COMMUNITY

## 2025-02-27 RX ORDER — OLMESARTAN MEDOXOMIL AND HYDROCHLOROTHIAZIDE 40/12.5 40; 12.5 MG/1; MG/1
1 TABLET ORAL DAILY
COMMUNITY
End: 2025-02-27

## 2025-02-27 RX ORDER — HYDROCODONE BITARTRATE AND ACETAMINOPHEN 7.5; 325 MG/1; MG/1
1 TABLET ORAL
COMMUNITY
End: 2025-02-27

## 2025-02-27 RX ORDER — BLOOD-GLUCOSE METER
EACH MISCELLANEOUS
COMMUNITY
Start: 2025-01-16

## 2025-02-27 RX ORDER — CALCIUM CITRATE/VITAMIN D3 200MG-6.25
TABLET ORAL 3 TIMES DAILY
COMMUNITY
Start: 2025-01-02

## 2025-02-27 RX ORDER — OSELTAMIVIR PHOSPHATE 75 MG/1
1 CAPSULE ORAL 2 TIMES DAILY
COMMUNITY
End: 2025-02-27

## 2025-02-27 RX ORDER — ARIPIPRAZOLE 5 MG/1
1 TABLET ORAL DAILY
COMMUNITY
End: 2025-02-27

## 2025-02-27 RX ORDER — BACLOFEN 10 MG/1
1 TABLET ORAL 3 TIMES DAILY PRN
COMMUNITY
End: 2025-02-27

## 2025-02-27 RX ORDER — METFORMIN HYDROCHLORIDE 500 MG/1
500 TABLET ORAL
COMMUNITY
End: 2025-02-27

## 2025-02-27 RX ORDER — ONDANSETRON 4 MG/1
4 TABLET, ORALLY DISINTEGRATING ORAL EVERY 6 HOURS PRN
COMMUNITY
Start: 2025-01-20

## 2025-03-05 ENCOUNTER — TELEPHONE (OUTPATIENT)
Facility: CLINIC | Age: 70
End: 2025-03-05
Payer: MEDICARE

## 2025-03-11 NOTE — PROGRESS NOTES
Vladimir Mendoza M.D.   [facility]  Interventional Pain    New Patient Visit    Referring provider: Guerita Fox    Chief Complaint   Patient presents with    Back Pain     Patient here today for a right sided back pain  Pain level today is 9/10  Referred here for T12 compression fracture  Patient is taking norco 7.5 mg 1/2 every 4 hours  Patient also taking gabapentin 1 tablet twice daily   patient has a good range on motion        Assessment and Plan:     Kayla Ruiz is a 69 y.o. female with low back pain. A detailed and lengthy discussion was undertaken regarding the patient's presentation including history,  physical examination, past treatments, relevant laboratory, imaging results and future management strategies:     Assessment: 69 y.o. year old female with        ICD-10-CM ICD-9-CM   1. T12 compression fracture, initial encounter  S22.080A 805.2   2. Degeneration of intervertebral disc of lumbar region without discogenic back pain or lower extremity pain  M51.369 722.52   3. Lumbar spondylosis  M47.816 721.3   4. Lumbar radiculopathy, chronic  M54.16 724.4   5. Muscle spasm  M62.838 728.85       The mentioned diagnosis is Worsening  and is accompanied by significant limitation of ADL's    T12 Compression Fracture  Patient presents with adjacent level compression fracture following spine Rubén procedure at L1 vertebra.  Patient noted aggravation of pain 3 days following the procedure.  The fracture is acute in nature as less than 6 weeks.  The fracture is most likely in the setting of osteoporosis and this is the 4th compression fracture.  Patient has previously had kyphoplasty at T8, T9 and spine Rubén procedure at L1 recently.  Patient currently is on treatment for osteoporosis.  Patient has worsening pain with pain score noted as 8 and 9 in the last 2 office visits.  The height loss is approximately 30% based on the x-ray imaging as noted below.  Conrad Doe disability index at  22/24.  Patient history with midline axial pain around the site of the new compression fracture. Plan to obtain MRI to assess for bony edema, retropulsion and height loss and significant kyphotic deformity.  Myofascial pain in the thoracolumbar spine address today with trigger point injections.  Patient also provided prescriptions of hydrocodone and methocarbamol to address pain related to compression fracture.    Lumbar spondylosis  Patient also has a component of lower lumbar spondylosis, with pain along the lower lumbar facet joints.  Plan is to re-evaluate patient in 2 weeks following trigger point injections performed today and MRI of the lumbar spine to assess for the T12 compression fracture.    T12 compression fracture, initial encounter  -     Ambulatory referral/consult to Pain Clinic  -     MRI Lumbar Spine Without Contrast; Future; Expected date: 03/12/2025  -     Miscellaneous Test, Sendout CDAU7; Future; Expected date: 03/12/2025  -     HYDROcodone-acetaminophen (NORCO) 5-325 mg per tablet; Take 1 tablet by mouth every 8 (eight) hours as needed for Pain.  Dispense: 42 tablet; Refill: 0    Degeneration of intervertebral disc of lumbar region without discogenic back pain or lower extremity pain    Lumbar spondylosis  -     HYDROcodone-acetaminophen (NORCO) 5-325 mg per tablet; Take 1 tablet by mouth every 8 (eight) hours as needed for Pain.  Dispense: 42 tablet; Refill: 0    Lumbar radiculopathy, chronic  -     pregabalin (LYRICA) 50 MG capsule; Take 1 capsule (50 mg total) by mouth 2 (two) times daily for 7 days, THEN 2 capsules (100 mg total) 2 (two) times daily for 7 days.  Dispense: 42 capsule; Refill: 0    Muscle spasm  -     methocarbamoL (ROBAXIN) 500 MG Tab; Take 1 tablet (500 mg total) by mouth 3 (three) times daily as needed (muscle spasm).  Dispense: 90 tablet; Refill: 0  -     LIDOcaine HCL 10 mg/ml (1%) injection 10 mL      Plan:  The plan was clearly communicated to the patient, who  verbalized understanding of the same.     Diagnostics: Reviewed the followed diagnostic results  Labs/EMG: Labs pertinent for raised BUN 21.6, anemia with Hb 10, and raised INR 5.5 (on warfarin)  UDS with confirmatory test ordered today  Imaging:   MRI/CT:   MRI Lumbar Spine ordered today (assess for % height loss, retropulsion, bony edema and kyphotic deformity)  02/04/2025 MRI Lumbar Spine Impression: Subacute compression fracture of L1 superior endplate with 43% maximum vertebral body height loss and only mild osseous retropulsion. Mild multilevel lumbar degeneration as detailed above.  01/09/2025 CT Lumbar Spine Impression: Mild multilevel lumbar disc degeneration.  Left foraminal disc protrusion at L3-L4 causing mild left foraminal stenosis encroaching upon the exiting left L3 nerve.  Shallow disc bulges at L1-L2 through L4-5 which indent the ventral thecal sac.  No significant central canal stenosis.  02/04/2025 CT Cervical Spine Impression: There is grade 1 anterolisthesis of C4 on C5 and C7 on T1.  Solid ACDF at C5, C6 and C7. There is moderate degenerative narrowing of the left neural foramen at C4-C5.     Xray:  02/27/2025 Xray Thoraco-Lumbar Spine Impression: New compression deformity of T12 with anterior wedging not seen on the examination of February 6, 2025. Compression deformity of L1 head progress since that last examination and is straightening now with evidence of kyphoplasty changes. Degenerative changes  02/06/2025 Xray Thoraco-Lumbar Spine Impression: L1 compression fracture with progressive loss of height compared to 01/09/2025.  Medications:  Plan/Ordered:   Pregabalin: Titration dose to 100 mg BID over 14 days (gabapentin d/c)  Methocarbamol 500 mg tid  Hydrocodone 5 mg TID PRN x 14 days   Current: Clonazepam, Fluoxetine, Gabapentin, Hydrocodone, Lidocaine 5%, Olanzapine, Trazodone  Previous:  Baclofen, oxycodone, tramadol  Anticoagulants/Antiplatelets: Yes aspirin and Coumadin   Pain  Contract 03/12/2025  UDS ordered 03/12/2025  Non-Pharmacologic  Therapy: Patient has previously completed physician directed physical therapy with aggravation of her pain symptoms.  Pain Psychology: None  Bracing: Continue with bracing for next 6-10 weeks. Patient has been using a brace for past 4 weeks without any significant pain relief.   New Consults: Cardiac Clearance with Dr. Tony Leon for possible kyphoplasty  Interventions:  Injections:  Plan/Ordered:   03/12/2025 TPI of paraspinal muscles in lumbar region: multifidus, longissimus and illiocostalis  Plan for Kyphoplasty following evaluation in 2-4 with conservative measures  Patient is taking ASA as 2° prevention; she will have to stop 5 days prior to procedure. Patient is also taking warfarin and will need bridging recommendations for kyphoplasty procedure. Will get clearance from (Cardiology) Dr. Tony Leon.      Previous:   Kyphoplasty at T8 and T9 in 09/2024  Spine Rubén at L1 in 02/2025  Information on the procedure was provided to the patient today. Risks and benefits were discussed. All questions were answered.  Surgical Spine Interventions: Cervical ACDF  Relevant Risk Factors for Interventions:  Medical-Surgical History/Devices: Stroke, compression # at T8 and T9 s/p kyphoplasty, compression # L1 s/p spine rubén procedure, COPD, Atrial Fib, CHF, s/p heart valve replacement, Right patella fracture, LESLI, tobacco dependence  Allergies: ACE inhibitors (Anaphylaxis), Naproxen, Nifedipine  Other:   I reviewed the prior notes from each unique source dated 02/27/2025 with Guerita RAPHAEL (NeuroSurgery) and 01/29/2025 with Dr. Devyn Young (Orthopedics)  Old Records: Decision was made to not obtain old records   Follow Up: Plan for patient to follow up in 2 weeks    Current Visit Imaging Interpretation:  I independently visualized/interpreted the following images dated Xray Thoraco-Lumbar Spine dated 02/27/2025 and 02/06/2025   Pertinent findings include:  Compression fracture at T12 with more than 30% height loss as per Xray imaging.  Key Images:       The above plan and management options were discussed at length with patient. Patient is in agreement with the above and verbalized understanding.    - I discussed the goals of interventional chronic pain management with the patient on today's visit. I explained the utility of injections for diagnostic and therapeutic purposes.  We discussed a multimodal approach to pain including treating the patient's given worst pain at any given time.  We will use a systematic approach to addressing pain.  We will also adopt a multimodal approach that includes injections, adjuvant medications, physical therapy, at times psychiatry.  There may be a limited role for opioid use intermittently in the treatment of pain, more particularly for acute pain although no one approach can be used as a sole treatment modality. I emphasized the importance of regular exercise, core strengthening and stretching, diet and weight loss as a cornerstone of long-term pain management.    - This condition does not require this patient to take time off of work, and the primary goal of our Pain Management services is to improve the patient's functional capacity.  - Patient Questions: Answered all of the patient's questions regarding diagnoses, therapy, treatment and next steps      History of Present Illness:     03/12/2025  HPI Summary    Context/Inciting Event: Denies recent trauma and falls. Notes increased aggravation of pain following spine jorge l procedure performed in 02/2025.  Location: Lower Back Pain   Radiation: Denies radiation to lower extremities   Onset and Progress: Continuous pain worsening over the past 4 weeks (Acute compression fracture)  Description/Character: stabbing  Frequency and Timing: Constant pain with periods of exacerbation  Pain Score:  8/10 on the best day and 10/10 on the worst day. Pain at this office visit 9/10 and the  previous office visit 8/10 with Guerita Fox (NeuroSurgery)  Worsening Pain: Patient notes that pain has been progressively worsening over the last few weeks.   Symptoms Worse With: Standing, Walking, and Flexing  Symptoms Improved With: nothing  Vertebral Height Loss and Imagin% height loss noted as per Xray imaging on 2025 at T12. Pending MRI to assess for retropulsion and bony edema  Osteoporosis and Treatment: yes on alendronate, calcium carbonate, vitamin D3  Recent DXA Scan: yes; ordered by Guerita Fox NP (NeuroSurgery)  Bracing: yes    Associated Conditions:  Numbness: no  Weakness: no  Sleep problems: yes  H/o Depression and anxiety: no  Opiate use disorder: no  Inflammatory conditions like Rheumatoid/Ankylosing spondylitis/Psoriatic: no  Functional/Activity Limitations: yes, Symptoms interfere with daily activity and sleeping    Patient denies night fever/night sweats, urinary incontinence, bowel incontinence, significant weight loss, significant motor weakness, and loss of sensations.    Previous Treatments:  Medications:   Current: Clonazepam, Fluoxetine, Gabapentin, Hydrocodone, Lidocaine 5%, Olanzapine, Trazodone  Previous:  Baclofen, oxycodone, tramadol  Anticoagulants/Antiplatelets: Yes aspirin and Coumadin     Physical Therapy/Home Exercise: yes  Ice/Heat:yes  TENS: no  Acupuncture: no  Massage: no  Chiropractic: no      Interventional Procedures:  Kyphoplasty at T8 and T9 in 2024  Spine Rubén at L1 in 2025  Spine Surgery: Cervical ACDF procedure  Other: None      Summary of Other Notes:  2025 with Guerita Fox FNP (NeuroSurgery)  ASSESSMENT:          ICD-10-CM ICD-9-CM   1. T12 compression fracture, initial encounter  S22.080A 805.2   2. Osteoporosis with current pathological fracture, unspecified osteoporosis type, initial encounter  M80.00XA 733.00       733.10      PLAN:      1. T12 compression fracture, initial encounter (Primary)  - Ambulatory  referral/consult to Pain Clinic; Future  - HYDROcodone-acetaminophen (NORCO) 7.5-325 mg per tablet; Take 1 tablet by mouth every 8 (eight) hours as needed for Pain (1/2 tablet every 8 hours PRN fro severe pain).  Dispense: 21 tablet; Refill: 0  - DXA Bone Density Appendicular Skeleton; Future     2. Osteoporosis with current pathological fracture, unspecified osteoporosis type, initial encounter  - DXA Bone Density Appendicular Skeleton; Future     Kayla Ruiz presents today describing severe pain into the lower back.  She is accompanied by her 2 daughters today.  I did take the time to review her previous CT scan as well as updated x-rays in clinic with them which does reveal a an acute T12 compression fracture.  We will refer the patient on for consultation with Dr. Mendoza regarding kyphoplasty.  The patient was advised to return to her TLSO brace in the interim.  We did discuss activity restrictions including no lifting more than 10lbs and avoiding any type of bending, stooping or twisting activities.  I did provide the patient a 1 time refill of hydrocodone which she has been cutting in half.  She was advised to utilize this medication sparingly as I will not be able to provide her a refill.  She was advised to alternate this medication with Tylenol and not to exceed more than 4000 mg in a 24 hour period.  The patient and her daughters verbalized understanding at this time.  We will plan to see the patient back in clinic on an as-needed basis going forward.  They were advised to notify us with any concerns or questions in the future.  I have also entered in order for a bone density scan as the patient states she has previously been diagnosed with osteoporosis although her most recent bone density scan was more than 2 years ago.  Her daughter states she has been prescribed what sounds like Fosamax although has not initiated this medication as the pharmacy has not gotten back with her since it was prescribed.   She was advised to reach out to her primary care provider regarding this issue.   01/29/2025 with Dr. Devyn Young (Orthopedics)  Patient is here today for follow-up evaluation 2 weeks status post fracture of right patella, nondisplaced. She was placed into a hinged knee brace in the hospital. It has been locked full extension. She is limited in her ability to mobilize at baseline. She has family with her today. She states she has soreness in the knee but it is well-controlled.   Assessment  1. Other closed fracture of right patella with routine healing, subsequent encounter  X-Ray Knee 1 or 2 View Right         Plan  Her fracture is completely nondisplaced and has not had any change in alignment compared to her initial films.  I have open up her brace at 0-60 at rest.  She will continue ambulating with it in full extension.  I have shown the family how to manage her brace.  She has minimal discomfort in the knee.  She has fractures in her spine that are being managed by Neurosurgery.  I will see her back in a month for repeat x-rays at which time we will open it up to 0-90 degrees.  They understand and agree with all that we have discussed and all questions and concerns were addressed.         Pain Disability Index:          3/12/2025     9:13 AM   Last 3 PDI Scores   Pain Disability Index (PDI) 56     Nabil Doe Disability Index 22/24     Review:  Reviewed and consistent with medication use as prescribed.        Review of patient's allergies indicates:   Allergen Reactions    Ace inhibitors Anaphylaxis     SWELLING    Venlafaxine Swelling    Naproxen sodium      RASH    Nifedipine      ITCHING       Past Medical History:   Diagnosis Date    A-fib     Anticoagulant long-term use     Depression     Diabetes mellitus     Hyperlipidemia     Sleep apnea        Past Surgical History:   Procedure Laterality Date    AORTIC VALVE REPLACEMENT      CHOLECYSTECTOMY      COLON RESECTION      fusion      LEFT HEART  CATHETERIZATION Left 05/18/2023    Procedure: Left heart cath;  Surgeon: Kahlil Martin MD;  Location: [facility] CATH LAB;  Service: Cardiology;  Laterality: Left;  LHC +/- PCI    TONSILLECTOMY          reports that she has been smoking cigarettes. She has a 50 pack-year smoking history. She has never used smokeless tobacco. She reports that she does not drink alcohol and does not use drugs.    Family History   Problem Relation Name Age of Onset    COPD Mother      Hypertension Mother      Heart disease Father         Current Outpatient Medications   Medication Sig    albuterol-ipratropium (DUO-NEB) 2.5 mg-0.5 mg/3 mL nebulizer solution Take 3 mLs by nebulization every 4 (four) hours as needed for Wheezing or Shortness of Breath.    alendronate (FOSAMAX) 70 MG tablet Take 70 mg by mouth every 7 days.    atorvastatin (LIPITOR) 40 MG tablet Take 40 mg by mouth once daily.    calcium carbonate-vitamin D3 600 mg-10 mcg (400 unit) Cap Take 1,250 mcg by mouth.    cholecalciferol, vitamin D3, 1,250 mcg (50,000 unit) capsule Take 50,000 Units by mouth every 7 days.    clonazePAM (KLONOPIN) 1 MG tablet Take 0.5 mg by mouth 3 (three) times daily as needed for Anxiety.    empagliflozin (JARDIANCE) 10 mg tablet Take 10 mg by mouth once daily.    FLUoxetine 40 MG capsule Take 40 mg by mouth once daily.    furosemide (LASIX) 40 MG tablet Take 1 tablet (40 mg total) by mouth 2 (two) times daily.    isosorbide mononitrate (IMDUR) 30 MG 24 hr tablet Take 30 mg by mouth once daily.    LIDOcaine (LIDODERM) 5 % Place 1 patch onto the skin once daily. Remove & Discard patch within 12 hours or as directed by MD    LIDOcaine 1%, BUFFERED, 10 MG/20 mL APPLY 1 PATCH TOPICALLY IN THE MORNING FOR 14 DAYS. REMOVE AND DISCARD WITHIN 12 HOURS    OLANZapine (ZYPREXA) 5 MG tablet Take 5 mg by mouth every evening.    ondansetron (ZOFRAN-ODT) 4 MG TbDL Take 4 mg by mouth every 6 (six) hours as needed.    [Paused] sotaloL (BETAPACE) 120 MG Tab Take 80  mg by mouth every 12 (twelve) hours. Take 1/2 tablet BID    TRUE METRIX GLUCOSE METER Misc USE 1 ITEM AS DIRECTED THREE TIMES A DAY TO CHECK BLOOD GLUCOSE    TRUE METRIX GLUCOSE TEST STRIP Strp 3 (three) times daily.    TRUEPLUS LANCETS 33 gauge Misc Apply topically 3 (three) times daily.    warfarin (COUMADIN) 5 MG tablet Take 5 mg by mouth Daily.    aspirin 81 MG Chew Take 81 mg by mouth once daily.    dapagliflozin propanediol (FARXIGA) 10 mg tablet Take 1 tablet by mouth once daily. (Patient not taking: Reported on 3/12/2025)    ergocalciferol (ERGOCALCIFEROL) 50,000 unit Cap Take 1 capsule by mouth every 7 days. (Patient not taking: Reported on 3/12/2025)    HYDROcodone-acetaminophen (NORCO) 5-325 mg per tablet Take 1 tablet by mouth every 8 (eight) hours as needed for Pain.    methocarbamoL (ROBAXIN) 500 MG Tab Take 1 tablet (500 mg total) by mouth 3 (three) times daily as needed (muscle spasm).    nicotine (NICODERM CQ) 21 mg/24 hr Place 1 patch onto the skin once daily. (Patient not taking: Reported on 3/12/2025)    pregabalin (LYRICA) 50 MG capsule Take 1 capsule (50 mg total) by mouth 2 (two) times daily for 7 days, THEN 2 capsules (100 mg total) 2 (two) times daily for 7 days.    spironolactone (ALDACTONE) 25 MG tablet Take 1 tablet (25 mg total) by mouth once daily.    traZODone (DESYREL) 50 MG tablet Take 25-50 mg by mouth nightly as needed.     Current Facility-Administered Medications   Medication    LIDOcaine HCL 10 mg/ml (1%) injection 10 mL     Facility-Administered Medications Ordered in Other Visits   Medication    0.9%  NaCl infusion    diphenhydrAMINE capsule 50 mg       Review of Systems:     Review of Systems   Constitutional: Negative.  Negative for chills, diaphoresis and fever.   HENT: Negative.     Eyes: Negative.    Respiratory: Negative.  Negative for shortness of breath and wheezing.    Cardiovascular: Negative.  Negative for chest pain and palpitations.   Gastrointestinal: Negative.   Negative for blood in stool.   Genitourinary: Negative.    Musculoskeletal:         Refer to HPI   Skin: Negative.  Negative for rash.   Neurological:  Negative for tremors and speech change.   Endo/Heme/Allergies: Negative.    Psychiatric/Behavioral: Negative.         PHYSICAL EXAM:   Visit Vitals  BP (!) 97/58 (BP Location: Left arm, Patient Position: Sitting)   Pulse (!) 53       General Appearance: healthy, well developed, well nourished, appropriately dressed  Mental Status: Alert, oriented, thought content appropriate; Normal affect  Psych: Mood and affect appropriate  Head: Normocephalic, atraumatic  Eyes: Extraocular movements intact.   Neck: No asymmetry, masses or scars; supple; midline   Skin: Warm and dry; No rashes visible on exposed areas  Lung: Respiratory effort normal, symmetrical chest rise  Cardiovascular: RRR with palpation of the radial artery.    Neuro:     Motor & Sensory:     Strength and Sensation:     Upper Extremity  Nerve C5 C6 C7 C8 T1   Muscle Groups Shoulder Abduction Elbow Flexion (Palm up) Elbow Flexion (Thumb up) Wrist Extension Elbow Extension Wrist Flexion Hand  Finger Abduction   Right 5/5 5/5 5/5 5/5 5/5 5/5 5/5 5/5   Left 5/5 5/5 5/5 5/5 5/5 5/5 5/5 5/5     Nerve Root (area) Light Touch    R L   C5  (Lateral arm below deltoid) 2 2   C6  (Thumb & Radial hand/forearm) 2 2   C7 (Finger 2,3,4) 2 2   C8 (Finger 5) 2 2   T1 (Medial elbow) 2 2       Lower Extremity:  Nerve L2 L3 L4/L5 L5 S1 S2   Muscle Group Hip Flexion Hip  Adduction Knee Extension Ankle Dorsiflexion Toe  Dorsiflexion Hip  Abduction Ankle Plantarflexion Toe  Plantarflexion   Right 5/5 5/5 5/5 5/5 5/5 5/5 5/5 5/5   Left 5/5 5/5 5/5 5/5 5/5 5/5 5/5 5/5     Nerve Root (area) Light Touch    R L   L1 (iliac crest/groin) 2 2   L2 (anterior/inner thigh) 2 2   L3 (anterior thigh/medial knee) 2 2   L4 (lateral thigh/ant. knee/medial foot border) 2 2   L5 (lateral leg/dorsum foot below 2-4 toes) 2 2   S1 (lateral foot  border) 2 2   S2 (plantar heel) 2 2       Reflexes:    R L   Biceps (C5) 2+ 2+   Brachioradialis (C6) 2+ 2+   Triceps (C7) 2+ 2+   Patellar (L4) 2+ 2+   Achilles (S1) 2+ 2+       Upper Tract Signs:    R L   Colmenares's Negative Negative   Plantar Response Down-going Down-going   Clonus Negative Negative     Gait:   Heel walking: Absent  Toe walking: Absent  Tandem walking: Absent      MSK:    Lumbar Exam  Inspection:  Surgical scar noted, insertion sites for needles for kyphoplasty noted around upper midback.  Palpation:  Midline tenderness noted along the lumbar spine in the upper lumbar spine area.  Tenderness also noted along the paraspinal muscles in the lower lumbar region.  ROM:  Significant limitation to range of motion for extension flexion rotation and lateral bending  Special Tests:   Facet Loading:  Positive bilateral  Straight Leg raise:  Negative bilaterally     Data     Imaging of Relevance:     Results for orders placed during the hospital encounter of 02/04/25    MRI Lumbar Spine Without Contrast    Narrative  EXAMINATION:  MRI LUMBAR SPINE WITHOUT CONTRAST    CLINICAL HISTORY:  Eval l1 fracture for acute component for eval of kyphoplasty per IR;    TECHNIQUE:  Multiplanar, multisequence MR images were acquired from the thoracolumbar junction to the sacrum without the administration of contrast.    COMPARISON:  CT lumbar spine 01/09/2025. thoracolumbar radiograph 02/06/2025    FINDINGS:  Subacute compression fracture L1 superior endplate with 43% maximal vertebral body height loss and mild osseous retropulsion.  There is marrow edema associated with L1 vertebral body.  Mild prevertebral edema at T12-L1 levels.    No additional fracture.  No subluxation.  No ligamentous disruption.  No epidural hematoma.  No intrinsic abnormality of the distal cord or cauda equina.  Partial visualization of an exophytic cyst arising from the left renal interpolar cortex.    T12-L1: Mild osseous retropulsion associated  with T12 superior endplate compression fracture which only mildly indents the ventral thecal sac.  No significant stenosis or neural impingement.  L1-L2: Shallow central disc protrusion with no significant stenosis or neural impingement.  No facet joint degeneration.  L2-L3: Unremarkable.  L3-L4: Shallow left foraminal disc protrusion causing mild left foraminal stenosis with no evidence of exiting nerve impingement.  Central canal and right foramina are patent.  No facet joint degeneration.  L4-L5: No stenosis or neural impingement.  Mild bilateral facet joint degeneration with trace facet joint effusions.  L5-S1: Unremarkable.    Impression  Subacute compression fracture of L1 superior endplate with 43% maximum vertebral body height loss and only mild osseous retropulsion. Mild multilevel lumbar degeneration as detailed above.      Electronically signed by: Phoenix Albert  Date:    02/11/2025  Time:    09:15      Results for orders placed during the hospital encounter of 01/09/25    CT Lumbar Spine Without Contrast    Narrative  EXAMINATION:  CT LUMBAR SPINE WITHOUT CONTRAST    CLINICAL HISTORY:  Low back pain, trauma;Back trauma, no prior imaging (Age >= 16y);    TECHNIQUE:  Low-dose axial, sagittal and coronal reformations are obtained through the lumbar spine. 3D reconstructions were performed. Contrast was not administered. Dose reduction techniques including automatic exposure control (AEC) were utilized.    Dose (DLP): 1060 mGycm    COMPARISON:  CT abdomen and pelvis 08/25/2014.    FINDINGS:  Five lumbar-type vertebral bodies.  Vertebral column alignment is normal.  No fractures demonstrated.  Vertebral body heights are preserved.  Mild multilevel lumbar disc degeneration.  Left foraminal disc protrusion at L3-L4 causing mild left foraminal stenosis encroaching upon the exiting left L3 nerve.  Shallow disc bulges at L1-L2 through L4-5 which indent the ventral thecal sac.  No significant central canal  stenosis.  Paravertebral soft tissues are normal.  Cholecystectomy.  Calcific atherosclerosis of the abdominal aorta and its branches.  Anastomotic sutures are noted in the sigmoid colon.  Diverticulosis of the descending and sigmoid colon.    Impression  No acute fracture or subluxation.Mild multilevel lumbar degeneration as detailed above.      Electronically signed by: Phoenix Albert  Date:    01/09/2025  Time:    10:40      Results for orders placed during the hospital encounter of 02/04/25    CT Cervical Spine Without Contrast    Narrative  EXAMINATION:  CT CERVICAL SPINE WITHOUT CONTRAST    CLINICAL HISTORY:  Trauma.    TECHNIQUE:  Multidetector axial images were performed of the cervical spine without and.  Images were reconstructed.  Automated exposure control was utilized to minimize radiation dose.  .    COMPARISON:  None available.    FINDINGS:  Cervical vertebrae stature is maintained.  There is grade 1 anterolisthesis of C4 on C5 and C7 on T1.  Solid ACDF at C5, C6 and C7.  No acute fracture or malalignment identified.  There is moderate degenerative narrowing of the left neural foramen at C4-C5.  There is no prevertebral soft tissue prominence.  This study does not exclude the possibility of intrathecal soft tissue, ligamentous or vascular injury.    Impression  No acute fracture or malalignment identified.      Electronically signed by: Juanito Chopra  Date:    02/04/2025  Time:    21:25      02/27/2025 Xray Thoraco-Lumbar Spine   FINDINGS:  There is demineralization of the visualized osseous structures with evidence of a treated compression deformity of L1 there is now evidence of anterior wedging and compression deformity of T12 these is new since the last examination of February 6, 2025.  Kyphoplasty was performed at what appears to be T8 and T9.  There is retrolisthesis of L2 on L3 and L3 on L4 with some degenerative changes of the posterior elements at L4-L5 and L5-S1.     Impression: New  compression deformity of T12 with anterior wedging not seen on the examination of February 6, 2025. Compression deformity of L1 head progress since that last examination and is straightening now with evidence of kyphoplasty changes. Degenerative changes    02/06/2025 Xray Thoraco-Lumbar Spine:   FINDINGS:  There is an L1 superior endplate compression fracture with mild-to-moderate loss of height, progressed from the prior exam.  The remaining vertebral body heights are preserved.  There are small multilevel marginal osteophytes.  There is mild facet arthropathy.  Vascular calcifications are noted.  Impression: L1 compression fracture with progressive loss of height compared to 01/09/2025.      Labs of Relevance:  Chemistry:  Lab Results   Component Value Date     02/16/2025    K 4.1 02/16/2025    BUN 21.6 (H) 02/16/2025    CREATININE 0.85 02/16/2025    EGFRNORACEVR >60 02/16/2025    GLUCOSE 198 (H) 02/16/2025    CALCIUM 8.4 02/16/2025    ALKPHOS 126 02/14/2025    LABPROT 50.3 (H) 02/26/2025    ALBUMIN 2.9 (L) 02/14/2025    BILIDIR 0.3 02/22/2021    IBILI 0.50 02/22/2021    AST 21 02/14/2025    ALT 21 02/14/2025    MG 2.20 02/16/2025    PHOS 4.2 02/06/2025        Lab Results   Component Value Date    HGBA1C 6.9 02/05/2025        Hematology:  Lab Results   Component Value Date    WBC 7.15 02/17/2025    HGB 10.0 (L) 02/17/2025    HCT 32.9 (L) 02/17/2025     02/17/2025      Lab Results   Component Value Date    INR 5.5 (HH) 02/26/2025    INR 8.7 (HH) 02/24/2025    INR 1.5 (H) 02/17/2025         Vladimir Mendoza MD  Interventional Pain Management  [facility]     Disclaimer:  This note was prepared using voice recognition system and is likely to have sound alike errors that may have been overlooked even after proof reading.  Please call me with any questions    PROCEDURE NOTE  PREPROCEDURE DIAGNOSIS: Bilateral Lumbar Myofascial Pain  POSTPROCEDURE DIAGNOSIS: Bilateral Lumbar Myofascial  Pain    PROCEDURE PERFORMED: Bilateral Lumbar Trigger Point Injection   ANESTHESIA: Local   COMPLICATIONS: None     INDICATION: The patient has a history of bilateral lumbar myofascial pain with evidence of taut bands of muscle in the bilateral lumbar musculature. These muscle groups are tender to palpation and reproduce the patient's symptoms. The pain is debilitating in nature and causes significant limitations in function for the patient, including limited lumbar range of motion secondary to pain.   The risks and benefits of the procedure were discussed with the patient, all questions were answered and verbal consent was obtained.    PROCEDURE: First the patient was placed in a seated position with the low back exposed. Discrete tender points characteristic of the patient's typical pain pattern were palpated and marked with a surgical pen. These areas were then cleaned with alcohol, twice.  Next, a solution of 10 mL 1% Lidocaine was placed in a 10 mL syringe and attached to a 1.25-inch 27-gauge needle. The needle was then advanced perpendicular to the skin and administered into the muscle belly of each tender point and 1mL of the local anesthetic was injected after negative aspiration.      A total of 10 injections were performed with 5 in the left and 5 in the right lumbar paraspinal musculature (multifidus, longissimus and illiocostalis on each side).  Bandages were applied at each of the injection sites. The patient tolerated the procedure well. No complications were noted from the procedure.

## 2025-03-12 ENCOUNTER — TELEPHONE (OUTPATIENT)
Facility: CLINIC | Age: 70
End: 2025-03-12

## 2025-03-12 ENCOUNTER — OFFICE VISIT (OUTPATIENT)
Facility: CLINIC | Age: 70
End: 2025-03-12
Payer: MEDICARE

## 2025-03-12 VITALS — SYSTOLIC BLOOD PRESSURE: 97 MMHG | DIASTOLIC BLOOD PRESSURE: 58 MMHG | HEART RATE: 53 BPM

## 2025-03-12 DIAGNOSIS — M54.16 LUMBAR RADICULOPATHY, CHRONIC: ICD-10-CM

## 2025-03-12 DIAGNOSIS — S22.080A T12 COMPRESSION FRACTURE, INITIAL ENCOUNTER: Primary | ICD-10-CM

## 2025-03-12 DIAGNOSIS — M51.369 DEGENERATION OF INTERVERTEBRAL DISC OF LUMBAR REGION WITHOUT DISCOGENIC BACK PAIN OR LOWER EXTREMITY PAIN: ICD-10-CM

## 2025-03-12 DIAGNOSIS — M62.838 MUSCLE SPASM: ICD-10-CM

## 2025-03-12 DIAGNOSIS — M47.816 LUMBAR SPONDYLOSIS: ICD-10-CM

## 2025-03-12 RX ORDER — METHOCARBAMOL 500 MG/1
500 TABLET, FILM COATED ORAL 3 TIMES DAILY PRN
Qty: 90 TABLET | Refills: 0 | Status: SHIPPED | OUTPATIENT
Start: 2025-03-12 | End: 2025-04-11

## 2025-03-12 RX ORDER — LIDOCAINE HYDROCHLORIDE 10 MG/ML
10 INJECTION, SOLUTION INFILTRATION; PERINEURAL ONCE
Status: SHIPPED | OUTPATIENT
Start: 2025-03-12

## 2025-03-12 RX ORDER — ISOSORBIDE MONONITRATE 30 MG/1
30 TABLET, EXTENDED RELEASE ORAL DAILY
COMMUNITY

## 2025-03-12 RX ORDER — DAPAGLIFLOZIN 10 MG/1
1 TABLET, FILM COATED ORAL DAILY
COMMUNITY

## 2025-03-12 RX ORDER — ALENDRONATE SODIUM 70 MG/1
70 TABLET ORAL
COMMUNITY
Start: 2025-03-10

## 2025-03-12 RX ORDER — PREGABALIN 50 MG/1
CAPSULE ORAL
Qty: 42 CAPSULE | Refills: 0 | Status: SHIPPED | OUTPATIENT
Start: 2025-03-12 | End: 2025-03-26

## 2025-03-12 RX ORDER — HYDROCODONE BITARTRATE AND ACETAMINOPHEN 5; 325 MG/1; MG/1
1 TABLET ORAL EVERY 8 HOURS PRN
Qty: 42 TABLET | Refills: 0 | Status: SHIPPED | OUTPATIENT
Start: 2025-03-12 | End: 2025-03-26

## 2025-03-12 RX ORDER — ATORVASTATIN CALCIUM 40 MG/1
40 TABLET, FILM COATED ORAL DAILY
COMMUNITY

## 2025-03-12 RX ORDER — NAPROXEN SODIUM 220 MG/1
81 TABLET, FILM COATED ORAL DAILY
COMMUNITY

## 2025-03-12 NOTE — TELEPHONE ENCOUNTER
Called patient to let her know that she does need to come back and do a uds. Patient did not answer her phone no voicemail. Will try again later

## 2025-03-12 NOTE — LETTER
This communication is flagged as high priority.      Cardiac Risk Assessment Request Form      Date: 03/12/2025    Patient's Name: Kayla Ruiz     YOB: 1955    Patient is scheduled to have a Kyphoplasty at T12 pending clearance by Dr. Vladimir Mendoza with:    XXX  General to Moderate Sedation    Dx: T12 Compression Fracture    Requesting staff name: Dalybala Barrett LPN    Phone number: [phone]      Fax number: [phone] or [phone]    Check all that apply and complete blanks:    XXX  Request for cardiac risk assessment for procedure    XXX Request to hold Aspirin for  5  days prior to procedure    XXX Request to hold Warfarin for 5 day +INR<1.3 prior to procedure  Patient is on Coumadin, will she need Lovenox in there interim?  ___ Yes     ___  No    If bridging recommendations are required, please contact patient to manage any prescriptions needed before and after the injection.    ** Please fax document back to [phone] or [phone]  ATTN: [facility] ([facility]) and allow at lease 3 business days to receive a response from [facility].  According to [employer] cardiac risk assessment, low risk surgeries do not need cardiac testing or risk stratification. Patients that are asymptomatic should safely proceed with low risk procedures that may include, but are not limited to dental procedure, minor skin procedures, EGD, Colonoscopy or Cataracts.  Symptomatic patients should make an appointment with [facility].

## 2025-03-14 NOTE — TELEPHONE ENCOUNTER
Called and spoke with patient stated that she needs to come in to do another UDS. Patient verbalized understanding stating she will come back on Monday.

## 2025-03-18 ENCOUNTER — TELEPHONE (OUTPATIENT)
Facility: CLINIC | Age: 70
End: 2025-03-18
Payer: MEDICARE

## 2025-03-18 DIAGNOSIS — S22.080A T12 COMPRESSION FRACTURE, INITIAL ENCOUNTER: Primary | ICD-10-CM

## 2025-03-18 DIAGNOSIS — M47.816 LUMBAR SPONDYLOSIS: ICD-10-CM

## 2025-03-18 PROCEDURE — 80361 OPIATES 1 OR MORE: CPT | Performed by: STUDENT IN AN ORGANIZED HEALTH CARE EDUCATION/TRAINING PROGRAM

## 2025-03-18 PROCEDURE — 80307 DRUG TEST PRSMV CHEM ANLYZR: CPT | Performed by: STUDENT IN AN ORGANIZED HEALTH CARE EDUCATION/TRAINING PROGRAM

## 2025-03-18 NOTE — TELEPHONE ENCOUNTER
Called and notified patient she will need to come back again to do another UDS do to not having enough urine to test. Advised her that she should drink 8-16 oz of water on her way her that way she will have enough. She stated she will try to come tomorrow.

## 2025-04-01 ENCOUNTER — HOSPITAL ENCOUNTER (OUTPATIENT)
Dept: RADIOLOGY | Facility: HOSPITAL | Age: 70
Discharge: HOME | End: 2025-04-01
Attending: STUDENT IN AN ORGANIZED HEALTH CARE EDUCATION/TRAINING PROGRAM
Payer: MEDICARE

## 2025-04-01 DIAGNOSIS — S22.080A T12 COMPRESSION FRACTURE, INITIAL ENCOUNTER: ICD-10-CM

## 2025-04-10 ENCOUNTER — APPOINTMENT (OUTPATIENT)
Dept: RADIOLOGY | Facility: HOSPITAL | Age: 70
End: 2025-04-10
Attending: STUDENT IN AN ORGANIZED HEALTH CARE EDUCATION/TRAINING PROGRAM
Payer: MEDICARE

## 2025-04-10 PROCEDURE — 72148 MRI LUMBAR SPINE W/O DYE: CPT | Mod: TC

## 2025-04-15 ENCOUNTER — OFFICE VISIT (OUTPATIENT)
Facility: CLINIC | Age: 70
End: 2025-04-15
Payer: MEDICARE

## 2025-04-15 VITALS
OXYGEN SATURATION: 93 % | WEIGHT: 196 LBS | SYSTOLIC BLOOD PRESSURE: 130 MMHG | BODY MASS INDEX: 32.65 KG/M2 | HEIGHT: 65 IN | HEART RATE: 77 BPM | DIASTOLIC BLOOD PRESSURE: 85 MMHG

## 2025-04-15 DIAGNOSIS — M62.838 MUSCLE SPASM: ICD-10-CM

## 2025-04-15 DIAGNOSIS — M47.816 LUMBAR SPONDYLOSIS: Primary | Chronic | ICD-10-CM

## 2025-04-15 DIAGNOSIS — S22.080A T12 COMPRESSION FRACTURE, INITIAL ENCOUNTER: ICD-10-CM

## 2025-04-15 PROCEDURE — 99214 OFFICE O/P EST MOD 30 MIN: CPT | Mod: ,,, | Performed by: STUDENT IN AN ORGANIZED HEALTH CARE EDUCATION/TRAINING PROGRAM

## 2025-04-15 PROCEDURE — 3288F FALL RISK ASSESSMENT DOCD: CPT | Mod: CPTII,,, | Performed by: STUDENT IN AN ORGANIZED HEALTH CARE EDUCATION/TRAINING PROGRAM

## 2025-04-15 PROCEDURE — 3044F HG A1C LEVEL LT 7.0%: CPT | Mod: CPTII,,, | Performed by: STUDENT IN AN ORGANIZED HEALTH CARE EDUCATION/TRAINING PROGRAM

## 2025-04-15 PROCEDURE — 1101F PT FALLS ASSESS-DOCD LE1/YR: CPT | Mod: CPTII,,, | Performed by: STUDENT IN AN ORGANIZED HEALTH CARE EDUCATION/TRAINING PROGRAM

## 2025-04-15 PROCEDURE — 3079F DIAST BP 80-89 MM HG: CPT | Mod: CPTII,,, | Performed by: STUDENT IN AN ORGANIZED HEALTH CARE EDUCATION/TRAINING PROGRAM

## 2025-04-15 PROCEDURE — 1159F MED LIST DOCD IN RCRD: CPT | Mod: CPTII,,, | Performed by: STUDENT IN AN ORGANIZED HEALTH CARE EDUCATION/TRAINING PROGRAM

## 2025-04-15 PROCEDURE — 3008F BODY MASS INDEX DOCD: CPT | Mod: CPTII,,, | Performed by: STUDENT IN AN ORGANIZED HEALTH CARE EDUCATION/TRAINING PROGRAM

## 2025-04-15 PROCEDURE — 3075F SYST BP GE 130 - 139MM HG: CPT | Mod: CPTII,,, | Performed by: STUDENT IN AN ORGANIZED HEALTH CARE EDUCATION/TRAINING PROGRAM

## 2025-04-15 RX ORDER — OXYCODONE HYDROCHLORIDE 10 MG/1
10 TABLET ORAL
Qty: 14 TABLET | Refills: 0 | Status: SHIPPED | OUTPATIENT
Start: 2025-04-15 | End: 2025-04-29

## 2025-04-15 RX ORDER — METHOCARBAMOL 500 MG/1
500 TABLET, FILM COATED ORAL 3 TIMES DAILY PRN
Qty: 90 TABLET | Refills: 0 | Status: SHIPPED | OUTPATIENT
Start: 2025-04-15 | End: 2025-05-15

## 2025-04-15 RX ORDER — ACETAMINOPHEN AND CODEINE PHOSPHATE 300; 30 MG/1; MG/1
1 TABLET ORAL
COMMUNITY
End: 2025-04-16

## 2025-04-15 NOTE — PROGRESS NOTES
Vladimir Mendoza M.D.   [facility]  Interventional Pain    Established Patient Visit        Chief Complaint   Patient presents with    Back Pain     Patient here for 2 week follow up   Still having back pain   Pain level today 8/10  Patient unsure of her medications       Assessment and Plan:     Kayla Ruiz is a 70 y.o. female with low back pain. A detailed and lengthy discussion was undertaken regarding the patient's presentation including history,  physical examination, past treatments, relevant laboratory, imaging results and future management strategies:     Assessment: 70 y.o. year old female with        ICD-10-CM ICD-9-CM   1. Lumbar spondylosis  M47.816 721.3   2. Muscle spasm  M62.838 728.85     The mentioned diagnosis is Worsening  and is accompanied by significant limitation of ADL's    1. Lumbar spondylosis  Assessment & Plan:  Patient presentation at this office visit is pertinent for lower back pain that is predominantly axial in nature.  Patient has previously attempted physical therapy and medication management for this without any significant alleviation of her symptoms.  Given the failure of conservative measures I recommended medial branch block of L4-L5 and L5-S1 as the next diagnostic and therapeutic steps.    Orders:  -     Ambulatory Referral/Consult to Physical Therapy; Future; Expected date: 04/22/2025  -     Case Request Operating Room: Block-nerve-medial branch-lumbar  -     oxyCODONE (ROXICODONE) 10 mg Tab immediate release tablet; Take 1 tablet (10 mg total) by mouth every 24 hours as needed for Pain.  Dispense: 14 tablet; Refill: 0    2. Muscle spasm  -     methocarbamoL (ROBAXIN) 500 MG Tab; Take 1 tablet (500 mg total) by mouth 3 (three) times daily as needed (muscle spasm).  Dispense: 90 tablet; Refill: 0    3. T12 compression fracture, initial encounter  Assessment & Plan:  Patient presented with adjacent level compression fracture following spine Jack procedure at  L1 vertebra.  Patient noted aggravation of pain 3 days following the procedure.  The fracture is 12 weeks now.  The fracture is most likely in the setting of osteoporosis and this is the 4th compression fracture.  Patient has previously had kyphoplasty at T8, T9 and spine Rubén procedure at L1 recently.  Patient currently is on treatment for osteoporosis.  Patient has worsening pain with pain score noted as 8 and 9 in the last 2 office visits.  The height loss is approximately 30% based on the x-ray imaging as noted below.  Conrad Doe disability index at 22/24.  I performed trigger point injections previously that have completely alleviated her pain around the mid back region.  She did not have any pain in the midback or upper lumbar spine area.  Given the significant alleviation and duration from her previous symptoms provided a new referral for physical therapy to address strengthening of core muscles of her lower back.          Plan:  The plan was clearly communicated to the patient, who verbalized understanding of the same.     Diagnostics: Reviewed the followed diagnostic results  Labs/EMG: Labs pertinent for raised BUN 21.6, anemia with Hb 10, and raised INR 5.5 (on warfarin)  Imaging:   MRI/CT:   04/10/2025 MRI Lumbar Spine Impression:  Subacute compression fracture along T12 inferior endplate with approximately 34% maximum vertebral body height loss. Similar height loss associated with L1 superior endplate compression fracture which has undergone kyphoplasty in the interim.  02/04/2025 MRI Lumbar Spine Impression: Subacute compression fracture of L1 superior endplate with 43% maximum vertebral body height loss and only mild osseous retropulsion. Mild multilevel lumbar degeneration as detailed above.  01/09/2025 CT Lumbar Spine Impression: Mild multilevel lumbar disc degeneration.  Left foraminal disc protrusion at L3-L4 causing mild left foraminal stenosis encroaching upon the exiting left L3 nerve.   Shallow disc bulges at L1-L2 through L4-5 which indent the ventral thecal sac.  No significant central canal stenosis.  02/04/2025 CT Cervical Spine Impression: There is grade 1 anterolisthesis of C4 on C5 and C7 on T1.  Solid ACDF at C5, C6 and C7. There is moderate degenerative narrowing of the left neural foramen at C4-C5.     Xray:  02/27/2025 Xray Thoraco-Lumbar Spine Impression: New compression deformity of T12 with anterior wedging not seen on the examination of February 6, 2025. Compression deformity of L1 head progress since that last examination and is straightening now with evidence of kyphoplasty changes. Degenerative changes  02/06/2025 Xray Thoraco-Lumbar Spine Impression: L1 compression fracture with progressive loss of height compared to 01/09/2025.  Medications:  Plan/Ordered:   Oxycodone 10 mg x 14 tab  Methocarbamol 500 mg tid prn x 30 days  Current: Clonazepam, Fluoxetine, Lidocaine 5% patch, Methocarbamol, Olanzapine, Trazodone, Alendronate  Previous:  Baclofen, oxycodone, tramadol, Hydrocodone, Pregabalin, Gabapentin, Tylenol#3  Anticoagulants/Antiplatelets: Yes aspirin and Coumadin   Pain Contract 03/12/2025  UDS ordered 03/12/2025 (did not result) will consider in future if requiring long term pain medications  Non-Pharmacologic  Therapy: Patient has previously completed physician directed physical therapy with aggravation of her pain symptoms.  I provided a new referral for physical therapy at this office visit to address her low back pain related to compression fracture and facet arthritis of lower lumbar spine.  Pain Psychology: None  Bracing: Continue with bracing for next 6-10 weeks. Patient has been using a brace for past 4 weeks without any significant pain relief.   New Consults: Cardiac Clearance with Dr. Tony Leon for possible RFA in future  Interventions:  Injections:  Plan/Ordered:   05/16/2025: B/l L4-5 and L5-S1 MBB (no aspirin/warfarin hold, need cardiac clearance for future  RFA)    Previous:   03/12/2025 TPI of paraspinal muscles in lumbar region: multifidus, longissimus and illiocostalis with complete relief of mid back pain at 4 week visit  Kyphoplasty at T8 and T9 in 09/2024  Spine Rubén at L1 in 02/2025  Information on the procedure was provided to the patient today. Risks and benefits were discussed. All questions were answered.  Surgical Spine Interventions: Cervical ACDF  Relevant Risk Factors for Interventions:  Medical-Surgical History/Devices: Stroke, compression # at T8 and T9 s/p kyphoplasty, compression # L1 s/p spine rubén procedure, COPD, Atrial Fib, CHF, s/p heart valve replacement, Right patella fracture, LESLI, tobacco dependence  Allergies: ACE inhibitors (Anaphylaxis), Naproxen, Nifedipine  Other:   I reviewed the prior notes from each unique source dated 02/27/2025 with Guerita RAPHAEL (NeuroSurgery) and 01/29/2025 with Dr. Devyn Young (Orthopedics)  Old Records: Decision was made to not obtain old records   Follow Up: Plan for patient to follow up in 2 weeks    Current Visit Imaging Interpretation:  I independently visualized/interpreted the following images dated Xray Thoraco-Lumbar Spine dated 02/27/2025 and 02/06/2025   Pertinent findings include: Compression fracture at T12 with more than 30% height loss as per Xray imaging.  Key Images:       The above plan and management options were discussed at length with patient. Patient is in agreement with the above and verbalized understanding.    - I discussed the goals of interventional chronic pain management with the patient on today's visit. I explained the utility of injections for diagnostic and therapeutic purposes.  We discussed a multimodal approach to pain including treating the patient's given worst pain at any given time.  We will use a systematic approach to addressing pain.  We will also adopt a multimodal approach that includes injections, adjuvant medications, physical therapy, at times  psychiatry.  There may be a limited role for opioid use intermittently in the treatment of pain, more particularly for acute pain although no one approach can be used as a sole treatment modality. I emphasized the importance of regular exercise, core strengthening and stretching, diet and weight loss as a cornerstone of long-term pain management.    - This condition does not require this patient to take time off of work, and the primary goal of our Pain Management services is to improve the patient's functional capacity.  - Patient Questions: Answered all of the patient's questions regarding diagnoses, therapy, treatment and next steps    Interval History:     04/15/2025  Pain Progress:  Patient noted that that she had significant pain relief following trigger point injections performed at the previous office visit at the site of newly developed fracture.  Patient did not have any tenderness to palpation along the mid back and the upper lumbar spine area at this office visit.  Significant pain at this office visit was in the lower back and it was predominantly axial.  This pain is related to facet arthritis.  Given the presentation I provided medications including short-term oxycodone and methocarbamol for muscle spasms.  Pain Score:  8/10  Functionality:  Patient continues to have significant difficulty with functionality especially pain that comes along with sitting and standing for longer periods of time.  Medications:  Short course of oxycodone including 14 tablets and on month supply of Robaxin at this office visit  New Diagnostic Labs/Imaging:  I reviewed the new MRI imaging performed on April 10, 2025 with the patient.  Therapy:  I provided a new referral for physical therapy at this office visit given it has been 8-12 weeks from her last documented fracture identification.  Intervention:  I also recommended medial branch block to address her pain related to facet arthritis in the lower back  region.      History of Present Illness:     2025 HPI Summary    Context/Inciting Event: Denies recent trauma and falls. Notes increased aggravation of pain following spine jorge l procedure performed in 2025.  Location: Lower Back Pain   Radiation: Denies radiation to lower extremities   Onset and Progress: Continuous pain worsening over the past 4 weeks (Acute compression fracture)  Description/Character: stabbing  Frequency and Timing: Constant pain with periods of exacerbation  Pain Score:  8/10 on the best day and 10/10 on the worst day. Pain at this office visit 10 and the previous office visit 10 with Guerita Fox (NeuroSurgery)  Worsening Pain: Patient notes that pain has been progressively worsening over the last few weeks.   Symptoms Worse With: Standing, Walking, and Flexing  Symptoms Improved With: nothing  Vertebral Height Loss and Imagin% height loss noted as per Xray imaging on 2025 at T12. Pending MRI to assess for retropulsion and bony edema  Osteoporosis and Treatment: yes on alendronate, calcium carbonate, vitamin D3  Recent DXA Scan: yes; ordered by Guerita Fox NP (NeuroSurgery)  Bracing: yes    Associated Conditions:  Numbness: no  Weakness: no  Sleep problems: yes  H/o Depression and anxiety: no  Opiate use disorder: no  Inflammatory conditions like Rheumatoid/Ankylosing spondylitis/Psoriatic: no  Functional/Activity Limitations: yes, Symptoms interfere with daily activity and sleeping    Patient denies night fever/night sweats, urinary incontinence, bowel incontinence, significant weight loss, significant motor weakness, and loss of sensations.    Previous Treatments:  Medications:   Current: Clonazepam, Fluoxetine, Gabapentin, Hydrocodone, Lidocaine 5%, Olanzapine, Trazodone  Previous:  Baclofen, oxycodone, tramadol  Anticoagulants/Antiplatelets: Yes aspirin and Coumadin     Physical Therapy/Home Exercise: yes  Ice/Heat:yes  TENS: no  Acupuncture: no  Massage:  no  Chiropractic: no      Interventional Procedures:  Kyphoplasty at T8 and T9 in 09/2024  Spine Jack at L1 in 02/2025  Spine Surgery: Cervical ACDF procedure  Other: None      Summary of Other Notes:  02/27/2025 with Guerita RAPHAEL (NeuroSurgery)  ASSESSMENT:          ICD-10-CM ICD-9-CM   1. T12 compression fracture, initial encounter  S22.080A 805.2   2. Osteoporosis with current pathological fracture, unspecified osteoporosis type, initial encounter  M80.00XA 733.00       733.10      PLAN:      1. T12 compression fracture, initial encounter (Primary)  - Ambulatory referral/consult to Pain Clinic; Future  - HYDROcodone-acetaminophen (NORCO) 7.5-325 mg per tablet; Take 1 tablet by mouth every 8 (eight) hours as needed for Pain (1/2 tablet every 8 hours PRN fro severe pain).  Dispense: 21 tablet; Refill: 0  - DXA Bone Density Appendicular Skeleton; Future     2. Osteoporosis with current pathological fracture, unspecified osteoporosis type, initial encounter  - DXA Bone Density Appendicular Skeleton; Future     Kayla Ruiz presents today describing severe pain into the lower back.  She is accompanied by her 2 daughters today.  I did take the time to review her previous CT scan as well as updated x-rays in clinic with them which does reveal a an acute T12 compression fracture.  We will refer the patient on for consultation with Dr. Mendoza regarding kyphoplasty.  The patient was advised to return to her TLSO brace in the interim.  We did discuss activity restrictions including no lifting more than 10lbs and avoiding any type of bending, stooping or twisting activities.  I did provide the patient a 1 time refill of hydrocodone which she has been cutting in half.  She was advised to utilize this medication sparingly as I will not be able to provide her a refill.  She was advised to alternate this medication with Tylenol and not to exceed more than 4000 mg in a 24 hour period.  The patient and her daughters  verbalized understanding at this time.  We will plan to see the patient back in clinic on an as-needed basis going forward.  They were advised to notify us with any concerns or questions in the future.  I have also entered in order for a bone density scan as the patient states she has previously been diagnosed with osteoporosis although her most recent bone density scan was more than 2 years ago.  Her daughter states she has been prescribed what sounds like Fosamax although has not initiated this medication as the pharmacy has not gotten back with her since it was prescribed.  She was advised to reach out to her primary care provider regarding this issue.   01/29/2025 with Dr. Devyn Young (Orthopedics)  Patient is here today for follow-up evaluation 2 weeks status post fracture of right patella, nondisplaced. She was placed into a hinged knee brace in the hospital. It has been locked full extension. She is limited in her ability to mobilize at baseline. She has family with her today. She states she has soreness in the knee but it is well-controlled.   Assessment  1. Other closed fracture of right patella with routine healing, subsequent encounter  X-Ray Knee 1 or 2 View Right         Plan  Her fracture is completely nondisplaced and has not had any change in alignment compared to her initial films.  I have open up her brace at 0-60 at rest.  She will continue ambulating with it in full extension.  I have shown the family how to manage her brace.  She has minimal discomfort in the knee.  She has fractures in her spine that are being managed by Neurosurgery.  I will see her back in a month for repeat x-rays at which time we will open it up to 0-90 degrees.  They understand and agree with all that we have discussed and all questions and concerns were addressed.         Pain Disability Index:          4/15/2025     9:08 AM 3/12/2025     9:13 AM   Last 3 PDI Scores   Pain Disability Index (PDI) 50 56     Nabil Azar  Disability Index 22/24     Review:  Reviewed and consistent with medication use as prescribed.        Review of patient's allergies indicates:   Allergen Reactions    Ace inhibitors Anaphylaxis     SWELLING    Venlafaxine Swelling    Naproxen sodium      RASH    Nifedipine      ITCHING       Past Medical History:   Diagnosis Date    A-fib     Anticoagulant long-term use     Depression     Diabetes mellitus     Hyperlipidemia     Sleep apnea        Past Surgical History:   Procedure Laterality Date    AORTIC VALVE REPLACEMENT      CHOLECYSTECTOMY      COLON RESECTION      fusion      LEFT HEART CATHETERIZATION Left 05/18/2023    Procedure: Left heart cath;  Surgeon: Kahlil Martin MD;  Location: [facility] CATH LAB;  Service: Cardiology;  Laterality: Left;  LHC +/- PCI    TONSILLECTOMY          reports that she has been smoking cigarettes. She has a 50 pack-year smoking history. She has never used smokeless tobacco. She reports that she does not drink alcohol and does not use drugs.    Family History   Problem Relation Name Age of Onset    COPD Mother      Hypertension Mother      Heart disease Father         Current Outpatient Medications   Medication Sig    albuterol-ipratropium (DUO-NEB) 2.5 mg-0.5 mg/3 mL nebulizer solution Take 3 mLs by nebulization every 4 (four) hours as needed for Wheezing or Shortness of Breath.    acetaminophen-codeine 300-30mg (TYLENOL #3) 300-30 mg Tab Take 1 tablet by mouth every 4 to 6 hours as needed. (Patient not taking: Reported on 4/15/2025)    alendronate (FOSAMAX) 70 MG tablet Take 70 mg by mouth every 7 days.    aspirin 81 MG Chew Take 81 mg by mouth once daily.    atorvastatin (LIPITOR) 40 MG tablet Take 40 mg by mouth once daily.    calcium carbonate-vitamin D3 600 mg-10 mcg (400 unit) Cap Take 1,250 mcg by mouth.    cholecalciferol, vitamin D3, 1,250 mcg (50,000 unit) capsule Take 50,000 Units by mouth every 7 days.    clonazePAM (KLONOPIN) 1 MG tablet Take 0.5 mg by mouth 3  (three) times daily as needed for Anxiety.    dapagliflozin propanediol (FARXIGA) 10 mg tablet Take 1 tablet by mouth once daily. (Patient not taking: Reported on 3/12/2025)    empagliflozin (JARDIANCE) 10 mg tablet Take 10 mg by mouth once daily.    ergocalciferol (ERGOCALCIFEROL) 50,000 unit Cap Take 1 capsule by mouth every 7 days. (Patient not taking: Reported on 3/12/2025)    FLUoxetine 40 MG capsule Take 40 mg by mouth once daily.    furosemide (LASIX) 40 MG tablet Take 1 tablet (40 mg total) by mouth 2 (two) times daily.    isosorbide mononitrate (IMDUR) 30 MG 24 hr tablet Take 30 mg by mouth once daily.    LIDOcaine (LIDODERM) 5 % Place 1 patch onto the skin once daily. Remove & Discard patch within 12 hours or as directed by MD    LIDOcaine 1%, BUFFERED, 10 MG/20 mL APPLY 1 PATCH TOPICALLY IN THE MORNING FOR 14 DAYS. REMOVE AND DISCARD WITHIN 12 HOURS    methocarbamoL (ROBAXIN) 500 MG Tab Take 1 tablet (500 mg total) by mouth 3 (three) times daily as needed (muscle spasm).    nicotine (NICODERM CQ) 21 mg/24 hr Place 1 patch onto the skin once daily. (Patient not taking: Reported on 3/12/2025)    OLANZapine (ZYPREXA) 5 MG tablet Take 5 mg by mouth every evening.    ondansetron (ZOFRAN-ODT) 4 MG TbDL Take 4 mg by mouth every 6 (six) hours as needed.    oxyCODONE (ROXICODONE) 10 mg Tab immediate release tablet Take 1 tablet (10 mg total) by mouth every 24 hours as needed for Pain.    pregabalin (LYRICA) 50 MG capsule Take 1 capsule (50 mg total) by mouth 2 (two) times daily for 7 days, THEN 2 capsules (100 mg total) 2 (two) times daily for 7 days.    [Paused] sotaloL (BETAPACE) 120 MG Tab Take 80 mg by mouth every 12 (twelve) hours. Take 1/2 tablet BID    spironolactone (ALDACTONE) 25 MG tablet Take 1 tablet (25 mg total) by mouth once daily.    traZODone (DESYREL) 50 MG tablet Take 25-50 mg by mouth nightly as needed.    TRUE METRIX GLUCOSE METER Misc USE 1 ITEM AS DIRECTED THREE TIMES A DAY TO CHECK BLOOD  "GLUCOSE    TRUE METRIX GLUCOSE TEST STRIP Strp 3 (three) times daily.    TRUEPLUS LANCETS 33 gauge Misc Apply topically 3 (three) times daily.    warfarin (COUMADIN) 5 MG tablet Take 5 mg by mouth Daily.     Current Facility-Administered Medications   Medication    LIDOcaine HCL 10 mg/ml (1%) injection 10 mL     Facility-Administered Medications Ordered in Other Visits   Medication    0.9%  NaCl infusion    diphenhydrAMINE capsule 50 mg       Review of Systems:     Review of Systems   Constitutional: Negative.  Negative for chills, diaphoresis and fever.   HENT: Negative.     Eyes: Negative.    Respiratory: Negative.  Negative for shortness of breath and wheezing.    Cardiovascular: Negative.  Negative for chest pain and palpitations.   Gastrointestinal: Negative.  Negative for blood in stool.   Genitourinary: Negative.    Musculoskeletal:         Refer to HPI   Skin: Negative.  Negative for rash.   Neurological:  Negative for tremors and speech change.   Endo/Heme/Allergies: Negative.    Psychiatric/Behavioral: Negative.         PHYSICAL EXAM:   Visit Vitals  /85   Pulse 77   Ht 5' 5" (1.651 m)   Wt 88.9 kg (196 lb)   SpO2 (!) 93%   BMI 32.62 kg/m²       General Appearance: healthy, well developed, well nourished, appropriately dressed  Mental Status: Alert, oriented, thought content appropriate; Normal affect  Psych: Mood and affect appropriate  Head: Normocephalic, atraumatic  Eyes: Extraocular movements intact.   Neck: No asymmetry, masses or scars; supple; midline   Skin: Warm and dry; No rashes visible on exposed areas  Lung: Respiratory effort normal, symmetrical chest rise  Cardiovascular: RRR with palpation of the radial artery.    Neuro:     Motor & Sensory:     Strength and Sensation:     Upper Extremity  Nerve C5 C6 C7 C8 T1   Muscle Groups Shoulder Abduction Elbow Flexion (Palm up) Elbow Flexion (Thumb up) Wrist Extension Elbow Extension Wrist Flexion Hand  Finger Abduction   Right 5/5 5/5 5/5 " 5/5 5/5 5/5 5/5 5/5   Left 5/5 5/5 5/5 5/5 5/5 5/5 5/5 5/5     Nerve Root (area) Light Touch    R L   C5  (Lateral arm below deltoid) 2 2   C6  (Thumb & Radial hand/forearm) 2 2   C7 (Finger 2,3,4) 2 2   C8 (Finger 5) 2 2   T1 (Medial elbow) 2 2       Lower Extremity:  Nerve L2 L3 L4/L5 L5 S1 S2   Muscle Group Hip Flexion Hip  Adduction Knee Extension Ankle Dorsiflexion Toe  Dorsiflexion Hip  Abduction Ankle Plantarflexion Toe  Plantarflexion   Right 5/5 5/5 5/5 5/5 5/5 5/5 5/5 5/5   Left 5/5 5/5 5/5 5/5 5/5 5/5 5/5 5/5     Nerve Root (area) Light Touch    R L   L1 (iliac crest/groin) 2 2   L2 (anterior/inner thigh) 2 2   L3 (anterior thigh/medial knee) 2 2   L4 (lateral thigh/ant. knee/medial foot border) 2 2   L5 (lateral leg/dorsum foot below 2-4 toes) 2 2   S1 (lateral foot border) 2 2   S2 (plantar heel) 2 2       Reflexes:    R L   Biceps (C5) 2+ 2+   Brachioradialis (C6) 2+ 2+   Triceps (C7) 2+ 2+   Patellar (L4) 2+ 2+   Achilles (S1) 2+ 2+       Upper Tract Signs:    R L   Colmenares's Negative Negative   Plantar Response Down-going Down-going   Clonus Negative Negative     Gait:   Heel walking: Absent  Toe walking: Absent  Tandem walking: Absent      MSK:    Lumbar Exam  Inspection:  Surgical scar noted, insertion sites for needles for kyphoplasty noted around upper midback.  Palpation:  Tenderness also noted along the paraspinal muscles in the lower lumbar region.  ROM:  Significant limitation to range of motion for extension flexion rotation and lateral bending  Special Tests:   Facet Loading:  Positive bilateral  Straight Leg raise:  Negative bilaterally     Data     Imaging of Relevance:     Lumbar Spine  MRI Imaging  Results for orders placed during the hospital encounter of 04/01/25    MRI Lumbar Spine Without Contrast    Narrative  EXAMINATION:  MRI LUMBAR SPINE WITHOUT CONTRAST    CLINICAL HISTORY:  Compression fracture, lumbar;Assess for compression fracture (T12 level, height loss, bony edema,  retropulsion), facet arthritis, stenosis (foraminal/central), degenerative disc, modic changes and atrophy of paraspinal muscles;  Wedge compression fracture of T11-T12 vertebra, initial encounter for closed fracture    TECHNIQUE:  Multiplanar, multisequence MR images were acquired from the thoracolumbar junction to the sacrum without the administration of contrast.    COMPARISON:  MR lumbar spine without contrast 02/11/2025.    FINDINGS:  Subacute compression fracture of T12 inferior endplate with 34% maximum vertebral body height loss.  This is new when compared to 02/11/2025 but was seen on radiographs from 02/27/2025.    Similar appearing height loss associated with L1 superior endplate compression fracture of approximately 44%.  This has been treated in the interval with kyphoplasty.  There is persistent mild bowing of the posterior cortex of L1 vertebral body with only mild indentation upon the ventral thecal sac.    No spondylolisthesis.    Marrow signal is heterogeneous but overall nonaggressive in appearance.    No intrinsic abnormality of the distal cord or cauda equina.    No significant paraspinous soft tissue abnormality.    Partial visualization of a cyst arising exophytically from the left renal interpolar cortex.    T12-L1: Shallow central disc protrusion indenting the ventral thecal sac with no significant stenosis or neural impingement.    L1-L2: Mild disc degeneration and shallow central disc protrusion indenting the ventral thecal sac with no significant stenosis or neural impingement.    L2-L3: Unremarkable.    L3-L4: Mild disc degeneration, shallow disc bulge, and superimposed left foraminal disc protrusion causing mild left foraminal stenosis with no evidence of exiting nerve impingement.  Central canal and right foramina are grossly patent.    L4-L5: Mild disc degeneration/Modic type 2 endplate changes, mild right marginal osteophyte, and mild right facet joint degeneration causing mild right  foraminal stenosis.  Central canal and left foramina are patent.    L5-S1: Mild disc degeneration with mild Modic type 2 endplate change and left marginal osteophyte with no significant stenosis or neural impingement.    Impression  Subacute compression fracture along T12 inferior endplate with approximately 34% maximum vertebral body height loss.    Similar height loss associated with L1 superior endplate compression fracture which has undergone kyphoplasty in the interim.      Electronically signed by: Phoenix Albert  Date:    04/10/2025  Time:    13:17      CT Imaging  Results for orders placed during the hospital encounter of 01/09/25    CT Lumbar Spine Without Contrast    Narrative  EXAMINATION:  CT LUMBAR SPINE WITHOUT CONTRAST    CLINICAL HISTORY:  Low back pain, trauma;Back trauma, no prior imaging (Age >= 16y);    TECHNIQUE:  Low-dose axial, sagittal and coronal reformations are obtained through the lumbar spine. 3D reconstructions were performed. Contrast was not administered. Dose reduction techniques including automatic exposure control (AEC) were utilized.    Dose (DLP): 1060 mGycm    COMPARISON:  CT abdomen and pelvis 08/25/2014.    FINDINGS:  Five lumbar-type vertebral bodies.    Vertebral column alignment is normal.  No fractures demonstrated.  Vertebral body heights are preserved.    Mild multilevel lumbar disc degeneration.  Left foraminal disc protrusion at L3-L4 causing mild left foraminal stenosis encroaching upon the exiting left L3 nerve.  Shallow disc bulges at L1-L2 through L4-5 which indent the ventral thecal sac.  No significant central canal stenosis.    Paravertebral soft tissues are normal.    Cholecystectomy.  Calcific atherosclerosis of the abdominal aorta and its branches.  Anastomotic sutures are noted in the sigmoid colon.  Diverticulosis of the descending and sigmoid colon.    Impression  No acute fracture or subluxation.    Mild multilevel lumbar degeneration as detailed  above.      Electronically signed by: Phoenix Albert  Date:    01/09/2025  Time:    10:40      Results for orders placed during the hospital encounter of 02/04/25    CT Cervical Spine Without Contrast    Narrative  EXAMINATION:  CT CERVICAL SPINE WITHOUT CONTRAST    CLINICAL HISTORY:  Trauma.    TECHNIQUE:  Multidetector axial images were performed of the cervical spine without and.  Images were reconstructed.  Automated exposure control was utilized to minimize radiation dose.  .    COMPARISON:  None available.    FINDINGS:  Cervical vertebrae stature is maintained.  There is grade 1 anterolisthesis of C4 on C5 and C7 on T1.  Solid ACDF at C5, C6 and C7.  No acute fracture or malalignment identified.  There is moderate degenerative narrowing of the left neural foramen at C4-C5.  There is no prevertebral soft tissue prominence.  This study does not exclude the possibility of intrathecal soft tissue, ligamentous or vascular injury.    Impression  No acute fracture or malalignment identified.      Electronically signed by: Juanito Chopra  Date:    02/04/2025  Time:    21:25      02/27/2025 Xray Thoraco-Lumbar Spine   FINDINGS:  There is demineralization of the visualized osseous structures with evidence of a treated compression deformity of L1 there is now evidence of anterior wedging and compression deformity of T12 these is new since the last examination of February 6, 2025.  Kyphoplasty was performed at what appears to be T8 and T9.  There is retrolisthesis of L2 on L3 and L3 on L4 with some degenerative changes of the posterior elements at L4-L5 and L5-S1.     Impression: New compression deformity of T12 with anterior wedging not seen on the examination of February 6, 2025. Compression deformity of L1 head progress since that last examination and is straightening now with evidence of kyphoplasty changes. Degenerative changes    02/06/2025 Xray Thoraco-Lumbar Spine:   FINDINGS:  There is an L1 superior endplate  compression fracture with mild-to-moderate loss of height, progressed from the prior exam.  The remaining vertebral body heights are preserved.  There are small multilevel marginal osteophytes.  There is mild facet arthropathy.  Vascular calcifications are noted.  Impression: L1 compression fracture with progressive loss of height compared to 01/09/2025.      Labs of Relevance:  Chemistry:  Lab Results   Component Value Date     02/16/2025    K 4.1 02/16/2025    BUN 21.6 (H) 02/16/2025    CREATININE 0.85 02/16/2025    EGFRNORACEVR >60 02/16/2025    GLUCOSE 198 (H) 02/16/2025    CALCIUM 8.4 02/16/2025    ALKPHOS 126 02/14/2025    LABPROT 50.3 (H) 02/26/2025    ALBUMIN 2.9 (L) 02/14/2025    BILIDIR 0.3 02/22/2021    IBILI 0.50 02/22/2021    AST 21 02/14/2025    ALT 21 02/14/2025    MG 2.20 02/16/2025    PHOS 4.2 02/06/2025        Lab Results   Component Value Date    HGBA1C 6.9 02/05/2025        Hematology:  Lab Results   Component Value Date    WBC 7.15 02/17/2025    HGB 10.0 (L) 02/17/2025    HCT 32.9 (L) 02/17/2025     02/17/2025      Lab Results   Component Value Date    INR 5.5 (HH) 02/26/2025    INR 8.7 (HH) 02/24/2025    INR 1.5 (H) 02/17/2025         Vladimir Mendoza MD  Interventional Pain Management  [facility]     Disclaimer:  This note was prepared using voice recognition system and is likely to have sound alike errors that may have been overlooked even after proof reading.  Please call me with any questions

## 2025-04-15 NOTE — LETTER
Cardiac Risk Assessment Request Form      Date: 4/15/2025      Patient's Name: Kayla Ruiz      YOB: 1955    Patient is scheduled to have bilateral medical branch block  on 5/16/2025 by Dr. Mendoza  with (check one):      XX  MAC ( RN IV Sedation)       Dx: Lumbar spondylosis  (please no ICD-10 code)      Requesting staff name: I[facility]       Phone number: [phone]      Fax number: [phone] or [phone]      Check all that apply and complete blanks:    XX  Request for cardiac risk assessment for procedure    XX Request to hold __ASA  and _Coumadin _ for _5_days prior to procedure    Please fax document back to [phone] or [phone]  ATTN: [facility] ([facility]) and allow at lease 3 business days to receive a response from [facility].  According to [employer] cardiac risk assessment, low risk surgeries do not need cardiac testing or risk stratification. Patients that are asymptomatic should safely proceed with low risk procedures that may include, but are not limited to dental procedure, minor skin procedures, EGD, Colonoscopy or Cataracts.  Symptomatic patients should make an appointment with [facility].

## 2025-04-17 NOTE — ASSESSMENT & PLAN NOTE
Patient presented with adjacent level compression fracture following spine Rubén procedure at L1 vertebra.  Patient noted aggravation of pain 3 days following the procedure.  The fracture is 12 weeks now.  The fracture is most likely in the setting of osteoporosis and this is the 4th compression fracture.  Patient has previously had kyphoplasty at T8, T9 and spine Rubén procedure at L1 recently.  Patient currently is on treatment for osteoporosis.  Patient has worsening pain with pain score noted as 8 and 9 in the last 2 office visits.  The height loss is approximately 30% based on the x-ray imaging as noted below.  Conrad Doe disability index at 22/24.  I performed trigger point injections previously that have completely alleviated her pain around the mid back region.  She did not have any pain in the midback or upper lumbar spine area.  Given the significant alleviation and duration from her previous symptoms provided a new referral for physical therapy to address strengthening of core muscles of her lower back.

## 2025-04-17 NOTE — ASSESSMENT & PLAN NOTE
Patient presentation at this office visit is pertinent for lower back pain that is predominantly axial in nature.  Patient has previously attempted physical therapy and medication management for this without any significant alleviation of her symptoms.  Given the failure of conservative measures I recommended medial branch block of L4-L5 and L5-S1 as the next diagnostic and therapeutic steps.

## 2025-04-24 ENCOUNTER — TELEPHONE (OUTPATIENT)
Facility: CLINIC | Age: 70
End: 2025-04-24
Payer: MEDICARE

## 2025-04-24 NOTE — TELEPHONE ENCOUNTER
Pt called the office stating that Oxycodone 10 mgs  once daily is not helping her pain,  she was given this medication at last visit, the only time she has relief is when she is sitting or sleeping. Please advise.

## 2025-04-25 NOTE — TELEPHONE ENCOUNTER
Please communicate to the patient that she is on alprazolam and it is not recommended to prescribe pain medications like oxycodone along with that.  Given her pain she is being scheduled for injection on 05/16/2025.  The goal of the procedure is to help identify and evaluate her current pain presentation.  Increasing the dose of current pain regimen that is oxycodone may not completely help her current symptoms.  She can consider muscle relaxant medications to see if that will help her more with the pain symptoms.  I can provide a short prescription of oxycodone however I do not recommend considering this medication or other types of opioids in the long term to address her pain symptoms.  Please discuss with the patient and let me know how I can help.

## 2025-04-30 ENCOUNTER — LAB REQUISITION (OUTPATIENT)
Dept: LAB | Facility: HOSPITAL | Age: 70
End: 2025-04-30
Payer: MEDICARE

## 2025-04-30 DIAGNOSIS — I48.0 PAROXYSMAL ATRIAL FIBRILLATION: ICD-10-CM

## 2025-04-30 DIAGNOSIS — I11.0 HYPERTENSIVE HEART DISEASE WITH HEART FAILURE: ICD-10-CM

## 2025-04-30 LAB
INR PPP: 5.8
PROTHROMBIN TIME: 53 SECONDS (ref 12.5–14.5)

## 2025-04-30 PROCEDURE — 85610 PROTHROMBIN TIME: CPT

## 2025-05-02 ENCOUNTER — LAB VISIT (OUTPATIENT)
Dept: LAB | Facility: HOSPITAL | Age: 70
End: 2025-05-02
Attending: INTERNAL MEDICINE
Payer: MEDICARE

## 2025-05-02 ENCOUNTER — TELEPHONE (OUTPATIENT)
Facility: CLINIC | Age: 70
End: 2025-05-02
Payer: MEDICARE

## 2025-05-02 DIAGNOSIS — E11.9 DIABETES MELLITUS WITHOUT COMPLICATION: ICD-10-CM

## 2025-05-02 DIAGNOSIS — E78.5 HYPERLIPIDEMIA, UNSPECIFIED HYPERLIPIDEMIA TYPE: ICD-10-CM

## 2025-05-02 DIAGNOSIS — I65.23 BILATERAL CAROTID ARTERY OCCLUSION: Primary | ICD-10-CM

## 2025-05-02 DIAGNOSIS — I10 ESSENTIAL HYPERTENSION, MALIGNANT: ICD-10-CM

## 2025-05-02 LAB
ALBUMIN SERPL-MCNC: 3.3 G/DL (ref 3.4–4.8)
ALBUMIN/GLOB SERPL: 0.9 RATIO (ref 1.1–2)
ALP SERPL-CCNC: 134 UNIT/L (ref 40–150)
ALT SERPL-CCNC: 11 UNIT/L (ref 0–55)
ANION GAP SERPL CALC-SCNC: 10 MEQ/L
AST SERPL-CCNC: 16 UNIT/L (ref 11–45)
BILIRUB SERPL-MCNC: 0.8 MG/DL
BUN SERPL-MCNC: 21.6 MG/DL (ref 9.8–20.1)
CALCIUM SERPL-MCNC: 8.8 MG/DL (ref 8.4–10.2)
CHLORIDE SERPL-SCNC: 100 MMOL/L (ref 98–107)
CHOLEST SERPL-MCNC: 120 MG/DL
CHOLEST/HDLC SERPL: 4 {RATIO} (ref 0–5)
CO2 SERPL-SCNC: 30 MMOL/L (ref 23–31)
CREAT SERPL-MCNC: 0.99 MG/DL (ref 0.55–1.02)
CREAT/UREA NIT SERPL: 22
GFR SERPLBLD CREATININE-BSD FMLA CKD-EPI: >60 ML/MIN/1.73/M2
GLOBULIN SER-MCNC: 3.7 GM/DL (ref 2.4–3.5)
GLUCOSE SERPL-MCNC: 160 MG/DL (ref 82–115)
HDLC SERPL-MCNC: 31 MG/DL (ref 35–60)
LDLC SERPL CALC-MCNC: 61 MG/DL (ref 50–140)
POTASSIUM SERPL-SCNC: 4.3 MMOL/L (ref 3.5–5.1)
PROT SERPL-MCNC: 7 GM/DL (ref 5.8–7.6)
SODIUM SERPL-SCNC: 140 MMOL/L (ref 136–145)
TRIGL SERPL-MCNC: 141 MG/DL (ref 37–140)
VLDLC SERPL CALC-MCNC: 28 MG/DL

## 2025-05-02 PROCEDURE — 80061 LIPID PANEL: CPT

## 2025-05-02 PROCEDURE — 36415 COLL VENOUS BLD VENIPUNCTURE: CPT

## 2025-05-02 PROCEDURE — 80053 COMPREHEN METABOLIC PANEL: CPT

## 2025-05-02 NOTE — TELEPHONE ENCOUNTER
Patient called to see if there is anything else you can prescribe to help with her pain.  Her pain is located in the lower back around the waist.  Pain Scale:  10/10.  She cannot do anything because she is in extreme pain.  You've prescribed her previously, HYDROcodone-acetaminophen (NORCO) 5-325 mg  oxyCODONE (ROXICODONE) 10 mg     These have not worked.  Any recommendations??

## 2025-05-05 NOTE — TELEPHONE ENCOUNTER
I spoke with Ms. Joseph per your note, and she verbalized understanding in regards to pain management and her upcoming procedure.      I did ask her if there was anything specific that has worked for her in the past including muscle relaxants or other NSAIDs, and she could not think of any medications she has tried and have helped.

## 2025-05-12 ENCOUNTER — TELEPHONE (OUTPATIENT)
Facility: CLINIC | Age: 70
End: 2025-05-12
Payer: MEDICARE

## 2025-05-12 NOTE — TELEPHONE ENCOUNTER
We received a refill request from [facility] referencing patient's methocarbamoL (ROBAXIN) 500 MG Tab.  Patient's  verbalized that patient had enough and there was no need for a refill at this time.

## 2025-05-13 NOTE — PRE-PROCEDURE INSTRUCTIONS
Pre-Procedure Phone Call Checklist (7 Days Before Procedure)    Procedure Verification:    - Procedure: [Bilateral L4-5 and L5-S1]    - Side/Laterality: Bilateral    - Confirmed with patient: Yes    - Patient understands the procedure: Yes      Pain Status:    - Is the pain still corresponding to the area indicated for the procedure? Yes    - Pain description:  Pain Score:  5/10 on the best day and 10/10 on the worst day  Symptoms Worse With: Walking  Symptoms Improved With: laying down    Recent Infections:    - Any recent infections? No    Changes in Medication Therapy:    - Any changes in medication therapy, particularly immunosuppressants or steroids, by other providers? No    Aspirin/Anticoagulants:    - Is the patient taking aspirin or anticoagulants? Yes      - If yes: [aspirin 81 mg daily, warfarin 5 mg Daily]      - Medication adjustment instructions (stop or continue as per periprocedural guidelines):(no aspirin/warfarin hold)     Cardiac Clearance:    - Does the patient need cardiac/medical clearance as per last pain doctor progress note? (no aspirin/warfarin hold, need cardiac clearance for future RFA)     Diabetes:    - Does the patient have diabetes? Yes    - If yes: [Insert current medications or insulin therapy]    - Confirm if they have a device to measure their glucose frequently I.e. fingerstick vs CGM? Yes    - Last HbA1c > 8 ? Yes    - Patient education about risk of elevated glucose for 48-72 hours if this is a steroid injection provided Yes    - Patient has established communication with physician managing diabetes to manage glucose if need be ? Yes If No, please discuss to complete that prior to procedure    - Education for glucose care after procedure provided (Hydration 3 ltrs/d x 72 hours, Decrease carbohydrate load, Glucose checks q6-8h, optimal level <250 mg/dl, seek care from physician if >250 mg/dl, Seek ER if symptoms of significant nausea, vomiting or confusion for possible DKA) ?  Yes    Additional Notes/Comments:    - [Insert any other relevant details or patient concerns]

## 2025-05-16 ENCOUNTER — HOSPITAL ENCOUNTER (OUTPATIENT)
Facility: HOSPITAL | Age: 70
Discharge: HOME | End: 2025-05-16
Attending: STUDENT IN AN ORGANIZED HEALTH CARE EDUCATION/TRAINING PROGRAM | Admitting: STUDENT IN AN ORGANIZED HEALTH CARE EDUCATION/TRAINING PROGRAM
Payer: MEDICARE

## 2025-05-16 DIAGNOSIS — M47.816 FACET ARTHRITIS OF LUMBAR REGION: ICD-10-CM

## 2025-05-16 DIAGNOSIS — M62.838 MUSCLE SPASM: ICD-10-CM

## 2025-05-16 DIAGNOSIS — M47.816 LUMBAR SPONDYLOSIS: ICD-10-CM

## 2025-05-16 DIAGNOSIS — M54.16 LUMBAR RADICULOPATHY, CHRONIC: Primary | ICD-10-CM

## 2025-05-16 LAB — POCT GLUCOSE: 180 MG/DL (ref 70–110)

## 2025-05-16 PROCEDURE — 25000003 PHARM REV CODE 250: Performed by: STUDENT IN AN ORGANIZED HEALTH CARE EDUCATION/TRAINING PROGRAM

## 2025-05-16 PROCEDURE — 64493 INJ PARAVERT F JNT L/S 1 LEV: CPT | Mod: 50 | Performed by: STUDENT IN AN ORGANIZED HEALTH CARE EDUCATION/TRAINING PROGRAM

## 2025-05-16 PROCEDURE — 64494 INJ PARAVERT F JNT L/S 2 LEV: CPT | Mod: 50 | Performed by: STUDENT IN AN ORGANIZED HEALTH CARE EDUCATION/TRAINING PROGRAM

## 2025-05-16 PROCEDURE — 64494 INJ PARAVERT F JNT L/S 2 LEV: CPT | Mod: 50,KX,, | Performed by: STUDENT IN AN ORGANIZED HEALTH CARE EDUCATION/TRAINING PROGRAM

## 2025-05-16 PROCEDURE — 64493 INJ PARAVERT F JNT L/S 1 LEV: CPT | Mod: 50,KX,, | Performed by: STUDENT IN AN ORGANIZED HEALTH CARE EDUCATION/TRAINING PROGRAM

## 2025-05-16 PROCEDURE — 63600175 PHARM REV CODE 636 W HCPCS

## 2025-05-16 PROCEDURE — 63600175 PHARM REV CODE 636 W HCPCS: Performed by: STUDENT IN AN ORGANIZED HEALTH CARE EDUCATION/TRAINING PROGRAM

## 2025-05-16 RX ORDER — METHOCARBAMOL 500 MG/1
500 TABLET, FILM COATED ORAL 3 TIMES DAILY PRN
Qty: 90 TABLET | Refills: 0 | Status: SHIPPED | OUTPATIENT
Start: 2025-05-16 | End: 2025-06-15

## 2025-05-16 RX ORDER — MIDAZOLAM HYDROCHLORIDE 2 MG/2ML
INJECTION, SOLUTION INTRAMUSCULAR; INTRAVENOUS
Status: COMPLETED
Start: 2025-05-16 | End: 2025-05-16

## 2025-05-16 RX ORDER — LIDOCAINE HYDROCHLORIDE 10 MG/ML
INJECTION, SOLUTION INFILTRATION; PERINEURAL
Status: DISCONTINUED | OUTPATIENT
Start: 2025-05-16 | End: 2025-05-16 | Stop reason: HOSPADM

## 2025-05-16 RX ORDER — BUPIVACAINE HYDROCHLORIDE AND EPINEPHRINE 5; 5 MG/ML; UG/ML
INJECTION, SOLUTION EPIDURAL; INTRACAUDAL; PERINEURAL
Status: DISCONTINUED | OUTPATIENT
Start: 2025-05-16 | End: 2025-05-16 | Stop reason: HOSPADM

## 2025-05-16 RX ADMIN — MIDAZOLAM HYDROCHLORIDE 2 MG: 1 INJECTION, SOLUTION INTRAMUSCULAR; INTRAVENOUS at 08:05

## 2025-05-16 NOTE — DISCHARGE SUMMARY
Discharge Note  Short Stay      SUMMARY     Admit Date: 5/16/2025    Attending Physician: Vladimir Mendoza MD        Discharge Physician: Vladimir Mendoza MD        Discharge Date: 5/16/2025 6:58 AM    Procedure(s) (LRB):  BLOCK, NERVE, FACET JOINT, LUMBAR, MEDIAL BRANCH      /////Bilateral L4-5 and L5-S1 MBB (no warfarin/aspirin, need cardiac clearance) (Bilateral)    Final Diagnosis: Lumbar spondylosis [M47.816]    Disposition: Home or self care    Patient Instructions:   Current Discharge Medication List        CONTINUE these medications which have NOT CHANGED    Details   albuterol-ipratropium (DUO-NEB) 2.5 mg-0.5 mg/3 mL nebulizer solution Take 3 mLs by nebulization every 4 (four) hours as needed for Wheezing or Shortness of Breath.      alendronate (FOSAMAX) 70 MG tablet Take 70 mg by mouth every 7 days.      aspirin 81 MG Chew Take 81 mg by mouth once daily.      atorvastatin (LIPITOR) 40 MG tablet Take 40 mg by mouth once daily.      calcium carbonate-vitamin D3 600 mg-10 mcg (400 unit) Cap Take 1,250 mcg by mouth.      cholecalciferol, vitamin D3, 1,250 mcg (50,000 unit) capsule Take 50,000 Units by mouth every 7 days.      empagliflozin (JARDIANCE) 10 mg tablet Take 10 mg by mouth once daily.      FLUoxetine 40 MG capsule Take 40 mg by mouth once daily.      furosemide (LASIX) 40 MG tablet Take 1 tablet (40 mg total) by mouth 2 (two) times daily.  Qty: 60 tablet, Refills: 11      isosorbide mononitrate (IMDUR) 30 MG 24 hr tablet Take 30 mg by mouth once daily.      LIDOcaine (LIDODERM) 5 % Place 1 patch onto the skin once daily. Remove & Discard patch within 12 hours or as directed by MD  Qty: 15 patch, Refills: 0      OLANZapine (ZYPREXA) 5 MG tablet Take 5 mg by mouth every evening.      ondansetron (ZOFRAN-ODT) 4 MG TbDL Take 4 mg by mouth every 6 (six) hours as needed.      spironolactone (ALDACTONE) 25 MG tablet Take 1 tablet (25 mg total) by mouth once daily.  Qty: 30 tablet, Refills: 11    Comments: .       traZODone (DESYREL) 50 MG tablet Take 25-50 mg by mouth nightly as needed.      warfarin (COUMADIN) 5 MG tablet Take 5 mg by mouth Daily.      clonazePAM (KLONOPIN) 1 MG tablet Take 0.5 mg by mouth 3 (three) times daily as needed for Anxiety.      dapagliflozin propanediol (FARXIGA) 10 mg tablet Take 1 tablet by mouth once daily.      ergocalciferol (ERGOCALCIFEROL) 50,000 unit Cap Take 1 capsule by mouth every 7 days.      LIDOcaine 1%, BUFFERED, 10 MG/20 mL APPLY 1 PATCH TOPICALLY IN THE MORNING FOR 14 DAYS. REMOVE AND DISCARD WITHIN 12 HOURS      nicotine (NICODERM CQ) 21 mg/24 hr Place 1 patch onto the skin once daily.  Qty: 30 patch, Refills: 0    Comments: Further taper per PCP      pregabalin (LYRICA) 50 MG capsule Take 1 capsule (50 mg total) by mouth 2 (two) times daily for 7 days, THEN 2 capsules (100 mg total) 2 (two) times daily for 7 days.  Qty: 42 capsule, Refills: 0    Associated Diagnoses: Lumbar radiculopathy, chronic      sotaloL (BETAPACE) 120 MG Tab Take 80 mg by mouth every 12 (twelve) hours. Take 1/2 tablet BID      TRUE METRIX GLUCOSE METER Misc USE 1 ITEM AS DIRECTED THREE TIMES A DAY TO CHECK BLOOD GLUCOSE      TRUE METRIX GLUCOSE TEST STRIP Strp 3 (three) times daily.      TRUEPLUS LANCETS 33 gauge Misc Apply topically 3 (three) times daily.           STOP taking these medications       methocarbamoL (ROBAXIN) 500 MG Tab Comments:   Reason for Stopping:                   Discharge Diagnosis: Lumbar spondylosis [M47.816]  Condition on Discharge: Stable with no complications to procedure   Diet on Discharge: Same as before.  Activity: as per instruction sheet.  Discharge to: Home with a responsible adult.  Follow up: 2-4 weeks

## 2025-05-16 NOTE — H&P
Vladimir Mendoza M.D.   [facility]  Interventional Pain    Established Patient Visit        No chief complaint on file.      Assessment and Plan:     Kayla Ruiz is a 70 y.o. female with low back pain. A detailed and lengthy discussion was undertaken regarding the patient's presentation including history,  physical examination, past treatments, relevant laboratory, imaging results and future management strategies:     Assessment: 70 y.o. year old female with        ICD-10-CM ICD-9-CM   1. Lumbar radiculopathy, chronic  M54.16 724.4   2. Lumbar spondylosis  M47.816 721.3   3. Facet arthritis of lumbar region  M47.816 721.3     The mentioned diagnosis is Worsening  and is accompanied by significant limitation of ADL's    1. Lumbar radiculopathy, chronic    2. Lumbar spondylosis  -     Vital signs; Standing  -     Place in Outpatient; Standing  -     Vital signs; Standing  -     Notify physician ; Standing  -     Notify physician ; Standing  -     Notify physician (specify); Standing  -     Notify physician (specify); Standing  -     Notify physician (specify); Standing  -     POCT urine pregnancy; Standing  -     POCT glucose; Standing  -     Discontinue IV; Standing  -     Insert peripheral IV; Standing  -     Verify informed consent; Standing  -     Saline lock IV; Standing  -     Diet NPO; Standing    3. Facet arthritis of lumbar region    Other orders  -     IP VTE LOW RISK PATIENT; Standing  -     POCT glucose; Standing      Plan:  The plan was clearly communicated to the patient, who verbalized understanding of the same.     Diagnostics: Reviewed the followed diagnostic results  Labs/EMG: Labs pertinent for raised BUN 21.6, anemia with Hb 10, and raised INR 5.5 (on warfarin)  Imaging:   MRI/CT:   04/10/2025 MRI Lumbar Spine Impression:  Subacute compression fracture along T12 inferior endplate with approximately 34% maximum vertebral body height loss. Similar height loss associated with L1  superior endplate compression fracture which has undergone kyphoplasty in the interim.  02/04/2025 MRI Lumbar Spine Impression: Subacute compression fracture of L1 superior endplate with 43% maximum vertebral body height loss and only mild osseous retropulsion. Mild multilevel lumbar degeneration as detailed above.  01/09/2025 CT Lumbar Spine Impression: Mild multilevel lumbar disc degeneration.  Left foraminal disc protrusion at L3-L4 causing mild left foraminal stenosis encroaching upon the exiting left L3 nerve.  Shallow disc bulges at L1-L2 through L4-5 which indent the ventral thecal sac.  No significant central canal stenosis.  02/04/2025 CT Cervical Spine Impression: There is grade 1 anterolisthesis of C4 on C5 and C7 on T1.  Solid ACDF at C5, C6 and C7. There is moderate degenerative narrowing of the left neural foramen at C4-C5.     Xray:  02/27/2025 Xray Thoraco-Lumbar Spine Impression: New compression deformity of T12 with anterior wedging not seen on the examination of February 6, 2025. Compression deformity of L1 head progress since that last examination and is straightening now with evidence of kyphoplasty changes. Degenerative changes  02/06/2025 Xray Thoraco-Lumbar Spine Impression: L1 compression fracture with progressive loss of height compared to 01/09/2025.  Medications:  Plan/Ordered:   Oxycodone 10 mg x 14 tab  Methocarbamol 500 mg tid prn x 30 days  Current: Clonazepam, Fluoxetine, Lidocaine 5% patch, Methocarbamol, Olanzapine, Trazodone, Alendronate  Previous:  Baclofen, oxycodone, tramadol, Hydrocodone, Pregabalin, Gabapentin, Tylenol#3  Anticoagulants/Antiplatelets: Yes aspirin and Coumadin   Pain Contract 03/12/2025  UDS ordered 03/12/2025 (did not result) will consider in future if requiring long term pain medications  Non-Pharmacologic  Therapy: Patient has previously completed physician directed physical therapy with aggravation of her pain symptoms.  I provided a new referral for  physical therapy at this office visit to address her low back pain related to compression fracture and facet arthritis of lower lumbar spine.  Pain Psychology: None  Bracing: Continue with bracing for next 6-10 weeks. Patient has been using a brace for past 4 weeks without any significant pain relief.   New Consults: Cardiac Clearance with Dr. Tony Leon for possible RFA in future  Interventions:  Injections:  Plan/Ordered:   05/16/2025: B/l L4-5 and L5-S1 MBB (no aspirin/warfarin hold, need cardiac clearance for future RFA)    Previous:   03/12/2025 TPI of paraspinal muscles in lumbar region: multifidus, longissimus and illiocostalis with complete relief of mid back pain at 4 week visit  Kyphoplasty at T8 and T9 in 09/2024  Spine Rubén at L1 in 02/2025  Information on the procedure was provided to the patient today. Risks and benefits were discussed. All questions were answered.  Surgical Spine Interventions: Cervical ACDF  Relevant Risk Factors for Interventions:  Medical-Surgical History/Devices: Stroke, compression # at T8 and T9 s/p kyphoplasty, compression # L1 s/p spine rubén procedure, COPD, Atrial Fib, CHF, s/p heart valve replacement, Right patella fracture, LESLI, tobacco dependence  Allergies: ACE inhibitors (Anaphylaxis), Naproxen, Nifedipine  Other:   I reviewed the prior notes from each unique source dated 02/27/2025 with Guerita RAPHAEL (NeuroSurgery) and 01/29/2025 with Dr. Devyn Young (Orthopedics)  Old Records: Decision was made to not obtain old records   Follow Up: Plan for patient to follow up in 2 weeks    Current Visit Imaging Interpretation:  I independently visualized/interpreted the following images dated Xray Thoraco-Lumbar Spine dated 02/27/2025 and 02/06/2025   Pertinent findings include: Compression fracture at T12 with more than 30% height loss as per Xray imaging.  Key Images:       The above plan and management options were discussed at length with patient. Patient is in agreement  with the above and verbalized understanding.    - I discussed the goals of interventional chronic pain management with the patient on today's visit. I explained the utility of injections for diagnostic and therapeutic purposes.  We discussed a multimodal approach to pain including treating the patient's given worst pain at any given time.  We will use a systematic approach to addressing pain.  We will also adopt a multimodal approach that includes injections, adjuvant medications, physical therapy, at times psychiatry.  There may be a limited role for opioid use intermittently in the treatment of pain, more particularly for acute pain although no one approach can be used as a sole treatment modality. I emphasized the importance of regular exercise, core strengthening and stretching, diet and weight loss as a cornerstone of long-term pain management.    - This condition does not require this patient to take time off of work, and the primary goal of our Pain Management services is to improve the patient's functional capacity.  - Patient Questions: Answered all of the patient's questions regarding diagnoses, therapy, treatment and next steps    Interval History:     04/15/2025  Pain Progress:  Patient noted that that she had significant pain relief following trigger point injections performed at the previous office visit at the site of newly developed fracture.  Patient did not have any tenderness to palpation along the mid back and the upper lumbar spine area at this office visit.  Significant pain at this office visit was in the lower back and it was predominantly axial.  This pain is related to facet arthritis.  Given the presentation I provided medications including short-term oxycodone and methocarbamol for muscle spasms.  Pain Score:  8/10  Functionality:  Patient continues to have significant difficulty with functionality especially pain that comes along with sitting and standing for longer periods of  time.  Medications:  Short course of oxycodone including 14 tablets and on month supply of Robaxin at this office visit  New Diagnostic Labs/Imaging:  I reviewed the new MRI imaging performed on April 10, 2025 with the patient.  Therapy:  I provided a new referral for physical therapy at this office visit given it has been 8-12 weeks from her last documented fracture identification.  Intervention:  I also recommended medial branch block to address her pain related to facet arthritis in the lower back region.      History of Present Illness:     2025 HPI Summary    Context/Inciting Event: Denies recent trauma and falls. Notes increased aggravation of pain following spine jorge l procedure performed in 2025.  Location: Lower Back Pain   Radiation: Denies radiation to lower extremities   Onset and Progress: Continuous pain worsening over the past 4 weeks (Acute compression fracture)  Description/Character: stabbing  Frequency and Timing: Constant pain with periods of exacerbation  Pain Score:  8/10 on the best day and 10/10 on the worst day. Pain at this office visit 9/10 and the previous office visit 8/10 with Guerita Fox (NeuroSurgery)  Worsening Pain: Patient notes that pain has been progressively worsening over the last few weeks.   Symptoms Worse With: Standing, Walking, and Flexing  Symptoms Improved With: nothing  Vertebral Height Loss and Imagin% height loss noted as per Xray imaging on 2025 at T12. Pending MRI to assess for retropulsion and bony edema  Osteoporosis and Treatment: yes on alendronate, calcium carbonate, vitamin D3  Recent DXA Scan: yes; ordered by Guerita Fox NP (NeuroSurgery)  Bracing: yes    Associated Conditions:  Numbness: no  Weakness: no  Sleep problems: yes  H/o Depression and anxiety: no  Opiate use disorder: no  Inflammatory conditions like Rheumatoid/Ankylosing spondylitis/Psoriatic: no  Functional/Activity Limitations: yes, Symptoms interfere with daily  activity and sleeping    Patient denies night fever/night sweats, urinary incontinence, bowel incontinence, significant weight loss, significant motor weakness, and loss of sensations.    Previous Treatments:  Medications:   Current: Clonazepam, Fluoxetine, Gabapentin, Hydrocodone, Lidocaine 5%, Olanzapine, Trazodone  Previous:  Baclofen, oxycodone, tramadol  Anticoagulants/Antiplatelets: Yes aspirin and Coumadin     Physical Therapy/Home Exercise: yes  Ice/Heat:yes  TENS: no  Acupuncture: no  Massage: no  Chiropractic: no      Interventional Procedures:  Kyphoplasty at T8 and T9 in 09/2024  Spine Jack at L1 in 02/2025  Spine Surgery: Cervical ACDF procedure  Other: None      Summary of Other Notes:  02/27/2025 with Guerita RAPHAEL (NeuroSurgery)  ASSESSMENT:          ICD-10-CM ICD-9-CM   1. T12 compression fracture, initial encounter  S22.080A 805.2   2. Osteoporosis with current pathological fracture, unspecified osteoporosis type, initial encounter  M80.00XA 733.00       733.10      PLAN:      1. T12 compression fracture, initial encounter (Primary)  - Ambulatory referral/consult to Pain Clinic; Future  - HYDROcodone-acetaminophen (NORCO) 7.5-325 mg per tablet; Take 1 tablet by mouth every 8 (eight) hours as needed for Pain (1/2 tablet every 8 hours PRN fro severe pain).  Dispense: 21 tablet; Refill: 0  - DXA Bone Density Appendicular Skeleton; Future     2. Osteoporosis with current pathological fracture, unspecified osteoporosis type, initial encounter  - DXA Bone Density Appendicular Skeleton; Future     Kayla Ruiz presents today describing severe pain into the lower back.  She is accompanied by her 2 daughters today.  I did take the time to review her previous CT scan as well as updated x-rays in clinic with them which does reveal a an acute T12 compression fracture.  We will refer the patient on for consultation with Dr. Mendoza regarding kyphoplasty.  The patient was advised to return to her TLSO  brace in the interim.  We did discuss activity restrictions including no lifting more than 10lbs and avoiding any type of bending, stooping or twisting activities.  I did provide the patient a 1 time refill of hydrocodone which she has been cutting in half.  She was advised to utilize this medication sparingly as I will not be able to provide her a refill.  She was advised to alternate this medication with Tylenol and not to exceed more than 4000 mg in a 24 hour period.  The patient and her daughters verbalized understanding at this time.  We will plan to see the patient back in clinic on an as-needed basis going forward.  They were advised to notify us with any concerns or questions in the future.  I have also entered in order for a bone density scan as the patient states she has previously been diagnosed with osteoporosis although her most recent bone density scan was more than 2 years ago.  Her daughter states she has been prescribed what sounds like Fosamax although has not initiated this medication as the pharmacy has not gotten back with her since it was prescribed.  She was advised to reach out to her primary care provider regarding this issue.   01/29/2025 with Dr. Devyn Young (Orthopedics)  Patient is here today for follow-up evaluation 2 weeks status post fracture of right patella, nondisplaced. She was placed into a hinged knee brace in the hospital. It has been locked full extension. She is limited in her ability to mobilize at baseline. She has family with her today. She states she has soreness in the knee but it is well-controlled.   Assessment  1. Other closed fracture of right patella with routine healing, subsequent encounter  X-Ray Knee 1 or 2 View Right         Plan  Her fracture is completely nondisplaced and has not had any change in alignment compared to her initial films.  I have open up her brace at 0-60 at rest.  She will continue ambulating with it in full extension.  I have shown the  family how to manage her brace.  She has minimal discomfort in the knee.  She has fractures in her spine that are being managed by Neurosurgery.  I will see her back in a month for repeat x-rays at which time we will open it up to 0-90 degrees.  They understand and agree with all that we have discussed and all questions and concerns were addressed.         Pain Disability Index:          4/15/2025     9:08 AM 3/12/2025     9:13 AM   Last 3 PDI Scores   Pain Disability Index (PDI) 50 56     Nabil Doe Disability Index 22/24     Review:  Reviewed and consistent with medication use as prescribed.        Review of patient's allergies indicates:   Allergen Reactions    Ace inhibitors Anaphylaxis     SWELLING    Venlafaxine Swelling    Naproxen sodium      RASH    Nifedipine      ITCHING       Past Medical History:   Diagnosis Date    A-fib     Anticoagulant long-term use     Depression     Diabetes mellitus     HTN (hypertension)     Hyperlipidemia     Sleep apnea     TIA (transient ischemic attack) 2025       Past Surgical History:   Procedure Laterality Date    AORTIC VALVE REPLACEMENT      CHOLECYSTECTOMY      COLON RESECTION      fusion      Lumbar    LEFT HEART CATHETERIZATION Left 05/18/2023    Procedure: Left heart cath;  Surgeon: Kahlil Martin MD;  Location: [facility] CATH LAB;  Service: Cardiology;  Laterality: Left;  LHC +/- PCI    TONSILLECTOMY          reports that she has quit smoking. Her smoking use included cigarettes. She has a 50 pack-year smoking history. She has never used smokeless tobacco. She reports that she does not drink alcohol and does not use drugs.    Family History   Problem Relation Name Age of Onset    COPD Mother      Hypertension Mother      Heart disease Father         No current facility-administered medications for this encounter.     Facility-Administered Medications Ordered in Other Encounters   Medication    0.9%  NaCl infusion    diphenhydrAMINE capsule 50 mg       Review  "of Systems:     Review of Systems   Constitutional: Negative.  Negative for chills, diaphoresis and fever.   HENT: Negative.     Eyes: Negative.    Respiratory: Negative.  Negative for shortness of breath and wheezing.    Cardiovascular: Negative.  Negative for chest pain and palpitations.   Gastrointestinal: Negative.  Negative for blood in stool.   Genitourinary: Negative.    Musculoskeletal:         Refer to HPI   Skin: Negative.  Negative for rash.   Neurological:  Negative for tremors and speech change.   Endo/Heme/Allergies: Negative.    Psychiatric/Behavioral: Negative.         PHYSICAL EXAM:   Visit Vitals  BP (!) 168/81   Pulse (!) 59   Temp 97.4 °F (36.3 °C) (Oral)   Ht 5' 5.5" (1.664 m)   Wt 86.2 kg (190 lb)   SpO2 95%   Breastfeeding No   BMI 31.14 kg/m²       General Appearance: healthy, well developed, well nourished, appropriately dressed  Mental Status: Alert, oriented, thought content appropriate; Normal affect  Psych: Mood and affect appropriate  Head: Normocephalic, atraumatic  Eyes: Extraocular movements intact.   Neck: No asymmetry, masses or scars; supple; midline   Skin: Warm and dry; No rashes visible on exposed areas  Lung: Respiratory effort normal, symmetrical chest rise  Cardiovascular: RRR with palpation of the radial artery.    Neuro:     Motor & Sensory:     Strength and Sensation:     Upper Extremity  Nerve C5 C6 C7 C8 T1   Muscle Groups Shoulder Abduction Elbow Flexion (Palm up) Elbow Flexion (Thumb up) Wrist Extension Elbow Extension Wrist Flexion Hand  Finger Abduction   Right 5/5 5/5 5/5 5/5 5/5 5/5 5/5 5/5   Left 5/5 5/5 5/5 5/5 5/5 5/5 5/5 5/5     Nerve Root (area) Light Touch    R L   C5  (Lateral arm below deltoid) 2 2   C6  (Thumb & Radial hand/forearm) 2 2   C7 (Finger 2,3,4) 2 2   C8 (Finger 5) 2 2   T1 (Medial elbow) 2 2       Lower Extremity:  Nerve L2 L3 L4/L5 L5 S1 S2   Muscle Group Hip Flexion Hip  Adduction Knee Extension Ankle Dorsiflexion Toe  Dorsiflexion " Hip  Abduction Ankle Plantarflexion Toe  Plantarflexion   Right 5/5 5/5 5/5 5/5 5/5 5/5 5/5 5/5   Left 5/5 5/5 5/5 5/5 5/5 5/5 5/5 5/5     Nerve Root (area) Light Touch    R L   L1 (iliac crest/groin) 2 2   L2 (anterior/inner thigh) 2 2   L3 (anterior thigh/medial knee) 2 2   L4 (lateral thigh/ant. knee/medial foot border) 2 2   L5 (lateral leg/dorsum foot below 2-4 toes) 2 2   S1 (lateral foot border) 2 2   S2 (plantar heel) 2 2       Reflexes:    R L   Biceps (C5) 2+ 2+   Brachioradialis (C6) 2+ 2+   Triceps (C7) 2+ 2+   Patellar (L4) 2+ 2+   Achilles (S1) 2+ 2+       Upper Tract Signs:    R L   Colmenares's Negative Negative   Plantar Response Down-going Down-going   Clonus Negative Negative     Gait:   Heel walking: Absent  Toe walking: Absent  Tandem walking: Absent      MSK:    Lumbar Exam  Inspection:  Surgical scar noted, insertion sites for needles for kyphoplasty noted around upper midback.  Palpation:  Tenderness also noted along the paraspinal muscles in the lower lumbar region.  ROM:  Significant limitation to range of motion for extension flexion rotation and lateral bending  Special Tests:   Facet Loading:  Positive bilateral  Straight Leg raise:  Negative bilaterally     Data     Imaging of Relevance:     Lumbar Spine  MRI Imaging  Results for orders placed during the hospital encounter of 04/01/25    MRI Lumbar Spine Without Contrast    Narrative  EXAMINATION:  MRI LUMBAR SPINE WITHOUT CONTRAST    CLINICAL HISTORY:  Compression fracture, lumbar;Assess for compression fracture (T12 level, height loss, bony edema, retropulsion), facet arthritis, stenosis (foraminal/central), degenerative disc, modic changes and atrophy of paraspinal muscles;  Wedge compression fracture of T11-T12 vertebra, initial encounter for closed fracture    TECHNIQUE:  Multiplanar, multisequence MR images were acquired from the thoracolumbar junction to the sacrum without the administration of contrast.    COMPARISON:  MR lumbar  spine without contrast 02/11/2025.    FINDINGS:  Subacute compression fracture of T12 inferior endplate with 34% maximum vertebral body height loss.  This is new when compared to 02/11/2025 but was seen on radiographs from 02/27/2025.    Similar appearing height loss associated with L1 superior endplate compression fracture of approximately 44%.  This has been treated in the interval with kyphoplasty.  There is persistent mild bowing of the posterior cortex of L1 vertebral body with only mild indentation upon the ventral thecal sac.    No spondylolisthesis.    Marrow signal is heterogeneous but overall nonaggressive in appearance.    No intrinsic abnormality of the distal cord or cauda equina.    No significant paraspinous soft tissue abnormality.    Partial visualization of a cyst arising exophytically from the left renal interpolar cortex.    T12-L1: Shallow central disc protrusion indenting the ventral thecal sac with no significant stenosis or neural impingement.    L1-L2: Mild disc degeneration and shallow central disc protrusion indenting the ventral thecal sac with no significant stenosis or neural impingement.    L2-L3: Unremarkable.    L3-L4: Mild disc degeneration, shallow disc bulge, and superimposed left foraminal disc protrusion causing mild left foraminal stenosis with no evidence of exiting nerve impingement.  Central canal and right foramina are grossly patent.    L4-L5: Mild disc degeneration/Modic type 2 endplate changes, mild right marginal osteophyte, and mild right facet joint degeneration causing mild right foraminal stenosis.  Central canal and left foramina are patent.    L5-S1: Mild disc degeneration with mild Modic type 2 endplate change and left marginal osteophyte with no significant stenosis or neural impingement.    Impression  Subacute compression fracture along T12 inferior endplate with approximately 34% maximum vertebral body height loss.    Similar height loss associated with L1  superior endplate compression fracture which has undergone kyphoplasty in the interim.      Electronically signed by: Phoenix Albert  Date:    04/10/2025  Time:    13:17      CT Imaging  Results for orders placed during the hospital encounter of 01/09/25    CT Lumbar Spine Without Contrast    Narrative  EXAMINATION:  CT LUMBAR SPINE WITHOUT CONTRAST    CLINICAL HISTORY:  Low back pain, trauma;Back trauma, no prior imaging (Age >= 16y);    TECHNIQUE:  Low-dose axial, sagittal and coronal reformations are obtained through the lumbar spine. 3D reconstructions were performed. Contrast was not administered. Dose reduction techniques including automatic exposure control (AEC) were utilized.    Dose (DLP): 1060 mGycm    COMPARISON:  CT abdomen and pelvis 08/25/2014.    FINDINGS:  Five lumbar-type vertebral bodies.    Vertebral column alignment is normal.  No fractures demonstrated.  Vertebral body heights are preserved.    Mild multilevel lumbar disc degeneration.  Left foraminal disc protrusion at L3-L4 causing mild left foraminal stenosis encroaching upon the exiting left L3 nerve.  Shallow disc bulges at L1-L2 through L4-5 which indent the ventral thecal sac.  No significant central canal stenosis.    Paravertebral soft tissues are normal.    Cholecystectomy.  Calcific atherosclerosis of the abdominal aorta and its branches.  Anastomotic sutures are noted in the sigmoid colon.  Diverticulosis of the descending and sigmoid colon.    Impression  No acute fracture or subluxation.    Mild multilevel lumbar degeneration as detailed above.      Electronically signed by: Phoenix Albert  Date:    01/09/2025  Time:    10:40      Results for orders placed during the hospital encounter of 02/04/25    CT Cervical Spine Without Contrast    Narrative  EXAMINATION:  CT CERVICAL SPINE WITHOUT CONTRAST    CLINICAL HISTORY:  Trauma.    TECHNIQUE:  Multidetector axial images were performed of the cervical spine without and.  Images were  reconstructed.  Automated exposure control was utilized to minimize radiation dose.  .    COMPARISON:  None available.    FINDINGS:  Cervical vertebrae stature is maintained.  There is grade 1 anterolisthesis of C4 on C5 and C7 on T1.  Solid ACDF at C5, C6 and C7.  No acute fracture or malalignment identified.  There is moderate degenerative narrowing of the left neural foramen at C4-C5.  There is no prevertebral soft tissue prominence.  This study does not exclude the possibility of intrathecal soft tissue, ligamentous or vascular injury.    Impression  No acute fracture or malalignment identified.      Electronically signed by: Juanito Chopra  Date:    02/04/2025  Time:    21:25      02/27/2025 Xray Thoraco-Lumbar Spine   FINDINGS:  There is demineralization of the visualized osseous structures with evidence of a treated compression deformity of L1 there is now evidence of anterior wedging and compression deformity of T12 these is new since the last examination of February 6, 2025.  Kyphoplasty was performed at what appears to be T8 and T9.  There is retrolisthesis of L2 on L3 and L3 on L4 with some degenerative changes of the posterior elements at L4-L5 and L5-S1.     Impression: New compression deformity of T12 with anterior wedging not seen on the examination of February 6, 2025. Compression deformity of L1 head progress since that last examination and is straightening now with evidence of kyphoplasty changes. Degenerative changes    02/06/2025 Xray Thoraco-Lumbar Spine:   FINDINGS:  There is an L1 superior endplate compression fracture with mild-to-moderate loss of height, progressed from the prior exam.  The remaining vertebral body heights are preserved.  There are small multilevel marginal osteophytes.  There is mild facet arthropathy.  Vascular calcifications are noted.  Impression: L1 compression fracture with progressive loss of height compared to 01/09/2025.      Labs of Relevance:  Chemistry:  Lab  Results   Component Value Date     05/02/2025    K 4.3 05/02/2025    BUN 21.6 (H) 05/02/2025    CREATININE 0.99 05/02/2025    EGFRNORACEVR >60 05/02/2025    CALCIUM 8.8 05/02/2025    ALKPHOS 134 05/02/2025    LABPROT 13.9 09/23/2024    ALBUMIN 3.3 (L) 05/02/2025    BILIDIR 0.3 02/22/2021    IBILI 0.50 02/22/2021    AST 16 05/02/2025    ALT 11 05/02/2025    MG 2.20 02/16/2025    PHOS 4.2 02/06/2025        Lab Results   Component Value Date    HGBA1C 6.9 02/05/2025        Hematology:  Lab Results   Component Value Date    WBC 7.15 02/17/2025    HGB 10.0 (L) 02/17/2025    HCT 32.9 (L) 02/17/2025     02/17/2025      Lab Results   Component Value Date    INR 5.8 (HH) 04/30/2025    INR 5.5 (HH) 02/26/2025    INR 8.7 (HH) 02/24/2025    PROTIME 53.0 (H) 04/30/2025    PROTIME 50.3 (H) 02/26/2025    PROTIME 77.9 (H) 02/24/2025         Vladimir Mendoza MD  Interventional Pain Management  [facility]     Disclaimer:  This note was prepared using voice recognition system and is likely to have sound alike errors that may have been overlooked even after proof reading.  Please call me with any questions

## 2025-05-16 NOTE — OP NOTE
Diagnostic Lumbar Medial Branch Block Under Fluoroscopy    The procedure, risks, benefits, and options were discussed with the patient. There are no contraindications to the procedure. The patent expressed understanding and agreed to the procedure. Informed written consent was obtained prior to the start of the procedure and can be found in the patient's chart.    PATIENT NAME: Kayla Ruiz   MRN: [id number]     DATE OF PROCEDURE: 05/16/2025                                           PROCEDURE:  Diagnostic Bilateral L4-5 and L5-S1 Lumbar Medial Branch Block under Fluoroscopy    PRE-OP DIAGNOSIS: Lumbar spondylosis [M47.816] Lumbar spondylosis [M47.816]    POST-OP DIAGNOSIS: Same    PHYSICIAN: Vladimir Mendoza MD    ASSISTANTS: None    MEDICATIONS INJECTED:  Bupivicaine 0.5%    LOCAL ANESTHETIC INJECTED:   Xylocaine 1%    SEDATION: Versed 2mg and Fentanyl 0mcg                                                                                                                                                                                     Conscious sedation ordered by M.D. Patient re-evaluation prior to administration of conscious sedation. No changes noted in patient's status from initial evaluation. The patient's vital signs were monitored by RN and patient remained hemodynamically stable throughout the procedure.  No case tracking events are documented in the log.    ESTIMATED BLOOD LOSS:  None    COMPLICATIONS:  None.    INTERVAL HISTORY: Patient has clinical and imaging findings suggestive of facet mediated pain.    TECHNIQUE: Time-out was performed to identify the patient and procedure to be performed. With the patient laying in a prone position, the surgical area was prepped and draped in the usual sterile fashion using ChloraPrep and fenestrated drape. The levels were determined under fluoroscopic guidance. Skin anesthesia was achieved by injecting Lidocaine 2% over the injection sites. A 25 gauge, 3.5 inch needle  was introduced into the medial branch nerves at the junctions of the superior articular process and the transverse processes of the targeted sites using AP, lateral and/or contralateral oblique fluoroscopic imaging. After negative aspiration for blood or CSF was confirmed, 0.5 mL of the anesthetic listed above was then slowly injected at each site. The needles were removed and bleeding was nil. A sterile dressing was applied. No specimens collected. The patient tolerated the procedure well.    PRE-PROCEDURE PAIN SCORE: 10/10        The patient was monitored after the procedure in the recovery area. They were given post-procedure and discharge instructions to follow at home. The patient was discharged in a stable condition.    Vladimir Mendoza MD

## 2025-05-16 NOTE — DISCHARGE INSTRUCTIONS
Medial Branch Block After Care    You may take the bandage off and shower tomorrow. Check for signs and symptoms of infection daily: redness, warmth, pus or drainage, increase swelling, and foul odor.  Wash your hands before and after touching your puncture site.  No heavy lifting for 1 week.  No driving for 24 hours.  You may resume your regular diet today.  You may resume your regular activities tomorrow.  No heating pad on the puncture site for 24 hours. You may use an ice pack for pain or swelling for no longer than 15 minutes at a time for 3-4 times a day. Do not place ice directly on your skin.  No aspirin, blood thinners, or NSAIDs for 24 hours.  Ok to take Tylenol today    Contact your doctor for:  Increase pain not controlled with medication.  Any new numbness or tingling.  Fever greater than 101 degree Fahrenheit that does not break with medication.  Any signs or symptoms of infection: redness, warmth, pus or drainage, increase swelling, and foul odor at the puncture site.     Diabetes Education after Pain Injections:    Steroid injections can significantly raise blood glucose levels. People with diabetes are especially vulnerable to blood sugar spikes after steroid injections and should discuss this risk with their doctor before receiving the injections. Close monitoring of blood sugar levels is recommended after receiving such injections.     Ensure you have a working fingerstick blood glucose machine or CGM to monitor blood glucose.   Ensure you have spoken to your primary care/endocrinologist about getting a steroid injection and managing blood glucose medications post procedure.  Check glucose frequently every 6-8 hours for the next 72 hours. Ensure levels are below 250mg/dl. If >250mg/dl, seek care from physician managing diabetes.   For the next 72 hours, consider hydrating up to 3 liters per day and decrease carbohydrates.   Consider going to the ER if you have significant nausea, vomiting, or  confusion as this may be symptoms of diabetic ketoacidosis (DKA).

## 2025-05-19 ENCOUNTER — LAB VISIT (OUTPATIENT)
Dept: LAB | Facility: HOSPITAL | Age: 70
End: 2025-05-19
Attending: INTERNAL MEDICINE
Payer: MEDICARE

## 2025-05-19 ENCOUNTER — TELEPHONE (OUTPATIENT)
Facility: CLINIC | Age: 70
End: 2025-05-19
Payer: MEDICARE

## 2025-05-19 DIAGNOSIS — M47.816 LUMBAR SPONDYLOSIS: Primary | ICD-10-CM

## 2025-05-19 DIAGNOSIS — Z95.2 HEART VALVE REPLACED BY TRANSPLANT: Primary | ICD-10-CM

## 2025-05-19 LAB
INR PPP: 2.7
PROTHROMBIN TIME: 25.6 SECONDS (ref 12.5–14.5)

## 2025-05-19 PROCEDURE — 85610 PROTHROMBIN TIME: CPT

## 2025-05-19 PROCEDURE — 36415 COLL VENOUS BLD VENIPUNCTURE: CPT

## 2025-05-19 RX ORDER — CELECOXIB 200 MG/1
200 CAPSULE ORAL 2 TIMES DAILY PRN
Qty: 60 CAPSULE | Refills: 0 | Status: SHIPPED | OUTPATIENT
Start: 2025-05-19 | End: 2025-06-18

## 2025-05-19 NOTE — TELEPHONE ENCOUNTER
Medial Branch Block Pain Diary    MBB C/T/L: lumbar at Vertebral Column: L4-L5 and L5-S1 on Date - month/day/year: (05/16/2025)     Time After Injection Bending Backward Rotation Standing Sitting   Before Injection 10/10 10/10 10/10 10/10   2 Hrs After Injection 10/10 9/10 10/10 10/10   4 Hrs After Injection 10/10 9/10 10/10 10/10   6 Hrs After Injection 5/10 3/10 4/10 4/10     Overal Pian Relief Compared to Before Injection:  Hours Much Better Better Slightly Better No Change Worse   2 Hours    X    4 Hours    X    6 Hours   X       Activity Tolerance (Check which relates to performance of the following activities.):  Activity Easier Same Harder   Walking  X    Climbing Stairs N/A N/A N/A   Household Chores  X    Driving N/A N/A N/A   Sitting at Desk  X       Medication User After Injection:  Did you take less pain medication than usual? [ ] Yes   [ X] No - She was taking the same medication.    If yes, which medication(s) and dose?    *Patient took 2 Tylenols and a Methocarbamol tablet that night.

## 2025-05-19 NOTE — TELEPHONE ENCOUNTER
Patient noted to have significant relief between our 6-8 where her pain scores dropped to 2/10 with more than 80% relief noted.  Patient also had significant improvement in her functionality.  Given the improvement in pain with the 1st medial branch block I will consider the 2nd confirmatory medial branch block as the next step.

## 2025-05-21 VITALS
SYSTOLIC BLOOD PRESSURE: 133 MMHG | WEIGHT: 190 LBS | TEMPERATURE: 97 F | DIASTOLIC BLOOD PRESSURE: 76 MMHG | BODY MASS INDEX: 30.53 KG/M2 | OXYGEN SATURATION: 97 % | HEIGHT: 66 IN | RESPIRATION RATE: 18 BRPM | HEART RATE: 61 BPM

## 2025-05-22 ENCOUNTER — TELEPHONE (OUTPATIENT)
Facility: CLINIC | Age: 70
End: 2025-05-22
Payer: MEDICARE

## 2025-05-22 NOTE — TELEPHONE ENCOUNTER
Call to advise patient that we were moving her procedure from 06/16/2025 to 06/23/2025 because 06/16/2025 is a clinic day.  Patient's spouse (Christopher Ruiz) verbalized understand.

## 2025-05-27 ENCOUNTER — ANTI-COAG VISIT (OUTPATIENT)
Dept: CARDIOLOGY | Facility: HOSPITAL | Age: 70
End: 2025-05-27
Attending: INTERNAL MEDICINE
Payer: MEDICARE

## 2025-05-27 LAB — INR PPP: 1.7 (ref 2–3)

## 2025-05-27 PROCEDURE — 85610 PROTHROMBIN TIME: CPT

## 2025-05-27 PROCEDURE — 99211 OFF/OP EST MAY X REQ PHY/QHP: CPT

## 2025-06-10 ENCOUNTER — ANTI-COAG VISIT (OUTPATIENT)
Dept: CARDIOLOGY | Facility: HOSPITAL | Age: 70
End: 2025-06-10
Attending: INTERNAL MEDICINE
Payer: MEDICARE

## 2025-06-10 LAB — INR PPP: 2.2 (ref 2–3)

## 2025-06-10 PROCEDURE — 99211 OFF/OP EST MAY X REQ PHY/QHP: CPT

## 2025-06-10 PROCEDURE — 85610 PROTHROMBIN TIME: CPT

## 2025-06-16 DIAGNOSIS — M47.816 LUMBAR SPONDYLOSIS: ICD-10-CM

## 2025-06-16 RX ORDER — CELECOXIB 200 MG/1
200 CAPSULE ORAL 2 TIMES DAILY PRN
Qty: 60 CAPSULE | Refills: 0 | Status: SHIPPED | OUTPATIENT
Start: 2025-06-16 | End: 2025-07-16

## 2025-06-16 NOTE — TELEPHONE ENCOUNTER
Controlled Medication Refill Request (Pregabalin/Gabapentin/Opioids)      Medication Plan as per Physician Last Office note/documentation (Plan/Current/Previous):    Plan/Ordered: 04/15/25  - Oxycodone 10 mg x 14 tab  - Methocarbamol 500 mg tid prn x 30 days    Details of Medication Requested:  Medication Requested: Celecoxib (Celebrex) 200 mg BID PRN  Dose & Frequency Requested: BID PRN  Pharmacy Name: [id number] [employer] [facility] [address]  Last Medication prescription: (05/ 19/25)  Medication Refill Date: (06/16/25)      Medication Effectiveness:  Clinical Diagnosis for the Medication Request: Lumbar spondylosis [M47.816]   Has the requested medication been effective for pain relief in the past? moderate  Side Effects: Have there been any side effects from the requested medication? No  Effectiveness of other medications prescribed by provider:       ER/Urgent Care Visits Since Last Appointment (for pain management): No  If Yes, provide details of ER/Urgent Care visit(s): N/A      Pain Assessment:  Pain Location: Lower back, no radiation  Pain Type: Nerve/Somatic/Mixed  Pain Score: Today the pain score is 5/10. Pain scores range from 5/10 - 8/10  Pain Impact on Daily Activities ([place] one): Moderate

## 2025-06-17 NOTE — TELEPHONE ENCOUNTER
I received a refill request from [facility] on [address] for refill of Patient's Methocarbamol.  I called Patient to confirm and was told that she did not need a refill at this time.    Details of Medication Requested:  Medication Requested: methocarbamoL (ROBAXIN)   Dose & Frequency Requested: 500 mg tid prn (muscle spasms)  Pharmacy Name: [facility] [address]  Last Medication prescription: (05/16/2025)  Medication Refill Date: (05/16/2025)

## 2025-06-23 ENCOUNTER — HOSPITAL ENCOUNTER (OUTPATIENT)
Facility: HOSPITAL | Age: 70
Discharge: HOME | End: 2025-06-23
Attending: STUDENT IN AN ORGANIZED HEALTH CARE EDUCATION/TRAINING PROGRAM | Admitting: STUDENT IN AN ORGANIZED HEALTH CARE EDUCATION/TRAINING PROGRAM
Payer: MEDICARE

## 2025-06-23 DIAGNOSIS — M47.816 FACET ARTHRITIS OF LUMBAR REGION: ICD-10-CM

## 2025-06-23 LAB
POCT GLUCOSE: 173 MG/DL (ref 70–110)
POCT GLUCOSE: 214 MG/DL (ref 70–110)

## 2025-06-23 PROCEDURE — 64494 INJ PARAVERT F JNT L/S 2 LEV: CPT | Mod: 50,KX,, | Performed by: STUDENT IN AN ORGANIZED HEALTH CARE EDUCATION/TRAINING PROGRAM

## 2025-06-23 PROCEDURE — 63600175 PHARM REV CODE 636 W HCPCS: Performed by: STUDENT IN AN ORGANIZED HEALTH CARE EDUCATION/TRAINING PROGRAM

## 2025-06-23 PROCEDURE — 64493 INJ PARAVERT F JNT L/S 1 LEV: CPT | Mod: 50,KX,, | Performed by: STUDENT IN AN ORGANIZED HEALTH CARE EDUCATION/TRAINING PROGRAM

## 2025-06-23 PROCEDURE — 25000242 PHARM REV CODE 250 ALT 637 W/ HCPCS: Performed by: STUDENT IN AN ORGANIZED HEALTH CARE EDUCATION/TRAINING PROGRAM

## 2025-06-23 PROCEDURE — 64494 INJ PARAVERT F JNT L/S 2 LEV: CPT | Mod: 50 | Performed by: STUDENT IN AN ORGANIZED HEALTH CARE EDUCATION/TRAINING PROGRAM

## 2025-06-23 PROCEDURE — 64493 INJ PARAVERT F JNT L/S 1 LEV: CPT | Mod: 50 | Performed by: STUDENT IN AN ORGANIZED HEALTH CARE EDUCATION/TRAINING PROGRAM

## 2025-06-23 RX ORDER — BUPIVACAINE HYDROCHLORIDE 5 MG/ML
INJECTION, SOLUTION EPIDURAL; INTRACAUDAL; PERINEURAL
Status: DISCONTINUED | OUTPATIENT
Start: 2025-06-23 | End: 2025-06-23 | Stop reason: HOSPADM

## 2025-06-23 RX ORDER — LIDOCAINE HYDROCHLORIDE 10 MG/ML
INJECTION, SOLUTION INFILTRATION; PERINEURAL
Status: DISCONTINUED | OUTPATIENT
Start: 2025-06-23 | End: 2025-06-23 | Stop reason: HOSPADM

## 2025-06-23 RX ORDER — IPRATROPIUM BROMIDE AND ALBUTEROL SULFATE 2.5; .5 MG/3ML; MG/3ML
3 SOLUTION RESPIRATORY (INHALATION) ONCE
Status: COMPLETED | OUTPATIENT
Start: 2025-06-23 | End: 2025-06-23

## 2025-06-23 RX ORDER — BUPIVACAINE HYDROCHLORIDE 5 MG/ML
INJECTION, SOLUTION EPIDURAL; INTRACAUDAL; PERINEURAL
Status: DISCONTINUED
Start: 2025-06-23 | End: 2025-06-24 | Stop reason: HOSPADM

## 2025-06-23 RX ORDER — MIDAZOLAM HYDROCHLORIDE 2 MG/2ML
INJECTION, SOLUTION INTRAMUSCULAR; INTRAVENOUS
Status: COMPLETED
Start: 2025-06-23 | End: 2025-06-23

## 2025-06-23 RX ADMIN — IPRATROPIUM BROMIDE AND ALBUTEROL SULFATE 3 ML: .5; 3 SOLUTION RESPIRATORY (INHALATION) at 11:06

## 2025-06-23 NOTE — H&P
Vladimir Mendoza M.D.   [facility]  Interventional Pain    Established Patient Visit        No chief complaint on file.      Assessment and Plan:     Kayla Ruiz is a 70 y.o. female with low back pain. A detailed and lengthy discussion was undertaken regarding the patient's presentation including history,  physical examination, past treatments, relevant laboratory, imaging results and future management strategies:     Assessment: 70 y.o. year old female with        ICD-10-CM ICD-9-CM   1. Facet arthritis of lumbar region  M47.816 721.3     The mentioned diagnosis is Worsening  and is accompanied by significant limitation of ADL's    1. Facet arthritis of lumbar region    Other orders  -     Place in Outpatient; Standing  -     Vital signs; Standing  -     Insert peripheral IV; Standing  -     Insert peripheral IV; Standing  -     Verify informed consent; Standing  -     Saline lock IV; Standing  -     Insert peripheral IV; Standing  -     Notify physician ; Standing  -     Notify physician ; Standing  -     Notify physician (specify); Standing  -     Notify physician (specify); Standing  -     Notify physician (specify); Standing  -     Diet NPO; Standing  -     IP VTE LOW RISK PATIENT; Standing  -     POCT urine pregnancy; Standing  -     POCT glucose; Standing  -     FL Fluoro for Pain Management; Standing  -     Vital signs; Standing  -     Discontinue IV; Standing  -     Diet message; Standing      Plan:  The plan was clearly communicated to the patient, who verbalized understanding of the same.     Diagnostics: Reviewed the followed diagnostic results  Labs/EMG: Labs pertinent for raised BUN 21.6, anemia with Hb 10, and raised INR 5.5 (on warfarin)  Imaging:   MRI/CT:   04/10/2025 MRI Lumbar Spine Impression:  Subacute compression fracture along T12 inferior endplate with approximately 34% maximum vertebral body height loss. Similar height loss associated with L1 superior endplate compression  fracture which has undergone kyphoplasty in the interim.  02/04/2025 MRI Lumbar Spine Impression: Subacute compression fracture of L1 superior endplate with 43% maximum vertebral body height loss and only mild osseous retropulsion. Mild multilevel lumbar degeneration as detailed above.  01/09/2025 CT Lumbar Spine Impression: Mild multilevel lumbar disc degeneration.  Left foraminal disc protrusion at L3-L4 causing mild left foraminal stenosis encroaching upon the exiting left L3 nerve.  Shallow disc bulges at L1-L2 through L4-5 which indent the ventral thecal sac.  No significant central canal stenosis.  02/04/2025 CT Cervical Spine Impression: There is grade 1 anterolisthesis of C4 on C5 and C7 on T1.  Solid ACDF at C5, C6 and C7. There is moderate degenerative narrowing of the left neural foramen at C4-C5.     Xray:  02/27/2025 Xray Thoraco-Lumbar Spine Impression: New compression deformity of T12 with anterior wedging not seen on the examination of February 6, 2025. Compression deformity of L1 head progress since that last examination and is straightening now with evidence of kyphoplasty changes. Degenerative changes  02/06/2025 Xray Thoraco-Lumbar Spine Impression: L1 compression fracture with progressive loss of height compared to 01/09/2025.  Medications:  Plan/Ordered:   Oxycodone 10 mg x 14 tab  Methocarbamol 500 mg tid prn x 30 days  Current: Clonazepam, Fluoxetine, Lidocaine 5% patch, Methocarbamol, Olanzapine, Trazodone, Alendronate  Previous:  Baclofen, oxycodone, tramadol, Hydrocodone, Pregabalin, Gabapentin, Tylenol#3  Anticoagulants/Antiplatelets: Yes aspirin and Coumadin   Pain Contract 03/12/2025  UDS ordered 03/12/2025 (did not result) will consider in future if requiring long term pain medications  Non-Pharmacologic  Therapy: Patient has previously completed physician directed physical therapy with aggravation of her pain symptoms.  I provided a new referral for physical therapy at this office  visit to address her low back pain related to compression fracture and facet arthritis of lower lumbar spine.  Pain Psychology: None  Bracing: Continue with bracing for next 6-10 weeks. Patient has been using a brace for past 4 weeks without any significant pain relief.   New Consults: Cardiac Clearance with Dr. Tony Leon for possible RFA in future  Interventions:  Injections:  Plan/Ordered:   05/16/2025: B/l L4-5 and L5-S1 MBB (no aspirin/warfarin hold, need cardiac clearance for future RFA)    Previous:   03/12/2025 TPI of paraspinal muscles in lumbar region: multifidus, longissimus and illiocostalis with complete relief of mid back pain at 4 week visit  Kyphoplasty at T8 and T9 in 09/2024  Spine Rubén at L1 in 02/2025  Information on the procedure was provided to the patient today. Risks and benefits were discussed. All questions were answered.  Surgical Spine Interventions: Cervical ACDF  Relevant Risk Factors for Interventions:  Medical-Surgical History/Devices: Stroke, compression # at T8 and T9 s/p kyphoplasty, compression # L1 s/p spine rubén procedure, COPD, Atrial Fib, CHF, s/p heart valve replacement, Right patella fracture, LESLI, tobacco dependence  Allergies: ACE inhibitors (Anaphylaxis), Naproxen, Nifedipine  Other:   I reviewed the prior notes from each unique source dated 02/27/2025 with Guerita RAPHAEL (NeuroSurgery) and 01/29/2025 with Dr. Devyn Young (Orthopedics)  Old Records: Decision was made to not obtain old records   Follow Up: Plan for patient to follow up in 2 weeks    Current Visit Imaging Interpretation:  I independently visualized/interpreted the following images dated Xray Thoraco-Lumbar Spine dated 02/27/2025 and 02/06/2025   Pertinent findings include: Compression fracture at T12 with more than 30% height loss as per Xray imaging.  Key Images:       The above plan and management options were discussed at length with patient. Patient is in agreement with the above and verbalized  understanding.    - I discussed the goals of interventional chronic pain management with the patient on today's visit. I explained the utility of injections for diagnostic and therapeutic purposes.  We discussed a multimodal approach to pain including treating the patient's given worst pain at any given time.  We will use a systematic approach to addressing pain.  We will also adopt a multimodal approach that includes injections, adjuvant medications, physical therapy, at times psychiatry.  There may be a limited role for opioid use intermittently in the treatment of pain, more particularly for acute pain although no one approach can be used as a sole treatment modality. I emphasized the importance of regular exercise, core strengthening and stretching, diet and weight loss as a cornerstone of long-term pain management.    - This condition does not require this patient to take time off of work, and the primary goal of our Pain Management services is to improve the patient's functional capacity.  - Patient Questions: Answered all of the patient's questions regarding diagnoses, therapy, treatment and next steps    Interval History:     04/15/2025  Pain Progress:  Patient noted that that she had significant pain relief following trigger point injections performed at the previous office visit at the site of newly developed fracture.  Patient did not have any tenderness to palpation along the mid back and the upper lumbar spine area at this office visit.  Significant pain at this office visit was in the lower back and it was predominantly axial.  This pain is related to facet arthritis.  Given the presentation I provided medications including short-term oxycodone and methocarbamol for muscle spasms.  Pain Score:  8/10  Functionality:  Patient continues to have significant difficulty with functionality especially pain that comes along with sitting and standing for longer periods of time.  Medications:  Short course of  oxycodone including 14 tablets and on month supply of Robaxin at this office visit  New Diagnostic Labs/Imaging:  I reviewed the new MRI imaging performed on April 10, 2025 with the patient.  Therapy:  I provided a new referral for physical therapy at this office visit given it has been 8-12 weeks from her last documented fracture identification.  Intervention:  I also recommended medial branch block to address her pain related to facet arthritis in the lower back region.      History of Present Illness:     2025 HPI Summary    Context/Inciting Event: Denies recent trauma and falls. Notes increased aggravation of pain following spine jorge l procedure performed in 2025.  Location: Lower Back Pain   Radiation: Denies radiation to lower extremities   Onset and Progress: Continuous pain worsening over the past 4 weeks (Acute compression fracture)  Description/Character: stabbing  Frequency and Timing: Constant pain with periods of exacerbation  Pain Score:  8/10 on the best day and 10/10 on the worst day. Pain at this office visit 9/10 and the previous office visit 8/10 with Guerita Fox (NeuroSurgery)  Worsening Pain: Patient notes that pain has been progressively worsening over the last few weeks.   Symptoms Worse With: Standing, Walking, and Flexing  Symptoms Improved With: nothing  Vertebral Height Loss and Imagin% height loss noted as per Xray imaging on 2025 at T12. Pending MRI to assess for retropulsion and bony edema  Osteoporosis and Treatment: yes on alendronate, calcium carbonate, vitamin D3  Recent DXA Scan: yes; ordered by Guerita Fox NP (NeuroSurgery)  Bracing: yes    Associated Conditions:  Numbness: no  Weakness: no  Sleep problems: yes  H/o Depression and anxiety: no  Opiate use disorder: no  Inflammatory conditions like Rheumatoid/Ankylosing spondylitis/Psoriatic: no  Functional/Activity Limitations: yes, Symptoms interfere with daily activity and sleeping    Patient denies  night fever/night sweats, urinary incontinence, bowel incontinence, significant weight loss, significant motor weakness, and loss of sensations.    Previous Treatments:  Medications:   Current: Clonazepam, Fluoxetine, Gabapentin, Hydrocodone, Lidocaine 5%, Olanzapine, Trazodone  Previous:  Baclofen, oxycodone, tramadol  Anticoagulants/Antiplatelets: Yes aspirin and Coumadin     Physical Therapy/Home Exercise: yes  Ice/Heat:yes  TENS: no  Acupuncture: no  Massage: no  Chiropractic: no      Interventional Procedures:  Kyphoplasty at T8 and T9 in 09/2024  Spine Jack at L1 in 02/2025  Spine Surgery: Cervical ACDF procedure  Other: None      Summary of Other Notes:  02/27/2025 with Guerita RAPHAEL (NeuroSurgery)  ASSESSMENT:          ICD-10-CM ICD-9-CM   1. T12 compression fracture, initial encounter  S22.080A 805.2   2. Osteoporosis with current pathological fracture, unspecified osteoporosis type, initial encounter  M80.00XA 733.00       733.10      PLAN:      1. T12 compression fracture, initial encounter (Primary)  - Ambulatory referral/consult to Pain Clinic; Future  - HYDROcodone-acetaminophen (NORCO) 7.5-325 mg per tablet; Take 1 tablet by mouth every 8 (eight) hours as needed for Pain (1/2 tablet every 8 hours PRN fro severe pain).  Dispense: 21 tablet; Refill: 0  - DXA Bone Density Appendicular Skeleton; Future     2. Osteoporosis with current pathological fracture, unspecified osteoporosis type, initial encounter  - DXA Bone Density Appendicular Skeleton; Future     Kayla Ruiz presents today describing severe pain into the lower back.  She is accompanied by her 2 daughters today.  I did take the time to review her previous CT scan as well as updated x-rays in clinic with them which does reveal a an acute T12 compression fracture.  We will refer the patient on for consultation with Dr. Mendoza regarding kyphoplasty.  The patient was advised to return to her TLSO brace in the interim.  We did discuss  activity restrictions including no lifting more than 10lbs and avoiding any type of bending, stooping or twisting activities.  I did provide the patient a 1 time refill of hydrocodone which she has been cutting in half.  She was advised to utilize this medication sparingly as I will not be able to provide her a refill.  She was advised to alternate this medication with Tylenol and not to exceed more than 4000 mg in a 24 hour period.  The patient and her daughters verbalized understanding at this time.  We will plan to see the patient back in clinic on an as-needed basis going forward.  They were advised to notify us with any concerns or questions in the future.  I have also entered in order for a bone density scan as the patient states she has previously been diagnosed with osteoporosis although her most recent bone density scan was more than 2 years ago.  Her daughter states she has been prescribed what sounds like Fosamax although has not initiated this medication as the pharmacy has not gotten back with her since it was prescribed.  She was advised to reach out to her primary care provider regarding this issue.   01/29/2025 with Dr. Devyn Young (Orthopedics)  Patient is here today for follow-up evaluation 2 weeks status post fracture of right patella, nondisplaced. She was placed into a hinged knee brace in the hospital. It has been locked full extension. She is limited in her ability to mobilize at baseline. She has family with her today. She states she has soreness in the knee but it is well-controlled.   Assessment  1. Other closed fracture of right patella with routine healing, subsequent encounter  X-Ray Knee 1 or 2 View Right         Plan  Her fracture is completely nondisplaced and has not had any change in alignment compared to her initial films.  I have open up her brace at 0-60 at rest.  She will continue ambulating with it in full extension.  I have shown the family how to manage her brace.  She has  minimal discomfort in the knee.  She has fractures in her spine that are being managed by Neurosurgery.  I will see her back in a month for repeat x-rays at which time we will open it up to 0-90 degrees.  They understand and agree with all that we have discussed and all questions and concerns were addressed.         Pain Disability Index:          4/15/2025     9:08 AM 3/12/2025     9:13 AM   Last 3 PDI Scores   Pain Disability Index (PDI) 50 56     Nabil Doe Disability Index 22/24     Review:  Reviewed and consistent with medication use as prescribed.        Review of patient's allergies indicates:   Allergen Reactions    Ace inhibitors Anaphylaxis     SWELLING    Venlafaxine Swelling    Naproxen sodium      RASH    Nifedipine      ITCHING       Past Medical History:   Diagnosis Date    A-fib     Anticoagulant long-term use     Depression     Diabetes mellitus     HTN (hypertension)     Hyperlipidemia     Sleep apnea     pt states she has not been fitted for cpap    TIA (transient ischemic attack) 2025       Past Surgical History:   Procedure Laterality Date    AORTIC VALVE REPLACEMENT      CHOLECYSTECTOMY      COLON RESECTION      fusion      Lumbar    INJECTION OF ANESTHETIC AGENT AROUND MEDIAL BRANCH NERVES INNERVATING LUMBAR FACET JOINT Bilateral 5/16/2025    Procedure: BLOCK, NERVE, FACET JOINT, LUMBAR, MEDIAL BRANCH      /////Bilateral L4-5 and L5-S1 MBB (no warfarin/aspirin, need cardiac clearance);  Surgeon: Vladimir Mendoza MD;  Location: [address] OR;  Service: Pain Management;  Laterality: Bilateral;  Bilateral L4-5 and L5-S1 (no warfarin/aspirin, need cardiac clearance)    LEFT HEART CATHETERIZATION Left 05/18/2023    Procedure: Left heart cath;  Surgeon: Kahlil Martin MD;  Location: [facility] CATH LAB;  Service: Cardiology;  Laterality: Left;  LHC +/- PCI    TONSILLECTOMY          reports that she has been smoking cigarettes. She has a 50 pack-year smoking history. She has never used  smokeless tobacco. She reports that she does not drink alcohol and does not use drugs.    Family History   Problem Relation Name Age of Onset    COPD Mother      Hypertension Mother      Heart disease Father         Current Facility-Administered Medications   Medication    LIDOcaine HCL 10 mg/ml (1%) injection 10 mL     Current Outpatient Medications   Medication Sig    albuterol-ipratropium (DUO-NEB) 2.5 mg-0.5 mg/3 mL nebulizer solution Take 3 mLs by nebulization every 4 (four) hours as needed for Wheezing or Shortness of Breath.    alendronate (FOSAMAX) 70 MG tablet Take 70 mg by mouth every 7 days.    aspirin 81 MG Chew Take 81 mg by mouth once daily.    atorvastatin (LIPITOR) 40 MG tablet Take 40 mg by mouth once daily.    calcium carbonate-vitamin D3 600 mg-10 mcg (400 unit) Cap Take 1,250 mcg by mouth.    cholecalciferol, vitamin D3, 1,250 mcg (50,000 unit) capsule Take 50,000 Units by mouth every 7 days.    clonazePAM (KLONOPIN) 1 MG tablet Take 0.5 mg by mouth 3 (three) times daily as needed for Anxiety.    empagliflozin (JARDIANCE) 10 mg tablet Take 10 mg by mouth once daily.    FLUoxetine 40 MG capsule Take 40 mg by mouth once daily.    furosemide (LASIX) 40 MG tablet Take 1 tablet (40 mg total) by mouth 2 (two) times daily.    isosorbide mononitrate (IMDUR) 30 MG 24 hr tablet Take 30 mg by mouth once daily.    LIDOcaine (LIDODERM) 5 % Place 1 patch onto the skin once daily. Remove & Discard patch within 12 hours or as directed by MD    OLANZapine (ZYPREXA) 5 MG tablet Take 5 mg by mouth every evening.    ondansetron (ZOFRAN-ODT) 4 MG TbDL Take 4 mg by mouth every 6 (six) hours as needed.    pregabalin (LYRICA) 50 MG capsule Take 1 capsule (50 mg total) by mouth 2 (two) times daily for 7 days, THEN 2 capsules (100 mg total) 2 (two) times daily for 7 days.    spironolactone (ALDACTONE) 25 MG tablet Take 1 tablet (25 mg total) by mouth once daily.    traZODone (DESYREL) 50 MG tablet Take 25-50 mg by mouth  "nightly as needed.    warfarin (COUMADIN) 5 MG tablet Take 5 mg by mouth Daily.    celecoxib (CELEBREX) 200 MG capsule Take 1 capsule (200 mg total) by mouth 2 (two) times daily as needed for Pain. Please don't take ibuprofen or other NSAIDs with this medication    dapagliflozin propanediol (FARXIGA) 10 mg tablet Take 1 tablet by mouth once daily. (Patient not taking: Reported on 3/12/2025)    ergocalciferol (ERGOCALCIFEROL) 50,000 unit Cap Take 1 capsule by mouth every 7 days. (Patient not taking: Reported on 3/12/2025)    LIDOcaine 1%, BUFFERED, 10 MG/20 mL APPLY 1 PATCH TOPICALLY IN THE MORNING FOR 14 DAYS. REMOVE AND DISCARD WITHIN 12 HOURS    nicotine (NICODERM CQ) 21 mg/24 hr Place 1 patch onto the skin once daily. (Patient not taking: Reported on 3/12/2025)    [Paused] sotaloL (BETAPACE) 120 MG Tab Take 80 mg by mouth every 12 (twelve) hours. Take 1/2 tablet BID    TRUE METRIX GLUCOSE METER Misc USE 1 ITEM AS DIRECTED THREE TIMES A DAY TO CHECK BLOOD GLUCOSE    TRUE METRIX GLUCOSE TEST STRIP Strp 3 (three) times daily.    TRUEPLUS LANCETS 33 gauge Misc Apply topically 3 (three) times daily.     Facility-Administered Medications Ordered in Other Encounters   Medication    0.9%  NaCl infusion    diphenhydrAMINE capsule 50 mg       Review of Systems:     Review of Systems   Constitutional: Negative.  Negative for chills, diaphoresis and fever.   HENT: Negative.     Eyes: Negative.    Respiratory: Negative.  Negative for shortness of breath and wheezing.    Cardiovascular: Negative.  Negative for chest pain and palpitations.   Gastrointestinal: Negative.  Negative for blood in stool.   Genitourinary: Negative.    Musculoskeletal:         Refer to HPI   Skin: Negative.  Negative for rash.   Neurological:  Negative for tremors and speech change.   Endo/Heme/Allergies: Negative.    Psychiatric/Behavioral: Negative.         PHYSICAL EXAM:   Visit Vitals  Ht 5' 5.5" (1.664 m)   Wt 86.2 kg (190 lb)   Breastfeeding No "   BMI 31.14 kg/m²       General Appearance: healthy, well developed, well nourished, appropriately dressed  Mental Status: Alert, oriented, thought content appropriate; Normal affect  Psych: Mood and affect appropriate  Head: Normocephalic, atraumatic  Eyes: Extraocular movements intact.   Neck: No asymmetry, masses or scars; supple; midline   Skin: Warm and dry; No rashes visible on exposed areas  Lung: Respiratory effort normal, symmetrical chest rise  Cardiovascular: RRR with palpation of the radial artery.    Neuro:     Motor & Sensory:     Strength and Sensation:     Upper Extremity  Nerve C5 C6 C7 C8 T1   Muscle Groups Shoulder Abduction Elbow Flexion (Palm up) Elbow Flexion (Thumb up) Wrist Extension Elbow Extension Wrist Flexion Hand  Finger Abduction   Right 5/5 5/5 5/5 5/5 5/5 5/5 5/5 5/5   Left 5/5 5/5 5/5 5/5 5/5 5/5 5/5 5/5     Nerve Root (area) Light Touch    R L   C5  (Lateral arm below deltoid) 2 2   C6  (Thumb & Radial hand/forearm) 2 2   C7 (Finger 2,3,4) 2 2   C8 (Finger 5) 2 2   T1 (Medial elbow) 2 2       Lower Extremity:  Nerve L2 L3 L4/L5 L5 S1 S2   Muscle Group Hip Flexion Hip  Adduction Knee Extension Ankle Dorsiflexion Toe  Dorsiflexion Hip  Abduction Ankle Plantarflexion Toe  Plantarflexion   Right 5/5 5/5 5/5 5/5 5/5 5/5 5/5 5/5   Left 5/5 5/5 5/5 5/5 5/5 5/5 5/5 5/5     Nerve Root (area) Light Touch    R L   L1 (iliac crest/groin) 2 2   L2 (anterior/inner thigh) 2 2   L3 (anterior thigh/medial knee) 2 2   L4 (lateral thigh/ant. knee/medial foot border) 2 2   L5 (lateral leg/dorsum foot below 2-4 toes) 2 2   S1 (lateral foot border) 2 2   S2 (plantar heel) 2 2       Reflexes:    R L   Biceps (C5) 2+ 2+   Brachioradialis (C6) 2+ 2+   Triceps (C7) 2+ 2+   Patellar (L4) 2+ 2+   Achilles (S1) 2+ 2+       Upper Tract Signs:    R L   Colmenares's Negative Negative   Plantar Response Down-going Down-going   Clonus Negative Negative     Gait:   Heel walking: Absent  Toe walking: Absent  Tandem  walking: Absent      MSK:    Lumbar Exam  Inspection:  Surgical scar noted, insertion sites for needles for kyphoplasty noted around upper midback.  Palpation:  Tenderness also noted along the paraspinal muscles in the lower lumbar region.  ROM:  Significant limitation to range of motion for extension flexion rotation and lateral bending  Special Tests:   Facet Loading:  Positive bilateral  Straight Leg raise:  Negative bilaterally     Data     Imaging of Relevance:     Lumbar Spine  MRI Imaging  Results for orders placed during the hospital encounter of 04/01/25    MRI Lumbar Spine Without Contrast    Narrative  EXAMINATION:  MRI LUMBAR SPINE WITHOUT CONTRAST    CLINICAL HISTORY:  Compression fracture, lumbar;Assess for compression fracture (T12 level, height loss, bony edema, retropulsion), facet arthritis, stenosis (foraminal/central), degenerative disc, modic changes and atrophy of paraspinal muscles;  Wedge compression fracture of T11-T12 vertebra, initial encounter for closed fracture    TECHNIQUE:  Multiplanar, multisequence MR images were acquired from the thoracolumbar junction to the sacrum without the administration of contrast.    COMPARISON:  MR lumbar spine without contrast 02/11/2025.    FINDINGS:  Subacute compression fracture of T12 inferior endplate with 34% maximum vertebral body height loss.  This is new when compared to 02/11/2025 but was seen on radiographs from 02/27/2025.    Similar appearing height loss associated with L1 superior endplate compression fracture of approximately 44%.  This has been treated in the interval with kyphoplasty.  There is persistent mild bowing of the posterior cortex of L1 vertebral body with only mild indentation upon the ventral thecal sac.    No spondylolisthesis.    Marrow signal is heterogeneous but overall nonaggressive in appearance.    No intrinsic abnormality of the distal cord or cauda equina.    No significant paraspinous soft tissue  abnormality.    Partial visualization of a cyst arising exophytically from the left renal interpolar cortex.    T12-L1: Shallow central disc protrusion indenting the ventral thecal sac with no significant stenosis or neural impingement.    L1-L2: Mild disc degeneration and shallow central disc protrusion indenting the ventral thecal sac with no significant stenosis or neural impingement.    L2-L3: Unremarkable.    L3-L4: Mild disc degeneration, shallow disc bulge, and superimposed left foraminal disc protrusion causing mild left foraminal stenosis with no evidence of exiting nerve impingement.  Central canal and right foramina are grossly patent.    L4-L5: Mild disc degeneration/Modic type 2 endplate changes, mild right marginal osteophyte, and mild right facet joint degeneration causing mild right foraminal stenosis.  Central canal and left foramina are patent.    L5-S1: Mild disc degeneration with mild Modic type 2 endplate change and left marginal osteophyte with no significant stenosis or neural impingement.    Impression  Subacute compression fracture along T12 inferior endplate with approximately 34% maximum vertebral body height loss.    Similar height loss associated with L1 superior endplate compression fracture which has undergone kyphoplasty in the interim.      Electronically signed by: Phoenix Albert  Date:    04/10/2025  Time:    13:17      CT Imaging  Results for orders placed during the hospital encounter of 01/09/25    CT Lumbar Spine Without Contrast    Narrative  EXAMINATION:  CT LUMBAR SPINE WITHOUT CONTRAST    CLINICAL HISTORY:  Low back pain, trauma;Back trauma, no prior imaging (Age >= 16y);    TECHNIQUE:  Low-dose axial, sagittal and coronal reformations are obtained through the lumbar spine. 3D reconstructions were performed. Contrast was not administered. Dose reduction techniques including automatic exposure control (AEC) were utilized.    Dose (DLP): 1060 mGycm    COMPARISON:  CT abdomen  and pelvis 08/25/2014.    FINDINGS:  Five lumbar-type vertebral bodies.    Vertebral column alignment is normal.  No fractures demonstrated.  Vertebral body heights are preserved.    Mild multilevel lumbar disc degeneration.  Left foraminal disc protrusion at L3-L4 causing mild left foraminal stenosis encroaching upon the exiting left L3 nerve.  Shallow disc bulges at L1-L2 through L4-5 which indent the ventral thecal sac.  No significant central canal stenosis.    Paravertebral soft tissues are normal.    Cholecystectomy.  Calcific atherosclerosis of the abdominal aorta and its branches.  Anastomotic sutures are noted in the sigmoid colon.  Diverticulosis of the descending and sigmoid colon.    Impression  No acute fracture or subluxation.    Mild multilevel lumbar degeneration as detailed above.      Electronically signed by: Phoenix Albert  Date:    01/09/2025  Time:    10:40      Results for orders placed during the hospital encounter of 02/04/25    CT Cervical Spine Without Contrast    Narrative  EXAMINATION:  CT CERVICAL SPINE WITHOUT CONTRAST    CLINICAL HISTORY:  Trauma.    TECHNIQUE:  Multidetector axial images were performed of the cervical spine without and.  Images were reconstructed.  Automated exposure control was utilized to minimize radiation dose.  .    COMPARISON:  None available.    FINDINGS:  Cervical vertebrae stature is maintained.  There is grade 1 anterolisthesis of C4 on C5 and C7 on T1.  Solid ACDF at C5, C6 and C7.  No acute fracture or malalignment identified.  There is moderate degenerative narrowing of the left neural foramen at C4-C5.  There is no prevertebral soft tissue prominence.  This study does not exclude the possibility of intrathecal soft tissue, ligamentous or vascular injury.    Impression  No acute fracture or malalignment identified.      Electronically signed by: Juanito Chopra  Date:    02/04/2025  Time:    21:25      02/27/2025 Xray Thoraco-Lumbar Spine    FINDINGS:  There is demineralization of the visualized osseous structures with evidence of a treated compression deformity of L1 there is now evidence of anterior wedging and compression deformity of T12 these is new since the last examination of February 6, 2025.  Kyphoplasty was performed at what appears to be T8 and T9.  There is retrolisthesis of L2 on L3 and L3 on L4 with some degenerative changes of the posterior elements at L4-L5 and L5-S1.     Impression: New compression deformity of T12 with anterior wedging not seen on the examination of February 6, 2025. Compression deformity of L1 head progress since that last examination and is straightening now with evidence of kyphoplasty changes. Degenerative changes    02/06/2025 Xray Thoraco-Lumbar Spine:   FINDINGS:  There is an L1 superior endplate compression fracture with mild-to-moderate loss of height, progressed from the prior exam.  The remaining vertebral body heights are preserved.  There are small multilevel marginal osteophytes.  There is mild facet arthropathy.  Vascular calcifications are noted.  Impression: L1 compression fracture with progressive loss of height compared to 01/09/2025.      Labs of Relevance:  Chemistry:  Lab Results   Component Value Date     05/02/2025    K 4.3 05/02/2025    BUN 21.6 (H) 05/02/2025    CREATININE 0.99 05/02/2025    EGFRNORACEVR >60 05/02/2025    CALCIUM 8.8 05/02/2025    ALKPHOS 134 05/02/2025    LABPROT 13.9 09/23/2024    ALBUMIN 3.3 (L) 05/02/2025    BILIDIR 0.3 02/22/2021    IBILI 0.50 02/22/2021    AST 16 05/02/2025    ALT 11 05/02/2025    MG 2.20 02/16/2025    PHOS 4.2 02/06/2025        Lab Results   Component Value Date    HGBA1C 6.9 02/05/2025        Hematology:  Lab Results   Component Value Date    WBC 7.15 02/17/2025    HGB 10.0 (L) 02/17/2025    HCT 32.9 (L) 02/17/2025     02/17/2025      Lab Results   Component Value Date    INR 2.2 06/10/2025    INR 1.7 (A) 05/27/2025    INR 2.7 (H)  05/19/2025    PROTIME 25.6 (H) 05/19/2025    PROTIME 53.0 (H) 04/30/2025    PROTIME 50.3 (H) 02/26/2025         Vladimir Mendoza MD  Interventional Pain Management  [facility]     Disclaimer:  This note was prepared using voice recognition system and is likely to have sound alike errors that may have been overlooked even after proof reading.  Please call me with any questions

## 2025-06-23 NOTE — DISCHARGE INSTRUCTIONS
Medial Branch Block After Care    You may take the bandage off and shower tomorrow. Check for signs and symptoms of infection daily: redness, warmth, pus or drainage, increase swelling, and foul odor.  Wash your hands before and after touching your puncture site.  No heavy lifting for 1 week.  No driving for 24 hours.  You may resume your regular diet today.  You may resume your regular activities tomorrow.  No heating pad on the puncture site for 24 hours. You may use an ice pack for pain or swelling for no longer than 15 minutes at a time for 3-4 times a day. Do not place ice directly on your skin.  No aspirin, blood thinners, or NSAIDs for 24 hours.  Ok to take Tylenol today    Contact your doctor for:  Increase pain not controlled with medication.  Any new numbness or tingling.  Fever greater than 101 degree Fahrenheit that does not break with medication.  Any signs or symptoms of infection: redness, warmth, pus or drainage, increase swelling, and foul odor at the puncture site.

## 2025-06-23 NOTE — OP NOTE
Diagnostic Lumbar Medial Branch Block Under Fluoroscopy    The procedure, risks, benefits, and options were discussed with the patient. There are no contraindications to the procedure. The patent expressed understanding and agreed to the procedure. Informed written consent was obtained prior to the start of the procedure and can be found in the patient's chart.    PATIENT NAME: Kayla Ruiz   MRN: [id number]     DATE OF PROCEDURE: 06/23/2025                                           PROCEDURE:  Diagnostic Bilateral L4-5 and L5-S1 Lumbar Medial Branch Block under Fluoroscopy    PRE-OP DIAGNOSIS: Lumbar spondylosis [M47.816] Lumbar spondylosis [M47.816]    POST-OP DIAGNOSIS: Same    PHYSICIAN: Vladimir Mendoza MD    ASSISTANTS: None    MEDICATIONS INJECTED:  Bupivicaine 0.5%    LOCAL ANESTHETIC INJECTED:   Xylocaine 1%    SEDATION: Versed 0 mg and Fentanyl 0mcg                                                                                                                                                                                     Conscious sedation ordered by M.D. Patient re-evaluation prior to administration of conscious sedation. No changes noted in patient's status from initial evaluation. The patient's vital signs were monitored by RN and patient remained hemodynamically stable throughout the procedure.  No case tracking events are documented in the log.    ESTIMATED BLOOD LOSS:  None    COMPLICATIONS:  None.    INTERVAL HISTORY: Patient has clinical and imaging findings suggestive of facet mediated pain. Patient had a previous diagnostic block performed with at least 80% relief in pain and/or at least 50% improvement in the ability to perform previously painful movements and ADLs for the expected duration of the local anesthetic utilized.    TECHNIQUE: Time-out was performed to identify the patient and procedure to be performed. With the patient laying in a prone position, the surgical area was prepped and  draped in the usual sterile fashion using ChloraPrep and fenestrated drape. The levels were determined under fluoroscopic guidance. Skin anesthesia was achieved by injecting Lidocaine 2% over the injection sites. A 25 gauge, 3.5 inch needle was introduced into the medial branch nerves at the junctions of the superior articular process and the transverse processes of the targeted sites using AP, lateral and/or contralateral oblique fluoroscopic imaging. After negative aspiration for blood or CSF was confirmed, 0.5 mL of the anesthetic listed above was then slowly injected at each site. The needles were removed and bleeding was nil. A sterile dressing was applied. No specimens collected. The patient tolerated the procedure well.    PRE-PROCEDURE PAIN SCORE: 8/10      The patient was monitored after the procedure in the recovery area. They were given post-procedure and discharge instructions to follow at home. The patient was discharged in a stable condition.    Vladimir Mendoza MD

## 2025-06-23 NOTE — DISCHARGE SUMMARY
Discharge Note  Short Stay      SUMMARY     Admit Date: 6/23/2025    Attending Physician: Vladimir Mendoza MD        Discharge Physician: Vladimir Mendoza MD        Discharge Date: 6/23/2025 12:49 PM    Procedure(s) (LRB):  BLOCK, NERVE, FACET JOINT, LUMBAR, MEDIAL BRANCH     /////B/l L4-5 and L5-S1 MBB (no medication hold for aspirin or warfarin, has cardiac clearance) (Bilateral)    Final Diagnosis: Lumbar spondylosis [M47.816]    Disposition: Home or self care    Patient Instructions:   Current Discharge Medication List        CONTINUE these medications which have NOT CHANGED    Details   albuterol-ipratropium (DUO-NEB) 2.5 mg-0.5 mg/3 mL nebulizer solution Take 3 mLs by nebulization every 4 (four) hours as needed for Wheezing or Shortness of Breath.      alendronate (FOSAMAX) 70 MG tablet Take 70 mg by mouth every 7 days.      aspirin 81 MG Chew Take 81 mg by mouth once daily.      atorvastatin (LIPITOR) 40 MG tablet Take 40 mg by mouth once daily.      calcium carbonate-vitamin D3 600 mg-10 mcg (400 unit) Cap Take 1,250 mcg by mouth.      cholecalciferol, vitamin D3, 1,250 mcg (50,000 unit) capsule Take 50,000 Units by mouth every 7 days.      clonazePAM (KLONOPIN) 1 MG tablet Take 0.5 mg by mouth 3 (three) times daily as needed for Anxiety.      empagliflozin (JARDIANCE) 10 mg tablet Take 10 mg by mouth once daily.      FLUoxetine 40 MG capsule Take 40 mg by mouth once daily.      furosemide (LASIX) 40 MG tablet Take 1 tablet (40 mg total) by mouth 2 (two) times daily.  Qty: 60 tablet, Refills: 11      isosorbide mononitrate (IMDUR) 30 MG 24 hr tablet Take 30 mg by mouth once daily.      LIDOcaine (LIDODERM) 5 % Place 1 patch onto the skin once daily. Remove & Discard patch within 12 hours or as directed by MD  Qty: 15 patch, Refills: 0      OLANZapine (ZYPREXA) 5 MG tablet Take 5 mg by mouth every evening.      ondansetron (ZOFRAN-ODT) 4 MG TbDL Take 4 mg by mouth every 6 (six) hours as needed.      pregabalin  (LYRICA) 50 MG capsule Take 1 capsule (50 mg total) by mouth 2 (two) times daily for 7 days, THEN 2 capsules (100 mg total) 2 (two) times daily for 7 days.  Qty: 42 capsule, Refills: 0    Associated Diagnoses: Lumbar radiculopathy, chronic      spironolactone (ALDACTONE) 25 MG tablet Take 1 tablet (25 mg total) by mouth once daily.  Qty: 30 tablet, Refills: 11    Comments: .      traZODone (DESYREL) 50 MG tablet Take 25-50 mg by mouth nightly as needed.      warfarin (COUMADIN) 5 MG tablet Take 5 mg by mouth Daily.      celecoxib (CELEBREX) 200 MG capsule Take 1 capsule (200 mg total) by mouth 2 (two) times daily as needed for Pain. Please don't take ibuprofen or other NSAIDs with this medication  Qty: 60 capsule, Refills: 0    Associated Diagnoses: Lumbar spondylosis      dapagliflozin propanediol (FARXIGA) 10 mg tablet Take 1 tablet by mouth once daily.      ergocalciferol (ERGOCALCIFEROL) 50,000 unit Cap Take 1 capsule by mouth every 7 days.      LIDOcaine 1%, BUFFERED, 10 MG/20 mL APPLY 1 PATCH TOPICALLY IN THE MORNING FOR 14 DAYS. REMOVE AND DISCARD WITHIN 12 HOURS      nicotine (NICODERM CQ) 21 mg/24 hr Place 1 patch onto the skin once daily.  Qty: 30 patch, Refills: 0    Comments: Further taper per PCP      sotaloL (BETAPACE) 120 MG Tab Take 80 mg by mouth every 12 (twelve) hours. Take 1/2 tablet BID      TRUE METRIX GLUCOSE METER Misc USE 1 ITEM AS DIRECTED THREE TIMES A DAY TO CHECK BLOOD GLUCOSE      TRUE METRIX GLUCOSE TEST STRIP Strp 3 (three) times daily.      TRUEPLUS LANCETS 33 gauge Misc Apply topically 3 (three) times daily.                 Discharge Diagnosis: Lumbar spondylosis [M47.816]  Condition on Discharge: Stable with no complications to procedure   Diet on Discharge: Same as before.  Activity: as per instruction sheet.  Discharge to: Home with a responsible adult.  Follow up: 2-4 weeks

## 2025-06-24 ENCOUNTER — ANTI-COAG VISIT (OUTPATIENT)
Dept: CARDIOLOGY | Facility: HOSPITAL | Age: 70
End: 2025-06-24
Attending: INTERNAL MEDICINE
Payer: MEDICARE

## 2025-06-24 ENCOUNTER — TELEPHONE (OUTPATIENT)
Facility: CLINIC | Age: 70
End: 2025-06-24
Payer: MEDICARE

## 2025-06-24 VITALS
BODY MASS INDEX: 30.53 KG/M2 | OXYGEN SATURATION: 92 % | TEMPERATURE: 98 F | HEIGHT: 66 IN | HEART RATE: 68 BPM | RESPIRATION RATE: 15 BRPM | DIASTOLIC BLOOD PRESSURE: 84 MMHG | SYSTOLIC BLOOD PRESSURE: 145 MMHG | WEIGHT: 190 LBS

## 2025-06-24 LAB — INR PPP: 2.8 (ref 2–3)

## 2025-06-24 PROCEDURE — 85610 PROTHROMBIN TIME: CPT

## 2025-06-24 NOTE — TELEPHONE ENCOUNTER
I called again and spoke with Patient's spouse who was not home.  He will have Patient call me once he gets back home.

## 2025-06-24 NOTE — TELEPHONE ENCOUNTER
Called patient to review MBB Pain Diary.  Unable to leave voicemail.  Will try patient again at a later time.

## 2025-06-25 NOTE — TELEPHONE ENCOUNTER
I attempted to call patient again today but was unable to reach her.  She does not have voicemail set up.    I also called Daughter's phone and left a voicemail message on her phone.

## 2025-07-07 ENCOUNTER — TELEPHONE (OUTPATIENT)
Facility: CLINIC | Age: 70
End: 2025-07-07
Payer: MEDICARE

## 2025-07-07 NOTE — TELEPHONE ENCOUNTER
Pt called today had MBB on 6/23/25 states pain level was a 10 before procedure states now is between 7-8 pt does not have a f/u appt please advise

## 2025-07-15 ENCOUNTER — ANTI-COAG VISIT (OUTPATIENT)
Dept: CARDIOLOGY | Facility: HOSPITAL | Age: 70
End: 2025-07-15
Attending: INTERNAL MEDICINE
Payer: MEDICARE

## 2025-07-15 LAB — INR PPP: 3.3 (ref 2–3)

## 2025-07-15 PROCEDURE — 85610 PROTHROMBIN TIME: CPT

## 2025-07-15 PROCEDURE — 99211 OFF/OP EST MAY X REQ PHY/QHP: CPT

## 2025-08-05 ENCOUNTER — ANTI-COAG VISIT (OUTPATIENT)
Dept: CARDIOLOGY | Facility: HOSPITAL | Age: 70
End: 2025-08-05
Attending: INTERNAL MEDICINE
Payer: MEDICARE

## 2025-08-05 LAB — INR PPP: 2.5 (ref 2–3)

## 2025-08-05 PROCEDURE — 99211 OFF/OP EST MAY X REQ PHY/QHP: CPT

## 2025-08-05 PROCEDURE — 85610 PROTHROMBIN TIME: CPT

## 2025-09-04 ENCOUNTER — ANTI-COAG VISIT (OUTPATIENT)
Dept: CARDIOLOGY | Facility: HOSPITAL | Age: 70
End: 2025-09-04
Attending: INTERNAL MEDICINE
Payer: MEDICARE

## 2025-09-04 LAB — INR PPP: 2.6 (ref 2–3)

## 2025-09-04 PROCEDURE — 85610 PROTHROMBIN TIME: CPT

## 2025-09-04 PROCEDURE — 99211 OFF/OP EST MAY X REQ PHY/QHP: CPT

## (undated) DEVICE — TRAY NERVE BLOCK

## (undated) DEVICE — MARKER SKIN REG TIP RULER LAB

## (undated) DEVICE — DRESSING TEGADERM 4.4X5IN

## (undated) DEVICE — ADAPTER DUAL NSL LUER M-M 7FT

## (undated) DEVICE — COVER PROBE US 5.5X58L NON LTX

## (undated) DEVICE — GLOVE SENSICARE PI MICRO 7

## (undated) DEVICE — NDL BLNT STD 1.5IN 18G

## (undated) DEVICE — BANDAGE CURAD ADH 0.75X3IN

## (undated) DEVICE — NDL HYPO REG 25G X 1 1/2

## (undated) DEVICE — CANNULA NASAL ADULT

## (undated) DEVICE — MARKER FN REG DUAL UTIL RULER

## (undated) DEVICE — NDL QUINCKE SPINAL 25G 3.5IN

## (undated) DEVICE — KIT HAND CONTROL HIGH PRESSUR

## (undated) DEVICE — PAD DEFIB CADENCE ADULT R2

## (undated) DEVICE — TUBING HP AIRLSS ROT ADPT 30IN

## (undated) DEVICE — KIT GLIDESHEATH SLEND 6FR 10CM

## (undated) DEVICE — BAND TR COMP DEVICE REG 24CM

## (undated) DEVICE — DRAPE ANGIO BRACH 38X44IN

## (undated) DEVICE — SYR 10CC LUER LOCK

## (undated) DEVICE — NDL QUINCKE S/SU 22GA 5IN

## (undated) DEVICE — SYR W NDL BLN FILL 5ML 18GX1.5

## (undated) DEVICE — APPLICATOR CHLORAPREP CLR 3ML

## (undated) DEVICE — CATH JACKY RADIAL 5FR 100CM

## (undated) DEVICE — WIRE GUIDE INQWIRE STR 180CM